# Patient Record
Sex: FEMALE | Race: WHITE | NOT HISPANIC OR LATINO | URBAN - METROPOLITAN AREA
[De-identification: names, ages, dates, MRNs, and addresses within clinical notes are randomized per-mention and may not be internally consistent; named-entity substitution may affect disease eponyms.]

---

## 2017-01-04 ENCOUNTER — EMERGENCY (EMERGENCY)
Facility: HOSPITAL | Age: 82
LOS: 0 days | Discharge: HOME | End: 2017-01-05
Admitting: INTERNAL MEDICINE

## 2017-06-27 DIAGNOSIS — Z90.710 ACQUIRED ABSENCE OF BOTH CERVIX AND UTERUS: ICD-10-CM

## 2017-06-27 DIAGNOSIS — E78.00 PURE HYPERCHOLESTEROLEMIA, UNSPECIFIED: ICD-10-CM

## 2017-06-27 DIAGNOSIS — Z90.89 ACQUIRED ABSENCE OF OTHER ORGANS: ICD-10-CM

## 2017-06-27 DIAGNOSIS — R05 COUGH: ICD-10-CM

## 2017-06-27 DIAGNOSIS — E03.9 HYPOTHYROIDISM, UNSPECIFIED: ICD-10-CM

## 2017-06-27 DIAGNOSIS — I50.9 HEART FAILURE, UNSPECIFIED: ICD-10-CM

## 2017-06-27 DIAGNOSIS — J40 BRONCHITIS, NOT SPECIFIED AS ACUTE OR CHRONIC: ICD-10-CM

## 2017-06-27 DIAGNOSIS — I48.91 UNSPECIFIED ATRIAL FIBRILLATION: ICD-10-CM

## 2017-06-27 DIAGNOSIS — I11.0 HYPERTENSIVE HEART DISEASE WITH HEART FAILURE: ICD-10-CM

## 2017-07-30 ENCOUNTER — INPATIENT (INPATIENT)
Facility: HOSPITAL | Age: 82
LOS: 1 days | Discharge: HOME HEALTH | End: 2017-08-01
Attending: INTERNAL MEDICINE

## 2017-07-30 DIAGNOSIS — I51.9 HEART DISEASE, UNSPECIFIED: ICD-10-CM

## 2017-07-30 DIAGNOSIS — R06.02 SHORTNESS OF BREATH: ICD-10-CM

## 2017-07-30 DIAGNOSIS — I27.2 OTHER SECONDARY PULMONARY HYPERTENSION: ICD-10-CM

## 2017-07-30 DIAGNOSIS — M94.0 CHONDROCOSTAL JUNCTION SYNDROME [TIETZE]: ICD-10-CM

## 2017-07-30 DIAGNOSIS — B02.9 ZOSTER WITHOUT COMPLICATIONS: ICD-10-CM

## 2017-07-30 DIAGNOSIS — R50.9 FEVER, UNSPECIFIED: ICD-10-CM

## 2017-07-30 DIAGNOSIS — G20 PARKINSON'S DISEASE: ICD-10-CM

## 2017-07-30 DIAGNOSIS — R05 COUGH: ICD-10-CM

## 2017-07-30 DIAGNOSIS — E03.9 HYPOTHYROIDISM, UNSPECIFIED: ICD-10-CM

## 2017-08-03 DIAGNOSIS — F41.9 ANXIETY DISORDER, UNSPECIFIED: ICD-10-CM

## 2017-08-03 DIAGNOSIS — I50.9 HEART FAILURE, UNSPECIFIED: ICD-10-CM

## 2017-08-03 DIAGNOSIS — Z90.711 ACQUIRED ABSENCE OF UTERUS WITH REMAINING CERVICAL STUMP: ICD-10-CM

## 2017-08-03 DIAGNOSIS — I11.0 HYPERTENSIVE HEART DISEASE WITH HEART FAILURE: ICD-10-CM

## 2017-08-03 DIAGNOSIS — E78.5 HYPERLIPIDEMIA, UNSPECIFIED: ICD-10-CM

## 2017-08-03 DIAGNOSIS — I48.91 UNSPECIFIED ATRIAL FIBRILLATION: ICD-10-CM

## 2017-08-03 DIAGNOSIS — I07.1 RHEUMATIC TRICUSPID INSUFFICIENCY: ICD-10-CM

## 2017-08-03 DIAGNOSIS — R07.9 CHEST PAIN, UNSPECIFIED: ICD-10-CM

## 2017-08-03 DIAGNOSIS — E03.9 HYPOTHYROIDISM, UNSPECIFIED: ICD-10-CM

## 2017-08-03 DIAGNOSIS — G20 PARKINSON'S DISEASE: ICD-10-CM

## 2017-10-12 ENCOUNTER — INPATIENT (INPATIENT)
Facility: HOSPITAL | Age: 82
LOS: 6 days | Discharge: NURSING, REHAB OR LONG-TERM CARE | End: 2017-10-19
Attending: INTERNAL MEDICINE

## 2017-10-12 DIAGNOSIS — R05 COUGH: ICD-10-CM

## 2017-10-12 DIAGNOSIS — B02.9 ZOSTER WITHOUT COMPLICATIONS: ICD-10-CM

## 2017-10-12 DIAGNOSIS — R06.02 SHORTNESS OF BREATH: ICD-10-CM

## 2017-10-12 DIAGNOSIS — R50.9 FEVER, UNSPECIFIED: ICD-10-CM

## 2017-10-12 DIAGNOSIS — G20 PARKINSON'S DISEASE: ICD-10-CM

## 2017-10-12 DIAGNOSIS — I51.9 HEART DISEASE, UNSPECIFIED: ICD-10-CM

## 2017-10-12 DIAGNOSIS — E03.9 HYPOTHYROIDISM, UNSPECIFIED: ICD-10-CM

## 2017-10-26 DIAGNOSIS — I25.10 ATHEROSCLEROTIC HEART DISEASE OF NATIVE CORONARY ARTERY WITHOUT ANGINA PECTORIS: ICD-10-CM

## 2017-10-26 DIAGNOSIS — J12.9 VIRAL PNEUMONIA, UNSPECIFIED: ICD-10-CM

## 2017-10-26 DIAGNOSIS — I50.9 HEART FAILURE, UNSPECIFIED: ICD-10-CM

## 2017-10-26 DIAGNOSIS — I48.91 UNSPECIFIED ATRIAL FIBRILLATION: ICD-10-CM

## 2017-10-26 DIAGNOSIS — Z86.19 PERSONAL HISTORY OF OTHER INFECTIOUS AND PARASITIC DISEASES: ICD-10-CM

## 2017-10-26 DIAGNOSIS — I11.0 HYPERTENSIVE HEART DISEASE WITH HEART FAILURE: ICD-10-CM

## 2017-10-26 DIAGNOSIS — F41.9 ANXIETY DISORDER, UNSPECIFIED: ICD-10-CM

## 2017-10-26 DIAGNOSIS — G93.41 METABOLIC ENCEPHALOPATHY: ICD-10-CM

## 2017-10-26 DIAGNOSIS — J12.1 RESPIRATORY SYNCYTIAL VIRUS PNEUMONIA: ICD-10-CM

## 2017-10-26 DIAGNOSIS — G20 PARKINSON'S DISEASE: ICD-10-CM

## 2017-10-26 DIAGNOSIS — F02.80 DEMENTIA IN OTHER DISEASES CLASSIFIED ELSEWHERE, UNSPECIFIED SEVERITY, WITHOUT BEHAVIORAL DISTURBANCE, PSYCHOTIC DISTURBANCE, MOOD DISTURBANCE, AND ANXIETY: ICD-10-CM

## 2017-10-26 DIAGNOSIS — E03.9 HYPOTHYROIDISM, UNSPECIFIED: ICD-10-CM

## 2018-02-15 ENCOUNTER — EMERGENCY (EMERGENCY)
Facility: HOSPITAL | Age: 83
LOS: 0 days | Discharge: HOME | End: 2018-02-16
Attending: EMERGENCY MEDICINE

## 2018-02-15 VITALS
SYSTOLIC BLOOD PRESSURE: 142 MMHG | TEMPERATURE: 98 F | HEART RATE: 67 BPM | DIASTOLIC BLOOD PRESSURE: 72 MMHG | OXYGEN SATURATION: 98 % | RESPIRATION RATE: 16 BRPM

## 2018-02-15 VITALS
WEIGHT: 139.99 LBS | SYSTOLIC BLOOD PRESSURE: 166 MMHG | OXYGEN SATURATION: 96 % | TEMPERATURE: 100 F | RESPIRATION RATE: 17 BRPM | HEART RATE: 58 BPM | HEIGHT: 60 IN | DIASTOLIC BLOOD PRESSURE: 76 MMHG

## 2018-02-15 DIAGNOSIS — M25.551 PAIN IN RIGHT HIP: ICD-10-CM

## 2018-02-15 DIAGNOSIS — I11.0 HYPERTENSIVE HEART DISEASE WITH HEART FAILURE: ICD-10-CM

## 2018-02-15 DIAGNOSIS — S22.41XA MULTIPLE FRACTURES OF RIBS, RIGHT SIDE, INITIAL ENCOUNTER FOR CLOSED FRACTURE: ICD-10-CM
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
## 2018-02-15 DIAGNOSIS — I50.9 HEART FAILURE, UNSPECIFIED: ICD-10-CM

## 2018-02-15 RX ORDER — OXYCODONE AND ACETAMINOPHEN 5; 325 MG/1; MG/1
1 TABLET ORAL ONCE
Qty: 0 | Refills: 0 | Status: COMPLETED | OUTPATIENT
Start: 2018-02-15 | End: 2018-02-15

## 2018-02-15 NOTE — ED PROVIDER NOTE - PHYSICAL EXAMINATION
CONSTITUTIONAL: Awake, alert. Well-developed; well-nourished; in no distress. Non-toxic appearing.   SKIN: No rash, vesicles/lesion, abrasions or lacerations. No ecchymosis or signs of trauma.   HEAD: Normocephalic; atraumatic. No step offs or tenderness.   EYES: Symmetrical, no discharge or signs of trauma. Conjunctiva and sclera clear.   ENT: Airway patent.   NECK: Supple; non-tender.   CARD: No chest wall deformity or tenderness. S1, S2 normal; no murmurs, gallops, or rubs. Regular rate and rhythm.  RESP: Good air movement. Lungs CTAB. No crackles, wheezes, rales or rhonchi.  ABD: Soft; non-distended; non-tender. No rebound/guarding/rigidity.   EXT: No bony deformity. + diffuse ttp along right rib. + ttp along anterior superior aspect of right hip. No shortening or external rotation.   NEURO: Strength 5/5 UE/LE b/l. No sensory deficits. n/v intact UE/LE b/l, pulses symmetrical.  PSYCH: Cooperative, appropriate.

## 2018-02-15 NOTE — ED PROVIDER NOTE - ATTENDING CONTRIBUTION TO CARE
s/p mech fall c/o right hip and rib pain. able to ambulate. no loc. on asa. no anticoags. in nad, ncat, perrl, eomi, cta, s1s2, ab soft, nt. tender right chest wall. no crep. tender right hip. FROM right hip with pain. nvi. non focal. will get imaging.

## 2018-02-15 NOTE — ED PROVIDER NOTE - PROGRESS NOTE DETAILS
Pt ambulating around ED. CT results discussed with pt. Instructed pt to f/u with PMD. Will give incentive spirometer & Percoets. Return precautions discussed and length and verbalized agreement. Pt ok with plan and comfortable with discharge.

## 2018-02-15 NOTE — ED PROVIDER NOTE - NS ED ROS FT
GENERAL: Denies fever/chills, loss of appetite/weight or fatigue.  SKIN: Denies rashes, abrasions, lacerations, ecchymosis, erythema, or edema.  HEAD: Denies headache, dizziness or trauma.  EYES: Denies blurry vision, diplopia, or photophobia.  CARDIAC: Denies chest pain, palpitations, or SOB.   RESPIRATORY: Denies SOB, cough, hemoptysis or wheezing.   GI: Denies abdominal pain, n/v/d.  MSK: + right hip pain, + right rib pain.   NEURO: Denies paresthesias, tingling or weakness.

## 2018-02-15 NOTE — ED PROVIDER NOTE - OBJECTIVE STATEMENT
Pt is an 84 y/o Female, PMHX of an irregular heartbeat, on 81mg of ASA, htn, presents to ED s/p fall complaining of right hip & right rib pain. Pt reports she was in the bathroom applying cream, when she lost her balance and fell onto her right side. Pt denies hitting her head or LOC. Has been ambulatory since injury. Denies bruising to area. Denies chest pain, SOB, abdominal pain, n/v, numbness, tingling, weakness.

## 2018-02-15 NOTE — ED PROVIDER NOTE - CARE PLAN
Principal Discharge DX:	Closed fracture of multiple ribs of right side, initial encounter  Secondary Diagnosis:	Fall, initial encounter  Secondary Diagnosis:	Pain of right hip joint

## 2018-02-16 RX ORDER — ACETAMINOPHEN WITH CODEINE 300MG-30MG
1 TABLET ORAL
Qty: 21 | Refills: 0 | OUTPATIENT
Start: 2018-02-16 | End: 2018-02-22

## 2018-04-19 ENCOUNTER — INPATIENT (INPATIENT)
Facility: HOSPITAL | Age: 83
LOS: 3 days | Discharge: HOME HEALTH | End: 2018-04-23
Attending: INTERNAL MEDICINE | Admitting: INTERNAL MEDICINE

## 2018-04-19 VITALS
WEIGHT: 132.94 LBS | DIASTOLIC BLOOD PRESSURE: 101 MMHG | SYSTOLIC BLOOD PRESSURE: 184 MMHG | RESPIRATION RATE: 20 BRPM | OXYGEN SATURATION: 95 % | TEMPERATURE: 99 F | HEIGHT: 62 IN | HEART RATE: 68 BPM

## 2018-04-19 DIAGNOSIS — R07.9 CHEST PAIN, UNSPECIFIED: ICD-10-CM

## 2018-04-19 DIAGNOSIS — I10 ESSENTIAL (PRIMARY) HYPERTENSION: ICD-10-CM

## 2018-04-19 DIAGNOSIS — I50.9 HEART FAILURE, UNSPECIFIED: ICD-10-CM

## 2018-04-19 LAB
ALBUMIN SERPL ELPH-MCNC: 4.2 G/DL — SIGNIFICANT CHANGE UP (ref 3.5–5.2)
ALP SERPL-CCNC: 108 U/L — SIGNIFICANT CHANGE UP (ref 30–115)
ALT FLD-CCNC: 8 U/L — SIGNIFICANT CHANGE UP (ref 0–41)
ANION GAP SERPL CALC-SCNC: 13 MMOL/L — SIGNIFICANT CHANGE UP (ref 7–14)
APTT BLD: 34.4 SEC — SIGNIFICANT CHANGE UP (ref 27–39.2)
AST SERPL-CCNC: 22 U/L — SIGNIFICANT CHANGE UP (ref 0–41)
BASOPHILS # BLD AUTO: 0.03 K/UL — SIGNIFICANT CHANGE UP (ref 0–0.2)
BASOPHILS NFR BLD AUTO: 0.6 % — SIGNIFICANT CHANGE UP (ref 0–1)
BILIRUB SERPL-MCNC: 0.8 MG/DL — SIGNIFICANT CHANGE UP (ref 0.2–1.2)
BUN SERPL-MCNC: 22 MG/DL — HIGH (ref 10–20)
CALCIUM SERPL-MCNC: 9.7 MG/DL — SIGNIFICANT CHANGE UP (ref 8.5–10.1)
CHLORIDE SERPL-SCNC: 103 MMOL/L — SIGNIFICANT CHANGE UP (ref 98–110)
CK SERPL-CCNC: 106 U/L — SIGNIFICANT CHANGE UP (ref 0–225)
CO2 SERPL-SCNC: 28 MMOL/L — SIGNIFICANT CHANGE UP (ref 17–32)
CREAT SERPL-MCNC: 1 MG/DL — SIGNIFICANT CHANGE UP (ref 0.7–1.5)
EOSINOPHIL # BLD AUTO: 0.18 K/UL — SIGNIFICANT CHANGE UP (ref 0–0.7)
EOSINOPHIL NFR BLD AUTO: 3.8 % — SIGNIFICANT CHANGE UP (ref 0–8)
GLUCOSE SERPL-MCNC: 106 MG/DL — HIGH (ref 70–99)
HCT VFR BLD CALC: 34.2 % — LOW (ref 37–47)
HGB BLD-MCNC: 11.2 G/DL — LOW (ref 12–16)
IMM GRANULOCYTES NFR BLD AUTO: 0.2 % — SIGNIFICANT CHANGE UP (ref 0.1–0.3)
INR BLD: 1.27 RATIO — SIGNIFICANT CHANGE UP (ref 0.65–1.3)
LIDOCAIN IGE QN: 30 U/L — SIGNIFICANT CHANGE UP (ref 7–60)
LYMPHOCYTES # BLD AUTO: 1.09 K/UL — LOW (ref 1.2–3.4)
LYMPHOCYTES # BLD AUTO: 22.8 % — SIGNIFICANT CHANGE UP (ref 20.5–51.1)
MCHC RBC-ENTMCNC: 27 PG — SIGNIFICANT CHANGE UP (ref 27–31)
MCHC RBC-ENTMCNC: 32.7 G/DL — SIGNIFICANT CHANGE UP (ref 32–37)
MCV RBC AUTO: 82.4 FL — SIGNIFICANT CHANGE UP (ref 81–99)
MONOCYTES # BLD AUTO: 0.5 K/UL — SIGNIFICANT CHANGE UP (ref 0.1–0.6)
MONOCYTES NFR BLD AUTO: 10.5 % — HIGH (ref 1.7–9.3)
NEUTROPHILS # BLD AUTO: 2.97 K/UL — SIGNIFICANT CHANGE UP (ref 1.4–6.5)
NEUTROPHILS NFR BLD AUTO: 62.1 % — SIGNIFICANT CHANGE UP (ref 42.2–75.2)
NRBC # BLD: 0 /100 WBCS — SIGNIFICANT CHANGE UP (ref 0–0)
NT-PROBNP SERPL-SCNC: 5271 PG/ML — HIGH (ref 0–300)
PLATELET # BLD AUTO: 174 K/UL — SIGNIFICANT CHANGE UP (ref 130–400)
POTASSIUM SERPL-MCNC: 3.9 MMOL/L — SIGNIFICANT CHANGE UP (ref 3.5–5)
POTASSIUM SERPL-SCNC: 3.9 MMOL/L — SIGNIFICANT CHANGE UP (ref 3.5–5)
PROT SERPL-MCNC: 6.6 G/DL — SIGNIFICANT CHANGE UP (ref 6–8)
PROTHROM AB SERPL-ACNC: 13.9 SEC — HIGH (ref 9.95–12.87)
RBC # BLD: 4.15 M/UL — LOW (ref 4.2–5.4)
RBC # FLD: 17.5 % — HIGH (ref 11.5–14.5)
SODIUM SERPL-SCNC: 144 MMOL/L — SIGNIFICANT CHANGE UP (ref 135–146)
TROPONIN T SERPL-MCNC: 0.02 NG/ML — HIGH
WBC # BLD: 4.78 K/UL — LOW (ref 4.8–10.8)
WBC # FLD AUTO: 4.78 K/UL — LOW (ref 4.8–10.8)

## 2018-04-19 RX ORDER — POTASSIUM CHLORIDE 20 MEQ
20 PACKET (EA) ORAL DAILY
Qty: 0 | Refills: 0 | Status: DISCONTINUED | OUTPATIENT
Start: 2018-04-19 | End: 2018-04-21

## 2018-04-19 RX ORDER — CARBIDOPA AND LEVODOPA 25; 100 MG/1; MG/1
1 TABLET ORAL THREE TIMES A DAY
Qty: 0 | Refills: 0 | Status: DISCONTINUED | OUTPATIENT
Start: 2018-04-19 | End: 2018-04-23

## 2018-04-19 RX ORDER — LEVOTHYROXINE SODIUM 125 MCG
75 TABLET ORAL DAILY
Qty: 0 | Refills: 0 | Status: DISCONTINUED | OUTPATIENT
Start: 2018-04-19 | End: 2018-04-23

## 2018-04-19 RX ORDER — ATORVASTATIN CALCIUM 80 MG/1
20 TABLET, FILM COATED ORAL AT BEDTIME
Qty: 0 | Refills: 0 | Status: DISCONTINUED | OUTPATIENT
Start: 2018-04-19 | End: 2018-04-23

## 2018-04-19 RX ORDER — CARVEDILOL PHOSPHATE 80 MG/1
25 CAPSULE, EXTENDED RELEASE ORAL EVERY 12 HOURS
Qty: 0 | Refills: 0 | Status: DISCONTINUED | OUTPATIENT
Start: 2018-04-19 | End: 2018-04-23

## 2018-04-19 RX ORDER — CARBIDOPA AND LEVODOPA 25; 100 MG/1; MG/1
1 TABLET ORAL DAILY
Qty: 0 | Refills: 0 | Status: DISCONTINUED | OUTPATIENT
Start: 2018-04-19 | End: 2018-04-23

## 2018-04-19 RX ORDER — HEPARIN SODIUM 5000 [USP'U]/ML
5000 INJECTION INTRAVENOUS; SUBCUTANEOUS EVERY 12 HOURS
Qty: 0 | Refills: 0 | Status: DISCONTINUED | OUTPATIENT
Start: 2018-04-19 | End: 2018-04-23

## 2018-04-19 RX ORDER — TEMAZEPAM 15 MG/1
15 CAPSULE ORAL AT BEDTIME
Qty: 0 | Refills: 0 | Status: DISCONTINUED | OUTPATIENT
Start: 2018-04-19 | End: 2018-04-23

## 2018-04-19 RX ORDER — DULOXETINE HYDROCHLORIDE 30 MG/1
60 CAPSULE, DELAYED RELEASE ORAL DAILY
Qty: 0 | Refills: 0 | Status: DISCONTINUED | OUTPATIENT
Start: 2018-04-19 | End: 2018-04-23

## 2018-04-19 RX ORDER — FOLIC ACID 0.8 MG
1 TABLET ORAL DAILY
Qty: 0 | Refills: 0 | Status: DISCONTINUED | OUTPATIENT
Start: 2018-04-19 | End: 2018-04-23

## 2018-04-19 RX ORDER — LOSARTAN POTASSIUM 100 MG/1
100 TABLET, FILM COATED ORAL DAILY
Qty: 0 | Refills: 0 | Status: DISCONTINUED | OUTPATIENT
Start: 2018-04-19 | End: 2018-04-23

## 2018-04-19 RX ORDER — ISOSORBIDE MONONITRATE 60 MG/1
30 TABLET, EXTENDED RELEASE ORAL DAILY
Qty: 0 | Refills: 0 | Status: DISCONTINUED | OUTPATIENT
Start: 2018-04-19 | End: 2018-04-23

## 2018-04-19 RX ORDER — FUROSEMIDE 40 MG
40 TABLET ORAL DAILY
Qty: 0 | Refills: 0 | Status: DISCONTINUED | OUTPATIENT
Start: 2018-04-19 | End: 2018-04-20

## 2018-04-19 RX ORDER — FAMOTIDINE 10 MG/ML
20 INJECTION INTRAVENOUS DAILY
Qty: 0 | Refills: 0 | Status: DISCONTINUED | OUTPATIENT
Start: 2018-04-19 | End: 2018-04-23

## 2018-04-19 RX ORDER — ASPIRIN/CALCIUM CARB/MAGNESIUM 324 MG
81 TABLET ORAL DAILY
Qty: 0 | Refills: 0 | Status: DISCONTINUED | OUTPATIENT
Start: 2018-04-19 | End: 2018-04-23

## 2018-04-19 RX ORDER — ASPIRIN/CALCIUM CARB/MAGNESIUM 324 MG
325 TABLET ORAL ONCE
Qty: 0 | Refills: 0 | Status: COMPLETED | OUTPATIENT
Start: 2018-04-19 | End: 2018-04-19

## 2018-04-19 RX ADMIN — Medication 325 MILLIGRAM(S): at 22:06

## 2018-04-19 NOTE — H&P ADULT - NSHPLABSRESULTS_GEN_ALL_CORE
11.2   4.78  )-----------( 174      ( 19 Apr 2018 18:52 )             34.2       04-19    144  |  103  |  22<H>  ----------------------------<  106<H>  3.9   |  28  |  1.0    Ca    9.7      19 Apr 2018 18:52    TPro  6.6  /  Alb  4.2  /  TBili  0.8  /  DBili  x   /  AST  22  /  ALT  8   /  AlkPhos  108  04-19                  PT/INR - ( 19 Apr 2018 18:52 )   PT: 13.90 sec;   INR: 1.27 ratio         PTT - ( 19 Apr 2018 18:52 )  PTT:34.4 sec    Lactate Trend      CARDIAC MARKERS ( 19 Apr 2018 18:52 )  x     / 0.02 ng/mL / 106 U/L / x     / x            CAPILLARY BLOOD GLUCOSE

## 2018-04-19 NOTE — ED PROVIDER NOTE - NS ED ROS FT
Constitutional:  No fever, chills, lethargy, or abnormal weight loss  Eyes:  No eye pain or visual changes  ENMT: No nasal discharge, no toothache, no sore throat. No neck pain or stiffness  Cardiac:  + chest pain  Respiratory:  No cough or respiratory distress.   GI:  No nausea, vomiting, diarrhea or abdominal pain.  :  No dysuria, frequency or burning.  MS:  No back or joint pain.  Neuro:  No headache. No numbness, weakness, or tingling.   Skin:  No skin rash  Except as documented in the HPI,  all other systems are negative

## 2018-04-19 NOTE — ED ADULT NURSE NOTE - CHIEF COMPLAINT QUOTE
Daughter states "she feels rapid heart rate chest pain on and off and right leg pain dr ktae office said to come I have crackles".

## 2018-04-19 NOTE — ED PROVIDER NOTE - PHYSICAL EXAMINATION
VITAL SIGNS: I have reviewed nursing notes and confirm.  CONSTITUTIONAL: elderly female, non-toxic, NAD  SKIN: Warm dry, normal skin turgor  HEAD: NCAT  EYES: EOMI, PERRLA, no scleral icterus  ENT: normal pharynx with no erythema or exudates  NECK: Supple; non tender. Full ROM. No cervical LAD  CARD: RRR, + systolic murmur  RESP: clear to ausculation b/l.  No rales, rhonchi, or wheezing.  ABD: soft, + BS, non-tender, non-distended, no rebound or guarding. No CVA tenderness  EXT: Full ROM, no bony tenderness, no pedal edema, no calf tenderness  NEURO: normal motor. normal sensory.   PSYCH: Cooperative, appropriate.

## 2018-04-19 NOTE — ED PROVIDER NOTE - OBJECTIVE STATEMENT
84yo F PMH HTN DL Hypothyroidism, CHF AFib no anticoagulation, Parkinson's who presents with chest pain.  No fever, chills, URI symptoms.  Patient has no leg swelling or calf pain. Patient reports pain as sharp, located to b/l chest, 6/10, improved with time, and non-radiating.

## 2018-04-19 NOTE — H&P ADULT - NSHPPHYSICALEXAM_GEN_ALL_CORE
Vital Signs Last 24 Hrs  T(C): 37.2 (04-19-18 @ 22:21)  T(F): 99 (04-19-18 @ 22:21), Max: 99 (04-19-18 @ 22:21)  HR: 88 (04-19-18 @ 22:21) (68 - 88)  BP: 131/79 (04-19-18 @ 22:21)  BP(mean): --  RR: 20 (04-19-18 @ 18:21) (20 - 20)  SpO2: 95% (04-19-18 @ 18:21) (95% - 95%)  Wt(kg): --

## 2018-04-19 NOTE — ED ADULT TRIAGE NOTE - CHIEF COMPLAINT QUOTE
Daughter states "she feels rapid heart rate chest pain on and off and right leg pain dr kate office said to come I have crackles".

## 2018-04-19 NOTE — ED PROVIDER NOTE - ATTENDING CONTRIBUTION TO CARE
84yo F PMH HTN CAD Dyslipidemia Hypothyroidism, CHF AFib no anticoagulation, Parkinson's who presents with chest pain since this AM, +nausea. Pain non-radiating, no SOB, f/c/v/d/, abd pain, numbness, weakness, no hx CAD. Cards Lefkovic. Denies hx DVT/PE, recent trauma/travel.    EKG reviewed and abnormal, but unchanged from prior (10/2017).  Appropiate labs sent. Patient is a IMTIAZ 3.  Dr. Curry spoken to and will admit to low risk tele for chest pain.

## 2018-04-20 LAB
ANION GAP SERPL CALC-SCNC: 13 MMOL/L — SIGNIFICANT CHANGE UP (ref 7–14)
ANION GAP SERPL CALC-SCNC: 16 MMOL/L — HIGH (ref 7–14)
BUN SERPL-MCNC: 20 MG/DL — SIGNIFICANT CHANGE UP (ref 10–20)
BUN SERPL-MCNC: 22 MG/DL — HIGH (ref 10–20)
CALCIUM SERPL-MCNC: 9.1 MG/DL — SIGNIFICANT CHANGE UP (ref 8.5–10.1)
CALCIUM SERPL-MCNC: 9.1 MG/DL — SIGNIFICANT CHANGE UP (ref 8.5–10.1)
CHLORIDE SERPL-SCNC: 103 MMOL/L — SIGNIFICANT CHANGE UP (ref 98–110)
CHLORIDE SERPL-SCNC: 106 MMOL/L — SIGNIFICANT CHANGE UP (ref 98–110)
CK MB CFR SERPL CALC: 2.3 NG/ML — SIGNIFICANT CHANGE UP (ref 0.6–6.3)
CK MB CFR SERPL CALC: 3.2 NG/ML — SIGNIFICANT CHANGE UP (ref 0.6–6.3)
CK SERPL-CCNC: 59 U/L — SIGNIFICANT CHANGE UP (ref 0–225)
CK SERPL-CCNC: 80 U/L — SIGNIFICANT CHANGE UP (ref 0–225)
CO2 SERPL-SCNC: 26 MMOL/L — SIGNIFICANT CHANGE UP (ref 17–32)
CO2 SERPL-SCNC: 30 MMOL/L — SIGNIFICANT CHANGE UP (ref 17–32)
CREAT SERPL-MCNC: 0.9 MG/DL — SIGNIFICANT CHANGE UP (ref 0.7–1.5)
CREAT SERPL-MCNC: 1.1 MG/DL — SIGNIFICANT CHANGE UP (ref 0.7–1.5)
GLUCOSE SERPL-MCNC: 172 MG/DL — HIGH (ref 70–99)
GLUCOSE SERPL-MCNC: 92 MG/DL — SIGNIFICANT CHANGE UP (ref 70–99)
HCT VFR BLD CALC: 34.9 % — LOW (ref 37–47)
HGB BLD-MCNC: 11.2 G/DL — LOW (ref 12–16)
MCHC RBC-ENTMCNC: 26.4 PG — LOW (ref 27–31)
MCHC RBC-ENTMCNC: 32.1 G/DL — SIGNIFICANT CHANGE UP (ref 32–37)
MCV RBC AUTO: 82.1 FL — SIGNIFICANT CHANGE UP (ref 81–99)
NRBC # BLD: 0 /100 WBCS — SIGNIFICANT CHANGE UP (ref 0–0)
PLATELET # BLD AUTO: 155 K/UL — SIGNIFICANT CHANGE UP (ref 130–400)
POTASSIUM SERPL-MCNC: 3.4 MMOL/L — LOW (ref 3.5–5)
POTASSIUM SERPL-MCNC: 4 MMOL/L — SIGNIFICANT CHANGE UP (ref 3.5–5)
POTASSIUM SERPL-SCNC: 3.4 MMOL/L — LOW (ref 3.5–5)
POTASSIUM SERPL-SCNC: 4 MMOL/L — SIGNIFICANT CHANGE UP (ref 3.5–5)
RBC # BLD: 4.25 M/UL — SIGNIFICANT CHANGE UP (ref 4.2–5.4)
RBC # FLD: 17.6 % — HIGH (ref 11.5–14.5)
SODIUM SERPL-SCNC: 146 MMOL/L — SIGNIFICANT CHANGE UP (ref 135–146)
SODIUM SERPL-SCNC: 148 MMOL/L — HIGH (ref 135–146)
TROPONIN T SERPL-MCNC: 0.01 NG/ML — SIGNIFICANT CHANGE UP
TROPONIN T SERPL-MCNC: <0.01 NG/ML — SIGNIFICANT CHANGE UP
WBC # BLD: 4.14 K/UL — LOW (ref 4.8–10.8)
WBC # FLD AUTO: 4.14 K/UL — LOW (ref 4.8–10.8)

## 2018-04-20 RX ORDER — FUROSEMIDE 40 MG
40 TABLET ORAL
Qty: 0 | Refills: 0 | Status: DISCONTINUED | OUTPATIENT
Start: 2018-04-20 | End: 2018-04-23

## 2018-04-20 RX ORDER — POTASSIUM CHLORIDE 20 MEQ
20 PACKET (EA) ORAL
Qty: 0 | Refills: 0 | Status: DISCONTINUED | OUTPATIENT
Start: 2018-04-20 | End: 2018-04-23

## 2018-04-20 RX ORDER — LABETALOL HCL 100 MG
100 TABLET ORAL ONCE
Qty: 0 | Refills: 0 | Status: COMPLETED | OUTPATIENT
Start: 2018-04-20 | End: 2018-04-20

## 2018-04-20 RX ADMIN — CARVEDILOL PHOSPHATE 25 MILLIGRAM(S): 80 CAPSULE, EXTENDED RELEASE ORAL at 06:50

## 2018-04-20 RX ADMIN — CARBIDOPA AND LEVODOPA 1 TABLET(S): 25; 100 TABLET ORAL at 17:22

## 2018-04-20 RX ADMIN — Medication 40 MILLIGRAM(S): at 06:50

## 2018-04-20 RX ADMIN — Medication 100 MILLIGRAM(S): at 06:50

## 2018-04-20 RX ADMIN — TEMAZEPAM 15 MILLIGRAM(S): 15 CAPSULE ORAL at 22:27

## 2018-04-20 RX ADMIN — CARBIDOPA AND LEVODOPA 1 TABLET(S): 25; 100 TABLET ORAL at 06:50

## 2018-04-20 RX ADMIN — DULOXETINE HYDROCHLORIDE 60 MILLIGRAM(S): 30 CAPSULE, DELAYED RELEASE ORAL at 11:28

## 2018-04-20 RX ADMIN — FAMOTIDINE 20 MILLIGRAM(S): 10 INJECTION INTRAVENOUS at 11:28

## 2018-04-20 RX ADMIN — Medication 40 MILLIGRAM(S): at 17:22

## 2018-04-20 RX ADMIN — CARBIDOPA AND LEVODOPA 1 TABLET(S): 25; 100 TABLET ORAL at 22:27

## 2018-04-20 RX ADMIN — Medication 20 MILLIEQUIVALENT(S): at 11:28

## 2018-04-20 RX ADMIN — Medication 81 MILLIGRAM(S): at 11:28

## 2018-04-20 RX ADMIN — Medication 1 MILLIGRAM(S): at 11:28

## 2018-04-20 RX ADMIN — Medication 75 MICROGRAM(S): at 06:50

## 2018-04-20 RX ADMIN — Medication 100 MILLIGRAM(S): at 17:27

## 2018-04-20 RX ADMIN — HEPARIN SODIUM 5000 UNIT(S): 5000 INJECTION INTRAVENOUS; SUBCUTANEOUS at 06:50

## 2018-04-20 RX ADMIN — HEPARIN SODIUM 5000 UNIT(S): 5000 INJECTION INTRAVENOUS; SUBCUTANEOUS at 17:23

## 2018-04-20 RX ADMIN — Medication 100 MILLIGRAM(S): at 01:55

## 2018-04-20 RX ADMIN — ISOSORBIDE MONONITRATE 30 MILLIGRAM(S): 60 TABLET, EXTENDED RELEASE ORAL at 11:28

## 2018-04-20 RX ADMIN — LOSARTAN POTASSIUM 100 MILLIGRAM(S): 100 TABLET, FILM COATED ORAL at 06:50

## 2018-04-20 RX ADMIN — CARVEDILOL PHOSPHATE 25 MILLIGRAM(S): 80 CAPSULE, EXTENDED RELEASE ORAL at 17:22

## 2018-04-20 RX ADMIN — ATORVASTATIN CALCIUM 20 MILLIGRAM(S): 80 TABLET, FILM COATED ORAL at 22:27

## 2018-04-20 RX ADMIN — Medication 20 MILLIEQUIVALENT(S): at 17:23

## 2018-04-20 NOTE — PROGRESS NOTE ADULT - ASSESSMENT
a/p -     sob / cp / - r/o acute mi ; continue meds lasix IV replace K +     hypothyroidism - continue meds    parkinsons - stable

## 2018-04-20 NOTE — PROGRESS NOTE ADULT - SUBJECTIVE AND OBJECTIVE BOX
85y female who presents with sob / chest pain ; pmhx - cad / chf/ parkinson's / hypothyroidism - feeling somewhat better     MEDICATIONS  (STANDING):  aspirin  chewable 81 milliGRAM(s) Oral daily  atorvastatin 20 milliGRAM(s) Oral at bedtime  carbidopa/levodopa  10/100 1 Tablet(s) Oral three times a day  carbidopa/levodopa  25/100 1 Tablet(s) Oral daily  carvedilol 25 milliGRAM(s) Oral every 12 hours  DULoxetine 60 milliGRAM(s) Oral daily  famotidine    Tablet 20 milliGRAM(s) Oral daily  folic acid 1 milliGRAM(s) Oral daily  furosemide    Tablet 40 milliGRAM(s) Oral daily  heparin  Injectable 5000 Unit(s) SubCutaneous every 12 hours  isosorbide   mononitrate ER Tablet (IMDUR) 30 milliGRAM(s) Oral daily  levothyroxine 75 MICROGram(s) Oral daily  losartan 100 milliGRAM(s) Oral daily  potassium chloride   Powder 20 milliEquivalent(s) Oral daily  pregabalin 100 milliGRAM(s) Oral two times a day    MEDICATIONS  (PRN):  temazepam 15 milliGRAM(s) Oral at bedtime PRN Insomnia    Vital Signs Last 24 Hrs  T(C): 36 (20 Apr 2018 14:00), Max: 37.2 (19 Apr 2018 22:21)  T(F): 96.8 (20 Apr 2018 14:00), Max: 99 (19 Apr 2018 22:21)  HR: 65 (20 Apr 2018 14:00) (55 - 88)  BP: 142/65 (20 Apr 2018 14:00) (131/79 - 196/80)  BP(mean): --  RR: 16 (20 Apr 2018 14:00) (16 - 20)  SpO2: 95% (19 Apr 2018 18:21) (95% - 95%)    Sitting up in BED - edentulous wearing glasses   lung - bilateral air entry  card - s1s2   abd - soft + bs  ext - no cyanosis   neuro alert awake no focal deficits     xray and ct reviewed     04-20    148<H>  |  106  |  20  ----------------------------<  92  3.4<L>   |  26  |  0.9    Ca    9.1      20 Apr 2018 07:01    TPro  6.6  /  Alb  4.2  /  TBili  0.8  /  DBili  x   /  AST  22  /  ALT  8   /  AlkPhos  108  04-19    CARDIAC MARKERS ( 20 Apr 2018 11:19 )  x     / <0.01 ng/mL / 59 U/L / x     / 2.3 ng/mL  CARDIAC MARKERS ( 20 Apr 2018 03:30 )  x     / 0.01 ng/mL / 80 U/L / x     / 3.2 ng/mL  CARDIAC MARKERS ( 19 Apr 2018 18:52 )  x     / 0.02 ng/mL / 106 U/L / x     / x

## 2018-04-21 LAB
HCT VFR BLD CALC: 35.6 % — LOW (ref 37–47)
HGB BLD-MCNC: 11.5 G/DL — LOW (ref 12–16)
MCHC RBC-ENTMCNC: 26.3 PG — LOW (ref 27–31)
MCHC RBC-ENTMCNC: 32.3 G/DL — SIGNIFICANT CHANGE UP (ref 32–37)
MCV RBC AUTO: 81.3 FL — SIGNIFICANT CHANGE UP (ref 81–99)
NRBC # BLD: 0 /100 WBCS — SIGNIFICANT CHANGE UP (ref 0–0)
NT-PROBNP SERPL-SCNC: 3011 PG/ML — HIGH (ref 0–300)
PLATELET # BLD AUTO: 178 K/UL — SIGNIFICANT CHANGE UP (ref 130–400)
RBC # BLD: 4.38 M/UL — SIGNIFICANT CHANGE UP (ref 4.2–5.4)
RBC # FLD: 17.1 % — HIGH (ref 11.5–14.5)
WBC # BLD: 5.09 K/UL — SIGNIFICANT CHANGE UP (ref 4.8–10.8)
WBC # FLD AUTO: 5.09 K/UL — SIGNIFICANT CHANGE UP (ref 4.8–10.8)

## 2018-04-21 RX ADMIN — CARBIDOPA AND LEVODOPA 1 TABLET(S): 25; 100 TABLET ORAL at 05:40

## 2018-04-21 RX ADMIN — CARBIDOPA AND LEVODOPA 1 TABLET(S): 25; 100 TABLET ORAL at 22:08

## 2018-04-21 RX ADMIN — CARBIDOPA AND LEVODOPA 1 TABLET(S): 25; 100 TABLET ORAL at 15:37

## 2018-04-21 RX ADMIN — CARBIDOPA AND LEVODOPA 1 TABLET(S): 25; 100 TABLET ORAL at 12:07

## 2018-04-21 RX ADMIN — CARVEDILOL PHOSPHATE 25 MILLIGRAM(S): 80 CAPSULE, EXTENDED RELEASE ORAL at 18:14

## 2018-04-21 RX ADMIN — ATORVASTATIN CALCIUM 20 MILLIGRAM(S): 80 TABLET, FILM COATED ORAL at 22:07

## 2018-04-21 RX ADMIN — TEMAZEPAM 15 MILLIGRAM(S): 15 CAPSULE ORAL at 22:12

## 2018-04-21 RX ADMIN — DULOXETINE HYDROCHLORIDE 60 MILLIGRAM(S): 30 CAPSULE, DELAYED RELEASE ORAL at 12:07

## 2018-04-21 RX ADMIN — LOSARTAN POTASSIUM 100 MILLIGRAM(S): 100 TABLET, FILM COATED ORAL at 05:40

## 2018-04-21 RX ADMIN — Medication 40 MILLIGRAM(S): at 18:15

## 2018-04-21 RX ADMIN — Medication 100 MILLIGRAM(S): at 18:14

## 2018-04-21 RX ADMIN — Medication 40 MILLIGRAM(S): at 05:40

## 2018-04-21 RX ADMIN — HEPARIN SODIUM 5000 UNIT(S): 5000 INJECTION INTRAVENOUS; SUBCUTANEOUS at 18:15

## 2018-04-21 RX ADMIN — Medication 1 MILLIGRAM(S): at 12:07

## 2018-04-21 RX ADMIN — Medication 81 MILLIGRAM(S): at 12:07

## 2018-04-21 RX ADMIN — Medication 75 MICROGRAM(S): at 05:39

## 2018-04-21 RX ADMIN — Medication 20 MILLIEQUIVALENT(S): at 18:15

## 2018-04-21 RX ADMIN — CARVEDILOL PHOSPHATE 25 MILLIGRAM(S): 80 CAPSULE, EXTENDED RELEASE ORAL at 05:40

## 2018-04-21 RX ADMIN — Medication 20 MILLIEQUIVALENT(S): at 05:40

## 2018-04-21 RX ADMIN — Medication 100 MILLIGRAM(S): at 05:40

## 2018-04-21 RX ADMIN — HEPARIN SODIUM 5000 UNIT(S): 5000 INJECTION INTRAVENOUS; SUBCUTANEOUS at 05:40

## 2018-04-21 RX ADMIN — ISOSORBIDE MONONITRATE 30 MILLIGRAM(S): 60 TABLET, EXTENDED RELEASE ORAL at 12:08

## 2018-04-21 RX ADMIN — FAMOTIDINE 20 MILLIGRAM(S): 10 INJECTION INTRAVENOUS at 12:07

## 2018-04-21 NOTE — PROGRESS NOTE ADULT - ASSESSMENT
chest pain, SOB due to acute on chronic CHF, CE negative  chest pain due to thoracic rib cage pain and rib fx  Hx of HTN, ASHD, CAD, afiv, CHF, cardiomegaly  Hx of CKD  Hx of Hyperlipidemia  Hx of Hypothyroidism  Hx of Parkinsons  Hx of oa, DDD, DJD, osteoporosis, ribx, Th and lumbar vertebral compression fx  hx of dec hearing and visual acuity  Hx of anxiety, depression, insomnia  Hx of GERD, diverticulosis  Advanced age  Edentulous state      gentle IV diuresis, O2 if needed  cont all meds  change diet to 2gm, heart healthy soft diet with chopped meat, add ensure pudding, yogurt for PM snack  monitor renal parameters and electrolytes and replete as needed  GI and DVT prophylaxis  oral and skin care  rehab for PT

## 2018-04-21 NOTE — PROGRESS NOTE ADULT - SUBJECTIVE AND OBJECTIVE BOX
BOB ENGLE 84yo W Female ad via the ER from home for c/o CP and SOB increasing x 2 days due to exacerbation of acute on chronic CHF.  The pt placed on IV diuretics with some improvement of sx.  Pt on tele with elevated BNP.  The CE are negative. EKG showed rate controlled  afib. The pt has the ff PMHx:  HTN, ASHD, CAD, CHF afib, CKD, Hyperlipidemia, Hypothyroidism, Parkinsons, OA, DDD, DJD, nondisplaced rib fx, comp fx of lower thoracic and lumbar vertebrae, osteoporosis, GERD, diverticulosis, dec visual acuity, dec hearing.    INTERVAL HPI/OVERNIGHT EVENTS:  pt feel better, less SOB    MEDICATIONS  (STANDING):  aspirin  chewable 81 milliGRAM(s) Oral daily  atorvastatin 20 milliGRAM(s) Oral at bedtime  carbidopa/levodopa  10/100 1 Tablet(s) Oral three times a day  carbidopa/levodopa  25/100 1 Tablet(s) Oral daily  carvedilol 25 milliGRAM(s) Oral every 12 hours  DULoxetine 60 milliGRAM(s) Oral daily  famotidine    Tablet 20 milliGRAM(s) Oral daily  folic acid 1 milliGRAM(s) Oral daily  furosemide    Tablet 40 milliGRAM(s) Oral two times a day  heparin  Injectable 5000 Unit(s) SubCutaneous every 12 hours  isosorbide   mononitrate ER Tablet (IMDUR) 30 milliGRAM(s) Oral daily  levothyroxine 75 MICROGram(s) Oral daily  losartan 100 milliGRAM(s) Oral daily  potassium chloride    Tablet ER 20 milliEquivalent(s) Oral two times a day  pregabalin 100 milliGRAM(s) Oral two times a day    MEDICATIONS  (PRN):  temazepam 15 milliGRAM(s) Oral at bedtime PRN Insomnia      Allergies    No Known Allergies  	    Vital Signs Last 24 Hrs  T(C): 36.3 (21 Apr 2018 14:15), Max: 36.3 (21 Apr 2018 14:15)  T(F): 97.4 (21 Apr 2018 14:15), Max: 97.4 (21 Apr 2018 14:15)  HR: 55 (21 Apr 2018 14:15) (50 - 62)  BP: 148/63 (21 Apr 2018 14:15) (148/63 - 164/72)    RR: 16 (21 Apr 2018 14:15) (16 - 16)      PHYSICAL EXAM:      Constitutional:  pt alert, oriented, thin and frail elderly WF, NAD    Eyes: normal    ENMT: edentulous state    Neck:  supple, + JVD    Back:  kelly rib cage, + TH kyphosis    Respiratory:  shallow respirations, scattered rhonchi    Cardiovascular:  S1S2 irreg, gr II/VI OZ    Gastrointestinal:  globose, soft and benign    Extremities:  moves all ext, dec motor strength of lower ext, mild pedal edema    Skin:  no rask, ecchymosis or laceration    Lymph Nodes:  not enlarged    Musculoskeletal:  poor mm mass and tone      LABS:                        11.5   5.09  )-----------( 178      ( 21 Apr 2018 06:57 )             35.6     04-20    146  |  103  |  22<H>  ----------------------------<  172<H>  4.0   |  30  |  1.1    GFR 46  Ca    9.1      20 Apr 2018 19:33  CE negative x 3  pro BNP 3011    TPro  6.6  /  Alb  4.2  /  TBili  0.8  /  DBili  x   /  AST  22  /  ALT  8   /  AlkPhos  108  04-19    PT/INR - ( 19 Apr 2018 18:52 )   PT: 13.90 sec;   INR: 1.27 ratio         PTT - ( 19 Apr 2018 18:52 )  PTT:34.4 sec      RADIOLOGY & ADDITIONAL TESTS:    CXR:  inc vasc markings, cardiomegaly, bibasilar atelectasis  Venous duplex of lower ext:  neg for DVT  EKG:  afib 80/min no acute changes  CT of R hip:  no fx, deg and arthritic cjanges  CT of chest:  cardiomegaly, mediastinal LN up to 1.4cm, enlarge pul a, min displaced 9th and 10th rib fx, no pneumo, lower thoracic and lumbar comp vertebral fx

## 2018-04-22 RX ADMIN — CARBIDOPA AND LEVODOPA 1 TABLET(S): 25; 100 TABLET ORAL at 21:34

## 2018-04-22 RX ADMIN — ATORVASTATIN CALCIUM 20 MILLIGRAM(S): 80 TABLET, FILM COATED ORAL at 21:34

## 2018-04-22 RX ADMIN — Medication 81 MILLIGRAM(S): at 11:47

## 2018-04-22 RX ADMIN — Medication 75 MICROGRAM(S): at 06:22

## 2018-04-22 RX ADMIN — Medication 20 MILLIEQUIVALENT(S): at 06:22

## 2018-04-22 RX ADMIN — TEMAZEPAM 15 MILLIGRAM(S): 15 CAPSULE ORAL at 21:34

## 2018-04-22 RX ADMIN — Medication 40 MILLIGRAM(S): at 17:56

## 2018-04-22 RX ADMIN — DULOXETINE HYDROCHLORIDE 60 MILLIGRAM(S): 30 CAPSULE, DELAYED RELEASE ORAL at 11:47

## 2018-04-22 RX ADMIN — CARVEDILOL PHOSPHATE 25 MILLIGRAM(S): 80 CAPSULE, EXTENDED RELEASE ORAL at 17:56

## 2018-04-22 RX ADMIN — Medication 20 MILLIEQUIVALENT(S): at 17:56

## 2018-04-22 RX ADMIN — HEPARIN SODIUM 5000 UNIT(S): 5000 INJECTION INTRAVENOUS; SUBCUTANEOUS at 17:56

## 2018-04-22 RX ADMIN — HEPARIN SODIUM 5000 UNIT(S): 5000 INJECTION INTRAVENOUS; SUBCUTANEOUS at 06:25

## 2018-04-22 RX ADMIN — CARVEDILOL PHOSPHATE 25 MILLIGRAM(S): 80 CAPSULE, EXTENDED RELEASE ORAL at 06:22

## 2018-04-22 RX ADMIN — Medication 1 MILLIGRAM(S): at 11:47

## 2018-04-22 RX ADMIN — CARBIDOPA AND LEVODOPA 1 TABLET(S): 25; 100 TABLET ORAL at 11:47

## 2018-04-22 RX ADMIN — Medication 100 MILLIGRAM(S): at 06:25

## 2018-04-22 RX ADMIN — FAMOTIDINE 20 MILLIGRAM(S): 10 INJECTION INTRAVENOUS at 11:46

## 2018-04-22 RX ADMIN — ISOSORBIDE MONONITRATE 30 MILLIGRAM(S): 60 TABLET, EXTENDED RELEASE ORAL at 11:47

## 2018-04-22 RX ADMIN — Medication 100 MILLIGRAM(S): at 18:03

## 2018-04-22 RX ADMIN — Medication 40 MILLIGRAM(S): at 06:22

## 2018-04-22 RX ADMIN — CARBIDOPA AND LEVODOPA 1 TABLET(S): 25; 100 TABLET ORAL at 06:22

## 2018-04-22 RX ADMIN — LOSARTAN POTASSIUM 100 MILLIGRAM(S): 100 TABLET, FILM COATED ORAL at 06:22

## 2018-04-22 NOTE — PROGRESS NOTE ADULT - SUBJECTIVE AND OBJECTIVE BOX
BOB ENGLE 86yo W Female ad via the ER from home for c/o CP and SOB increasing x 2 days due to exacerbation of acute on chronic CHF.  The pt placed on IV diuretics with some improvement of sx.  Pt on tele with elevated BNP.  The CE are negative. EKG showed rate controlled  afib. The pt has the ff PMHx:  HTN, ASHD, CAD, CHF afib, CKD, Hyperlipidemia, Hypothyroidism, Parkinsons, OA, DDD, DJD, nondisplaced rib fx, comp fx of lower thoracic and lumbar vertebrae, osteoporosis, GERD, diverticulosis, dec visual acuity, dec hearing.    INTERVAL HPI/OVERNIGHT EVENTS: improving respiratory status, no CP    MEDICATIONS  (STANDING):  aspirin  chewable 81 milliGRAM(s) Oral daily  atorvastatin 20 milliGRAM(s) Oral at bedtime  carbidopa/levodopa  10/100 1 Tablet(s) Oral three times a day  carbidopa/levodopa  25/100 1 Tablet(s) Oral daily  carvedilol 25 milliGRAM(s) Oral every 12 hours  DULoxetine 60 milliGRAM(s) Oral daily  famotidine    Tablet 20 milliGRAM(s) Oral daily  folic acid 1 milliGRAM(s) Oral daily  furosemide    Tablet 40 milliGRAM(s) Oral two times a day  heparin  Injectable 5000 Unit(s) SubCutaneous every 12 hours  isosorbide   mononitrate ER Tablet (IMDUR) 30 milliGRAM(s) Oral daily  levothyroxine 75 MICROGram(s) Oral daily  losartan 100 milliGRAM(s) Oral daily  potassium chloride    Tablet ER 20 milliEquivalent(s) Oral two times a day  pregabalin 100 milliGRAM(s) Oral two times a day    MEDICATIONS  (PRN):  temazepam 15 milliGRAM(s) Oral at bedtime PRN Insomnia      Allergies    No Known Allergies  	    Vital Signs Last 24 Hrs    T(F): 96.2  HR: 63  BP: 164/71    RR: 18      PHYSICAL EXAM:      Constitutional:  pt alert, oriented, thin and frail elderly WF, NAD    Eyes: normal    ENMT: edentulous state, dry oral mucosa    Neck:  supple, + JVD    Back:  kelly rib cage, + TH kyphosis    Respiratory:  shallow respirations, scattered rhonchi    Cardiovascular:  S1S2 irreg, gr II/VI OZ    Gastrointestinal:  globose, soft and benign    Extremities:  moves all ext, dec motor strength of lower ext, mild pedal edema    Skin:  no rask, ecchymosis or laceration    Lymph Nodes:  not enlarged    Musculoskeletal:  poor mm mass and tone      LABS:                        11.5   5.09  )-----------( 178                   35.6         146  |  103  |  22<H>  ----------------------------<  172<H>  4.0   |  30  |  1.1    GFR 46  Ca    9.1      20 Apr 2018 19:33  CE negative x 3  pro BNP 3011    TPro  6.6  /  Alb  4.2  /  TBili  0.8  /  DBili  x   /  AST  22  /  ALT  8   /  AlkPhos  108  04-19    PT/INR - ( 19 Apr 2018 18:52 )   PT: 13.90 sec;   INR: 1.27 ratio         PTT - ( 19 Apr 2018 18:52 )  PTT:34.4 sec      RADIOLOGY & ADDITIONAL TESTS:    CXR:  inc vasc markings, cardiomegaly, bibasilar atelectasis  Venous duplex of lower ext:  neg for DVT  EKG:  afib 80/min no acute changes  CT of R hip:  no fx, deg and arthritic cjanges  CT of chest:  cardiomegaly, mediastinal LN up to 1.4cm, enlarge pul a, min displaced 9th and 10th rib fx, no pneumo, lower thoracic and lumbar comp vertebral fx

## 2018-04-22 NOTE — PROGRESS NOTE ADULT - ASSESSMENT
chest pain, SOB due to acute on chronic CHF, CE negative  chest pain due to thoracic rib cage pain and rib fx  Hx of HTN, ASHD, CAD, afiv, CHF, cardiomegaly  Hx of CKD  Hx of Hyperlipidemia  Hx of Hypothyroidism  Hx of Parkinsons  Hx of oa, DDD, DJD, osteoporosis, ribx, Th and lumbar vertebral compression fx  hx of dec hearing and visual acuity  Hx of anxiety, depression, insomnia  Hx of GERD, diverticulosis  Advanced age  Edentulous state      gentle IV diuresis, O2 if needed  cont all meds  change diet to 2gm, heart healthy soft diet with chopped meat, add ensure pudding, yogurt for PM snack  monitor renal parameters and electrolytes and replete as needed  GI and DVT prophylaxis  oral and skin care  rehab for PT  social service consult for safe disposition

## 2018-04-23 VITALS
RESPIRATION RATE: 16 BRPM | DIASTOLIC BLOOD PRESSURE: 63 MMHG | SYSTOLIC BLOOD PRESSURE: 126 MMHG | TEMPERATURE: 99 F | HEART RATE: 54 BPM

## 2018-04-23 RX ADMIN — ISOSORBIDE MONONITRATE 30 MILLIGRAM(S): 60 TABLET, EXTENDED RELEASE ORAL at 11:46

## 2018-04-23 RX ADMIN — CARBIDOPA AND LEVODOPA 1 TABLET(S): 25; 100 TABLET ORAL at 11:47

## 2018-04-23 RX ADMIN — CARVEDILOL PHOSPHATE 25 MILLIGRAM(S): 80 CAPSULE, EXTENDED RELEASE ORAL at 05:33

## 2018-04-23 RX ADMIN — Medication 20 MILLIEQUIVALENT(S): at 17:18

## 2018-04-23 RX ADMIN — Medication 75 MICROGRAM(S): at 05:33

## 2018-04-23 RX ADMIN — DULOXETINE HYDROCHLORIDE 60 MILLIGRAM(S): 30 CAPSULE, DELAYED RELEASE ORAL at 11:46

## 2018-04-23 RX ADMIN — FAMOTIDINE 20 MILLIGRAM(S): 10 INJECTION INTRAVENOUS at 11:46

## 2018-04-23 RX ADMIN — HEPARIN SODIUM 5000 UNIT(S): 5000 INJECTION INTRAVENOUS; SUBCUTANEOUS at 05:32

## 2018-04-23 RX ADMIN — Medication 81 MILLIGRAM(S): at 11:47

## 2018-04-23 RX ADMIN — Medication 1 MILLIGRAM(S): at 11:46

## 2018-04-23 RX ADMIN — CARBIDOPA AND LEVODOPA 1 TABLET(S): 25; 100 TABLET ORAL at 17:19

## 2018-04-23 RX ADMIN — LOSARTAN POTASSIUM 100 MILLIGRAM(S): 100 TABLET, FILM COATED ORAL at 05:33

## 2018-04-23 RX ADMIN — Medication 100 MILLIGRAM(S): at 05:33

## 2018-04-23 RX ADMIN — Medication 40 MILLIGRAM(S): at 17:18

## 2018-04-23 RX ADMIN — CARBIDOPA AND LEVODOPA 1 TABLET(S): 25; 100 TABLET ORAL at 05:33

## 2018-04-23 RX ADMIN — Medication 40 MILLIGRAM(S): at 05:33

## 2018-04-23 RX ADMIN — Medication 20 MILLIEQUIVALENT(S): at 05:32

## 2018-04-23 RX ADMIN — Medication 100 MILLIGRAM(S): at 17:21

## 2018-04-23 RX ADMIN — HEPARIN SODIUM 5000 UNIT(S): 5000 INJECTION INTRAVENOUS; SUBCUTANEOUS at 17:18

## 2018-04-23 RX ADMIN — CARVEDILOL PHOSPHATE 25 MILLIGRAM(S): 80 CAPSULE, EXTENDED RELEASE ORAL at 17:18

## 2018-04-23 NOTE — DISCHARGE NOTE ADULT - HOSPITAL COURSE
85y female admitted with chest pain and sob ; r/o m/i - placed on telemetry diuresis   Vital Signs Last 24 Hrs  T(C): 36.7 (23 Apr 2018 06:18), Max: 36.7 (23 Apr 2018 06:18)  T(F): 98 (23 Apr 2018 06:18), Max: 98 (23 Apr 2018 06:18)  HR: 58 (23 Apr 2018 06:18) (58 - 72)  BP: 155/67 (23 Apr 2018 06:18) (131/61 - 155/67)  BP(mean): --  RR: 16 (23 Apr 2018 06:18) (16 - 16)  SpO2: --    alert awake oriented   lung bilateral air entry   card s1 s2     improved for d/c

## 2018-04-23 NOTE — DISCHARGE NOTE ADULT - PATIENT PORTAL LINK FT
You can access the [facility] Portal, offered by [facility], by registering with the following website: [email or web address]

## 2018-04-23 NOTE — DISCHARGE NOTE ADULT - MEDICATION SUMMARY - MEDICATIONS TO TAKE
I will START or STAY ON the medications listed below when I get home from the hospital:    aspirin 81 mg oral tablet  -- 1 tab(s) by mouth once a day  -- Indication: For Congestive heart failure    losartan 100 mg oral tablet  -- 1 tab(s) by mouth once a day  -- Indication: For Congestive heart failure    isosorbide mononitrate 30 mg oral tablet, extended release  -- 1 tab(s) by mouth once a day (in the morning)  -- Indication: For Congestive heart failure    Lyrica 100 mg oral capsule  -- 1 cap(s) by mouth 2 times a day  -- Indication: For neuropathy    Cymbalta 60 mg oral delayed release capsule  -- 1 cap(s) by mouth 2 times a day  -- Indication: For mood     pravastatin 10 mg oral tablet  -- 1 tab(s) by mouth once a day  -- Indication: For Congestive heart failure    carbidopa-levodopa 25 mg-100 mg oral tablet  -- 1 tab(s) by mouth once a day  -- Indication: For Congestive heart failure    carbidopa-levodopa 10 mg-100 mg oral tablet  -- 1 tab(s) by mouth 2 times a day  -- Indication: For parkinsons    temazepam 30 mg oral capsule  -- 1 cap(s) by mouth once a day (at bedtime)  -- Indication: For sleep    carvedilol 25 mg oral tablet  -- 1 tab(s) by mouth 2 times a day  -- Indication: For parkinson    Lasix 40 mg oral tablet  -- 1 tab(s) by mouth once a day  -- Indication: For Congestive heart failure    Zantac 300 oral tablet  -- 1 tab(s) by mouth once a day (at bedtime)  -- Indication: For gerd    Klor-Con 20 mEq oral powder for reconstitution  -- 1 each by mouth once a day (at bedtime)  -- Indication: For electrolyte supplement    levothyroxine 75 mcg (0.075 mg) oral tablet  -- 1 tab(s) by mouth once a day  -- Indication: For Hypothyroidism    folic acid 1 mg oral tablet  -- 1 tab(s) by mouth once a day  -- Indication: For supplement

## 2018-04-23 NOTE — DISCHARGE NOTE ADULT - CARE PLAN
Principal Discharge DX:	Congestive heart failure  Goal:	improvement  Assessment and plan of treatment:	monitor weights and fluid continue medications

## 2018-04-23 NOTE — DISCHARGE NOTE ADULT - CARE PROVIDER_API CALL
Unruly Curry), Internal Medicine  [address]  [address]  [place] [address]  Phone: [phone]  Fax: [phone]    Yonas Scott), Cardiovascular Disease; Internal Medicine  [address] [place] [address]  Phone: [phone]  Fax: [phone]

## 2018-05-08 DIAGNOSIS — F41.9 ANXIETY DISORDER, UNSPECIFIED: ICD-10-CM

## 2018-05-08 DIAGNOSIS — I13.0 HYPERTENSIVE HEART AND CHRONIC KIDNEY DISEASE WITH HEART FAILURE AND STAGE 1 THROUGH STAGE 4 CHRONIC KIDNEY DISEASE, OR UNSPECIFIED CHRONIC KIDNEY DISEASE: ICD-10-CM

## 2018-05-08 DIAGNOSIS — N18.9 CHRONIC KIDNEY DISEASE, UNSPECIFIED: ICD-10-CM

## 2018-05-08 DIAGNOSIS — E78.5 HYPERLIPIDEMIA, UNSPECIFIED: ICD-10-CM

## 2018-05-08 DIAGNOSIS — I48.91 UNSPECIFIED ATRIAL FIBRILLATION: ICD-10-CM

## 2018-05-08 DIAGNOSIS — E03.9 HYPOTHYROIDISM, UNSPECIFIED: ICD-10-CM

## 2018-05-08 DIAGNOSIS — I50.33 ACUTE ON CHRONIC DIASTOLIC (CONGESTIVE) HEART FAILURE: ICD-10-CM

## 2018-05-08 DIAGNOSIS — M19.90 UNSPECIFIED OSTEOARTHRITIS, UNSPECIFIED SITE: ICD-10-CM

## 2018-05-08 DIAGNOSIS — K21.9 GASTRO-ESOPHAGEAL REFLUX DISEASE WITHOUT ESOPHAGITIS: ICD-10-CM

## 2018-05-08 DIAGNOSIS — G47.00 INSOMNIA, UNSPECIFIED: ICD-10-CM

## 2018-05-08 DIAGNOSIS — I51.7 CARDIOMEGALY: ICD-10-CM

## 2018-05-08 DIAGNOSIS — R07.9 CHEST PAIN, UNSPECIFIED: ICD-10-CM

## 2018-05-08 DIAGNOSIS — F32.9 MAJOR DEPRESSIVE DISORDER, SINGLE EPISODE, UNSPECIFIED: ICD-10-CM

## 2018-05-08 DIAGNOSIS — G20 PARKINSON'S DISEASE: ICD-10-CM

## 2018-05-08 DIAGNOSIS — M81.0 AGE-RELATED OSTEOPOROSIS WITHOUT CURRENT PATHOLOGICAL FRACTURE: ICD-10-CM

## 2018-05-08 PROBLEM — Z00.00 ENCOUNTER FOR PREVENTIVE HEALTH EXAMINATION: Status: ACTIVE | Noted: 2018-05-08

## 2018-06-02 ENCOUNTER — EMERGENCY (EMERGENCY)
Facility: HOSPITAL | Age: 83
LOS: 0 days | Discharge: HOME | End: 2018-06-02
Attending: EMERGENCY MEDICINE | Admitting: EMERGENCY MEDICINE

## 2018-06-02 VITALS
TEMPERATURE: 100 F | DIASTOLIC BLOOD PRESSURE: 94 MMHG | HEART RATE: 62 BPM | RESPIRATION RATE: 18 BRPM | SYSTOLIC BLOOD PRESSURE: 169 MMHG | HEIGHT: 61 IN | WEIGHT: 139.99 LBS | OXYGEN SATURATION: 96 %

## 2018-06-02 VITALS
SYSTOLIC BLOOD PRESSURE: 180 MMHG | HEART RATE: 60 BPM | TEMPERATURE: 100 F | OXYGEN SATURATION: 98 % | RESPIRATION RATE: 17 BRPM | DIASTOLIC BLOOD PRESSURE: 99 MMHG

## 2018-06-02 DIAGNOSIS — M25.552 PAIN IN LEFT HIP: ICD-10-CM
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
## 2018-06-02 DIAGNOSIS — Z79.899 OTHER LONG TERM (CURRENT) DRUG THERAPY: ICD-10-CM

## 2018-06-02 DIAGNOSIS — I11.0 HYPERTENSIVE HEART DISEASE WITH HEART FAILURE: ICD-10-CM
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
## 2018-06-02 DIAGNOSIS — E03.9 HYPOTHYROIDISM, UNSPECIFIED: ICD-10-CM
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
## 2018-06-02 DIAGNOSIS — S22.32XA FRACTURE OF ONE RIB, LEFT SIDE, INITIAL ENCOUNTER FOR CLOSED FRACTURE: ICD-10-CM

## 2018-06-02 DIAGNOSIS — M54.2 CERVICALGIA: ICD-10-CM

## 2018-06-02 DIAGNOSIS — Z79.82 LONG TERM (CURRENT) USE OF ASPIRIN: ICD-10-CM

## 2018-06-02 DIAGNOSIS — R07.81 PLEURODYNIA: ICD-10-CM

## 2018-06-02 DIAGNOSIS — I50.9 HEART FAILURE, UNSPECIFIED: ICD-10-CM

## 2018-06-02 LAB
ANION GAP SERPL CALC-SCNC: 11 MMOL/L — SIGNIFICANT CHANGE UP (ref 7–14)
APTT BLD: 29.4 SEC — SIGNIFICANT CHANGE UP (ref 27–39.2)
BASOPHILS # BLD AUTO: 0.03 K/UL — SIGNIFICANT CHANGE UP (ref 0–0.2)
BASOPHILS NFR BLD AUTO: 0.6 % — SIGNIFICANT CHANGE UP (ref 0–1)
BUN SERPL-MCNC: 36 MG/DL — HIGH (ref 10–20)
CALCIUM SERPL-MCNC: 8.9 MG/DL — SIGNIFICANT CHANGE UP (ref 8.5–10.1)
CHLORIDE SERPL-SCNC: 101 MMOL/L — SIGNIFICANT CHANGE UP (ref 98–110)
CO2 SERPL-SCNC: 29 MMOL/L — SIGNIFICANT CHANGE UP (ref 17–32)
CREAT SERPL-MCNC: 1.2 MG/DL — SIGNIFICANT CHANGE UP (ref 0.7–1.5)
EOSINOPHIL # BLD AUTO: 0.2 K/UL — SIGNIFICANT CHANGE UP (ref 0–0.7)
EOSINOPHIL NFR BLD AUTO: 4 % — SIGNIFICANT CHANGE UP (ref 0–8)
GLUCOSE SERPL-MCNC: 86 MG/DL — SIGNIFICANT CHANGE UP (ref 70–99)
HCT VFR BLD CALC: 35.2 % — LOW (ref 37–47)
HGB BLD-MCNC: 11.7 G/DL — LOW (ref 12–16)
IMM GRANULOCYTES NFR BLD AUTO: 0.4 % — HIGH (ref 0.1–0.3)
INR BLD: 1.11 RATIO — SIGNIFICANT CHANGE UP (ref 0.65–1.3)
LYMPHOCYTES # BLD AUTO: 1.28 K/UL — SIGNIFICANT CHANGE UP (ref 1.2–3.4)
LYMPHOCYTES # BLD AUTO: 25.7 % — SIGNIFICANT CHANGE UP (ref 20.5–51.1)
MCHC RBC-ENTMCNC: 27.7 PG — SIGNIFICANT CHANGE UP (ref 27–31)
MCHC RBC-ENTMCNC: 33.2 G/DL — SIGNIFICANT CHANGE UP (ref 32–37)
MCV RBC AUTO: 83.4 FL — SIGNIFICANT CHANGE UP (ref 81–99)
MONOCYTES # BLD AUTO: 0.53 K/UL — SIGNIFICANT CHANGE UP (ref 0.1–0.6)
MONOCYTES NFR BLD AUTO: 10.6 % — HIGH (ref 1.7–9.3)
NEUTROPHILS # BLD AUTO: 2.93 K/UL — SIGNIFICANT CHANGE UP (ref 1.4–6.5)
NEUTROPHILS NFR BLD AUTO: 58.7 % — SIGNIFICANT CHANGE UP (ref 42.2–75.2)
NRBC # BLD: 0 /100 WBCS — SIGNIFICANT CHANGE UP (ref 0–0)
PLATELET # BLD AUTO: 169 K/UL — SIGNIFICANT CHANGE UP (ref 130–400)
POTASSIUM SERPL-MCNC: 4.3 MMOL/L — SIGNIFICANT CHANGE UP (ref 3.5–5)
POTASSIUM SERPL-SCNC: 4.3 MMOL/L — SIGNIFICANT CHANGE UP (ref 3.5–5)
PROTHROM AB SERPL-ACNC: 12 SEC — SIGNIFICANT CHANGE UP (ref 9.95–12.87)
RBC # BLD: 4.22 M/UL — SIGNIFICANT CHANGE UP (ref 4.2–5.4)
RBC # FLD: 18.5 % — HIGH (ref 11.5–14.5)
SODIUM SERPL-SCNC: 141 MMOL/L — SIGNIFICANT CHANGE UP (ref 135–146)
WBC # BLD: 4.99 K/UL — SIGNIFICANT CHANGE UP (ref 4.8–10.8)
WBC # FLD AUTO: 4.99 K/UL — SIGNIFICANT CHANGE UP (ref 4.8–10.8)

## 2018-06-02 RX ORDER — ACETAMINOPHEN WITH CODEINE 300MG-30MG
1 TABLET ORAL
Qty: 8 | Refills: 0
Start: 2018-06-02

## 2018-06-02 RX ORDER — IBUPROFEN 200 MG
1 TABLET ORAL
Qty: 15 | Refills: 0
Start: 2018-06-02 | End: 2018-06-06

## 2018-06-02 NOTE — ED ADULT NURSE REASSESSMENT NOTE - NS ED NURSE REASSESS COMMENT FT1
Pt received awake in bed A&Ox3 w/ family at bedside. Pt breathing RA w/ normal WOB. Pt c/o slight pain to left rib area. Will continue to monitor.

## 2018-06-02 NOTE — ED PROVIDER NOTE - CARDIAC, MLM
Normal rate, regular rhythm.  Heart sounds S1, S2.  No murmurs, rubs or gallops. chest wall has brusing noted to the left lateral chest wall

## 2018-06-02 NOTE — ED PROVIDER NOTE - CARE PLAN
Principal Discharge DX:	Closed fracture of one rib of left side, initial encounter  Secondary Diagnosis:	Fall, initial encounter

## 2018-06-02 NOTE — ED PROVIDER NOTE - OBJECTIVE STATEMENT
Patient is a 85 years old F pmhx CHF, HTN presents to the ED for evaluation of left sided rib pain post fall on . As per patient she was walking in the house getting ready for  of her cousin when she turned lost balance and fell onto left side. No head injury no loc, no neck pain, +chest wall pain and left hip pain.

## 2018-06-02 NOTE — ED PROVIDER NOTE - NS ED ROS FT
Review of Systems  Constitutional: (-) fever  Cardiovascular: (-) chest pain, (-) syncope  Respiratory: (-) cough, (-) shortness of breath  Musculoskeletal: (-) neck pain, (-) back pain, (-) joint pain  Integumentary: (-) rash, (-) edema  Neurological: (-) headache, (-) altered mental status

## 2018-06-02 NOTE — ED PROVIDER NOTE - ATTENDING CONTRIBUTION TO CARE
I have personally performed a history and physical exam on this patient and personally directed the management of the patient. Patient presnts for evaluation of left lateral chest wall pain and brusing after a slip and fall 2 days prior she denies any loc and denies any vomiting, s he denies any headache, visual changes, shortness of breath, abdominal pain or back pain she denies any other joint pain. On physical exam she is nc/at perrla eomi oropharynx clear she has no pain to palpation of the posterior c-spine, she has bruising and ttp of the left lateral chest wall at the level of approx t4-t6.  radail pulses 2 +=, rrr s1s2 noted abd-soft nt nd bs +ext from she has no focal deficits. baed on her age and condition we obtained ct of the head, neck, chest abd/pelvis she has no other ttp of her joints I will continue to monitor at this time family updated and agree with the poc.

## 2018-06-03 PROBLEM — S22.39XA FRACTURE OF ONE RIB, UNSPECIFIED SIDE, INITIAL ENCOUNTER FOR CLOSED FRACTURE: Chronic | Status: ACTIVE | Noted: 2018-04-19

## 2019-07-09 ENCOUNTER — INPATIENT (INPATIENT)
Facility: HOSPITAL | Age: 84
LOS: 7 days | Discharge: HOME HEALTH | End: 2019-07-17
Attending: INTERNAL MEDICINE | Admitting: INTERNAL MEDICINE
Payer: MEDICARE

## 2019-07-09 VITALS
OXYGEN SATURATION: 96 % | TEMPERATURE: 98 F | RESPIRATION RATE: 20 BRPM | WEIGHT: 134.92 LBS | HEART RATE: 68 BPM | DIASTOLIC BLOOD PRESSURE: 89 MMHG | HEIGHT: 60 IN | SYSTOLIC BLOOD PRESSURE: 193 MMHG

## 2019-07-09 DIAGNOSIS — N18.3 CHRONIC KIDNEY DISEASE, STAGE 3 (MODERATE): ICD-10-CM

## 2019-07-09 DIAGNOSIS — E78.5 HYPERLIPIDEMIA, UNSPECIFIED: ICD-10-CM

## 2019-07-09 DIAGNOSIS — G20 PARKINSON'S DISEASE: ICD-10-CM

## 2019-07-09 DIAGNOSIS — J40 BRONCHITIS, NOT SPECIFIED AS ACUTE OR CHRONIC: ICD-10-CM

## 2019-07-09 DIAGNOSIS — E03.9 HYPOTHYROIDISM, UNSPECIFIED: ICD-10-CM

## 2019-07-09 DIAGNOSIS — I48.91 UNSPECIFIED ATRIAL FIBRILLATION: ICD-10-CM

## 2019-07-09 DIAGNOSIS — I13.0 HYPERTENSIVE HEART AND CHRONIC KIDNEY DISEASE WITH HEART FAILURE AND STAGE 1 THROUGH STAGE 4 CHRONIC KIDNEY DISEASE, OR UNSPECIFIED CHRONIC KIDNEY DISEASE: ICD-10-CM

## 2019-07-09 DIAGNOSIS — I50.33 ACUTE ON CHRONIC DIASTOLIC (CONGESTIVE) HEART FAILURE: ICD-10-CM

## 2019-07-09 DIAGNOSIS — I50.9 HEART FAILURE, UNSPECIFIED: ICD-10-CM

## 2019-07-09 DIAGNOSIS — Z79.82 LONG TERM (CURRENT) USE OF ASPIRIN: ICD-10-CM

## 2019-07-09 LAB
ALBUMIN SERPL ELPH-MCNC: 4.3 G/DL — SIGNIFICANT CHANGE UP (ref 3.5–5.2)
ALP SERPL-CCNC: 133 U/L — HIGH (ref 30–115)
ALT FLD-CCNC: 10 U/L — SIGNIFICANT CHANGE UP (ref 0–41)
ANION GAP SERPL CALC-SCNC: 11 MMOL/L — SIGNIFICANT CHANGE UP (ref 7–14)
APTT BLD: 35.8 SEC — SIGNIFICANT CHANGE UP (ref 27–39.2)
AST SERPL-CCNC: 29 U/L — SIGNIFICANT CHANGE UP (ref 0–41)
BASE EXCESS BLDV CALC-SCNC: 4.8 MMOL/L — HIGH (ref -2–2)
BILIRUB SERPL-MCNC: 0.8 MG/DL — SIGNIFICANT CHANGE UP (ref 0.2–1.2)
BUN SERPL-MCNC: 33 MG/DL — HIGH (ref 10–20)
CA-I SERPL-SCNC: 1.19 MMOL/L — SIGNIFICANT CHANGE UP (ref 1.12–1.3)
CALCIUM SERPL-MCNC: 9.1 MG/DL — SIGNIFICANT CHANGE UP (ref 8.5–10.1)
CHLORIDE SERPL-SCNC: 99 MMOL/L — SIGNIFICANT CHANGE UP (ref 98–110)
CO2 SERPL-SCNC: 29 MMOL/L — SIGNIFICANT CHANGE UP (ref 17–32)
CREAT SERPL-MCNC: 1.1 MG/DL — SIGNIFICANT CHANGE UP (ref 0.7–1.5)
GAS PNL BLDV: 140 MMOL/L — SIGNIFICANT CHANGE UP (ref 136–145)
GAS PNL BLDV: SIGNIFICANT CHANGE UP
GLUCOSE SERPL-MCNC: 128 MG/DL — HIGH (ref 70–99)
HCO3 BLDV-SCNC: 31 MMOL/L — HIGH (ref 22–29)
HCT VFR BLD CALC: 40.4 % — SIGNIFICANT CHANGE UP (ref 37–47)
HCT VFR BLDA CALC: 42 % — SIGNIFICANT CHANGE UP (ref 34–44)
HGB BLD CALC-MCNC: 13.7 G/DL — LOW (ref 14–18)
HGB BLD-MCNC: 12.9 G/DL — SIGNIFICANT CHANGE UP (ref 12–16)
HOROWITZ INDEX BLDV+IHG-RTO: 21 — SIGNIFICANT CHANGE UP
INR BLD: 1.18 RATIO — SIGNIFICANT CHANGE UP (ref 0.65–1.3)
LACTATE BLDV-MCNC: 1.6 MMOL/L — SIGNIFICANT CHANGE UP (ref 0.5–1.6)
MAGNESIUM SERPL-MCNC: 2.2 MG/DL — SIGNIFICANT CHANGE UP (ref 1.8–2.4)
MCHC RBC-ENTMCNC: 28.2 PG — SIGNIFICANT CHANGE UP (ref 27–31)
MCHC RBC-ENTMCNC: 31.9 G/DL — LOW (ref 32–37)
MCV RBC AUTO: 88.4 FL — SIGNIFICANT CHANGE UP (ref 81–99)
NRBC # BLD: 0 /100 WBCS — SIGNIFICANT CHANGE UP (ref 0–0)
NT-PROBNP SERPL-SCNC: 4397 PG/ML — HIGH (ref 0–300)
PCO2 BLDV: 49 MMHG — SIGNIFICANT CHANGE UP (ref 41–51)
PH BLDV: 7.4 — SIGNIFICANT CHANGE UP (ref 7.26–7.43)
PLATELET # BLD AUTO: 175 K/UL — SIGNIFICANT CHANGE UP (ref 130–400)
PO2 BLDV: 35 MMHG — SIGNIFICANT CHANGE UP (ref 20–40)
POTASSIUM BLDV-SCNC: 4.4 MMOL/L — SIGNIFICANT CHANGE UP (ref 3.3–5.6)
POTASSIUM SERPL-MCNC: 5.1 MMOL/L — HIGH (ref 3.5–5)
POTASSIUM SERPL-SCNC: 5.1 MMOL/L — HIGH (ref 3.5–5)
PROT SERPL-MCNC: 7.2 G/DL — SIGNIFICANT CHANGE UP (ref 6–8)
PROTHROM AB SERPL-ACNC: 13.5 SEC — HIGH (ref 9.95–12.87)
RBC # BLD: 4.57 M/UL — SIGNIFICANT CHANGE UP (ref 4.2–5.4)
RBC # FLD: 15.6 % — HIGH (ref 11.5–14.5)
SAO2 % BLDV: 66 % — SIGNIFICANT CHANGE UP
SODIUM SERPL-SCNC: 139 MMOL/L — SIGNIFICANT CHANGE UP (ref 135–146)
TROPONIN T SERPL-MCNC: <0.01 NG/ML — SIGNIFICANT CHANGE UP
WBC # BLD: 6.88 K/UL — SIGNIFICANT CHANGE UP (ref 4.8–10.8)
WBC # FLD AUTO: 6.88 K/UL — SIGNIFICANT CHANGE UP (ref 4.8–10.8)

## 2019-07-09 PROCEDURE — 71045 X-RAY EXAM CHEST 1 VIEW: CPT | Mod: 26

## 2019-07-09 PROCEDURE — 99285 EMERGENCY DEPT VISIT HI MDM: CPT

## 2019-07-09 RX ORDER — IPRATROPIUM/ALBUTEROL SULFATE 18-103MCG
3 AEROSOL WITH ADAPTER (GRAM) INHALATION ONCE
Refills: 0 | Status: COMPLETED | OUTPATIENT
Start: 2019-07-09 | End: 2019-07-09

## 2019-07-09 RX ORDER — IPRATROPIUM/ALBUTEROL SULFATE 18-103MCG
3 AEROSOL WITH ADAPTER (GRAM) INHALATION EVERY 6 HOURS
Refills: 0 | Status: DISCONTINUED | OUTPATIENT
Start: 2019-07-09 | End: 2019-07-17

## 2019-07-09 RX ORDER — FUROSEMIDE 40 MG
40 TABLET ORAL DAILY
Refills: 0 | Status: DISCONTINUED | OUTPATIENT
Start: 2019-07-09 | End: 2019-07-13

## 2019-07-09 RX ORDER — AMIODARONE HYDROCHLORIDE 400 MG/1
200 TABLET ORAL DAILY
Refills: 0 | Status: DISCONTINUED | OUTPATIENT
Start: 2019-07-09 | End: 2019-07-17

## 2019-07-09 RX ORDER — LEVOTHYROXINE SODIUM 125 MCG
75 TABLET ORAL DAILY
Refills: 0 | Status: DISCONTINUED | OUTPATIENT
Start: 2019-07-09 | End: 2019-07-17

## 2019-07-09 RX ORDER — FUROSEMIDE 40 MG
40 TABLET ORAL ONCE
Refills: 0 | Status: COMPLETED | OUTPATIENT
Start: 2019-07-09 | End: 2019-07-09

## 2019-07-09 RX ORDER — DULOXETINE HYDROCHLORIDE 30 MG/1
60 CAPSULE, DELAYED RELEASE ORAL DAILY
Refills: 0 | Status: DISCONTINUED | OUTPATIENT
Start: 2019-07-09 | End: 2019-07-17

## 2019-07-09 RX ORDER — CHLORHEXIDINE GLUCONATE 213 G/1000ML
1 SOLUTION TOPICAL
Refills: 0 | Status: DISCONTINUED | OUTPATIENT
Start: 2019-07-09 | End: 2019-07-17

## 2019-07-09 RX ORDER — ISOSORBIDE MONONITRATE 60 MG/1
30 TABLET, EXTENDED RELEASE ORAL DAILY
Refills: 0 | Status: DISCONTINUED | OUTPATIENT
Start: 2019-07-09 | End: 2019-07-17

## 2019-07-09 RX ORDER — CARVEDILOL PHOSPHATE 80 MG/1
25 CAPSULE, EXTENDED RELEASE ORAL
Refills: 0 | Status: DISCONTINUED | OUTPATIENT
Start: 2019-07-09 | End: 2019-07-17

## 2019-07-09 RX ORDER — RANITIDINE HYDROCHLORIDE 150 MG/1
1 TABLET, FILM COATED ORAL
Qty: 0 | Refills: 0 | DISCHARGE

## 2019-07-09 RX ORDER — HEPARIN SODIUM 5000 [USP'U]/ML
5000 INJECTION INTRAVENOUS; SUBCUTANEOUS EVERY 12 HOURS
Refills: 0 | Status: DISCONTINUED | OUTPATIENT
Start: 2019-07-09 | End: 2019-07-17

## 2019-07-09 RX ORDER — ASPIRIN/CALCIUM CARB/MAGNESIUM 324 MG
81 TABLET ORAL DAILY
Refills: 0 | Status: DISCONTINUED | OUTPATIENT
Start: 2019-07-09 | End: 2019-07-17

## 2019-07-09 RX ADMIN — Medication 40 MILLIGRAM(S): at 22:36

## 2019-07-09 RX ADMIN — Medication 3 MILLILITER(S): at 21:49

## 2019-07-09 NOTE — H&P ADULT - HISTORY OF PRESENT ILLNESS
Pt is an 84 yo f h/o Afib CHF HTN CKD Parkinson’s hypothyroid hyperlipidemia presents with cough and SOB x 3 days.    pt c/o worsening SOB, even at rest. also had intermitted chest pain/dyspnea with breathing, pain 5/10, now resolved   Denies fever, chills, CP N/V/D numbness/tingling

## 2019-07-09 NOTE — H&P ADULT - NSICDXPASTMEDICALHX_GEN_ALL_CORE_FT
PAST MEDICAL HISTORY:  Congestive heart failure     Hypertension     Hypothyroid     Parkinson disease     Rib fractures

## 2019-07-09 NOTE — H&P ADULT - ASSESSMENT
A/p: 86 yo f h/o Afib CHF HTN CKD Parkinson’s hypothyroid hyperlipidemia p/w CHF exacerbation   # CHF exacerbation   Admit to medicine   IV Lasix   O2 prn   Daily weights   Nebs prn     # pmhx HTN Afib Parkinson’s hypothyroid hyperlipidemia  Continue home meds    # DVT ppx   # GI ppx

## 2019-07-09 NOTE — H&P ADULT - NSHPLABSRESULTS_GEN_ALL_CORE
12.9   6.88  )-----------( 175      ( 09 Jul 2019 21:18 )             40.4     07-09    139  |  99  |  33<H>  ----------------------------<  128<H>  5.1<H>   |  29  |  1.1    Ca    9.1      09 Jul 2019 21:18  Mg     2.2     07-09    TPro  7.2  /  Alb  4.3  /  TBili  0.8  /  DBili  x   /  AST  29  /  ALT  10  /  AlkPhos  133<H>  07-09      BMP 4300

## 2019-07-09 NOTE — ED PROVIDER NOTE - PHYSICAL EXAMINATION
Physical Exam    Vital Signs: I have reviewed the initial vital signs.  Constitutional: well-nourished, appears stated age, no acute distress  Eyes: Conjunctiva pink, Sclera clear, PERRLA, EOMI.  Cardiovascular: S1 and S2, regular rate, regular rhythm, well-perfused extremities, radial pulses equal and 2+  Respiratory: unlabored respiratory effort, + b/l wheezing and rales with decreased breath sounds at bases  Gastrointestinal: soft, non-tender abdomen, no pulsatile mass, normal bowl sounds  Musculoskeletal: supple neck, no lower extremity edema, no midline tenderness  Integumentary: warm, dry, no rash  Neurologic: awake, alert, cranial nerves II-XII grossly intact, extremities’ motor and sensory functions grossly intact  Psychiatric: appropriate mood, appropriate affect

## 2019-07-09 NOTE — ED PROVIDER NOTE - CLINICAL SUMMARY MEDICAL DECISION MAKING FREE TEXT BOX
Pt presenting with shortness of breath, cough- found to be fluid overloaded, lasix given, admitted for CHF exacerbation

## 2019-07-09 NOTE — H&P ADULT - NSHPPHYSICALEXAM_GEN_ALL_CORE
Vitals: ICU Vital Signs Last 24 Hrs  T(C): 37.6 (09 Jul 2019 20:29), Max: 37.6 (09 Jul 2019 20:29)  T(F): 99.7 (09 Jul 2019 20:29), Max: 99.7 (09 Jul 2019 20:29)  HR: 64 (09 Jul 2019 22:15) (64 - 68)  BP: 184/91 (09 Jul 2019 22:15) (184/91 - 193/89)  RR: 20 (09 Jul 2019 22:15) (20 - 20)  SpO2: 96% (09 Jul 2019 22:15) (96% - 96%)    Gen: lying in bed, NAD   HEENT: PERRL No nasal discharge. Moist mucus membranes.  Neck: Supple, non tender, full range of motion.  CV: RRR no murmurs, rubs, or gallops. +S1S2.   Pulm: speaking in full sentences, b/l rhochi and crackles   Abd: soft NT ND +BS.   Ext: Warm and well perfused x4, moving all extremities, b/l pedal edema   Psy: Cooperative, appropriate.   Skin: Warm, dry, no rash

## 2019-07-09 NOTE — ED PROVIDER NOTE - ATTENDING CONTRIBUTION TO CARE
87yo F history of HTN Hypothyroidism CHF presenting with shortness of breath, cough- no fevers/chills, chest pain, abdominal pain, nausea/vomiting/diarrhea. Constitutional: Well appearing. No acute distress. Non toxic.   Eyes: PERRLA. Extraocular movements intact, no entrapment. Conjunctiva normal.   ENT: No nasal discharge. Moist mucus membranes.  Neck: Supple, non tender, full range of motion.  CV: RRR no murmurs, rubs, or gallops. +S1S2.   Pulm: +b/l rales Normal work of breathing.  Abd: soft NT ND +BS.   Ext: Warm and well perfused x4, moving all extremities, no edema.   Psy: Cooperative, appropriate.   Skin: Warm, dry, no rash  Neuro: CN2-12 grossly intact no sensory or motor deficits throughout, no drift \    a/p- sob- labs, ekg, cxr, reassess

## 2019-07-09 NOTE — ED PROVIDER NOTE - OBJECTIVE STATEMENT
86 year old female past medical history of CHF and parkinson's. comes to emergency room for coughing for the last 4 days with increasing shortness of breath. Pt denies chest pain, no fever no chills.

## 2019-07-09 NOTE — ED PROVIDER NOTE - NS ED ROS FT
Constitutional: (-) fever  Eyes/ENT: (-) blurry vision, (-) epistaxis  Cardiovascular: (-) chest pain, (-) syncope  Respiratory: +) cough, (+) shortness of breath  Gastrointestinal: (-) vomiting, (-) diarrhea  Musculoskeletal: (-) neck pain, (-) back pain, (-) joint pain  Integumentary: (-) rash, (-) edema  Neurological: (-) headache, (-) altered mental status  Psychiatric: (-) hallucinations  Allergic/Immunologic: (-) pruritus

## 2019-07-10 PROBLEM — I10 ESSENTIAL (PRIMARY) HYPERTENSION: Chronic | Status: ACTIVE | Noted: 2018-02-16

## 2019-07-10 PROBLEM — E03.9 HYPOTHYROIDISM, UNSPECIFIED: Chronic | Status: ACTIVE | Noted: 2018-06-02

## 2019-07-10 PROBLEM — I50.9 HEART FAILURE, UNSPECIFIED: Chronic | Status: ACTIVE | Noted: 2018-02-16

## 2019-07-10 RX ORDER — CARBIDOPA AND LEVODOPA 25; 100 MG/1; MG/1
1 TABLET ORAL AT BEDTIME
Refills: 0 | Status: DISCONTINUED | OUTPATIENT
Start: 2019-07-10 | End: 2019-07-17

## 2019-07-10 RX ORDER — TEMAZEPAM 15 MG/1
30 CAPSULE ORAL AT BEDTIME
Refills: 0 | Status: DISCONTINUED | OUTPATIENT
Start: 2019-07-10 | End: 2019-07-16

## 2019-07-10 RX ORDER — ATORVASTATIN CALCIUM 80 MG/1
10 TABLET, FILM COATED ORAL AT BEDTIME
Refills: 0 | Status: DISCONTINUED | OUTPATIENT
Start: 2019-07-10 | End: 2019-07-17

## 2019-07-10 RX ORDER — CARBIDOPA AND LEVODOPA 25; 100 MG/1; MG/1
1 TABLET ORAL DAILY
Refills: 0 | Status: DISCONTINUED | OUTPATIENT
Start: 2019-07-10 | End: 2019-07-17

## 2019-07-10 RX ORDER — FAMOTIDINE 10 MG/ML
20 INJECTION INTRAVENOUS DAILY
Refills: 0 | Status: DISCONTINUED | OUTPATIENT
Start: 2019-07-10 | End: 2019-07-17

## 2019-07-10 RX ADMIN — HEPARIN SODIUM 5000 UNIT(S): 5000 INJECTION INTRAVENOUS; SUBCUTANEOUS at 17:39

## 2019-07-10 RX ADMIN — DULOXETINE HYDROCHLORIDE 60 MILLIGRAM(S): 30 CAPSULE, DELAYED RELEASE ORAL at 11:16

## 2019-07-10 RX ADMIN — ISOSORBIDE MONONITRATE 30 MILLIGRAM(S): 60 TABLET, EXTENDED RELEASE ORAL at 11:16

## 2019-07-10 RX ADMIN — CARBIDOPA AND LEVODOPA 1 TABLET(S): 25; 100 TABLET ORAL at 11:16

## 2019-07-10 RX ADMIN — Medication 81 MILLIGRAM(S): at 11:16

## 2019-07-10 RX ADMIN — Medication 40 MILLIGRAM(S): at 05:04

## 2019-07-10 RX ADMIN — FAMOTIDINE 20 MILLIGRAM(S): 10 INJECTION INTRAVENOUS at 11:16

## 2019-07-10 RX ADMIN — CARVEDILOL PHOSPHATE 25 MILLIGRAM(S): 80 CAPSULE, EXTENDED RELEASE ORAL at 01:22

## 2019-07-10 RX ADMIN — Medication 3 MILLILITER(S): at 13:15

## 2019-07-10 RX ADMIN — ATORVASTATIN CALCIUM 10 MILLIGRAM(S): 80 TABLET, FILM COATED ORAL at 21:27

## 2019-07-10 RX ADMIN — TEMAZEPAM 30 MILLIGRAM(S): 15 CAPSULE ORAL at 21:30

## 2019-07-10 RX ADMIN — Medication 3 MILLILITER(S): at 07:56

## 2019-07-10 RX ADMIN — CARVEDILOL PHOSPHATE 25 MILLIGRAM(S): 80 CAPSULE, EXTENDED RELEASE ORAL at 17:39

## 2019-07-10 RX ADMIN — CARBIDOPA AND LEVODOPA 1 TABLET(S): 25; 100 TABLET ORAL at 21:28

## 2019-07-10 RX ADMIN — Medication 100 MILLIGRAM(S): at 05:04

## 2019-07-10 RX ADMIN — Medication 3 MILLILITER(S): at 19:56

## 2019-07-10 RX ADMIN — HEPARIN SODIUM 5000 UNIT(S): 5000 INJECTION INTRAVENOUS; SUBCUTANEOUS at 05:04

## 2019-07-10 RX ADMIN — Medication 75 MICROGRAM(S): at 05:04

## 2019-07-10 RX ADMIN — Medication 100 MILLIGRAM(S): at 17:38

## 2019-07-10 NOTE — PROGRESS NOTE ADULT - ASSESSMENT
84 yo f h/o Afib CHF HTN CKD Parkinson’s hypothyroid hyperlipidemia p/w CHF exacerbation   # CHF exacerbation   Admit to medicine   IV Lasix   O2 prn   Daily weights   Nebs prn     # pmhx HTN Afib Parkinson’s hypothyroid hyperlipidemia  Continue home meds    # DVT ppx   # GI ppx       Problem/Plan - 1:  ·  Problem: CHF exacerbation.  Plan: # CHF exacerbation   Admit to medicine   IV Lasix   O2 prn   Daily weights   Nebs prn.       advance care cpr  dispo home

## 2019-07-10 NOTE — PATIENT PROFILE ADULT - VISION (WITH CORRECTIVE LENSES IF THE PATIENT USUALLY WEARS THEM):
Wears corrective glasses/Normal vision: sees adequately in most situations; can see medication labels, newsprint

## 2019-07-11 LAB
ALBUMIN SERPL ELPH-MCNC: 3.8 G/DL — SIGNIFICANT CHANGE UP (ref 3.5–5.2)
ALP SERPL-CCNC: 125 U/L — HIGH (ref 30–115)
ALT FLD-CCNC: 6 U/L — SIGNIFICANT CHANGE UP (ref 0–41)
ANION GAP SERPL CALC-SCNC: 12 MMOL/L — SIGNIFICANT CHANGE UP (ref 7–14)
AST SERPL-CCNC: 19 U/L — SIGNIFICANT CHANGE UP (ref 0–41)
BILIRUB SERPL-MCNC: 0.8 MG/DL — SIGNIFICANT CHANGE UP (ref 0.2–1.2)
BUN SERPL-MCNC: 28 MG/DL — HIGH (ref 10–20)
CALCIUM SERPL-MCNC: 8.6 MG/DL — SIGNIFICANT CHANGE UP (ref 8.5–10.1)
CHLORIDE SERPL-SCNC: 101 MMOL/L — SIGNIFICANT CHANGE UP (ref 98–110)
CO2 SERPL-SCNC: 30 MMOL/L — SIGNIFICANT CHANGE UP (ref 17–32)
CREAT SERPL-MCNC: 1 MG/DL — SIGNIFICANT CHANGE UP (ref 0.7–1.5)
GLUCOSE SERPL-MCNC: 117 MG/DL — HIGH (ref 70–99)
HCT VFR BLD CALC: 39.1 % — SIGNIFICANT CHANGE UP (ref 37–47)
HGB BLD-MCNC: 13 G/DL — SIGNIFICANT CHANGE UP (ref 12–16)
MCHC RBC-ENTMCNC: 28.8 PG — SIGNIFICANT CHANGE UP (ref 27–31)
MCHC RBC-ENTMCNC: 33.2 G/DL — SIGNIFICANT CHANGE UP (ref 32–37)
MCV RBC AUTO: 86.5 FL — SIGNIFICANT CHANGE UP (ref 81–99)
NRBC # BLD: 0 /100 WBCS — SIGNIFICANT CHANGE UP (ref 0–0)
NT-PROBNP SERPL-SCNC: 3700 PG/ML — HIGH (ref 0–300)
PLATELET # BLD AUTO: 171 K/UL — SIGNIFICANT CHANGE UP (ref 130–400)
POTASSIUM SERPL-MCNC: 3.8 MMOL/L — SIGNIFICANT CHANGE UP (ref 3.5–5)
POTASSIUM SERPL-SCNC: 3.8 MMOL/L — SIGNIFICANT CHANGE UP (ref 3.5–5)
PROT SERPL-MCNC: 6.5 G/DL — SIGNIFICANT CHANGE UP (ref 6–8)
RBC # BLD: 4.52 M/UL — SIGNIFICANT CHANGE UP (ref 4.2–5.4)
RBC # FLD: 15.3 % — HIGH (ref 11.5–14.5)
SODIUM SERPL-SCNC: 143 MMOL/L — SIGNIFICANT CHANGE UP (ref 135–146)
WBC # BLD: 6.22 K/UL — SIGNIFICANT CHANGE UP (ref 4.8–10.8)
WBC # FLD AUTO: 6.22 K/UL — SIGNIFICANT CHANGE UP (ref 4.8–10.8)

## 2019-07-11 RX ADMIN — CARBIDOPA AND LEVODOPA 1 TABLET(S): 25; 100 TABLET ORAL at 21:59

## 2019-07-11 RX ADMIN — ATORVASTATIN CALCIUM 10 MILLIGRAM(S): 80 TABLET, FILM COATED ORAL at 21:59

## 2019-07-11 RX ADMIN — CARVEDILOL PHOSPHATE 25 MILLIGRAM(S): 80 CAPSULE, EXTENDED RELEASE ORAL at 04:58

## 2019-07-11 RX ADMIN — Medication 3 MILLILITER(S): at 20:03

## 2019-07-11 RX ADMIN — Medication 75 MICROGRAM(S): at 04:58

## 2019-07-11 RX ADMIN — CARBIDOPA AND LEVODOPA 1 TABLET(S): 25; 100 TABLET ORAL at 11:38

## 2019-07-11 RX ADMIN — Medication 3 MILLILITER(S): at 07:41

## 2019-07-11 RX ADMIN — DULOXETINE HYDROCHLORIDE 60 MILLIGRAM(S): 30 CAPSULE, DELAYED RELEASE ORAL at 11:38

## 2019-07-11 RX ADMIN — HEPARIN SODIUM 5000 UNIT(S): 5000 INJECTION INTRAVENOUS; SUBCUTANEOUS at 04:59

## 2019-07-11 RX ADMIN — ISOSORBIDE MONONITRATE 30 MILLIGRAM(S): 60 TABLET, EXTENDED RELEASE ORAL at 11:38

## 2019-07-11 RX ADMIN — Medication 81 MILLIGRAM(S): at 11:38

## 2019-07-11 RX ADMIN — Medication 3 MILLILITER(S): at 13:10

## 2019-07-11 RX ADMIN — CHLORHEXIDINE GLUCONATE 1 APPLICATION(S): 213 SOLUTION TOPICAL at 05:00

## 2019-07-11 RX ADMIN — TEMAZEPAM 30 MILLIGRAM(S): 15 CAPSULE ORAL at 21:59

## 2019-07-11 RX ADMIN — FAMOTIDINE 20 MILLIGRAM(S): 10 INJECTION INTRAVENOUS at 11:38

## 2019-07-11 RX ADMIN — CARVEDILOL PHOSPHATE 25 MILLIGRAM(S): 80 CAPSULE, EXTENDED RELEASE ORAL at 17:13

## 2019-07-11 RX ADMIN — Medication 100 MILLIGRAM(S): at 05:00

## 2019-07-11 RX ADMIN — HEPARIN SODIUM 5000 UNIT(S): 5000 INJECTION INTRAVENOUS; SUBCUTANEOUS at 17:13

## 2019-07-11 RX ADMIN — Medication 100 MILLIGRAM(S): at 17:13

## 2019-07-11 RX ADMIN — Medication 40 MILLIGRAM(S): at 04:59

## 2019-07-11 RX ADMIN — AMIODARONE HYDROCHLORIDE 200 MILLIGRAM(S): 400 TABLET ORAL at 04:58

## 2019-07-11 NOTE — PROGRESS NOTE ADULT - SUBJECTIVE AND OBJECTIVE BOX
86 yo f h/o Afib CHF HTN CKD Parkinson’s hypothyroid hyperlipidemia presents with cough and SOB x 3 days.   pt c/o worsening SOB, even at rest. also had intermitted chest pain/dyspnea with breathing, pain 5/10, now resolved   Denies fever, chills, CP N/V/D numbness/tingling   PAST MEDICAL & SURGICAL HISTORY:  Parkinson disease  Hypothyroid  Rib fractures  Congestive heart failure  Hypertension  No significant past surgical history    MEDICATIONS  (STANDING):  ALBUTerol/ipratropium for Nebulization 3 milliLiter(s) Nebulizer every 6 hours  amiodarone    Tablet 200 milliGRAM(s) Oral daily  aspirin  chewable 81 milliGRAM(s) Oral daily  atorvastatin 10 milliGRAM(s) Oral at bedtime  carbidopa/levodopa  10/100 1 Tablet(s) Oral at bedtime  carbidopa/levodopa  25/100 1 Tablet(s) Oral daily  carvedilol Oral Tab/Cap - Peds 25 milliGRAM(s) Oral two times a day  chlorhexidine 4% Liquid 1 Application(s) Topical <User Schedule>  DULoxetine 60 milliGRAM(s) Oral daily  famotidine    Tablet 20 milliGRAM(s) Oral daily  furosemide   Injectable 40 milliGRAM(s) IV Push daily  heparin  Injectable 5000 Unit(s) SubCutaneous every 12 hours  isosorbide   mononitrate ER Tablet (IMDUR) 30 milliGRAM(s) Oral daily  levothyroxine 75 MICROGram(s) Oral daily  pregabalin 100 milliGRAM(s) Oral two times a day    MEDICATIONS  (PRN):  temazepam 30 milliGRAM(s) Oral at bedtime PRN Insomnia  Vital Signs Last 24 Hrs  T(C): 36.8 (10 Jul 2019 21:10), Max: 36.9 (10 Jul 2019 14:20)  T(F): 98.2 (10 Jul 2019 21:10), Max: 98.4 (10 Jul 2019 14:20)  HR: 58 (10 Jul 2019 21:10) (52 - 68)  BP: 152/67 (10 Jul 2019 21:10) (149/63 - 172/74)  BP(mean): --  RR: 16 (10 Jul 2019 21:10) (16 - 16)  SpO2: 96% (10 Jul 2019 21:34) (96% - 96%)    	Gen: lying in bed, NAD   	HEENT: PERRL No nasal discharge. Moist mucus membranes.  	Neck: Supple, non tender, full range of motion.  	CV: RRR no murmurs, rubs, or gallops. +S1S2.   	Pulm: speaking in full sentences, b/l rhochi and crackles   	Abd: soft NT ND +BS.   	Ext: Warm and well perfused x4, moving all extremities, b/l pedal edema   	Psy: Cooperative, appropriate.   Skin: Warm, dry, no rash    CARDIAC MARKERS ( 09 Jul 2019 21:18 )  x     / <0.01 ng/mL / x     / x     / x                              12.9   6.88  )-----------( 175      ( 09 Jul 2019 21:18 )             40.4   07-09    139  |  99  |  33<H>  ----------------------------<  128<H>  5.1<H>   |  29  |  1.1    Ca    9.1      09 Jul 2019 21:18  Mg     2.2     07-09    TPro  7.2  /  Alb  4.3  /  TBili  0.8  /  DBili  x   /  AST  29  /  ALT  10  /  AlkPhos  133<H>  07-09

## 2019-07-11 NOTE — PHYSICAL THERAPY INITIAL EVALUATION ADULT - GAIT DEVIATIONS NOTED, PT EVAL
decreased fabricio/increased time in double stance/decreased heel strike / push off, forward flexed posture/decreased step length/decreased weight-shifting ability

## 2019-07-11 NOTE — PHYSICAL THERAPY INITIAL EVALUATION ADULT - GENERAL OBSERVATIONS, REHAB EVAL
9:45 - 10:05. Chart reviewed. Patient available to be seen for physical therapy. Patient encountered semi-reclined in bed. Denies pain at rest, agreeable for PT evaluation.

## 2019-07-11 NOTE — PHYSICAL THERAPY INITIAL EVALUATION ADULT - ADDITIONAL COMMENTS
Patient has rolling walker, but does not use it.  + flight of stairs in home, patient says daughter and son-in-law assist her with stairs at baseline

## 2019-07-12 RX ORDER — IPRATROPIUM/ALBUTEROL SULFATE 18-103MCG
3 AEROSOL WITH ADAPTER (GRAM) INHALATION THREE TIMES A DAY
Refills: 0 | Status: DISCONTINUED | OUTPATIENT
Start: 2019-07-12 | End: 2019-07-12

## 2019-07-12 RX ORDER — BUDESONIDE AND FORMOTEROL FUMARATE DIHYDRATE 160; 4.5 UG/1; UG/1
2 AEROSOL RESPIRATORY (INHALATION)
Refills: 0 | Status: DISCONTINUED | OUTPATIENT
Start: 2019-07-12 | End: 2019-07-17

## 2019-07-12 RX ORDER — AZITHROMYCIN 500 MG/1
500 TABLET, FILM COATED ORAL DAILY
Refills: 0 | Status: COMPLETED | OUTPATIENT
Start: 2019-07-12 | End: 2019-07-14

## 2019-07-12 RX ADMIN — Medication 3 MILLILITER(S): at 13:16

## 2019-07-12 RX ADMIN — Medication 60 MILLIGRAM(S): at 21:57

## 2019-07-12 RX ADMIN — BUDESONIDE AND FORMOTEROL FUMARATE DIHYDRATE 2 PUFF(S): 160; 4.5 AEROSOL RESPIRATORY (INHALATION) at 19:35

## 2019-07-12 RX ADMIN — ATORVASTATIN CALCIUM 10 MILLIGRAM(S): 80 TABLET, FILM COATED ORAL at 21:57

## 2019-07-12 RX ADMIN — TEMAZEPAM 30 MILLIGRAM(S): 15 CAPSULE ORAL at 22:00

## 2019-07-12 RX ADMIN — Medication 40 MILLIGRAM(S): at 05:36

## 2019-07-12 RX ADMIN — Medication 60 MILLIGRAM(S): at 14:37

## 2019-07-12 RX ADMIN — CARVEDILOL PHOSPHATE 25 MILLIGRAM(S): 80 CAPSULE, EXTENDED RELEASE ORAL at 05:34

## 2019-07-12 RX ADMIN — Medication 100 MILLIGRAM(S): at 18:04

## 2019-07-12 RX ADMIN — CARVEDILOL PHOSPHATE 25 MILLIGRAM(S): 80 CAPSULE, EXTENDED RELEASE ORAL at 18:04

## 2019-07-12 RX ADMIN — Medication 3 MILLILITER(S): at 19:35

## 2019-07-12 RX ADMIN — AZITHROMYCIN 500 MILLIGRAM(S): 500 TABLET, FILM COATED ORAL at 12:03

## 2019-07-12 RX ADMIN — Medication 3 MILLILITER(S): at 08:06

## 2019-07-12 RX ADMIN — BUDESONIDE AND FORMOTEROL FUMARATE DIHYDRATE 2 PUFF(S): 160; 4.5 AEROSOL RESPIRATORY (INHALATION) at 09:40

## 2019-07-12 RX ADMIN — CARBIDOPA AND LEVODOPA 1 TABLET(S): 25; 100 TABLET ORAL at 21:57

## 2019-07-12 RX ADMIN — AMIODARONE HYDROCHLORIDE 200 MILLIGRAM(S): 400 TABLET ORAL at 05:36

## 2019-07-12 RX ADMIN — Medication 100 MILLIGRAM(S): at 05:36

## 2019-07-12 RX ADMIN — HEPARIN SODIUM 5000 UNIT(S): 5000 INJECTION INTRAVENOUS; SUBCUTANEOUS at 05:36

## 2019-07-12 RX ADMIN — CHLORHEXIDINE GLUCONATE 1 APPLICATION(S): 213 SOLUTION TOPICAL at 05:35

## 2019-07-12 RX ADMIN — Medication 81 MILLIGRAM(S): at 12:03

## 2019-07-12 RX ADMIN — Medication 75 MICROGRAM(S): at 05:34

## 2019-07-12 RX ADMIN — HEPARIN SODIUM 5000 UNIT(S): 5000 INJECTION INTRAVENOUS; SUBCUTANEOUS at 18:04

## 2019-07-12 RX ADMIN — CARBIDOPA AND LEVODOPA 1 TABLET(S): 25; 100 TABLET ORAL at 12:03

## 2019-07-12 RX ADMIN — Medication 1200 MILLIGRAM(S): at 18:04

## 2019-07-12 RX ADMIN — DULOXETINE HYDROCHLORIDE 60 MILLIGRAM(S): 30 CAPSULE, DELAYED RELEASE ORAL at 12:03

## 2019-07-12 RX ADMIN — FAMOTIDINE 20 MILLIGRAM(S): 10 INJECTION INTRAVENOUS at 12:03

## 2019-07-12 RX ADMIN — ISOSORBIDE MONONITRATE 30 MILLIGRAM(S): 60 TABLET, EXTENDED RELEASE ORAL at 12:02

## 2019-07-12 NOTE — PROGRESS NOTE ADULT - SUBJECTIVE AND OBJECTIVE BOX
84 yo f h/o Afib CHF HTN CKD Parkinson’s hypothyroid hyperlipidemia presents with cough and SOB x 3 days.   pt c/o worsening SOB, even at rest. also had intermitted chest pain/dyspnea with breathing, pain 5/10, now resolved   Denies fever, chills, CP N/V/D numbness/tingling   now with cough and congestion + yellow / green sputum     PAST MEDICAL & SURGICAL HISTORY:  Parkinson disease  Hypothyroid  Rib fractures  Congestive heart failure  Hypertension  No significant past surgical history    MEDICATIONS  (STANDING):  ALBUTerol/ipratropium for Nebulization 3 milliLiter(s) Nebulizer every 6 hours  ALBUTerol/ipratropium for Nebulization. 3 milliLiter(s) Nebulizer three times a day  amiodarone    Tablet 200 milliGRAM(s) Oral daily  aspirin  chewable 81 milliGRAM(s) Oral daily  atorvastatin 10 milliGRAM(s) Oral at bedtime  azithromycin   Tablet 500 milliGRAM(s) Oral daily  buDESOnide 160 MICROgram(s)/formoterol 4.5 MICROgram(s) Inhaler 2 Puff(s) Inhalation two times a day  carbidopa/levodopa  10/100 1 Tablet(s) Oral at bedtime  carbidopa/levodopa  25/100 1 Tablet(s) Oral daily  carvedilol Oral Tab/Cap - Peds 25 milliGRAM(s) Oral two times a day  chlorhexidine 4% Liquid 1 Application(s) Topical <User Schedule>  DULoxetine 60 milliGRAM(s) Oral daily  famotidine    Tablet 20 milliGRAM(s) Oral daily  furosemide   Injectable 40 milliGRAM(s) IV Push daily  guaiFENesin ER 1200 milliGRAM(s) Oral every 12 hours  heparin  Injectable 5000 Unit(s) SubCutaneous every 12 hours  isosorbide   mononitrate ER Tablet (IMDUR) 30 milliGRAM(s) Oral daily  levothyroxine 75 MICROGram(s) Oral daily  methylPREDNISolone sodium succinate Injectable 60 milliGRAM(s) IV Push every 8 hours  pregabalin 100 milliGRAM(s) Oral two times a day    MEDICATIONS  (PRN):  temazepam 30 milliGRAM(s) Oral at bedtime PRN Insomnia      Vital Signs Last 24 Hrs  T(C): 36.6 (12 Jul 2019 05:37), Max: 37.6 (11 Jul 2019 14:57)  T(F): 97.9 (12 Jul 2019 05:37), Max: 99.7 (11 Jul 2019 14:57)  HR: 61 (12 Jul 2019 05:37) (53 - 68)  BP: 181/74 (12 Jul 2019 05:37) (123/69 - 181/74)  BP(mean): --  RR: 16 (12 Jul 2019 05:37) (16 - 16)  SpO2: 92% (11 Jul 2019 07:42) (92% - 92%)  	Gen: lying in bed, NAD   	HEENT: PERRL No nasal discharge. Moist mucus membranes.  	Neck: Supple, non tender, full range of motion.  	CV: RRR no murmurs, rubs, or gallops. +S1S2.   	Pulm: speaking in full sentences, b/l rhochi and crackles   	Abd: soft NT ND +BS.   	Ext: Warm and well perfused x4, moving all extremities, b/l pedal edema   	Psy: Cooperative, appropriate.   Skin: Warm, dry, no rash    CARDIAC MARKERS ( 09 Jul 2019 21:18 )  x     / <0.01 ng/mL / x     / x     / x                                   13.0   6.22  )-----------( 171      ( 11 Jul 2019 07:11 )             39.1   07-11    143  |  101  |  28<H>  ----------------------------<  117<H>  3.8   |  30  |  1.0    Ca    8.6      11 Jul 2019 07:11    TPro  6.5  /  Alb  3.8  /  TBili  0.8  /  DBili  x   /  AST  19  /  ALT  6   /  AlkPhos  125<H>  07-11

## 2019-07-12 NOTE — PROGRESS NOTE ADULT - ASSESSMENT
86 yo f h/o Afib CHF HTN CKD Parkinson’s hypothyroid hyperlipidemia p/w CHF exacerbation   # CHF exacerbation   Admit to medicine   IV Lasix   O2 prn   Daily weights   Nebs prn     # pmhx HTN Afib Parkinson’s hypothyroid hyperlipidemia  Continue home meds    # DVT ppx   # GI ppx     congestion bronchitits     - add zithromax  - add steroids   - add symbicort     Problem/Plan - 1:  ·  Problem: CHF exacerbation.  Plan: # CHF exacerbation   Admit to medicine   IV Lasix   O2 prn   Daily weights   Nebs prn.

## 2019-07-13 LAB
ALBUMIN SERPL ELPH-MCNC: 4 G/DL — SIGNIFICANT CHANGE UP (ref 3.5–5.2)
ALP SERPL-CCNC: 141 U/L — HIGH (ref 30–115)
ALT FLD-CCNC: 13 U/L — SIGNIFICANT CHANGE UP (ref 0–41)
ANION GAP SERPL CALC-SCNC: 13 MMOL/L — SIGNIFICANT CHANGE UP (ref 7–14)
AST SERPL-CCNC: 19 U/L — SIGNIFICANT CHANGE UP (ref 0–41)
BILIRUB SERPL-MCNC: 0.5 MG/DL — SIGNIFICANT CHANGE UP (ref 0.2–1.2)
BUN SERPL-MCNC: 31 MG/DL — HIGH (ref 10–20)
CALCIUM SERPL-MCNC: 9.7 MG/DL — SIGNIFICANT CHANGE UP (ref 8.5–10.1)
CHLORIDE SERPL-SCNC: 98 MMOL/L — SIGNIFICANT CHANGE UP (ref 98–110)
CO2 SERPL-SCNC: 29 MMOL/L — SIGNIFICANT CHANGE UP (ref 17–32)
CREAT SERPL-MCNC: 0.9 MG/DL — SIGNIFICANT CHANGE UP (ref 0.7–1.5)
GLUCOSE SERPL-MCNC: 284 MG/DL — HIGH (ref 70–99)
HCT VFR BLD CALC: 41.7 % — SIGNIFICANT CHANGE UP (ref 37–47)
HGB BLD-MCNC: 13.7 G/DL — SIGNIFICANT CHANGE UP (ref 12–16)
MCHC RBC-ENTMCNC: 28.5 PG — SIGNIFICANT CHANGE UP (ref 27–31)
MCHC RBC-ENTMCNC: 32.9 G/DL — SIGNIFICANT CHANGE UP (ref 32–37)
MCV RBC AUTO: 86.9 FL — SIGNIFICANT CHANGE UP (ref 81–99)
NRBC # BLD: 0 /100 WBCS — SIGNIFICANT CHANGE UP (ref 0–0)
NT-PROBNP SERPL-SCNC: 4909 PG/ML — HIGH (ref 0–300)
PLATELET # BLD AUTO: 219 K/UL — SIGNIFICANT CHANGE UP (ref 130–400)
POTASSIUM SERPL-MCNC: 4.1 MMOL/L — SIGNIFICANT CHANGE UP (ref 3.5–5)
POTASSIUM SERPL-SCNC: 4.1 MMOL/L — SIGNIFICANT CHANGE UP (ref 3.5–5)
PROT SERPL-MCNC: 7.1 G/DL — SIGNIFICANT CHANGE UP (ref 6–8)
RBC # BLD: 4.8 M/UL — SIGNIFICANT CHANGE UP (ref 4.2–5.4)
RBC # FLD: 15 % — HIGH (ref 11.5–14.5)
SODIUM SERPL-SCNC: 140 MMOL/L — SIGNIFICANT CHANGE UP (ref 135–146)
WBC # BLD: 9.56 K/UL — SIGNIFICANT CHANGE UP (ref 4.8–10.8)
WBC # FLD AUTO: 9.56 K/UL — SIGNIFICANT CHANGE UP (ref 4.8–10.8)

## 2019-07-13 RX ORDER — ACETAMINOPHEN 500 MG
650 TABLET ORAL EVERY 6 HOURS
Refills: 0 | Status: DISCONTINUED | OUTPATIENT
Start: 2019-07-13 | End: 2019-07-17

## 2019-07-13 RX ORDER — FUROSEMIDE 40 MG
20 TABLET ORAL EVERY 12 HOURS
Refills: 0 | Status: DISCONTINUED | OUTPATIENT
Start: 2019-07-13 | End: 2019-07-15

## 2019-07-13 RX ADMIN — Medication 1200 MILLIGRAM(S): at 17:17

## 2019-07-13 RX ADMIN — Medication 81 MILLIGRAM(S): at 12:06

## 2019-07-13 RX ADMIN — TEMAZEPAM 30 MILLIGRAM(S): 15 CAPSULE ORAL at 21:46

## 2019-07-13 RX ADMIN — Medication 60 MILLIGRAM(S): at 13:13

## 2019-07-13 RX ADMIN — Medication 40 MILLIGRAM(S): at 05:42

## 2019-07-13 RX ADMIN — Medication 3 MILLILITER(S): at 21:00

## 2019-07-13 RX ADMIN — CARVEDILOL PHOSPHATE 25 MILLIGRAM(S): 80 CAPSULE, EXTENDED RELEASE ORAL at 17:16

## 2019-07-13 RX ADMIN — CARVEDILOL PHOSPHATE 25 MILLIGRAM(S): 80 CAPSULE, EXTENDED RELEASE ORAL at 05:42

## 2019-07-13 RX ADMIN — AMIODARONE HYDROCHLORIDE 200 MILLIGRAM(S): 400 TABLET ORAL at 05:42

## 2019-07-13 RX ADMIN — DULOXETINE HYDROCHLORIDE 60 MILLIGRAM(S): 30 CAPSULE, DELAYED RELEASE ORAL at 12:06

## 2019-07-13 RX ADMIN — CARBIDOPA AND LEVODOPA 1 TABLET(S): 25; 100 TABLET ORAL at 12:06

## 2019-07-13 RX ADMIN — Medication 60 MILLIGRAM(S): at 21:38

## 2019-07-13 RX ADMIN — Medication 75 MICROGRAM(S): at 05:41

## 2019-07-13 RX ADMIN — ISOSORBIDE MONONITRATE 30 MILLIGRAM(S): 60 TABLET, EXTENDED RELEASE ORAL at 12:06

## 2019-07-13 RX ADMIN — Medication 20 MILLIGRAM(S): at 17:15

## 2019-07-13 RX ADMIN — BUDESONIDE AND FORMOTEROL FUMARATE DIHYDRATE 2 PUFF(S): 160; 4.5 AEROSOL RESPIRATORY (INHALATION) at 07:54

## 2019-07-13 RX ADMIN — Medication 3 MILLILITER(S): at 07:52

## 2019-07-13 RX ADMIN — Medication 1200 MILLIGRAM(S): at 05:42

## 2019-07-13 RX ADMIN — Medication 650 MILLIGRAM(S): at 21:46

## 2019-07-13 RX ADMIN — Medication 60 MILLIGRAM(S): at 05:43

## 2019-07-13 RX ADMIN — ATORVASTATIN CALCIUM 10 MILLIGRAM(S): 80 TABLET, FILM COATED ORAL at 21:39

## 2019-07-13 RX ADMIN — HEPARIN SODIUM 5000 UNIT(S): 5000 INJECTION INTRAVENOUS; SUBCUTANEOUS at 17:15

## 2019-07-13 RX ADMIN — Medication 100 MILLIGRAM(S): at 17:16

## 2019-07-13 RX ADMIN — HEPARIN SODIUM 5000 UNIT(S): 5000 INJECTION INTRAVENOUS; SUBCUTANEOUS at 05:42

## 2019-07-13 RX ADMIN — FAMOTIDINE 20 MILLIGRAM(S): 10 INJECTION INTRAVENOUS at 12:06

## 2019-07-13 RX ADMIN — CARBIDOPA AND LEVODOPA 1 TABLET(S): 25; 100 TABLET ORAL at 21:39

## 2019-07-13 RX ADMIN — BUDESONIDE AND FORMOTEROL FUMARATE DIHYDRATE 2 PUFF(S): 160; 4.5 AEROSOL RESPIRATORY (INHALATION) at 21:01

## 2019-07-13 RX ADMIN — AZITHROMYCIN 500 MILLIGRAM(S): 500 TABLET, FILM COATED ORAL at 12:06

## 2019-07-13 RX ADMIN — Medication 100 MILLIGRAM(S): at 05:42

## 2019-07-13 RX ADMIN — Medication 3 MILLILITER(S): at 13:04

## 2019-07-13 NOTE — PROGRESS NOTE ADULT - SUBJECTIVE AND OBJECTIVE BOX
86 yo f h/o Afib CHF HTN CKD Parkinson’s hypothyroid hyperlipidemia presents with cough and SOB x 3 days.   pt c/o worsening SOB, even at rest. also had intermitted chest pain/dyspnea with breathing, pain 5/10, now resolved   Denies fever, chills, CP N/V/D numbness/tingling   now with cough and congestion + yellow / green sputum     PAST MEDICAL & SURGICAL HISTORY:  Parkinson disease  Hypothyroid  Rib fractures  Congestive heart failure  Hypertension  No significant past surgical history    MEDICATIONS  (STANDING):  ALBUTerol/ipratropium for Nebulization 3 milliLiter(s) Nebulizer every 6 hours  amiodarone    Tablet 200 milliGRAM(s) Oral daily  aspirin  chewable 81 milliGRAM(s) Oral daily  atorvastatin 10 milliGRAM(s) Oral at bedtime  azithromycin   Tablet 500 milliGRAM(s) Oral daily  buDESOnide 160 MICROgram(s)/formoterol 4.5 MICROgram(s) Inhaler 2 Puff(s) Inhalation two times a day  carbidopa/levodopa  10/100 1 Tablet(s) Oral at bedtime  carbidopa/levodopa  25/100 1 Tablet(s) Oral daily  carvedilol Oral Tab/Cap - Peds 25 milliGRAM(s) Oral two times a day  chlorhexidine 4% Liquid 1 Application(s) Topical <User Schedule>  DULoxetine 60 milliGRAM(s) Oral daily  famotidine    Tablet 20 milliGRAM(s) Oral daily  furosemide   Injectable 20 milliGRAM(s) IV Push every 12 hours  furosemide   Injectable 40 milliGRAM(s) IV Push daily  guaiFENesin ER 1200 milliGRAM(s) Oral every 12 hours  heparin  Injectable 5000 Unit(s) SubCutaneous every 12 hours  isosorbide   mononitrate ER Tablet (IMDUR) 30 milliGRAM(s) Oral daily  levothyroxine 75 MICROGram(s) Oral daily  methylPREDNISolone sodium succinate Injectable 60 milliGRAM(s) IV Push every 8 hours  pregabalin 100 milliGRAM(s) Oral two times a day    MEDICATIONS  (PRN):  temazepam 30 milliGRAM(s) Oral at bedtime PRN Insomnia        Vital Signs Last 24 Hrs  T(C): 36.1 (13 Jul 2019 06:05), Max: 36.1 (13 Jul 2019 06:05)  T(F): 96.9 (13 Jul 2019 06:05), Max: 96.9 (13 Jul 2019 06:05)  HR: 63 (13 Jul 2019 06:05) (61 - 63)  BP: 185/83 (13 Jul 2019 06:05) (138/63 - 185/83)  BP(mean): --  RR: 16 (13 Jul 2019 06:05) (16 - 16)  SpO2: 95% (13 Jul 2019 05:40) (95% - 95%)    	Gen: lying in bed, NAD   	HEENT: PERRL No nasal discharge. Moist mucus membranes.  	Neck: Supple, non tender, full range of motion.  	CV: RRR no murmurs, rubs, or gallops. +S1S2.   	Pulm: speaking in full sentences, b/l rhochi and crackles   	Abd: soft NT ND +BS.   	Ext: Warm and well perfused x4, moving all extremities, b/l pedal edema   	Psy: Cooperative, appropriate.   Skin: Warm, dry, no rash    CARDIAC MARKERS ( 09 Jul 2019 21:18 )  x     / <0.01 ng/mL / x     / x     / x                                   13.0   6.22  )-----------( 171      ( 11 Jul 2019 07:11 )             39.1   07-11    143  |  101  |  28<H>  ----------------------------<  117<H>  3.8   |  30  |  1.0    Ca    8.6      11 Jul 2019 07:11    TPro  6.5  /  Alb  3.8  /  TBili  0.8  /  DBili  x   /  AST  19  /  ALT  6   /  AlkPhos  125<H>  07-11

## 2019-07-14 LAB
ALBUMIN SERPL ELPH-MCNC: 3.9 G/DL — SIGNIFICANT CHANGE UP (ref 3.5–5.2)
ALP SERPL-CCNC: 125 U/L — HIGH (ref 30–115)
ALT FLD-CCNC: 18 U/L — SIGNIFICANT CHANGE UP (ref 0–41)
ANION GAP SERPL CALC-SCNC: 13 MMOL/L — SIGNIFICANT CHANGE UP (ref 7–14)
AST SERPL-CCNC: 24 U/L — SIGNIFICANT CHANGE UP (ref 0–41)
BILIRUB SERPL-MCNC: 0.4 MG/DL — SIGNIFICANT CHANGE UP (ref 0.2–1.2)
BUN SERPL-MCNC: 40 MG/DL — HIGH (ref 10–20)
CALCIUM SERPL-MCNC: 9.6 MG/DL — SIGNIFICANT CHANGE UP (ref 8.5–10.1)
CHLORIDE SERPL-SCNC: 101 MMOL/L — SIGNIFICANT CHANGE UP (ref 98–110)
CO2 SERPL-SCNC: 28 MMOL/L — SIGNIFICANT CHANGE UP (ref 17–32)
CREAT SERPL-MCNC: 1.1 MG/DL — SIGNIFICANT CHANGE UP (ref 0.7–1.5)
GLUCOSE SERPL-MCNC: 305 MG/DL — HIGH (ref 70–99)
HCT VFR BLD CALC: 41 % — SIGNIFICANT CHANGE UP (ref 37–47)
HGB BLD-MCNC: 13.5 G/DL — SIGNIFICANT CHANGE UP (ref 12–16)
MCHC RBC-ENTMCNC: 28.7 PG — SIGNIFICANT CHANGE UP (ref 27–31)
MCHC RBC-ENTMCNC: 32.9 G/DL — SIGNIFICANT CHANGE UP (ref 32–37)
MCV RBC AUTO: 87.2 FL — SIGNIFICANT CHANGE UP (ref 81–99)
NRBC # BLD: 0 /100 WBCS — SIGNIFICANT CHANGE UP (ref 0–0)
PLATELET # BLD AUTO: 211 K/UL — SIGNIFICANT CHANGE UP (ref 130–400)
POTASSIUM SERPL-MCNC: 4.2 MMOL/L — SIGNIFICANT CHANGE UP (ref 3.5–5)
POTASSIUM SERPL-SCNC: 4.2 MMOL/L — SIGNIFICANT CHANGE UP (ref 3.5–5)
PROT SERPL-MCNC: 6.6 G/DL — SIGNIFICANT CHANGE UP (ref 6–8)
RBC # BLD: 4.7 M/UL — SIGNIFICANT CHANGE UP (ref 4.2–5.4)
RBC # FLD: 15.3 % — HIGH (ref 11.5–14.5)
SODIUM SERPL-SCNC: 142 MMOL/L — SIGNIFICANT CHANGE UP (ref 135–146)
WBC # BLD: 10.11 K/UL — SIGNIFICANT CHANGE UP (ref 4.8–10.8)
WBC # FLD AUTO: 10.11 K/UL — SIGNIFICANT CHANGE UP (ref 4.8–10.8)

## 2019-07-14 RX ORDER — IBUPROFEN 200 MG
400 TABLET ORAL EVERY 8 HOURS
Refills: 0 | Status: DISCONTINUED | OUTPATIENT
Start: 2019-07-14 | End: 2019-07-17

## 2019-07-14 RX ADMIN — Medication 3 MILLILITER(S): at 20:16

## 2019-07-14 RX ADMIN — Medication 650 MILLIGRAM(S): at 16:26

## 2019-07-14 RX ADMIN — Medication 400 MILLIGRAM(S): at 11:03

## 2019-07-14 RX ADMIN — AMIODARONE HYDROCHLORIDE 200 MILLIGRAM(S): 400 TABLET ORAL at 05:11

## 2019-07-14 RX ADMIN — Medication 20 MILLIGRAM(S): at 17:35

## 2019-07-14 RX ADMIN — Medication 650 MILLIGRAM(S): at 16:00

## 2019-07-14 RX ADMIN — ATORVASTATIN CALCIUM 10 MILLIGRAM(S): 80 TABLET, FILM COATED ORAL at 21:22

## 2019-07-14 RX ADMIN — DULOXETINE HYDROCHLORIDE 60 MILLIGRAM(S): 30 CAPSULE, DELAYED RELEASE ORAL at 11:01

## 2019-07-14 RX ADMIN — TEMAZEPAM 30 MILLIGRAM(S): 15 CAPSULE ORAL at 21:26

## 2019-07-14 RX ADMIN — Medication 1200 MILLIGRAM(S): at 05:11

## 2019-07-14 RX ADMIN — AZITHROMYCIN 500 MILLIGRAM(S): 500 TABLET, FILM COATED ORAL at 11:00

## 2019-07-14 RX ADMIN — CARBIDOPA AND LEVODOPA 1 TABLET(S): 25; 100 TABLET ORAL at 11:01

## 2019-07-14 RX ADMIN — CARVEDILOL PHOSPHATE 25 MILLIGRAM(S): 80 CAPSULE, EXTENDED RELEASE ORAL at 17:35

## 2019-07-14 RX ADMIN — Medication 3 MILLILITER(S): at 13:21

## 2019-07-14 RX ADMIN — Medication 3 MILLILITER(S): at 07:56

## 2019-07-14 RX ADMIN — Medication 100 MILLIGRAM(S): at 05:12

## 2019-07-14 RX ADMIN — BUDESONIDE AND FORMOTEROL FUMARATE DIHYDRATE 2 PUFF(S): 160; 4.5 AEROSOL RESPIRATORY (INHALATION) at 07:54

## 2019-07-14 RX ADMIN — HEPARIN SODIUM 5000 UNIT(S): 5000 INJECTION INTRAVENOUS; SUBCUTANEOUS at 17:35

## 2019-07-14 RX ADMIN — BUDESONIDE AND FORMOTEROL FUMARATE DIHYDRATE 2 PUFF(S): 160; 4.5 AEROSOL RESPIRATORY (INHALATION) at 21:22

## 2019-07-14 RX ADMIN — CARVEDILOL PHOSPHATE 25 MILLIGRAM(S): 80 CAPSULE, EXTENDED RELEASE ORAL at 05:11

## 2019-07-14 RX ADMIN — Medication 400 MILLIGRAM(S): at 10:56

## 2019-07-14 RX ADMIN — Medication 650 MILLIGRAM(S): at 08:24

## 2019-07-14 RX ADMIN — Medication 650 MILLIGRAM(S): at 09:28

## 2019-07-14 RX ADMIN — Medication 75 MICROGRAM(S): at 05:11

## 2019-07-14 RX ADMIN — Medication 1200 MILLIGRAM(S): at 17:35

## 2019-07-14 RX ADMIN — ISOSORBIDE MONONITRATE 30 MILLIGRAM(S): 60 TABLET, EXTENDED RELEASE ORAL at 11:01

## 2019-07-14 RX ADMIN — Medication 60 MILLIGRAM(S): at 05:11

## 2019-07-14 RX ADMIN — Medication 81 MILLIGRAM(S): at 11:00

## 2019-07-14 RX ADMIN — FAMOTIDINE 20 MILLIGRAM(S): 10 INJECTION INTRAVENOUS at 11:01

## 2019-07-14 RX ADMIN — HEPARIN SODIUM 5000 UNIT(S): 5000 INJECTION INTRAVENOUS; SUBCUTANEOUS at 05:11

## 2019-07-14 RX ADMIN — Medication 20 MILLIGRAM(S): at 05:11

## 2019-07-14 RX ADMIN — CARBIDOPA AND LEVODOPA 1 TABLET(S): 25; 100 TABLET ORAL at 21:22

## 2019-07-14 RX ADMIN — Medication 100 MILLIGRAM(S): at 17:35

## 2019-07-14 NOTE — PROGRESS NOTE ADULT - SUBJECTIVE AND OBJECTIVE BOX
84 yo f h/o Afib CHF HTN CKD Parkinson’s hypothyroid hyperlipidemia presents with cough and SOB x 3 days.   pt c/o worsening SOB, even at rest. also had intermitted chest pain/dyspnea with breathing, pain 5/10, now resolved   Denies fever, chills, CP N/V/D numbness/tingling   now with cough and congestion + yellow / green sputum     PAST MEDICAL & SURGICAL HISTORY:  Parkinson disease  Hypothyroid  Rib fractures  Congestive heart failure  Hypertension  No significant past surgical history    MEDICATIONS  (STANDING):  ALBUTerol/ipratropium for Nebulization 3 milliLiter(s) Nebulizer every 6 hours  amiodarone    Tablet 200 milliGRAM(s) Oral daily  aspirin  chewable 81 milliGRAM(s) Oral daily  atorvastatin 10 milliGRAM(s) Oral at bedtime  buDESOnide 160 MICROgram(s)/formoterol 4.5 MICROgram(s) Inhaler 2 Puff(s) Inhalation two times a day  carbidopa/levodopa  10/100 1 Tablet(s) Oral at bedtime  carbidopa/levodopa  25/100 1 Tablet(s) Oral daily  carvedilol Oral Tab/Cap - Peds 25 milliGRAM(s) Oral two times a day  chlorhexidine 4% Liquid 1 Application(s) Topical <User Schedule>  DULoxetine 60 milliGRAM(s) Oral daily  famotidine    Tablet 20 milliGRAM(s) Oral daily  furosemide   Injectable 20 milliGRAM(s) IV Push every 12 hours  guaiFENesin ER 1200 milliGRAM(s) Oral every 12 hours  heparin  Injectable 5000 Unit(s) SubCutaneous every 12 hours  isosorbide   mononitrate ER Tablet (IMDUR) 30 milliGRAM(s) Oral daily  levothyroxine 75 MICROGram(s) Oral daily  pregabalin 100 milliGRAM(s) Oral two times a day    MEDICATIONS  (PRN):  acetaminophen   Tablet .. 650 milliGRAM(s) Oral every 6 hours PRN Temp greater or equal to 38C (100.4F), Mild Pain (1 - 3)  ibuprofen  Tablet. 400 milliGRAM(s) Oral every 8 hours PRN Moderate Pain (4 - 6)  temazepam 30 milliGRAM(s) Oral at bedtime PRN Insomnia    Vital Signs Last 24 Hrs  T(C): 36.8 (14 Jul 2019 22:42), Max: 36.8 (14 Jul 2019 22:42)  T(F): 98.2 (14 Jul 2019 22:42), Max: 98.2 (14 Jul 2019 22:42)  HR: 68 (14 Jul 2019 22:42) (61 - 68)  BP: 139/62 (14 Jul 2019 22:42) (138/60 - 177/80)  BP(mean): --  RR: 16 (14 Jul 2019 22:42) (16 - 16)  SpO2: 94% (14 Jul 2019 08:12) (94% - 94%)    Gen: lying in bed, NAD   	HEENT: PERRL No nasal discharge. Moist mucus membranes.  	Neck: Supple, non tender, full range of motion.  	CV: RRR no murmurs, rubs, or gallops. +S1S2.   	Pulm: speaking in full sentences, b/l rhochi and crackles   	Abd: soft NT ND +BS.   	Ext: Warm and well perfused x4, moving all extremities, b/l pedal edema   	Psy: Cooperative, appropriate.   Skin: Warm, dry, no rash                          13.5   10.11 )-----------( 211      ( 14 Jul 2019 11:02 )             41.0   07-14    142  |  101  |  40<H>  ----------------------------<  305<H>  4.2   |  28  |  1.1    Ca    9.6      14 Jul 2019 11:02    TPro  6.6  /  Alb  3.9  /  TBili  0.4  /  DBili  x   /  AST  24  /  ALT  18  /  AlkPhos  125<H>  07-14

## 2019-07-14 NOTE — CHART NOTE - NSCHARTNOTEFT_GEN_A_CORE
Called by RN regarding pt's c/o anterior chest pain. Pt seen and evaluated at bedside. She reports transient episodes of chest pain. Current started overnight and has decreased in severity. Pt receiving neb tx. VS/Pox stable.  On PE the are b/l end expiratory wheezes, no rales auscultated. +reproducible left chest wall tenderness (same location as stated pain).  Pt to continue on current meds. Motrin PRN  musculoskeletal pain.

## 2019-07-15 LAB
CULTURE RESULTS: SIGNIFICANT CHANGE UP
CULTURE RESULTS: SIGNIFICANT CHANGE UP
SPECIMEN SOURCE: SIGNIFICANT CHANGE UP
SPECIMEN SOURCE: SIGNIFICANT CHANGE UP

## 2019-07-15 RX ORDER — FUROSEMIDE 40 MG
40 TABLET ORAL
Refills: 0 | Status: DISCONTINUED | OUTPATIENT
Start: 2019-07-15 | End: 2019-07-17

## 2019-07-15 RX ADMIN — Medication 40 MILLIGRAM(S): at 17:22

## 2019-07-15 RX ADMIN — AMIODARONE HYDROCHLORIDE 200 MILLIGRAM(S): 400 TABLET ORAL at 05:52

## 2019-07-15 RX ADMIN — Medication 1200 MILLIGRAM(S): at 17:24

## 2019-07-15 RX ADMIN — HEPARIN SODIUM 5000 UNIT(S): 5000 INJECTION INTRAVENOUS; SUBCUTANEOUS at 05:56

## 2019-07-15 RX ADMIN — CARVEDILOL PHOSPHATE 25 MILLIGRAM(S): 80 CAPSULE, EXTENDED RELEASE ORAL at 17:23

## 2019-07-15 RX ADMIN — BUDESONIDE AND FORMOTEROL FUMARATE DIHYDRATE 2 PUFF(S): 160; 4.5 AEROSOL RESPIRATORY (INHALATION) at 12:19

## 2019-07-15 RX ADMIN — ISOSORBIDE MONONITRATE 30 MILLIGRAM(S): 60 TABLET, EXTENDED RELEASE ORAL at 12:19

## 2019-07-15 RX ADMIN — CHLORHEXIDINE GLUCONATE 1 APPLICATION(S): 213 SOLUTION TOPICAL at 08:38

## 2019-07-15 RX ADMIN — TEMAZEPAM 30 MILLIGRAM(S): 15 CAPSULE ORAL at 21:40

## 2019-07-15 RX ADMIN — Medication 100 MILLIGRAM(S): at 05:53

## 2019-07-15 RX ADMIN — Medication 1200 MILLIGRAM(S): at 05:52

## 2019-07-15 RX ADMIN — CARBIDOPA AND LEVODOPA 1 TABLET(S): 25; 100 TABLET ORAL at 21:40

## 2019-07-15 RX ADMIN — BUDESONIDE AND FORMOTEROL FUMARATE DIHYDRATE 2 PUFF(S): 160; 4.5 AEROSOL RESPIRATORY (INHALATION) at 19:56

## 2019-07-15 RX ADMIN — Medication 100 MILLIGRAM(S): at 17:27

## 2019-07-15 RX ADMIN — HEPARIN SODIUM 5000 UNIT(S): 5000 INJECTION INTRAVENOUS; SUBCUTANEOUS at 17:22

## 2019-07-15 RX ADMIN — FAMOTIDINE 20 MILLIGRAM(S): 10 INJECTION INTRAVENOUS at 12:18

## 2019-07-15 RX ADMIN — Medication 3 MILLILITER(S): at 14:28

## 2019-07-15 RX ADMIN — Medication 75 MICROGRAM(S): at 05:52

## 2019-07-15 RX ADMIN — Medication 20 MILLIGRAM(S): at 05:52

## 2019-07-15 RX ADMIN — DULOXETINE HYDROCHLORIDE 60 MILLIGRAM(S): 30 CAPSULE, DELAYED RELEASE ORAL at 12:19

## 2019-07-15 RX ADMIN — Medication 40 MILLIGRAM(S): at 05:52

## 2019-07-15 RX ADMIN — CARBIDOPA AND LEVODOPA 1 TABLET(S): 25; 100 TABLET ORAL at 12:18

## 2019-07-15 RX ADMIN — Medication 3 MILLILITER(S): at 19:56

## 2019-07-15 RX ADMIN — Medication 3 MILLILITER(S): at 10:03

## 2019-07-15 RX ADMIN — Medication 81 MILLIGRAM(S): at 12:19

## 2019-07-15 RX ADMIN — ATORVASTATIN CALCIUM 10 MILLIGRAM(S): 80 TABLET, FILM COATED ORAL at 21:40

## 2019-07-15 NOTE — PROGRESS NOTE ADULT - SUBJECTIVE AND OBJECTIVE BOX
86 yo f h/o Afib CHF HTN CKD Parkinson’s hypothyroid hyperlipidemia presents with cough and SOB x 3 days.   pt c/o worsening SOB, even at rest. also had intermitted chest pain/dyspnea with breathing, pain 5/10, now resolved   Denies fever, chills, CP N/V/D numbness/tingling   now with cough and congestion + yellow / green sputum     PAST MEDICAL & SURGICAL HISTORY:  Parkinson disease  Hypothyroid  Rib fractures  Congestive heart failure  Hypertension  No significant past surgical history    MEDICATIONS  (STANDING):  ALBUTerol/ipratropium for Nebulization 3 milliLiter(s) Nebulizer every 6 hours  amiodarone    Tablet 200 milliGRAM(s) Oral daily  aspirin  chewable 81 milliGRAM(s) Oral daily  atorvastatin 10 milliGRAM(s) Oral at bedtime  buDESOnide 160 MICROgram(s)/formoterol 4.5 MICROgram(s) Inhaler 2 Puff(s) Inhalation two times a day  carbidopa/levodopa  10/100 1 Tablet(s) Oral at bedtime  carbidopa/levodopa  25/100 1 Tablet(s) Oral daily  carvedilol Oral Tab/Cap - Peds 25 milliGRAM(s) Oral two times a day  chlorhexidine 4% Liquid 1 Application(s) Topical <User Schedule>  DULoxetine 60 milliGRAM(s) Oral daily  famotidine    Tablet 20 milliGRAM(s) Oral daily  furosemide    Tablet 40 milliGRAM(s) Oral two times a day  furosemide   Injectable 20 milliGRAM(s) IV Push every 12 hours  guaiFENesin ER 1200 milliGRAM(s) Oral every 12 hours  heparin  Injectable 5000 Unit(s) SubCutaneous every 12 hours  isosorbide   mononitrate ER Tablet (IMDUR) 30 milliGRAM(s) Oral daily  levothyroxine 75 MICROGram(s) Oral daily  methylPREDNISolone sodium succinate Injectable 40 milliGRAM(s) IV Push daily  pregabalin 100 milliGRAM(s) Oral two times a day    MEDICATIONS  (PRN):  acetaminophen   Tablet .. 650 milliGRAM(s) Oral every 6 hours PRN Temp greater or equal to 38C (100.4F), Mild Pain (1 - 3)  ibuprofen  Tablet. 400 milliGRAM(s) Oral every 8 hours PRN Moderate Pain (4 - 6)  temazepam 30 milliGRAM(s) Oral at bedtime PRN Insomnia      Vital Signs Last 24 Hrs  T(C): 36.8 (14 Jul 2019 22:42), Max: 36.8 (14 Jul 2019 22:42)  T(F): 98.2 (14 Jul 2019 22:42), Max: 98.2 (14 Jul 2019 22:42)  HR: 68 (14 Jul 2019 22:42) (61 - 68)  BP: 139/62 (14 Jul 2019 22:42) (138/60 - 165/71)  BP(mean): --  RR: 16 (14 Jul 2019 22:42) (16 - 16)  SpO2: 94% (14 Jul 2019 08:12) (94% - 94%)      Gen: lying in bed, NAD   	HEENT: PERRL No nasal discharge. Moist mucus membranes.  	Neck: Supple, non tender, full range of motion.  	CV: RRR no murmurs, rubs, or gallops. +S1S2.   	Pulm: speaking in full sentences, b/l rhochi and crackles   	Abd: soft NT ND +BS.   	Ext: Warm and well perfused x4, moving all extremities, b/l pedal edema   	Psy: Cooperative, appropriate.   Skin: Warm, dry, no rash                          13.5   10.11 )-----------( 211      ( 14 Jul 2019 11:02 )             41.0   07-14    142  |  101  |  40<H>  ----------------------------<  305<H>  4.2   |  28  |  1.1    Ca    9.6      14 Jul 2019 11:02    TPro  6.6  /  Alb  3.9  /  TBili  0.4  /  DBili  x   /  AST  24  /  ALT  18  /  AlkPhos  125<H>  07-14

## 2019-07-15 NOTE — PROGRESS NOTE ADULT - ASSESSMENT
84 yo f h/o Afib CHF HTN CKD Parkinson’s hypothyroid hyperlipidemia p/w CHF exacerbation   # CHF exacerbation   Admit to medicine   Lasix   O2 prn   Daily weights   Nebs prn   # pmhx HTN Afib Parkinson’s hypothyroid hyperlipidemia  Continue home meds    # DVT ppx   # GI ppx     congestion bronchitits     - add zithromax  - add steroids   - add symbicort     Problem/Plan - 1:  ·  Problem: CHF exacerbation.  Plan: # CHF exacerbation   Admit to medicine   IV Lasix   O2 prn   Daily weights   Nebs prn.

## 2019-07-16 LAB
ALBUMIN SERPL ELPH-MCNC: 3.8 G/DL — SIGNIFICANT CHANGE UP (ref 3.5–5.2)
ALP SERPL-CCNC: 109 U/L — SIGNIFICANT CHANGE UP (ref 30–115)
ALT FLD-CCNC: 20 U/L — SIGNIFICANT CHANGE UP (ref 0–41)
ANION GAP SERPL CALC-SCNC: 13 MMOL/L — SIGNIFICANT CHANGE UP (ref 7–14)
AST SERPL-CCNC: 24 U/L — SIGNIFICANT CHANGE UP (ref 0–41)
BILIRUB SERPL-MCNC: 0.4 MG/DL — SIGNIFICANT CHANGE UP (ref 0.2–1.2)
BUN SERPL-MCNC: 62 MG/DL — CRITICAL HIGH (ref 10–20)
CALCIUM SERPL-MCNC: 9.1 MG/DL — SIGNIFICANT CHANGE UP (ref 8.5–10.1)
CHLORIDE SERPL-SCNC: 97 MMOL/L — LOW (ref 98–110)
CO2 SERPL-SCNC: 31 MMOL/L — SIGNIFICANT CHANGE UP (ref 17–32)
CREAT SERPL-MCNC: 1 MG/DL — SIGNIFICANT CHANGE UP (ref 0.7–1.5)
GLUCOSE SERPL-MCNC: 332 MG/DL — HIGH (ref 70–99)
HCT VFR BLD CALC: 41.8 % — SIGNIFICANT CHANGE UP (ref 37–47)
HGB BLD-MCNC: 13.7 G/DL — SIGNIFICANT CHANGE UP (ref 12–16)
MCHC RBC-ENTMCNC: 28.2 PG — SIGNIFICANT CHANGE UP (ref 27–31)
MCHC RBC-ENTMCNC: 32.8 G/DL — SIGNIFICANT CHANGE UP (ref 32–37)
MCV RBC AUTO: 86 FL — SIGNIFICANT CHANGE UP (ref 81–99)
NRBC # BLD: 0 /100 WBCS — SIGNIFICANT CHANGE UP (ref 0–0)
PLATELET # BLD AUTO: 199 K/UL — SIGNIFICANT CHANGE UP (ref 130–400)
POTASSIUM SERPL-MCNC: 4.1 MMOL/L — SIGNIFICANT CHANGE UP (ref 3.5–5)
POTASSIUM SERPL-SCNC: 4.1 MMOL/L — SIGNIFICANT CHANGE UP (ref 3.5–5)
PROT SERPL-MCNC: 6.6 G/DL — SIGNIFICANT CHANGE UP (ref 6–8)
RBC # BLD: 4.86 M/UL — SIGNIFICANT CHANGE UP (ref 4.2–5.4)
RBC # FLD: 15.4 % — HIGH (ref 11.5–14.5)
SODIUM SERPL-SCNC: 141 MMOL/L — SIGNIFICANT CHANGE UP (ref 135–146)
WBC # BLD: 9.94 K/UL — SIGNIFICANT CHANGE UP (ref 4.8–10.8)
WBC # FLD AUTO: 9.94 K/UL — SIGNIFICANT CHANGE UP (ref 4.8–10.8)

## 2019-07-16 PROCEDURE — 71046 X-RAY EXAM CHEST 2 VIEWS: CPT | Mod: 26

## 2019-07-16 RX ORDER — NYSTATIN CREAM 100000 [USP'U]/G
1 CREAM TOPICAL
Refills: 0 | Status: DISCONTINUED | OUTPATIENT
Start: 2019-07-16 | End: 2019-07-17

## 2019-07-16 RX ADMIN — CARBIDOPA AND LEVODOPA 1 TABLET(S): 25; 100 TABLET ORAL at 21:19

## 2019-07-16 RX ADMIN — Medication 40 MILLIGRAM(S): at 05:58

## 2019-07-16 RX ADMIN — BUDESONIDE AND FORMOTEROL FUMARATE DIHYDRATE 2 PUFF(S): 160; 4.5 AEROSOL RESPIRATORY (INHALATION) at 21:19

## 2019-07-16 RX ADMIN — FAMOTIDINE 20 MILLIGRAM(S): 10 INJECTION INTRAVENOUS at 12:44

## 2019-07-16 RX ADMIN — CARVEDILOL PHOSPHATE 25 MILLIGRAM(S): 80 CAPSULE, EXTENDED RELEASE ORAL at 05:58

## 2019-07-16 RX ADMIN — CHLORHEXIDINE GLUCONATE 1 APPLICATION(S): 213 SOLUTION TOPICAL at 05:58

## 2019-07-16 RX ADMIN — AMIODARONE HYDROCHLORIDE 200 MILLIGRAM(S): 400 TABLET ORAL at 05:58

## 2019-07-16 RX ADMIN — BUDESONIDE AND FORMOTEROL FUMARATE DIHYDRATE 2 PUFF(S): 160; 4.5 AEROSOL RESPIRATORY (INHALATION) at 20:04

## 2019-07-16 RX ADMIN — Medication 3 MILLILITER(S): at 14:28

## 2019-07-16 RX ADMIN — TEMAZEPAM 30 MILLIGRAM(S): 15 CAPSULE ORAL at 21:18

## 2019-07-16 RX ADMIN — Medication 3 MILLILITER(S): at 07:32

## 2019-07-16 RX ADMIN — Medication 40 MILLIGRAM(S): at 05:56

## 2019-07-16 RX ADMIN — Medication 100 MILLIGRAM(S): at 05:57

## 2019-07-16 RX ADMIN — Medication 3 MILLILITER(S): at 20:04

## 2019-07-16 RX ADMIN — Medication 100 MILLIGRAM(S): at 17:42

## 2019-07-16 RX ADMIN — Medication 1200 MILLIGRAM(S): at 17:42

## 2019-07-16 RX ADMIN — DULOXETINE HYDROCHLORIDE 60 MILLIGRAM(S): 30 CAPSULE, DELAYED RELEASE ORAL at 12:44

## 2019-07-16 RX ADMIN — Medication 75 MICROGRAM(S): at 05:57

## 2019-07-16 RX ADMIN — ATORVASTATIN CALCIUM 10 MILLIGRAM(S): 80 TABLET, FILM COATED ORAL at 21:19

## 2019-07-16 RX ADMIN — ISOSORBIDE MONONITRATE 30 MILLIGRAM(S): 60 TABLET, EXTENDED RELEASE ORAL at 12:44

## 2019-07-16 RX ADMIN — Medication 1200 MILLIGRAM(S): at 05:58

## 2019-07-16 RX ADMIN — NYSTATIN CREAM 1 APPLICATION(S): 100000 CREAM TOPICAL at 23:19

## 2019-07-16 RX ADMIN — HEPARIN SODIUM 5000 UNIT(S): 5000 INJECTION INTRAVENOUS; SUBCUTANEOUS at 05:57

## 2019-07-16 RX ADMIN — Medication 40 MILLIGRAM(S): at 17:42

## 2019-07-16 RX ADMIN — Medication 81 MILLIGRAM(S): at 12:44

## 2019-07-16 RX ADMIN — HEPARIN SODIUM 5000 UNIT(S): 5000 INJECTION INTRAVENOUS; SUBCUTANEOUS at 17:42

## 2019-07-16 RX ADMIN — CARVEDILOL PHOSPHATE 25 MILLIGRAM(S): 80 CAPSULE, EXTENDED RELEASE ORAL at 17:43

## 2019-07-16 RX ADMIN — CARBIDOPA AND LEVODOPA 1 TABLET(S): 25; 100 TABLET ORAL at 12:44

## 2019-07-16 NOTE — DIETITIAN INITIAL EVALUATION ADULT. - ENERGY NEEDS
Energy: 7989-8150 kcal/day (MSJx1.2-1.3 AF)    Protein: 63-75 g/day (1-1.2 g/kg ABW)    Fluids: 1 mL/kcal

## 2019-07-16 NOTE — DIETITIAN INITIAL EVALUATION ADULT. - RD TO REMAIN AVAILABLE
yes/Nutrition Intervention: Meals & Snacks. Monitoring/Evaluation: Energy intake, glucose profile, renal profile, nutrition focused physical findings, body composition.

## 2019-07-16 NOTE — DIETITIAN INITIAL EVALUATION ADULT. - OTHER INFO
Pertinent Medical Information: p/w cough and SOB x 3 days.   pt c/o worsening SOB, even at rest. also had intermitted chest pain/dyspnea with breathing, pain 5/10, now resolved. CHF exacerbation  noted - on lasix.    Pertinent Subjective Information: Pertinent Medical Information: p/w cough and SOB x 3 days.   pt c/o worsening SOB, even at rest. also had intermitted chest pain/dyspnea with breathing, pain 5/10, now resolved. CHF exacerbation  noted - on lasix.    Pertinent Subjective Information: Low salt, heart healthy diet PTP. NKFA. No supplements. Basic knowledge of heart healthy nutrition therapy. No known h/o unintentional wt loss. UBW ~61.4 - 63.6 kg.

## 2019-07-17 ENCOUNTER — TRANSCRIPTION ENCOUNTER (OUTPATIENT)
Age: 84
End: 2019-07-17

## 2019-07-17 VITALS
TEMPERATURE: 99 F | SYSTOLIC BLOOD PRESSURE: 168 MMHG | DIASTOLIC BLOOD PRESSURE: 67 MMHG | RESPIRATION RATE: 16 BRPM | HEART RATE: 71 BPM

## 2019-07-17 RX ORDER — BUDESONIDE AND FORMOTEROL FUMARATE DIHYDRATE 160; 4.5 UG/1; UG/1
1 AEROSOL RESPIRATORY (INHALATION)
Qty: 1 | Refills: 3
Start: 2019-07-17 | End: 2019-11-13

## 2019-07-17 RX ORDER — AMIODARONE HYDROCHLORIDE 400 MG/1
1 TABLET ORAL
Qty: 30 | Refills: 3
Start: 2019-07-17 | End: 2019-11-13

## 2019-07-17 RX ORDER — ALBUTEROL 90 UG/1
2 AEROSOL, METERED ORAL
Qty: 1 | Refills: 3
Start: 2019-07-17 | End: 2019-11-13

## 2019-07-17 RX ADMIN — Medication 40 MILLIGRAM(S): at 05:35

## 2019-07-17 RX ADMIN — AMIODARONE HYDROCHLORIDE 200 MILLIGRAM(S): 400 TABLET ORAL at 05:34

## 2019-07-17 RX ADMIN — ISOSORBIDE MONONITRATE 30 MILLIGRAM(S): 60 TABLET, EXTENDED RELEASE ORAL at 11:59

## 2019-07-17 RX ADMIN — CARVEDILOL PHOSPHATE 25 MILLIGRAM(S): 80 CAPSULE, EXTENDED RELEASE ORAL at 05:34

## 2019-07-17 RX ADMIN — Medication 3 MILLILITER(S): at 13:04

## 2019-07-17 RX ADMIN — Medication 1200 MILLIGRAM(S): at 05:34

## 2019-07-17 RX ADMIN — CARBIDOPA AND LEVODOPA 1 TABLET(S): 25; 100 TABLET ORAL at 11:59

## 2019-07-17 RX ADMIN — Medication 40 MILLIGRAM(S): at 17:27

## 2019-07-17 RX ADMIN — HEPARIN SODIUM 5000 UNIT(S): 5000 INJECTION INTRAVENOUS; SUBCUTANEOUS at 17:28

## 2019-07-17 RX ADMIN — DULOXETINE HYDROCHLORIDE 60 MILLIGRAM(S): 30 CAPSULE, DELAYED RELEASE ORAL at 11:59

## 2019-07-17 RX ADMIN — Medication 81 MILLIGRAM(S): at 11:59

## 2019-07-17 RX ADMIN — BUDESONIDE AND FORMOTEROL FUMARATE DIHYDRATE 2 PUFF(S): 160; 4.5 AEROSOL RESPIRATORY (INHALATION) at 07:50

## 2019-07-17 RX ADMIN — HEPARIN SODIUM 5000 UNIT(S): 5000 INJECTION INTRAVENOUS; SUBCUTANEOUS at 05:34

## 2019-07-17 RX ADMIN — CARVEDILOL PHOSPHATE 25 MILLIGRAM(S): 80 CAPSULE, EXTENDED RELEASE ORAL at 17:27

## 2019-07-17 RX ADMIN — FAMOTIDINE 20 MILLIGRAM(S): 10 INJECTION INTRAVENOUS at 11:59

## 2019-07-17 RX ADMIN — Medication 75 MICROGRAM(S): at 05:34

## 2019-07-17 RX ADMIN — Medication 1200 MILLIGRAM(S): at 17:28

## 2019-07-17 RX ADMIN — CHLORHEXIDINE GLUCONATE 1 APPLICATION(S): 213 SOLUTION TOPICAL at 05:35

## 2019-07-17 RX ADMIN — Medication 3 MILLILITER(S): at 07:48

## 2019-07-17 RX ADMIN — Medication 40 MILLIGRAM(S): at 05:34

## 2019-07-17 RX ADMIN — NYSTATIN CREAM 1 APPLICATION(S): 100000 CREAM TOPICAL at 05:33

## 2019-07-17 RX ADMIN — NYSTATIN CREAM 1 APPLICATION(S): 100000 CREAM TOPICAL at 17:27

## 2019-07-17 NOTE — PROGRESS NOTE ADULT - ASSESSMENT
84 yo f h/o Afib CHF HTN CKD Parkinson’s hypothyroid hyperlipidemia p/w CHF exacerbation   # CHF exacerbation   Admit to medicine   Lasix   O2 d/c   Daily weights   Nebs prn   # pmhx HTN Afib Parkinson’s hypothyroid hyperlipidemia  Continue home meds    # DVT ppx   # GI ppx     congestion bronchitits     -completed zithromax  - d/c  steroids   - cont symbicort     Problem/Plan - 1:  ·  Problem: CHF exacerbation.  Plan: # CHF exacerbation   Admit to medicine   Ipo Lasix  qd   O2 prn   Daily weights   Nebs prn.

## 2019-07-17 NOTE — DISCHARGE NOTE NURSING/CASE MANAGEMENT/SOCIAL WORK - NSDCDPATPORTLINK_GEN_ALL_CORE
You can access the [facility], offered by [facility], by registering with the following website: [email or web address]

## 2019-07-17 NOTE — PHARMACOTHERAPY INTERVENTION NOTE - COMMENTS
The patient was educated on the purpose of her diuretic and BB and how to take med. She was also educated on potential side effects, daily weights, and sodium restriction with diet.
I spoke with the PA regarding the re-initiation of the patient's Losartan. The patient's renal function and potassium are both within normal limits. The PA stated she would further investigate and see if we can restart.

## 2019-07-17 NOTE — DISCHARGE NOTE PROVIDER - CARE PROVIDER_API CALL
Unruly Curry)  Internal Medicine  [address] [place] [address], [address]  [place] [address]  Phone: [phone]  Fax: [phone]  Follow Up Time: 1 week

## 2019-07-17 NOTE — PROGRESS NOTE ADULT - SUBJECTIVE AND OBJECTIVE BOX
86 yo f h/o Afib CHF HTN CKD Parkinson’s hypothyroid hyperlipidemia presents with cough and SOB x 3 days.   pt c/o worsening SOB, even at rest. also had intermitted chest pain/dyspnea with breathing, pain 5/10, now resolved   Denies fever, chills, CP N/V/D numbness/tingling   improving , xray noted - patient was seen on 07/16 note did not save     PAST MEDICAL & SURGICAL HISTORY:  Parkinson disease  Hypothyroid  Rib fractures  Congestive heart failure  Hypertension  No significant past surgical history    MEDICATIONS  (STANDING):  ALBUTerol/ipratropium for Nebulization 3 milliLiter(s) Nebulizer every 6 hours  amiodarone    Tablet 200 milliGRAM(s) Oral daily  aspirin  chewable 81 milliGRAM(s) Oral daily  atorvastatin 10 milliGRAM(s) Oral at bedtime  buDESOnide 160 MICROgram(s)/formoterol 4.5 MICROgram(s) Inhaler 2 Puff(s) Inhalation two times a day  carbidopa/levodopa  10/100 1 Tablet(s) Oral at bedtime  carbidopa/levodopa  25/100 1 Tablet(s) Oral daily  carvedilol Oral Tab/Cap - Peds 25 milliGRAM(s) Oral two times a day  chlorhexidine 4% Liquid 1 Application(s) Topical <User Schedule>  DULoxetine 60 milliGRAM(s) Oral daily  famotidine    Tablet 20 milliGRAM(s) Oral daily  furosemide    Tablet 40 milliGRAM(s) Oral two times a day  furosemide   Injectable 20 milliGRAM(s) IV Push every 12 hours  guaiFENesin ER 1200 milliGRAM(s) Oral every 12 hours  heparin  Injectable 5000 Unit(s) SubCutaneous every 12 hours  isosorbide   mononitrate ER Tablet (IMDUR) 30 milliGRAM(s) Oral daily  levothyroxine 75 MICROGram(s) Oral daily  methylPREDNISolone sodium succinate Injectable 40 milliGRAM(s) IV Push daily  pregabalin 100 milliGRAM(s) Oral two times a day    MEDICATIONS  (PRN):  acetaminophen   Tablet .. 650 milliGRAM(s) Oral every 6 hours PRN Temp greater or equal to 38C (100.4F), Mild Pain (1 - 3)  ibuprofen  Tablet. 400 milliGRAM(s) Oral every 8 hours PRN Moderate Pain (4 - 6)  temazepam 30 milliGRAM(s) Oral at bedtime PRN Insomnia      Vital Signs Last 24 Hrs  T(C): 36.1 (17 Jul 2019 05:27), Max: 37.1 (16 Jul 2019 21:00)  T(F): 97 (17 Jul 2019 05:27), Max: 98.7 (16 Jul 2019 21:00)  HR: 54 (17 Jul 2019 05:27) (54 - 73)  BP: 177/74 (17 Jul 2019 05:27) (134/62 - 177/74)  BP(mean): --  RR: 16 (17 Jul 2019 05:27) (16 - 16)  SpO2: --    Gen: lying in bed, NAD   	HEENT: PERRL No nasal discharge. Moist mucus membranes.  	Neck: Supple, non tender, full range of motion.  	CV: RRR no murmurs, rubs, or gallops. +S1S2.   	Pulm: speaking in full sentences occasional wheeze   	Abd: soft NT ND +BS.   	Ext: Warm and well perfused x4, moving all extremities, b/l pedal edema  decreased   	Psy: Cooperative, appropriate.   Skin: Warm, dry, no rash                                      13.7   9.94  )-----------( 199      ( 16 Jul 2019 11:30 )             41.8   07-16    141  |  97<L>  |  62<HH>  ----------------------------<  332<H>  4.1   |  31  |  1.0    Ca    9.1      16 Jul 2019 11:30    TPro  6.6  /  Alb  3.8  /  TBili  0.4  /  DBili  x   /  AST  24  /  ALT  20  /  AlkPhos  109  07-16    EXAM:  XR CHEST PA LAT 2V            PROCEDURE DATE:  07/16/2019            INTERPRETATION:  Clinical History / Reason for exam: Shortness of breath    Comparison : Chest radiograph 4/19/2018.    Technique/Positioning: Frontal and lateral views.    Findings:    Support devices: None.    Cardiac/mediastinum/hilum: Cardiomegaly    Lung parenchyma/Pleura: Mild pulmonary vascular congestion. No evidence   of consolidation or pneumothorax.    Skeleton/soft tissues: No interval changes.    Impression:      Cardiomegaly with mild pulmonary vascular congestion.                       DARIAN JONES M.D., ATTENDING RADIOLOGIST  This document has been electronically signed. Jul 16 2019  3:05PM

## 2019-07-17 NOTE — DISCHARGE NOTE PROVIDER - HOSPITAL COURSE
86 yo f h/o Afib CHF HTN CKD Parkinson’s hypothyroid hyperlipidemia presents with cough and SOB x 3 days.   pt c/o worsening SOB, even at rest. also had intermitted chest pain/dyspnea with breathing, pain 5/10, now resolved   Denies fever, chills, CP N/V/D numbness/tingling     improving , xray noted - patient was seen on 07/16 note did not save     PROCEDURE DATE:  07/16/2019                      INTERPRETATION:  Clinical History / Reason for exam: Shortness of breath        Comparison : Chest radiograph 4/19/2018.        Technique/Positioning: Frontal and lateral views.        Findings:        Support devices: None.        Cardiac/mediastinum/hilum: Cardiomegaly        Lung parenchyma/Pleura: Mild pulmonary vascular congestion. No evidence     of consolidation or pneumothorax.        Skeleton/soft tissues: No interval changes.        Impression:          Cardiomegaly with mild pulmonary vascular congestion.                                             DARIAN JONES M.D., ATTENDING RADIOLOGIST    This document has been electronically signed. Jul 16 2019  3:05PM            Assessment and Plan:     · Assessment        86 yo f h/o Afib CHF HTN CKD Parkinson’s hypothyroid hyperlipidemia p/w CHF exacerbation     # CHF exacerbation     Admit to medicine     Lasix     O2 d/c     Daily weights     Nebs prn     # pmhx HTN Afib Parkinson’s hypothyroid hyperlipidemia    Continue home meds        # DVT ppx     # GI ppx         congestion bronchitits         -completed zithromax    - d/c  steroids     - cont symbicort         Problem/Plan - 1:    ·  Problem: CHF exacerbation.  Plan: # CHF exacerbation     Admit to medicine     Ipo Lasix  qd     O2 prn     Daily weights     Nebs prn.

## 2019-07-18 NOTE — PATIENT PROFILE ADULT. - MEDICATION HERBAL REMEDIES, PROFILE
ANTICOAGULATION FOLLOW-UP CLINIC VISIT    Patient Name:  Nigel Rodarte Jr.  Date:  2019  Contact Type:  Face to Face    SUBJECTIVE:  Patient Findings     Positives:   Change in diet/appetite (he thinks he may be eating more greens/veggies during the summer months)        Clinical Outcomes     Negatives:   Major bleeding event, Thromboembolic event, Anticoagulation-related hospital admission, Anticoagulation-related ED visit, Anticoagulation-related fatality           OBJECTIVE    INR Protime   Date Value Ref Range Status   2019 1.8 (A) 0.86 - 1.14 Final       ASSESSMENT / PLAN  INR assessment SUB    Recheck INR In: 2 WEEKS    INR Location Clinic      Anticoagulation Summary  As of 2019    INR goal:   2.0-3.0   TTR:   65.6 % (2.1 y)   INR used for dosin.8! (2019)   Warfarin maintenance plan:   7.5 mg (5 mg x 1.5) every day   Full warfarin instructions:   7.5 mg every day   Weekly warfarin total:   52.5 mg   Plan last modified:   Gladys Herrera RN (2019)   Next INR check:   2019   Target end date:   Indefinite    Indications    Atrial fibrillation (H) [I48.91] [I48.91]  Long term current use of anticoagulants with INR goal of 2.0-3.0 [Z79.01]             Anticoagulation Episode Summary     INR check location:       Preferred lab:       Send INR reminders to:   [facility] HEART INR NURSE    Comments:         Anticoagulation Care Providers     Provider Role Specialty Phone number    Grzegorz Malhotra MD Responsible Cardiology [phone]            See the Encounter Report to view Anticoagulation Flowsheet and Dosing Calendar (Go to Encounters tab in chart review, and find the Anticoagulation Therapy Visit)    INR 1.8 No change in meds. He thinks he may be eating more greens/veggies during the summer months. No abnormal bleeding or bruising or clotting symptoms. Will increase dosing schedule back to 7.5 mg daily (his previously successful dosing schedule) and recheck in 2   weeks.  Dosage adjustment made based on physician directed care plan.    Gladys Herrera RN                  no

## 2019-07-19 VITALS
SYSTOLIC BLOOD PRESSURE: 120 MMHG | DIASTOLIC BLOOD PRESSURE: 80 MMHG | TEMPERATURE: 98.3 F | HEART RATE: 66 BPM | RESPIRATION RATE: 15 BRPM | OXYGEN SATURATION: 93 %

## 2019-07-19 DIAGNOSIS — I50.9 HEART FAILURE, UNSPECIFIED: ICD-10-CM

## 2019-07-19 DIAGNOSIS — J40 BRONCHITIS, NOT SPECIFIED AS ACUTE OR CHRONIC: ICD-10-CM

## 2019-07-19 RX ORDER — CARBIDOPA AND LEVODOPA 10; 100 MG/1; MG/1
10-100 TABLET ORAL
Refills: 0 | Status: ACTIVE | COMMUNITY
Start: 2019-07-19

## 2019-07-19 RX ORDER — CARVEDILOL 25 MG/1
25 TABLET, FILM COATED ORAL TWICE DAILY
Refills: 0 | Status: ACTIVE | COMMUNITY
Start: 2019-07-19

## 2019-07-19 RX ORDER — FUROSEMIDE 40 MG/1
40 TABLET ORAL DAILY
Refills: 0 | Status: ACTIVE | COMMUNITY
Start: 2019-07-19

## 2019-07-19 RX ORDER — PREGABALIN 100 MG/1
100 CAPSULE ORAL TWICE DAILY
Refills: 0 | Status: ACTIVE | COMMUNITY
Start: 2019-07-19

## 2019-07-19 RX ORDER — ISOSORBIDE MONONITRATE 30 MG/1
30 TABLET, EXTENDED RELEASE ORAL DAILY
Refills: 0 | Status: ACTIVE | COMMUNITY
Start: 2019-07-19

## 2019-07-19 RX ORDER — LOSARTAN POTASSIUM 100 MG/1
100 TABLET, FILM COATED ORAL DAILY
Refills: 0 | Status: ACTIVE | COMMUNITY
Start: 2019-07-19

## 2019-07-19 RX ORDER — DULOXETINE HYDROCHLORIDE 60 MG/1
60 CAPSULE, DELAYED RELEASE ORAL TWICE DAILY
Refills: 0 | Status: ACTIVE | COMMUNITY
Start: 2019-07-19

## 2019-07-19 RX ORDER — BUDESONIDE AND FORMOTEROL FUMARATE DIHYDRATE 160; 4.5 UG/1; UG/1
160-4.5 AEROSOL RESPIRATORY (INHALATION)
Refills: 0 | Status: ACTIVE | COMMUNITY
Start: 2019-07-19

## 2019-07-19 RX ORDER — ASPIRIN ENTERIC COATED TABLETS 81 MG 81 MG/1
81 TABLET, DELAYED RELEASE ORAL DAILY
Qty: 30 | Refills: 3 | Status: ACTIVE | COMMUNITY
Start: 2019-07-19

## 2019-07-19 RX ORDER — CARBIDOPA AND LEVODOPA 25; 100 MG/1; MG/1
25-100 TABLET ORAL
Refills: 0 | Status: ACTIVE | COMMUNITY
Start: 2019-07-19

## 2019-07-19 RX ORDER — AMIODARONE HYDROCHLORIDE 200 MG/1
200 TABLET ORAL DAILY
Refills: 0 | Status: ACTIVE | COMMUNITY
Start: 2019-07-19

## 2019-07-19 NOTE — HISTORY OF PRESENT ILLNESS
[FreeTextEntry1] : Eval at home for SOB, s/p recent hospitalization for CHF.  patient is unable to leave home as it requires a considerable and taxing effort, requires assistive devices to leave home.\par  [de-identified] : This is an 86 yo f h/o Afib CHF HTN CKD Parkinson’s hypothyroid hyperlipidemia presented with cough and SOB x 3 days.  Pt was admitted with CHF and diuresed with IV lasix.  She was also treated with zithromax for bronchitis.  and enrolled in the STARS program. Upon arrival to home pt is lying down in bed, in no apparent distress, respirations are easy.  Daughter is present.  pt had a visit from [facility]- CARLIN RN yesterday.  Daughter will schedule f/u with pts PCP- Dr. Curry. \par \par

## 2019-07-19 NOTE — COUNSELING
[Fall prevention counseling provided] : fall prevention  [de-identified] : [employer] T[employer] STARS teaching: Enforced with patient need for daily weights. Advised to call for an increase of greater than 2 lbs. in a day or 5 pounds in a week. Adhere to low salt diet and educated patient on foods that should be avoided such as processed or fast food. Limit fluids to 1 liter a day which is 4-5 glasses. Continue medications as ordered.  Follow up with Cardiologist. Contact information given, patient advised to call with any questions/concerns\par

## 2019-07-19 NOTE — REVIEW OF SYSTEMS
[Fatigue] : fatigue [Lower Ext Edema] : no lower extremity edema [Dyspnea on Exertion] : dyspnea on exertion [Muscle Weakness] : muscle weakness [Negative] : Heme/Lymph [FreeTextEntry9] : w

## 2019-07-19 NOTE — PLAN
[FreeTextEntry1] : chf- c/w lasix, c/w losartan,  daily weights\par bronchitis- c/w symbicort, albuterol\par

## 2019-07-19 NOTE — PHYSICAL EXAM
[No Acute Distress] : no acute distress [Well Nourished] : well nourished [Well Developed] : well developed [Well-Appearing] : well-appearing [No Respiratory Distress] : no respiratory distress  [Normal Rate] : normal rate  [Normal S1, S2] : normal S1 and S2 [Pedal Pulses Present] : the pedal pulses are present [No Edema] : there was no peripheral edema [Normal] : affect was normal and insight and judgment were intact [de-identified] : b/l rachel, b/l wheeze   some cleared with cough [de-identified] : weakness pmh: parkinsons

## 2019-08-07 NOTE — CHART NOTE - NSCHARTNOTEFT_GEN_A_CORE
patient present with CHf EF  55% gfr $5 ; Acute on Chronic diastolic Heart failure with Stage 3 A chronic kidney disease present on admission

## 2019-08-11 ENCOUNTER — INPATIENT (INPATIENT)
Facility: HOSPITAL | Age: 84
LOS: 5 days | Discharge: HOME | End: 2019-08-17
Attending: INTERNAL MEDICINE | Admitting: INTERNAL MEDICINE
Payer: MEDICARE

## 2019-08-11 VITALS
DIASTOLIC BLOOD PRESSURE: 69 MMHG | RESPIRATION RATE: 18 BRPM | TEMPERATURE: 98 F | HEART RATE: 61 BPM | OXYGEN SATURATION: 95 % | SYSTOLIC BLOOD PRESSURE: 166 MMHG

## 2019-08-11 PROBLEM — G20 PARKINSON'S DISEASE: Chronic | Status: ACTIVE | Noted: 2019-07-09

## 2019-08-11 LAB
ALBUMIN SERPL ELPH-MCNC: 4 G/DL — SIGNIFICANT CHANGE UP (ref 3.5–5.2)
ALP SERPL-CCNC: 106 U/L — SIGNIFICANT CHANGE UP (ref 30–115)
ALT FLD-CCNC: 12 U/L — SIGNIFICANT CHANGE UP (ref 0–41)
ANION GAP SERPL CALC-SCNC: 13 MMOL/L — SIGNIFICANT CHANGE UP (ref 7–14)
APTT BLD: 33.6 SEC — SIGNIFICANT CHANGE UP (ref 27–39.2)
AST SERPL-CCNC: 26 U/L — SIGNIFICANT CHANGE UP (ref 0–41)
BASOPHILS # BLD AUTO: 0.03 K/UL — SIGNIFICANT CHANGE UP (ref 0–0.2)
BASOPHILS NFR BLD AUTO: 0.5 % — SIGNIFICANT CHANGE UP (ref 0–1)
BILIRUB SERPL-MCNC: 1.5 MG/DL — HIGH (ref 0.2–1.2)
BUN SERPL-MCNC: 27 MG/DL — HIGH (ref 10–20)
CALCIUM SERPL-MCNC: 9 MG/DL — SIGNIFICANT CHANGE UP (ref 8.5–10.1)
CHLORIDE SERPL-SCNC: 101 MMOL/L — SIGNIFICANT CHANGE UP (ref 98–110)
CK MB CFR SERPL CALC: 1.6 NG/ML — SIGNIFICANT CHANGE UP (ref 0.6–6.3)
CK SERPL-CCNC: 35 U/L — SIGNIFICANT CHANGE UP (ref 0–225)
CO2 SERPL-SCNC: 28 MMOL/L — SIGNIFICANT CHANGE UP (ref 17–32)
CREAT SERPL-MCNC: 1.1 MG/DL — SIGNIFICANT CHANGE UP (ref 0.7–1.5)
EOSINOPHIL # BLD AUTO: 0.06 K/UL — SIGNIFICANT CHANGE UP (ref 0–0.7)
EOSINOPHIL NFR BLD AUTO: 1 % — SIGNIFICANT CHANGE UP (ref 0–8)
GLUCOSE SERPL-MCNC: 240 MG/DL — HIGH (ref 70–99)
HCT VFR BLD CALC: 35.6 % — LOW (ref 37–47)
HGB BLD-MCNC: 11.7 G/DL — LOW (ref 12–16)
IMM GRANULOCYTES NFR BLD AUTO: 0.5 % — HIGH (ref 0.1–0.3)
INR BLD: 1.31 RATIO — HIGH (ref 0.65–1.3)
LYMPHOCYTES # BLD AUTO: 0.82 K/UL — LOW (ref 1.2–3.4)
LYMPHOCYTES # BLD AUTO: 14.2 % — LOW (ref 20.5–51.1)
MCHC RBC-ENTMCNC: 29 PG — SIGNIFICANT CHANGE UP (ref 27–31)
MCHC RBC-ENTMCNC: 32.9 G/DL — SIGNIFICANT CHANGE UP (ref 32–37)
MCV RBC AUTO: 88.1 FL — SIGNIFICANT CHANGE UP (ref 81–99)
MONOCYTES # BLD AUTO: 0.58 K/UL — SIGNIFICANT CHANGE UP (ref 0.1–0.6)
MONOCYTES NFR BLD AUTO: 10 % — HIGH (ref 1.7–9.3)
NEUTROPHILS # BLD AUTO: 4.26 K/UL — SIGNIFICANT CHANGE UP (ref 1.4–6.5)
NEUTROPHILS NFR BLD AUTO: 73.8 % — SIGNIFICANT CHANGE UP (ref 42.2–75.2)
NRBC # BLD: 0 /100 WBCS — SIGNIFICANT CHANGE UP (ref 0–0)
NT-PROBNP SERPL-SCNC: HIGH PG/ML (ref 0–300)
PLATELET # BLD AUTO: 134 K/UL — SIGNIFICANT CHANGE UP (ref 130–400)
POTASSIUM SERPL-MCNC: 3.5 MMOL/L — SIGNIFICANT CHANGE UP (ref 3.5–5)
POTASSIUM SERPL-SCNC: 3.5 MMOL/L — SIGNIFICANT CHANGE UP (ref 3.5–5)
PROT SERPL-MCNC: 6.4 G/DL — SIGNIFICANT CHANGE UP (ref 6–8)
PROTHROM AB SERPL-ACNC: 15 SEC — HIGH (ref 9.95–12.87)
RBC # BLD: 4.04 M/UL — LOW (ref 4.2–5.4)
RBC # FLD: 18.6 % — HIGH (ref 11.5–14.5)
SODIUM SERPL-SCNC: 142 MMOL/L — SIGNIFICANT CHANGE UP (ref 135–146)
TROPONIN T SERPL-MCNC: <0.01 NG/ML — SIGNIFICANT CHANGE UP
TROPONIN T SERPL-MCNC: <0.01 NG/ML — SIGNIFICANT CHANGE UP
WBC # BLD: 5.78 K/UL — SIGNIFICANT CHANGE UP (ref 4.8–10.8)
WBC # FLD AUTO: 5.78 K/UL — SIGNIFICANT CHANGE UP (ref 4.8–10.8)

## 2019-08-11 PROCEDURE — 71045 X-RAY EXAM CHEST 1 VIEW: CPT | Mod: 26

## 2019-08-11 PROCEDURE — 99285 EMERGENCY DEPT VISIT HI MDM: CPT

## 2019-08-11 RX ORDER — HEPARIN SODIUM 5000 [USP'U]/ML
5000 INJECTION INTRAVENOUS; SUBCUTANEOUS EVERY 12 HOURS
Refills: 0 | Status: DISCONTINUED | OUTPATIENT
Start: 2019-08-11 | End: 2019-08-17

## 2019-08-11 RX ORDER — FUROSEMIDE 40 MG
40 TABLET ORAL EVERY 12 HOURS
Refills: 0 | Status: DISCONTINUED | OUTPATIENT
Start: 2019-08-11 | End: 2019-08-15

## 2019-08-11 RX ORDER — CARBIDOPA AND LEVODOPA 25; 100 MG/1; MG/1
1 TABLET ORAL DAILY
Refills: 0 | Status: DISCONTINUED | OUTPATIENT
Start: 2019-08-11 | End: 2019-08-17

## 2019-08-11 RX ORDER — ATORVASTATIN CALCIUM 80 MG/1
10 TABLET, FILM COATED ORAL AT BEDTIME
Refills: 0 | Status: DISCONTINUED | OUTPATIENT
Start: 2019-08-11 | End: 2019-08-17

## 2019-08-11 RX ORDER — BUDESONIDE AND FORMOTEROL FUMARATE DIHYDRATE 160; 4.5 UG/1; UG/1
2 AEROSOL RESPIRATORY (INHALATION)
Refills: 0 | Status: DISCONTINUED | OUTPATIENT
Start: 2019-08-11 | End: 2019-08-17

## 2019-08-11 RX ORDER — CARBIDOPA AND LEVODOPA 25; 100 MG/1; MG/1
1 TABLET ORAL AT BEDTIME
Refills: 0 | Status: DISCONTINUED | OUTPATIENT
Start: 2019-08-11 | End: 2019-08-17

## 2019-08-11 RX ORDER — FUROSEMIDE 40 MG
40 TABLET ORAL ONCE
Refills: 0 | Status: COMPLETED | OUTPATIENT
Start: 2019-08-11 | End: 2019-08-11

## 2019-08-11 RX ORDER — DULOXETINE HYDROCHLORIDE 30 MG/1
60 CAPSULE, DELAYED RELEASE ORAL DAILY
Refills: 0 | Status: DISCONTINUED | OUTPATIENT
Start: 2019-08-11 | End: 2019-08-17

## 2019-08-11 RX ORDER — ISOSORBIDE MONONITRATE 60 MG/1
30 TABLET, EXTENDED RELEASE ORAL DAILY
Refills: 0 | Status: DISCONTINUED | OUTPATIENT
Start: 2019-08-11 | End: 2019-08-17

## 2019-08-11 RX ORDER — IPRATROPIUM/ALBUTEROL SULFATE 18-103MCG
3 AEROSOL WITH ADAPTER (GRAM) INHALATION EVERY 6 HOURS
Refills: 0 | Status: DISCONTINUED | OUTPATIENT
Start: 2019-08-11 | End: 2019-08-17

## 2019-08-11 RX ORDER — TEMAZEPAM 15 MG/1
30 CAPSULE ORAL AT BEDTIME
Refills: 0 | Status: DISCONTINUED | OUTPATIENT
Start: 2019-08-11 | End: 2019-08-17

## 2019-08-11 RX ORDER — CARVEDILOL PHOSPHATE 80 MG/1
25 CAPSULE, EXTENDED RELEASE ORAL EVERY 12 HOURS
Refills: 0 | Status: DISCONTINUED | OUTPATIENT
Start: 2019-08-11 | End: 2019-08-17

## 2019-08-11 RX ORDER — POTASSIUM CHLORIDE 20 MEQ
20 PACKET (EA) ORAL DAILY
Refills: 0 | Status: DISCONTINUED | OUTPATIENT
Start: 2019-08-11 | End: 2019-08-17

## 2019-08-11 RX ORDER — ASPIRIN/CALCIUM CARB/MAGNESIUM 324 MG
81 TABLET ORAL DAILY
Refills: 0 | Status: DISCONTINUED | OUTPATIENT
Start: 2019-08-11 | End: 2019-08-17

## 2019-08-11 RX ORDER — AMIODARONE HYDROCHLORIDE 400 MG/1
200 TABLET ORAL DAILY
Refills: 0 | Status: DISCONTINUED | OUTPATIENT
Start: 2019-08-11 | End: 2019-08-17

## 2019-08-11 RX ORDER — CHLORHEXIDINE GLUCONATE 213 G/1000ML
1 SOLUTION TOPICAL
Refills: 0 | Status: DISCONTINUED | OUTPATIENT
Start: 2019-08-11 | End: 2019-08-17

## 2019-08-11 RX ORDER — LOSARTAN POTASSIUM 100 MG/1
50 TABLET, FILM COATED ORAL DAILY
Refills: 0 | Status: DISCONTINUED | OUTPATIENT
Start: 2019-08-11 | End: 2019-08-15

## 2019-08-11 RX ORDER — FOLIC ACID 0.8 MG
1 TABLET ORAL DAILY
Refills: 0 | Status: DISCONTINUED | OUTPATIENT
Start: 2019-08-11 | End: 2019-08-17

## 2019-08-11 RX ORDER — PANTOPRAZOLE SODIUM 20 MG/1
40 TABLET, DELAYED RELEASE ORAL
Refills: 0 | Status: DISCONTINUED | OUTPATIENT
Start: 2019-08-11 | End: 2019-08-17

## 2019-08-11 RX ORDER — LOSARTAN POTASSIUM 100 MG/1
100 TABLET, FILM COATED ORAL DAILY
Refills: 0 | Status: DISCONTINUED | OUTPATIENT
Start: 2019-08-11 | End: 2019-08-11

## 2019-08-11 RX ORDER — LEVOTHYROXINE SODIUM 125 MCG
75 TABLET ORAL DAILY
Refills: 0 | Status: DISCONTINUED | OUTPATIENT
Start: 2019-08-11 | End: 2019-08-17

## 2019-08-11 RX ADMIN — BUDESONIDE AND FORMOTEROL FUMARATE DIHYDRATE 2 PUFF(S): 160; 4.5 AEROSOL RESPIRATORY (INHALATION) at 21:21

## 2019-08-11 RX ADMIN — TEMAZEPAM 30 MILLIGRAM(S): 15 CAPSULE ORAL at 21:24

## 2019-08-11 RX ADMIN — CARBIDOPA AND LEVODOPA 1 TABLET(S): 25; 100 TABLET ORAL at 21:22

## 2019-08-11 RX ADMIN — ATORVASTATIN CALCIUM 10 MILLIGRAM(S): 80 TABLET, FILM COATED ORAL at 21:22

## 2019-08-11 RX ADMIN — CARVEDILOL PHOSPHATE 25 MILLIGRAM(S): 80 CAPSULE, EXTENDED RELEASE ORAL at 21:22

## 2019-08-11 RX ADMIN — Medication 40 MILLIGRAM(S): at 17:20

## 2019-08-11 RX ADMIN — Medication 100 MILLIGRAM(S): at 21:22

## 2019-08-11 NOTE — CHART NOTE - NSCHARTNOTEFT_GEN_A_CORE
Pt admitted for CHF exacerbation - given lasix in ER- has not voided yet. RN to obtain bedside bladder sono to eval for retention  Also, pt ordered for cardiac enzymes at 10 pm and in AM - labs to be F/U salomón Hutson made aware

## 2019-08-11 NOTE — PROGRESS NOTE ADULT - SUBJECTIVE AND OBJECTIVE BOX
Briefly pt seen and examined in the ER as a STAR pt alert notification from Dr. Rodriges.   Pt is speaking in short sentences, unable to ambulate, overall general weakness and sob.  CXR: Diffuse congestion  BNP > 17K from 4K prior to distarge  Neck: ++ elevated JVD   Lungs: Diffused Crackles bilateral   Overall in CHF Exarcerbation and needs inpatient admission for further optimization   Discussed with ER Attending and Dr. Rodriges 5pm.  Pls admit to Dr. Henning Service via PA Service Briefly pt seen and examined in the ER as a STAR pt alert notification from Dr. Rodriges.   Pt is speaking in short sentences, unable to ambulate, overall general weakness and sob.  CXR: Diffuse congestion  BNP > 17K from 4K prior to distarge  Neck: ++ elevated JVD   Lungs: Diffused Crackles bilateral   Overall in HFpEF Exarcerbation and needs inpatient admission for further optimization with IV Lasix 40mg q12, I/Os, Daily wts. Daily BMPs.   Resume all home meds.   Discussed with ER Attending and Dr. Rodriges 5pm.  Pls admit to Dr. Henning Service via PA Service

## 2019-08-11 NOTE — H&P ADULT - ASSESSMENT
86 yo f h/o Afib CHF HTN CKD Parkinson’s hypothyroid hyperlipidemia p/w CHF exacerbation     1. CHF exacerbation   Admit to medicine   IV Lasix   O2 prn   Daily weights   Nebs prn     2. pmhx HTN Afib Parkinson’s hypothyroid hyperlipidemia  Continue home meds    3. DVT ppx   4. GI ppx   5. STAR patient 86 yo f h/o Afib CHF HTN CKD Parkinson’s hypothyroid hyperlipidemia p/w CHF exacerbation     1. CHF exacerbation   Admit to medicine   IV Lasix   O2 prn   Daily weights   Nebs prn   serial CE's    2. pmhx HTN Afib Parkinson’s hypothyroid hyperlipidemia  Continue home meds    3. DVT ppx   4. GI ppx   5. STAR patient

## 2019-08-11 NOTE — H&P ADULT - HISTORY OF PRESENT ILLNESS
87 yr old female with c/o SOB/chest discomfort  and generalized weakness/difficulty walking x few days.  She has recent admission last month for CHF exacerbation.   She denies any leg swelling/cough/fevers; speaking in short sentences but without any dyspnea.  SHe lives at home with daughter; does not use oxygen at home; uses 2-3 pillows at night to sleep.  Currently denies any chest discomfort 87 yr old female with c/o SOB/chest discomfort  and generalized weakness/difficulty walking x few days.  She has recent admission last month for CHF exacerbation.   She denies any leg swelling/cough/fevers; speaking in short sentences but without any dyspnea.  SHe lives at home with daughter; does not use oxygen at home; uses 2-3 pillows at night to sleep.  Currently denies any chest discomfort. She was given lasix 40 mg IV x 1 dose in ER

## 2019-08-11 NOTE — ED PROVIDER NOTE - PROGRESS NOTE DETAILS
Discussed case with hospitalist Dr. Melissa, pt is a star patient and will obs for further evaluations

## 2019-08-11 NOTE — H&P ADULT - NSHPPHYSICALEXAM_GEN_ALL_CORE
Gen: no dyspnea/use of accessory breathing muscles; in NAD  lungs: clear B/L  cvs: S1S2  abd: soft, (+) ecchymosis noted to righ tlower abdomen; NT/ND  ext: (+) 2+ pitting edema B/L LE

## 2019-08-11 NOTE — ED PROVIDER NOTE - CLINICAL SUMMARY MEDICAL DECISION MAKING FREE TEXT BOX
pw SOB, chest discomfort, Found to be in CHF exacerbation. Spoke to Dr. Martins - [facility] hospitalist who discussed with Dr. Rodriges. See note - stating pt unlikely to improve on obs status and will require admission. Will admit patient for ongoing management.

## 2019-08-11 NOTE — ED PROVIDER NOTE - PHYSICAL EXAMINATION
CONSTITUTIONAL: Well-appearing; well-nourished; in no apparent distress.   NECK: Supple; non-tender; no cervical lymphadenopathy.   CARDIOVASCULAR: Normal S1, S2; no murmurs, rubs, or gallops.   RESPIRATORY: No signs of respiratory distress. Pt speaking full sentences. No tachypnea. + rales/rhonchi b/l  GI/: Normal bowel sounds; non-distended; non-tender; no palpable organomegaly.   MS: No evidence of trauma or deformity. Normal ROM in all four extremities; non-tender to palpation; distal pulses are normal.   Extrem: + mild nonpitting edema. No calf ttp  SKIN: Normal for age and race; warm; dry; good turgor; no apparent lesions or exudate.   NEURO/PSYCH: A & O x 4; grossly unremarkable. mood and manner are appropriate.

## 2019-08-11 NOTE — ED CLERICAL - NS ED CLERK NOTE PRE-ARRIVAL INFORMATION; ADDITIONAL PRE-ARRIVAL INFORMATION
This patient is enrolled in the STAR readmission reduction initiative and has active care navigation. This patient can be followed up by the care navigation team within 24 hours.  To arrange close follow-up or to obtain additional clinical information, please call the contact number above. The on call [facility] Hospitalist has been notified and will coordinate care in concert with the ED Physician including consults as necessary. Call [phone] to reach the hospitalist. You may also call the Hospitalist Division at  at either site. Consider CDU for management per guidelines

## 2019-08-11 NOTE — H&P ADULT - NSHPLABSRESULTS_GEN_ALL_CORE
Vital Signs Last 24 Hrs  T(C): 36.9 (11 Aug 2019 14:14), Max: 36.9 (11 Aug 2019 14:14)  T(F): 98.4 (11 Aug 2019 14:14), Max: 98.4 (11 Aug 2019 14:14)  HR: 58 (11 Aug 2019 14:14) (58 - 61)  BP: 117/57 (11 Aug 2019 14:14) (117/57 - 166/69)  BP(mean): --  RR: 18 (11 Aug 2019 14:14) (18 - 18)  SpO2: 95% (11 Aug 2019 14:14) (95% - 95%)    Labs:  CAPILLARY BLOOD GLUCOSE                              11.7   5.78  )-----------( 134      ( 11 Aug 2019 13:22 )             35.6       Auto Neutrophil %: 73.8 % (08-11-19 @ 13:22)  Auto Immature Granulocyte %: 0.5 % (08-11-19 @ 13:22)    08-11    142  |  101  |  27<H>  ----------------------------<  240<H>  3.5   |  28  |  1.1      Calcium, Total Serum: 9.0 mg/dL (08-11-19 @ 13:22)      LFTs:             6.4  | 1.5  | 26       ------------------[106     ( 11 Aug 2019 13:22 )  4.0  | x    | 12          Lipase:x      Amylase:x             Coags:     15.00  ----< 1.31    ( 11 Aug 2019 13:22 )     33.6        CARDIAC MARKERS ( 11 Aug 2019 13:22 )  x     / <0.01 ng/mL / x     / x     / x    RADIOLOGY:  Xray Chest 1 View-PORTABLE IMMEDIATE (08.11.19 @ 13:33) >    Impression:      Cardiomegaly and interstitial edema. Vital Signs Last 24 Hrs  T(C): 36.9 (11 Aug 2019 14:14), Max: 36.9 (11 Aug 2019 14:14)  T(F): 98.4 (11 Aug 2019 14:14), Max: 98.4 (11 Aug 2019 14:14)  HR: 58 (11 Aug 2019 14:14) (58 - 61)  BP: 117/57 (11 Aug 2019 14:14) (117/57 - 166/69)  BP(mean): --  RR: 18 (11 Aug 2019 14:14) (18 - 18)  SpO2: 95% (11 Aug 2019 14:14) (95% - 95%)    Labs:  CAPILLARY BLOOD GLUCOSE                              11.7   5.78  )-----------( 134      ( 11 Aug 2019 13:22 )             35.6       Auto Neutrophil %: 73.8 % (08-11-19 @ 13:22)  Auto Immature Granulocyte %: 0.5 % (08-11-19 @ 13:22)    08-11    142  |  101  |  27<H>  ----------------------------<  240<H>  3.5   |  28  |  1.1      Calcium, Total Serum: 9.0 mg/dL (08-11-19 @ 13:22)      LFTs:             6.4  | 1.5  | 26       ------------------[106     ( 11 Aug 2019 13:22 )  4.0  | x    | 12          Lipase:x      Amylase:x         Serum Pro-Brain Natriuretic Peptide (08.11.19 @ 13:22)    Serum Pro-Brain Natriuretic Peptide: 89686 pg/mL      Coags:     15.00  ----< 1.31    ( 11 Aug 2019 13:22 )     33.6        CARDIAC MARKERS ( 11 Aug 2019 13:22 )  x     / <0.01 ng/mL / x     / x     / x    RADIOLOGY:  Xray Chest 1 View-PORTABLE IMMEDIATE (08.11.19 @ 13:33) >    Impression:      Cardiomegaly and interstitial edema.

## 2019-08-11 NOTE — ED PROVIDER NOTE - ATTENDING CONTRIBUTION TO CARE
87 y F PMH hypothyroidism, HTN, CHF, parkinson's a fib (not on AC), recently admitted and discharge for management of CHF c/o sob and chest discomfort yesterday. Today persistently SOB, especially with exertion, ok at rest. PT and family insist on med compliance. No fever, chills, nausea, vomiting, diarrhea, dysuria, hematuria.   Exam: NAD, NCAT, HEENT: mmm, EOMI, PERRLA, Neck: supple, nontender, nl ROM, Heart: systolic murmur, irr ir, normal rate, JVD, Lungs: BCTA, no signs of increased WOB, Abd: NTND, no guarding or rebound, no hernia palpated, no CVAT. MSK: chest, back, and ext nontender, nl rom, no deformity. trace pedal edema. Neuro: A&Ox3, normal strength, nl sensation throughout, normal speech.  A/P: Eval ACS, CHF exacerbation, electrolyte abnormality,

## 2019-08-11 NOTE — ED PROVIDER NOTE - NS ED ROS FT
used Constitutional: no fever, chills, no recent weight loss, change in appetite or malaise  ENT: no rhinorrhea/ear pain/sore throat  Cardiac: + sob and chest discomfort. + edema.  Respiratory: No cough or respiratory distress  GI: No nausea, vomiting, diarrhea or abdominal pain.  : No dysuria, frequency, urgency or hematuria  MS: no pain to back or extremities, no loss of ROM, no weakness  Neuro: No headache or weakness. No LOC.  Skin: No skin rash.  Endocrine: + hx of hypothyroidism  Except as documented in the HPI, all other systems are negative.

## 2019-08-11 NOTE — ED PROVIDER NOTE - OBJECTIVE STATEMENT
87 year old F with hx of hypothyroidism, HTN, CHF, parkinson's a fib (not on AC)  c/o sob and chest discomfort yesterday. Pt was admitted last month for CHF exacerbation and sts symptoms feels similar now.+ sob at rest and mid sternal chest discomfort. Chest discomfort is intermittent with no radiation/ no exacerbating/relieving factors. Denies any cough, fever/uri symptoms, orthopnea, leg swelling, sick contacts, n/v/d/abdominal pain, hx of DVT/PE.

## 2019-08-12 LAB
ALBUMIN SERPL ELPH-MCNC: 3.6 G/DL — SIGNIFICANT CHANGE UP (ref 3.5–5.2)
ALP SERPL-CCNC: 88 U/L — SIGNIFICANT CHANGE UP (ref 30–115)
ALT FLD-CCNC: <5 U/L — SIGNIFICANT CHANGE UP (ref 0–41)
ANION GAP SERPL CALC-SCNC: 11 MMOL/L — SIGNIFICANT CHANGE UP (ref 7–14)
AST SERPL-CCNC: 20 U/L — SIGNIFICANT CHANGE UP (ref 0–41)
BILIRUB DIRECT SERPL-MCNC: 0.5 MG/DL — HIGH (ref 0–0.2)
BILIRUB INDIRECT FLD-MCNC: 1 MG/DL — SIGNIFICANT CHANGE UP (ref 0.2–1.2)
BILIRUB SERPL-MCNC: 1.5 MG/DL — HIGH (ref 0.2–1.2)
BUN SERPL-MCNC: 27 MG/DL — HIGH (ref 10–20)
CALCIUM SERPL-MCNC: 8.7 MG/DL — SIGNIFICANT CHANGE UP (ref 8.5–10.1)
CHLORIDE SERPL-SCNC: 100 MMOL/L — SIGNIFICANT CHANGE UP (ref 98–110)
CK MB CFR SERPL CALC: 1.4 NG/ML — SIGNIFICANT CHANGE UP (ref 0.6–6.3)
CK SERPL-CCNC: 26 U/L — SIGNIFICANT CHANGE UP (ref 0–225)
CO2 SERPL-SCNC: 30 MMOL/L — SIGNIFICANT CHANGE UP (ref 17–32)
CREAT SERPL-MCNC: 0.9 MG/DL — SIGNIFICANT CHANGE UP (ref 0.7–1.5)
GLUCOSE SERPL-MCNC: 110 MG/DL — HIGH (ref 70–99)
HCT VFR BLD CALC: 34.4 % — LOW (ref 37–47)
HGB BLD-MCNC: 11.3 G/DL — LOW (ref 12–16)
MAGNESIUM SERPL-MCNC: 1.8 MG/DL — SIGNIFICANT CHANGE UP (ref 1.8–2.4)
MCHC RBC-ENTMCNC: 29 PG — SIGNIFICANT CHANGE UP (ref 27–31)
MCHC RBC-ENTMCNC: 32.8 G/DL — SIGNIFICANT CHANGE UP (ref 32–37)
MCV RBC AUTO: 88.4 FL — SIGNIFICANT CHANGE UP (ref 81–99)
NRBC # BLD: 0 /100 WBCS — SIGNIFICANT CHANGE UP (ref 0–0)
NT-PROBNP SERPL-SCNC: 8658 PG/ML — HIGH (ref 0–300)
PHOSPHATE SERPL-MCNC: 3 MG/DL — SIGNIFICANT CHANGE UP (ref 2.1–4.9)
PLATELET # BLD AUTO: 119 K/UL — LOW (ref 130–400)
POTASSIUM SERPL-MCNC: 3 MMOL/L — LOW (ref 3.5–5)
POTASSIUM SERPL-SCNC: 3 MMOL/L — LOW (ref 3.5–5)
PROT SERPL-MCNC: 5.6 G/DL — LOW (ref 6–8)
RBC # BLD: 3.89 M/UL — LOW (ref 4.2–5.4)
RBC # FLD: 18.6 % — HIGH (ref 11.5–14.5)
SODIUM SERPL-SCNC: 141 MMOL/L — SIGNIFICANT CHANGE UP (ref 135–146)
TROPONIN T SERPL-MCNC: <0.01 NG/ML — SIGNIFICANT CHANGE UP
WBC # BLD: 4.71 K/UL — LOW (ref 4.8–10.8)
WBC # FLD AUTO: 4.71 K/UL — LOW (ref 4.8–10.8)

## 2019-08-12 RX ADMIN — PANTOPRAZOLE SODIUM 40 MILLIGRAM(S): 20 TABLET, DELAYED RELEASE ORAL at 05:45

## 2019-08-12 RX ADMIN — HEPARIN SODIUM 5000 UNIT(S): 5000 INJECTION INTRAVENOUS; SUBCUTANEOUS at 17:42

## 2019-08-12 RX ADMIN — Medication 100 MILLIGRAM(S): at 05:45

## 2019-08-12 RX ADMIN — CARBIDOPA AND LEVODOPA 1 TABLET(S): 25; 100 TABLET ORAL at 21:05

## 2019-08-12 RX ADMIN — ISOSORBIDE MONONITRATE 30 MILLIGRAM(S): 60 TABLET, EXTENDED RELEASE ORAL at 11:37

## 2019-08-12 RX ADMIN — CARBIDOPA AND LEVODOPA 1 TABLET(S): 25; 100 TABLET ORAL at 13:49

## 2019-08-12 RX ADMIN — Medication 40 MILLIGRAM(S): at 05:45

## 2019-08-12 RX ADMIN — Medication 40 MILLIGRAM(S): at 17:41

## 2019-08-12 RX ADMIN — Medication 1 MILLIGRAM(S): at 11:37

## 2019-08-12 RX ADMIN — TEMAZEPAM 30 MILLIGRAM(S): 15 CAPSULE ORAL at 21:08

## 2019-08-12 RX ADMIN — ATORVASTATIN CALCIUM 10 MILLIGRAM(S): 80 TABLET, FILM COATED ORAL at 21:05

## 2019-08-12 RX ADMIN — BUDESONIDE AND FORMOTEROL FUMARATE DIHYDRATE 2 PUFF(S): 160; 4.5 AEROSOL RESPIRATORY (INHALATION) at 07:38

## 2019-08-12 RX ADMIN — HEPARIN SODIUM 5000 UNIT(S): 5000 INJECTION INTRAVENOUS; SUBCUTANEOUS at 05:46

## 2019-08-12 RX ADMIN — Medication 20 MILLIEQUIVALENT(S): at 11:37

## 2019-08-12 RX ADMIN — Medication 81 MILLIGRAM(S): at 11:37

## 2019-08-12 RX ADMIN — LOSARTAN POTASSIUM 50 MILLIGRAM(S): 100 TABLET, FILM COATED ORAL at 05:46

## 2019-08-12 RX ADMIN — Medication 75 MICROGRAM(S): at 05:45

## 2019-08-12 RX ADMIN — Medication 100 MILLIGRAM(S): at 17:42

## 2019-08-12 RX ADMIN — DULOXETINE HYDROCHLORIDE 60 MILLIGRAM(S): 30 CAPSULE, DELAYED RELEASE ORAL at 11:37

## 2019-08-12 RX ADMIN — BUDESONIDE AND FORMOTEROL FUMARATE DIHYDRATE 2 PUFF(S): 160; 4.5 AEROSOL RESPIRATORY (INHALATION) at 21:04

## 2019-08-12 NOTE — CONSULT NOTE ADULT - SUBJECTIVE AND OBJECTIVE BOX
CARDIOLOGY CONSULT NOTE     CHIEF COMPLAINT/REASON FOR CONSULT:    HPI:  87 yr old female with c/o SOB/chest discomfort  and generalized weakness/difficulty walking x few days.  She has recent admission last month for CHF exacerbation.   She denies any leg swelling/cough/fevers; speaking in short sentences but without any dyspnea.  SHe lives at home with daughter; does not use oxygen at home; uses 2-3 pillows at night to sleep.  Currently denies any chest discomfort. She was given lasix 40 mg IV x 1 dose in ER (11 Aug 2019 18:36)      PAST MEDICAL & SURGICAL HISTORY:  Parkinson disease  Hypothyroid  Rib fractures  Congestive heart failure  Hypertension  No significant past surgical history      Cardiac Risks:   [ x]HTN, [ ] DM, [ ] Smoking, [ ] FH,  [ ] Lipids        MEDICATIONS:  MEDICATIONS  (STANDING):  amiodarone    Tablet 200 milliGRAM(s) Oral daily  aspirin  chewable 81 milliGRAM(s) Oral daily  atorvastatin 10 milliGRAM(s) Oral at bedtime  buDESOnide 160 MICROgram(s)/formoterol 4.5 MICROgram(s) Inhaler 2 Puff(s) Inhalation two times a day  carbidopa/levodopa  10/100 1 Tablet(s) Oral at bedtime  carbidopa/levodopa  25/100 1 Tablet(s) Oral daily  carvedilol 25 milliGRAM(s) Oral every 12 hours  chlorhexidine 4% Liquid 1 Application(s) Topical <User Schedule>  DULoxetine 60 milliGRAM(s) Oral daily  folic acid 1 milliGRAM(s) Oral daily  furosemide   Injectable 40 milliGRAM(s) IV Push every 12 hours  heparin  Injectable 5000 Unit(s) SubCutaneous every 12 hours  isosorbide   mononitrate ER Tablet (IMDUR) 30 milliGRAM(s) Oral daily  levothyroxine 75 MICROGram(s) Oral daily  losartan 50 milliGRAM(s) Oral daily  pantoprazole    Tablet 40 milliGRAM(s) Oral before breakfast  potassium chloride   Powder 20 milliEquivalent(s) Oral daily  pregabalin 100 milliGRAM(s) Oral two times a day      FAMILY HISTORY:  No pertinent family history in first degree relatives      SOCIAL HISTORY:      [ ] Marital status    Allergies    No Known Allergies        	    REVIEW OF SYSTEMS:  CONSTITUTIONAL: No fever, weight loss, or fatigue  EYES: No eye pain, visual disturbances, or discharge  ENMT:  No difficulty hearing, tinnitus, vertigo; No sinus or throat pain  NECK: No pain or stiffness  RESPIRATORY: No cough, wheezing, chills or hemoptysis; No Shortness of Breath  CARDIOVASCULAR: No chest pain, palpitations, passing out, dizziness, or leg swelling  GASTROINTESTINAL: No abdominal or epigastric pain. No nausea, vomiting, or hematemesis; No diarrhea or constipation. No melena or hematochezia.  GENITOURINARY: No dysuria, frequency, hematuria, or incontinence  NEUROLOGICAL: No headaches, memory loss, loss of strength, numbness, or tremors  SKIN: No itching, burning, rashes, or lesions   	      PHYSICAL EXAM:  T(C): 35.9 (08-12-19 @ 05:55), Max: 37.2 (08-11-19 @ 21:03)  HR: 44 (08-12-19 @ 05:55) (44 - 65)  BP: 159/63 (08-12-19 @ 05:55) (117/57 - 166/69)  RR: 16 (08-12-19 @ 05:55) (16 - 18)  SpO2: 95% (08-11-19 @ 14:14) (95% - 95%)  Wt(kg): --  I&O's Summary    11 Aug 2019 07:01  -  12 Aug 2019 07:00  --------------------------------------------------------  IN: 0 mL / OUT: 600 mL / NET: -600 mL        Appearance: Normal	  Psychiatry: A & O x 3, Mood & affect appropriate  HEENT:   Normal oral mucosa, PERRL, EOMI	  Lymphatic: No lymphadenopathy  Cardiovascular: Normal S1 S2,irreg No JVD, No murmurs  Respiratory: Lungs clear to auscultation	  Gastrointestinal:  Soft, Non-tender, + BS	  Skin: No rashes, No ecchymoses, No cyanosis	  Neurologic: Non-focal  Extremities: Normal range of motion, No clubbing, cyanosis or edema  Vascular: Peripheral pulses palpable 2+ bilaterally      ECG:  	afib lphb  Ns-st    	  LABS:	 	    CARDIAC MARKERS:          Serum Pro-Brain Natriuretic Peptide: 8658 pg/mL (08-12 @ 06:40)  Serum Pro-Brain Natriuretic Peptide: 53872 pg/mL (08-11 @ 13:22)                            11.3   4.71  )-----------( 119      ( 12 Aug 2019 06:40 )             34.4     08-12    141  |  100  |  27<H>  ----------------------------<  110<H>  3.0<L>   |  30  |  0.9    Ca    8.7      12 Aug 2019 06:40  Phos  3.0     08-12  Mg     1.8     08-12    TPro  5.6<L>  /  Alb  3.6  /  TBili  1.5<H>  /  DBili  0.5<H>  /  AST  20  /  ALT  <5  /  AlkPhos  88  08-12    PT/INR - ( 11 Aug 2019 13:22 )   PT: 15.00 sec;   INR: 1.31 ratio         PTT - ( 11 Aug 2019 13:22 )  PTT:33.6 sec  proBNP: Serum Pro-Brain Natriuretic Peptide: 8658 pg/mL (08-12 @ 06:40)  Serum Pro-Brain Natriuretic Peptide: 79521 pg/mL (08-11 @ 13:22)

## 2019-08-12 NOTE — CONSULT NOTE ADULT - SUBJECTIVE AND OBJECTIVE BOX
Patient is a 87y old  Female who presents with a chief complaint of generalized weakness and SOB x 1 day (11 Aug 2019 18:36)      HPI:  87 yr old female with c/o SOB/chest discomfort  and generalized weakness/difficulty walking x few days.  She has recent admission last month for CHF exacerbation.   She denies any leg swelling/cough/fevers; speaking in short sentences but without any dyspnea.  She lives at home with daughter; does not use oxygen at home; uses 2-3 pillows at night to sleep.  Currently denies any chest discomfort. She was given lasix 40 mg IV x 1 dose in ER (11 Aug 2019 18:36)      PAST MEDICAL & SURGICAL HISTORY:  Parkinson disease  Hypothyroid  Rib fractures  Congestive heart failure  Hypertension  No significant past surgical history      SOCIAL HX:   neg x3    FAMILY HISTORY:  No pertinent family history in first degree relatives      Allergies    No Known Allergies    Intolerances      PHYSICAL EXAM  Vital Signs Last 24 Hrs  T(C): 35.9 (12 Aug 2019 05:55), Max: 37.2 (11 Aug 2019 21:03)  T(F): 96.7 (12 Aug 2019 05:55), Max: 98.9 (11 Aug 2019 21:03)  HR: 44 (12 Aug 2019 05:55) (44 - 65)  BP: 159/63 (12 Aug 2019 05:55) (117/57 - 166/69)  RR: 16 (12 Aug 2019 05:55) (16 - 18)  SpO2: 95% (11 Aug 2019 14:14) (95% - 95%)  I&O's Summary    11 Aug 2019 07:01  -  12 Aug 2019 07:00  --------------------------------------------------------  IN: 0 mL / OUT: 600 mL / NET: -600 mL      General: Comfortable in bed  HEENT:  On NC  Lymph node: No palpable LN             Lungs: Bilateral crackles   Cardiovascular: RRR, S1S2  Abdomen: BS+ve; soft non tender  Extremities:  Skin:   Neurological:  AAOx3; No focal deficit      LABS:                          11.3   4.71  )-----------( 119      ( 12 Aug 2019 06:40 )             34.4    08-12    141  |  100  |  27<H>  ----------------------------<  110<H>  3.0<L>   |  30  |  0.9    Ca    8.7      12 Aug 2019 06:40  Phos  3.0     08-12  Mg     1.8     08-12    TPro  5.6<L>  /  Alb  3.6  /  TBili  1.5<H>  /  DBili  0.5<H>  /  AST  20  /  ALT  <5  /  AlkPhos  88  08-12    PT/INR - ( 11 Aug 2019 13:22 )   PT: 15.00 sec;   INR: 1.31 ratio     PTT - ( 11 Aug 2019 13:22 )  PTT:33.6 sec                                            CARDIAC MARKERS ( 12 Aug 2019 06:40 )  x     / <0.01 ng/mL / 26 U/L / x     / 1.4 ng/mL  CARDIAC MARKERS ( 11 Aug 2019 21:39 )  x     / <0.01 ng/mL / 35 U/L / x     / 1.6 ng/mL  CARDIAC MARKERS ( 11 Aug 2019 13:22 )  x     / <0.01 ng/mL / x     / x     / x        LIVER FUNCTIONS - ( 12 Aug 2019 06:40 )  Alb: 3.6 g/dL / Pro: 5.6 g/dL / ALK PHOS: 88 U/L / ALT: <5 U/L / AST: 20 U/L / GGT: x            Serum Pro-Brain Natriuretic Peptide: 8658 pg/mL (08-12-19 @ 06:40)  Serum Pro-Brain Natriuretic Peptide: 40900 pg/mL (08-11-19 @ 13:22)    MEDICATIONS  (STANDING):  amiodarone    Tablet 200 milliGRAM(s) Oral daily  aspirin  chewable 81 milliGRAM(s) Oral daily  atorvastatin 10 milliGRAM(s) Oral at bedtime  buDESOnide 160 MICROgram(s)/formoterol 4.5 MICROgram(s) Inhaler 2 Puff(s) Inhalation two times a day  carbidopa/levodopa  10/100 1 Tablet(s) Oral at bedtime  carbidopa/levodopa  25/100 1 Tablet(s) Oral daily  carvedilol 25 milliGRAM(s) Oral every 12 hours  chlorhexidine 4% Liquid 1 Application(s) Topical <User Schedule>  DULoxetine 60 milliGRAM(s) Oral daily  folic acid 1 milliGRAM(s) Oral daily  furosemide   Injectable 40 milliGRAM(s) IV Push every 12 hours  heparin  Injectable 5000 Unit(s) SubCutaneous every 12 hours  isosorbide   mononitrate ER Tablet (IMDUR) 30 milliGRAM(s) Oral daily  levothyroxine 75 MICROGram(s) Oral daily  losartan 50 milliGRAM(s) Oral daily  pantoprazole    Tablet 40 milliGRAM(s) Oral before breakfast  potassium chloride   Powder 20 milliEquivalent(s) Oral daily  pregabalin 100 milliGRAM(s) Oral two times a day    MEDICATIONS  (PRN):  ALBUTerol/ipratropium for Nebulization 3 milliLiter(s) Nebulizer every 6 hours PRN Shortness of Breath and/or Wheezing  temazepam 30 milliGRAM(s) Oral at bedtime PRN Insomnia      Radiology:   Cxr: Increased bilateral interstitial markings; Enlarged cardiac silhouette Patient is a 87y old  Female who presents with a chief complaint of generalized weakness and SOB x 1 day (11 Aug 2019 18:36)      HPI:  87 yr old female with c/o SOB/chest discomfort  and generalized weakness/difficulty walking x few days.  She has recent admission last month for CHF exacerbation.   She denies any leg swelling/cough/fevers; speaking in short sentences but without any dyspnea.  She lives at home with daughter; does not use oxygen at home; uses 2-3 pillows at night to sleep.  Currently denies any chest discomfort. She was given lasix 40 mg IV x 1 dose in ER (11 Aug 2019 18:36)      PAST MEDICAL & SURGICAL HISTORY:  Parkinson disease  Hypothyroid  Rib fractures  Congestive heart failure  Hypertension  No significant past surgical history      SOCIAL HX:   neg x3    FAMILY HISTORY:  No pertinent family history in first degree relatives      Allergies    No Known Allergies    Intolerances      PHYSICAL EXAM  Vital Signs Last 24 Hrs  T(C): 35.9 (12 Aug 2019 05:55), Max: 37.2 (11 Aug 2019 21:03)  T(F): 96.7 (12 Aug 2019 05:55), Max: 98.9 (11 Aug 2019 21:03)  HR: 44 (12 Aug 2019 05:55) (44 - 65)  BP: 159/63 (12 Aug 2019 05:55) (117/57 - 166/69)  RR: 16 (12 Aug 2019 05:55) (16 - 18)  SpO2: 95% (11 Aug 2019 14:14) (95% - 95%)  I&O's Summary    11 Aug 2019 07:01  -  12 Aug 2019 07:00  --------------------------------------------------------  IN: 0 mL / OUT: 600 mL / NET: -600 mL      General: Comfortable in bed  HEENT:  On RA  Lymph node: No palpable LN             Lungs: Bilateral crackles   Cardiovascular: irregular, S1S2  Abdomen: BS+ve; soft non tender  Extremities: No edema   Neurological:  AAOx3; No focal deficit      LABS:                          11.3   4.71  )-----------( 119      ( 12 Aug 2019 06:40 )             34.4    08-12    141  |  100  |  27<H>  ----------------------------<  110<H>  3.0<L>   |  30  |  0.9    Ca    8.7      12 Aug 2019 06:40  Phos  3.0     08-12  Mg     1.8     08-12    TPro  5.6<L>  /  Alb  3.6  /  TBili  1.5<H>  /  DBili  0.5<H>  /  AST  20  /  ALT  <5  /  AlkPhos  88  08-12    PT/INR - ( 11 Aug 2019 13:22 )   PT: 15.00 sec;   INR: 1.31 ratio     PTT - ( 11 Aug 2019 13:22 )  PTT:33.6 sec                                            CARDIAC MARKERS ( 12 Aug 2019 06:40 )  x     / <0.01 ng/mL / 26 U/L / x     / 1.4 ng/mL  CARDIAC MARKERS ( 11 Aug 2019 21:39 )  x     / <0.01 ng/mL / 35 U/L / x     / 1.6 ng/mL  CARDIAC MARKERS ( 11 Aug 2019 13:22 )  x     / <0.01 ng/mL / x     / x     / x        LIVER FUNCTIONS - ( 12 Aug 2019 06:40 )  Alb: 3.6 g/dL / Pro: 5.6 g/dL / ALK PHOS: 88 U/L / ALT: <5 U/L / AST: 20 U/L / GGT: x            Serum Pro-Brain Natriuretic Peptide: 8658 pg/mL (08-12-19 @ 06:40)  Serum Pro-Brain Natriuretic Peptide: 13600 pg/mL (08-11-19 @ 13:22)    MEDICATIONS  (STANDING):  amiodarone    Tablet 200 milliGRAM(s) Oral daily  aspirin  chewable 81 milliGRAM(s) Oral daily  atorvastatin 10 milliGRAM(s) Oral at bedtime  buDESOnide 160 MICROgram(s)/formoterol 4.5 MICROgram(s) Inhaler 2 Puff(s) Inhalation two times a day  carbidopa/levodopa  10/100 1 Tablet(s) Oral at bedtime  carbidopa/levodopa  25/100 1 Tablet(s) Oral daily  carvedilol 25 milliGRAM(s) Oral every 12 hours  chlorhexidine 4% Liquid 1 Application(s) Topical <User Schedule>  DULoxetine 60 milliGRAM(s) Oral daily  folic acid 1 milliGRAM(s) Oral daily  furosemide   Injectable 40 milliGRAM(s) IV Push every 12 hours  heparin  Injectable 5000 Unit(s) SubCutaneous every 12 hours  isosorbide   mononitrate ER Tablet (IMDUR) 30 milliGRAM(s) Oral daily  levothyroxine 75 MICROGram(s) Oral daily  losartan 50 milliGRAM(s) Oral daily  pantoprazole    Tablet 40 milliGRAM(s) Oral before breakfast  potassium chloride   Powder 20 milliEquivalent(s) Oral daily  pregabalin 100 milliGRAM(s) Oral two times a day    MEDICATIONS  (PRN):  ALBUTerol/ipratropium for Nebulization 3 milliLiter(s) Nebulizer every 6 hours PRN Shortness of Breath and/or Wheezing  temazepam 30 milliGRAM(s) Oral at bedtime PRN Insomnia      Radiology:   Cxr: Increased bilateral interstitial markings; Enlarged cardiac silhouette

## 2019-08-12 NOTE — PROGRESS NOTE ADULT - SUBJECTIVE AND OBJECTIVE BOX
87 yr old female with c/o SOB/chest discomfort  and generalized weakness/difficulty walking x few days.  She has recent admission last month for CHF exacerbation.   She denies any leg swelling/cough/fevers; speaking in short sentences but without any dyspnea.  SHe lives at home with daughter; does not use oxygen at home; uses 2-3 pillows at night to sleep.  Currently denies any chest discomfort. She was given lasix 40 mg IV x 1 dose in ER    PAST MEDICAL & SURGICAL HISTORY:  Parkinson disease  Hypothyroid  Rib fractures  Congestive heart failure  Hypertension  No significant past surgical history    MEDICATIONS  (STANDING):  amiodarone    Tablet 200 milliGRAM(s) Oral daily  aspirin  chewable 81 milliGRAM(s) Oral daily  atorvastatin 10 milliGRAM(s) Oral at bedtime  buDESOnide 160 MICROgram(s)/formoterol 4.5 MICROgram(s) Inhaler 2 Puff(s) Inhalation two times a day  carbidopa/levodopa  10/100 1 Tablet(s) Oral at bedtime  carbidopa/levodopa  25/100 1 Tablet(s) Oral daily  carvedilol 25 milliGRAM(s) Oral every 12 hours  chlorhexidine 4% Liquid 1 Application(s) Topical <User Schedule>  DULoxetine 60 milliGRAM(s) Oral daily  folic acid 1 milliGRAM(s) Oral daily  furosemide   Injectable 40 milliGRAM(s) IV Push every 12 hours  heparin  Injectable 5000 Unit(s) SubCutaneous every 12 hours  isosorbide   mononitrate ER Tablet (IMDUR) 30 milliGRAM(s) Oral daily  levothyroxine 75 MICROGram(s) Oral daily  losartan 50 milliGRAM(s) Oral daily  pantoprazole    Tablet 40 milliGRAM(s) Oral before breakfast  potassium chloride   Powder 20 milliEquivalent(s) Oral daily  pregabalin 100 milliGRAM(s) Oral two times a day    MEDICATIONS  (PRN):  ALBUTerol/ipratropium for Nebulization 3 milliLiter(s) Nebulizer every 6 hours PRN Shortness of Breath and/or Wheezing  temazepam 30 milliGRAM(s) Oral at bedtime PRN Insomnia    Vital Signs Last 24 Hrs  T(C): 35.9 (12 Aug 2019 05:55), Max: 37.2 (11 Aug 2019 21:03)  T(F): 96.7 (12 Aug 2019 05:55), Max: 98.9 (11 Aug 2019 21:03)  HR: 44 (12 Aug 2019 05:55) (44 - 65)  BP: 159/63 (12 Aug 2019 05:55) (117/57 - 166/69)  BP(mean): --  RR: 16 (12 Aug 2019 05:55) (16 - 18)  SpO2: 95% (11 Aug 2019 14:14) (95% - 95%)    Gen: lying in bed, NAD   	HEENT: PERRL No nasal discharge. Moist mucus membranes.  	Neck: Supple, non tender, full range of motion.  	CV: RRR no murmurs, rubs, or gallops. +S1S2.   	Pulm: speaking in full sentences, b/l rhochi and crackles   	Abd: soft NT ND +BS.   	Ext: Warm and well perfused x4, moving all extremities, b/l pedal edema   	Psy: Cooperative, appropriate.   Skin: Warm, dry, no rash                          11.3   4.71  )-----------( 119      ( 12 Aug 2019 06:40 )             34.4   08-12    141  |  100  |  27<H>  ----------------------------<  110<H>  3.0<L>   |  30  |  0.9    Ca    8.7      12 Aug 2019 06:40  Phos  3.0     08-12  Mg     1.8     08-12    TPro  5.6<L>  /  Alb  3.6  /  TBili  1.5<H>  /  DBili  0.5<H>  /  AST  20  /  ALT  <5  /  AlkPhos  88  08-12    Serum Pro-Brain Natriuretic Peptide (08.12.19 @ 06:40)    Serum Pro-Brain Natriuretic Peptide: 8658 pg/mL    CARDIAC MARKERS ( 12 Aug 2019 06:40 )  x     / <0.01 ng/mL / 26 U/L / x     / 1.4 ng/mL  CARDIAC MARKERS ( 11 Aug 2019 21:39 )  x     / <0.01 ng/mL / 35 U/L / x     / 1.6 ng/mL  CARDIAC MARKERS ( 11 Aug 2019 13:22 )  x     / <0.01 ng/mL / x     / x     / x

## 2019-08-12 NOTE — PROGRESS NOTE ADULT - SUBJECTIVE AND OBJECTIVE BOX
87 yr old female with c/o SOB/chest discomfort  and generalized weakness/difficulty walking x few days.  She has recent admission last month for CHF exacerbation.   She denies any leg swelling/cough/fevers; speaking in short sentences but without any dyspnea.  SHe lives at home with daughter; does not use oxygen at home; uses 2-3 pillows at night to sleep.  Currently denies any chest discomfort. She was given lasix 40 mg IV x 1 dose in ER    PAST MEDICAL & SURGICAL HISTORY:  Parkinson disease  Hypothyroid  Rib fractures  Congestive heart failure  Hypertension  No significant past surgical history    MEDICATIONS  (STANDING):  amiodarone    Tablet 200 milliGRAM(s) Oral daily  aspirin  chewable 81 milliGRAM(s) Oral daily  atorvastatin 10 milliGRAM(s) Oral at bedtime  buDESOnide 160 MICROgram(s)/formoterol 4.5 MICROgram(s) Inhaler 2 Puff(s) Inhalation two times a day  carbidopa/levodopa  10/100 1 Tablet(s) Oral at bedtime  carbidopa/levodopa  25/100 1 Tablet(s) Oral daily  carvedilol 25 milliGRAM(s) Oral every 12 hours  chlorhexidine 4% Liquid 1 Application(s) Topical <User Schedule>  DULoxetine 60 milliGRAM(s) Oral daily  folic acid 1 milliGRAM(s) Oral daily  furosemide   Injectable 40 milliGRAM(s) IV Push every 12 hours  heparin  Injectable 5000 Unit(s) SubCutaneous every 12 hours  isosorbide   mononitrate ER Tablet (IMDUR) 30 milliGRAM(s) Oral daily  levothyroxine 75 MICROGram(s) Oral daily  losartan 50 milliGRAM(s) Oral daily  pantoprazole    Tablet 40 milliGRAM(s) Oral before breakfast  potassium chloride   Powder 20 milliEquivalent(s) Oral daily  pregabalin 100 milliGRAM(s) Oral two times a day    MEDICATIONS  (PRN):  ALBUTerol/ipratropium for Nebulization 3 milliLiter(s) Nebulizer every 6 hours PRN Shortness of Breath and/or Wheezing  temazepam 30 milliGRAM(s) Oral at bedtime PRN Insomnia    Vital Signs Last 24 Hrs  T(C): 35.9 (12 Aug 2019 05:55), Max: 37.2 (11 Aug 2019 21:03)  T(F): 96.7 (12 Aug 2019 05:55), Max: 98.9 (11 Aug 2019 21:03)  HR: 44 (12 Aug 2019 05:55) (44 - 65)  BP: 159/63 (12 Aug 2019 05:55) (117/57 - 166/69)  BP(mean): --  RR: 16 (12 Aug 2019 05:55) (16 - 18)  SpO2: 95% (11 Aug 2019 14:14) (95% - 95%)    Gen: lying in bed, NAD   HEENT: PERRL No nasal discharge. Moist mucus membranes.  Neck: Supple, non tender, full range of motion.  CV: RRR no murmurs, rubs, or gallops. +S1S2.   Pulm: speaking in full sentences occasional wheeze   Abd: soft NT ND +BS.   Ext: Warm and well perfused x4, moving all extremities, b/l pedal edema  decreased   Psy: Cooperative, appropriate.   Skin: Warm, dry, no rash

## 2019-08-13 LAB
ANION GAP SERPL CALC-SCNC: 10 MMOL/L — SIGNIFICANT CHANGE UP (ref 7–14)
BUN SERPL-MCNC: 30 MG/DL — HIGH (ref 10–20)
CALCIUM SERPL-MCNC: 8.4 MG/DL — LOW (ref 8.5–10.1)
CHLORIDE SERPL-SCNC: 102 MMOL/L — SIGNIFICANT CHANGE UP (ref 98–110)
CO2 SERPL-SCNC: 31 MMOL/L — SIGNIFICANT CHANGE UP (ref 17–32)
CREAT SERPL-MCNC: 1.1 MG/DL — SIGNIFICANT CHANGE UP (ref 0.7–1.5)
GLUCOSE SERPL-MCNC: 114 MG/DL — HIGH (ref 70–99)
POTASSIUM SERPL-MCNC: 3.3 MMOL/L — LOW (ref 3.5–5)
POTASSIUM SERPL-SCNC: 3.3 MMOL/L — LOW (ref 3.5–5)
SODIUM SERPL-SCNC: 143 MMOL/L — SIGNIFICANT CHANGE UP (ref 135–146)

## 2019-08-13 RX ADMIN — CARBIDOPA AND LEVODOPA 1 TABLET(S): 25; 100 TABLET ORAL at 11:45

## 2019-08-13 RX ADMIN — CARBIDOPA AND LEVODOPA 1 TABLET(S): 25; 100 TABLET ORAL at 21:24

## 2019-08-13 RX ADMIN — Medication 40 MILLIGRAM(S): at 18:00

## 2019-08-13 RX ADMIN — Medication 100 MILLIGRAM(S): at 05:13

## 2019-08-13 RX ADMIN — Medication 40 MILLIGRAM(S): at 05:13

## 2019-08-13 RX ADMIN — LOSARTAN POTASSIUM 50 MILLIGRAM(S): 100 TABLET, FILM COATED ORAL at 05:13

## 2019-08-13 RX ADMIN — ATORVASTATIN CALCIUM 10 MILLIGRAM(S): 80 TABLET, FILM COATED ORAL at 21:24

## 2019-08-13 RX ADMIN — Medication 100 MILLIGRAM(S): at 18:00

## 2019-08-13 RX ADMIN — BUDESONIDE AND FORMOTEROL FUMARATE DIHYDRATE 2 PUFF(S): 160; 4.5 AEROSOL RESPIRATORY (INHALATION) at 21:24

## 2019-08-13 RX ADMIN — BUDESONIDE AND FORMOTEROL FUMARATE DIHYDRATE 2 PUFF(S): 160; 4.5 AEROSOL RESPIRATORY (INHALATION) at 09:55

## 2019-08-13 RX ADMIN — ISOSORBIDE MONONITRATE 30 MILLIGRAM(S): 60 TABLET, EXTENDED RELEASE ORAL at 11:45

## 2019-08-13 RX ADMIN — Medication 75 MICROGRAM(S): at 05:13

## 2019-08-13 RX ADMIN — HEPARIN SODIUM 5000 UNIT(S): 5000 INJECTION INTRAVENOUS; SUBCUTANEOUS at 17:58

## 2019-08-13 RX ADMIN — Medication 81 MILLIGRAM(S): at 11:45

## 2019-08-13 RX ADMIN — HEPARIN SODIUM 5000 UNIT(S): 5000 INJECTION INTRAVENOUS; SUBCUTANEOUS at 05:13

## 2019-08-13 RX ADMIN — AMIODARONE HYDROCHLORIDE 200 MILLIGRAM(S): 400 TABLET ORAL at 11:45

## 2019-08-13 RX ADMIN — DULOXETINE HYDROCHLORIDE 60 MILLIGRAM(S): 30 CAPSULE, DELAYED RELEASE ORAL at 11:45

## 2019-08-13 RX ADMIN — PANTOPRAZOLE SODIUM 40 MILLIGRAM(S): 20 TABLET, DELAYED RELEASE ORAL at 05:13

## 2019-08-13 RX ADMIN — Medication 20 MILLIEQUIVALENT(S): at 11:47

## 2019-08-13 RX ADMIN — Medication 1 MILLIGRAM(S): at 11:45

## 2019-08-14 LAB
ANION GAP SERPL CALC-SCNC: 10 MMOL/L — SIGNIFICANT CHANGE UP (ref 7–14)
BUN SERPL-MCNC: 26 MG/DL — HIGH (ref 10–20)
CALCIUM SERPL-MCNC: 9 MG/DL — SIGNIFICANT CHANGE UP (ref 8.5–10.1)
CHLORIDE SERPL-SCNC: 103 MMOL/L — SIGNIFICANT CHANGE UP (ref 98–110)
CO2 SERPL-SCNC: 31 MMOL/L — SIGNIFICANT CHANGE UP (ref 17–32)
CREAT SERPL-MCNC: 0.9 MG/DL — SIGNIFICANT CHANGE UP (ref 0.7–1.5)
GLUCOSE SERPL-MCNC: 122 MG/DL — HIGH (ref 70–99)
HCT VFR BLD CALC: 35.8 % — LOW (ref 37–47)
HGB BLD-MCNC: 11.7 G/DL — LOW (ref 12–16)
MCHC RBC-ENTMCNC: 28.9 PG — SIGNIFICANT CHANGE UP (ref 27–31)
MCHC RBC-ENTMCNC: 32.7 G/DL — SIGNIFICANT CHANGE UP (ref 32–37)
MCV RBC AUTO: 88.4 FL — SIGNIFICANT CHANGE UP (ref 81–99)
NRBC # BLD: 0 /100 WBCS — SIGNIFICANT CHANGE UP (ref 0–0)
PLATELET # BLD AUTO: 164 K/UL — SIGNIFICANT CHANGE UP (ref 130–400)
POTASSIUM SERPL-MCNC: 3.5 MMOL/L — SIGNIFICANT CHANGE UP (ref 3.5–5)
POTASSIUM SERPL-SCNC: 3.5 MMOL/L — SIGNIFICANT CHANGE UP (ref 3.5–5)
RBC # BLD: 4.05 M/UL — LOW (ref 4.2–5.4)
RBC # FLD: 18.6 % — HIGH (ref 11.5–14.5)
SODIUM SERPL-SCNC: 144 MMOL/L — SIGNIFICANT CHANGE UP (ref 135–146)
WBC # BLD: 4.68 K/UL — LOW (ref 4.8–10.8)
WBC # FLD AUTO: 4.68 K/UL — LOW (ref 4.8–10.8)

## 2019-08-14 RX ADMIN — Medication 1 MILLIGRAM(S): at 11:20

## 2019-08-14 RX ADMIN — Medication 20 MILLIEQUIVALENT(S): at 11:20

## 2019-08-14 RX ADMIN — Medication 100 MILLIGRAM(S): at 05:21

## 2019-08-14 RX ADMIN — LOSARTAN POTASSIUM 50 MILLIGRAM(S): 100 TABLET, FILM COATED ORAL at 05:22

## 2019-08-14 RX ADMIN — CARVEDILOL PHOSPHATE 25 MILLIGRAM(S): 80 CAPSULE, EXTENDED RELEASE ORAL at 18:40

## 2019-08-14 RX ADMIN — PANTOPRAZOLE SODIUM 40 MILLIGRAM(S): 20 TABLET, DELAYED RELEASE ORAL at 05:22

## 2019-08-14 RX ADMIN — Medication 81 MILLIGRAM(S): at 11:19

## 2019-08-14 RX ADMIN — ATORVASTATIN CALCIUM 10 MILLIGRAM(S): 80 TABLET, FILM COATED ORAL at 21:29

## 2019-08-14 RX ADMIN — TEMAZEPAM 30 MILLIGRAM(S): 15 CAPSULE ORAL at 21:45

## 2019-08-14 RX ADMIN — ISOSORBIDE MONONITRATE 30 MILLIGRAM(S): 60 TABLET, EXTENDED RELEASE ORAL at 11:20

## 2019-08-14 RX ADMIN — HEPARIN SODIUM 5000 UNIT(S): 5000 INJECTION INTRAVENOUS; SUBCUTANEOUS at 05:22

## 2019-08-14 RX ADMIN — CARBIDOPA AND LEVODOPA 1 TABLET(S): 25; 100 TABLET ORAL at 21:29

## 2019-08-14 RX ADMIN — BUDESONIDE AND FORMOTEROL FUMARATE DIHYDRATE 2 PUFF(S): 160; 4.5 AEROSOL RESPIRATORY (INHALATION) at 21:29

## 2019-08-14 RX ADMIN — Medication 75 MICROGRAM(S): at 05:22

## 2019-08-14 RX ADMIN — HEPARIN SODIUM 5000 UNIT(S): 5000 INJECTION INTRAVENOUS; SUBCUTANEOUS at 18:42

## 2019-08-14 RX ADMIN — Medication 40 MILLIGRAM(S): at 05:22

## 2019-08-14 RX ADMIN — CARBIDOPA AND LEVODOPA 1 TABLET(S): 25; 100 TABLET ORAL at 11:20

## 2019-08-14 RX ADMIN — BUDESONIDE AND FORMOTEROL FUMARATE DIHYDRATE 2 PUFF(S): 160; 4.5 AEROSOL RESPIRATORY (INHALATION) at 11:19

## 2019-08-14 RX ADMIN — DULOXETINE HYDROCHLORIDE 60 MILLIGRAM(S): 30 CAPSULE, DELAYED RELEASE ORAL at 11:20

## 2019-08-14 RX ADMIN — Medication 100 MILLIGRAM(S): at 18:39

## 2019-08-14 RX ADMIN — Medication 40 MILLIGRAM(S): at 18:41

## 2019-08-14 NOTE — CONSULT NOTE ADULT - REASON FOR ADMISSION
generalized weakness and SOB x 1 day

## 2019-08-14 NOTE — CONSULT NOTE ADULT - SUBJECTIVE AND OBJECTIVE BOX
HPI:  87 yr old female with c/o SOB/chest discomfort  and generalized weakness/difficulty walking x few days.  She has recent admission last month for CHF exacerbation.   She denies any leg swelling/cough/fevers; speaking in short sentences but without any dyspnea.  SHe lives at home with daughter; does not use oxygen at home; uses 2-3 pillows at night to sleep.  Currently denies any chest discomfort. She was given lasix 40 mg IV x 1 dose in ER .     PTN  REFERRED TO ACUTE  REHAB  FOR  EVAL AND  TX   PAST MEDICAL & SURGICAL HISTORY:  Parkinson disease  Hypothyroid  Rib fractures  Congestive heart failure  Hypertension  No significant past surgical history      Hospital Course:    TODAY'S SUBJECTIVE & REVIEW OF SYMPTOMS:     Constitutional WNL   Cardio WNL   Resp WNL   GI WNL  Heme WNL  Endo WNL  Skin WNL  MSK WNL  Neuro WNL  Cognitive WNL  Psych WNL      MEDICATIONS  (STANDING):  amiodarone    Tablet 200 milliGRAM(s) Oral daily  aspirin  chewable 81 milliGRAM(s) Oral daily  atorvastatin 10 milliGRAM(s) Oral at bedtime  buDESOnide 160 MICROgram(s)/formoterol 4.5 MICROgram(s) Inhaler 2 Puff(s) Inhalation two times a day  carbidopa/levodopa  10/100 1 Tablet(s) Oral at bedtime  carbidopa/levodopa  25/100 1 Tablet(s) Oral daily  carvedilol 25 milliGRAM(s) Oral every 12 hours  chlorhexidine 4% Liquid 1 Application(s) Topical <User Schedule>  DULoxetine 60 milliGRAM(s) Oral daily  folic acid 1 milliGRAM(s) Oral daily  furosemide   Injectable 40 milliGRAM(s) IV Push every 12 hours  heparin  Injectable 5000 Unit(s) SubCutaneous every 12 hours  isosorbide   mononitrate ER Tablet (IMDUR) 30 milliGRAM(s) Oral daily  levothyroxine 75 MICROGram(s) Oral daily  losartan 50 milliGRAM(s) Oral daily  pantoprazole    Tablet 40 milliGRAM(s) Oral before breakfast  potassium chloride   Powder 20 milliEquivalent(s) Oral daily  pregabalin 100 milliGRAM(s) Oral two times a day    MEDICATIONS  (PRN):  ALBUTerol/ipratropium for Nebulization 3 milliLiter(s) Nebulizer every 6 hours PRN Shortness of Breath and/or Wheezing  temazepam 30 milliGRAM(s) Oral at bedtime PRN Insomnia      FAMILY HISTORY:  No pertinent family history in first degree relatives      Allergies    No Known Allergies    Intolerances        SOCIAL HISTORY:    [  ] Etoh  [  ] Smoking  [  ] Substance abuse     Home Environment:  [  ] Home Alone  [ x ] Lives with Family         dtr  [  ] Home Health Aid    Dwelling:  [  ] Apartment  [x  ] Private House  [  ] Adult Home  [  ] Skilled Nursing Facility      [  ] Short Term  [  ] Long Term  [ x] Stairs       Elevator [  ]    FUNCTIONAL STATUS PTA: (Check all that apply)  Ambulation: [x  ]Independent    [  ] Dependent     [  ] Non-Ambulatory  Assistive Device: [  ] SA Cane  [  ]  Q Cane  [x  ] Walker  [  ]  Wheelchair  ADL : [ x ] Independent  [  ]  Dependent       Vital Signs Last 24 Hrs  T(C): 35.6 (14 Aug 2019 05:15), Max: 36.6 (13 Aug 2019 13:37)  T(F): 96.1 (14 Aug 2019 05:15), Max: 97.8 (13 Aug 2019 13:37)  HR: 54 (14 Aug 2019 06:35) (53 - 66)  BP: 185/79 (14 Aug 2019 06:35) (150/70 - 197/82)  BP(mean): --  RR: 16 (14 Aug 2019 05:15) (16 - 16)  SpO2: --      PHYSICAL EXAM: Alert & Oriented X 2  GENERAL: NAD, well-groomed, well-developed  HEAD:  Atraumatic, Normocephalic  EYES: EOMI, PERRLA, conjunctiva and sclera clear  NECK: Supple, No JVD, Normal thyroid  CHEST/LUNG: Clear to percussion bilaterally; No rales, rhonchi, wheezing, or rubs  HEART: Regular rate and rhythm; No murmurs, rubs, or gallops  ABDOMEN: Soft, Nontender, Nondistended; Bowel sounds present  EXTREMITIES:  2+ Peripheral Pulses, No clubbing, cyanosis, or edema    NERVOUS SYSTEM:  Cranial Nerves 2-12 intact [ x ] Abnormal  [  ]  ROM: WFL all extremities [  ]  Abnormal [x  ]  Motor Strength: WFL all extremities  [  ]  Abnormal [ x ]  Sensation: intact to light touch [  ] Abnormal [  x]  Reflexes: Symmetric [  ]  Abnormal [ x ]    FUNCTIONAL STATUS:  Bed Mobility: Independent [  ]  Supervision [  ]  Needs Assistance [ x]  N/A [  ]  Transfers: Independent [  ]  Supervision [  ]  Needs Assistance [  x]  N/A [  ]   Ambulation: Independent [  ]  Supervision [  ]  Needs Assistance [x  ]  N/A [  ]  ADL: Independent [  ] Requires Assistance [  ] N/A [ x ]  SEE PT/OT IE NOTES    LABS:                        11.7   4.68  )-----------( 164      ( 14 Aug 2019 06:41 )             35.8     08-14    144  |  103  |  26<H>  ----------------------------<  122<H>  3.5   |  31  |  0.9    Ca    9.0      14 Aug 2019 06:41            RADIOLOGY & ADDITIONAL STUDIES:    Assesment:

## 2019-08-14 NOTE — PROGRESS NOTE ADULT - SUBJECTIVE AND OBJECTIVE BOX
87 yr old female with c/o SOB/chest discomfort  and generalized weakness/difficulty walking x few days.  She has recent admission last month for CHF exacerbation.   She denies any leg swelling/cough/fevers; speaking in short sentences but without any dyspnea.  SHe lives at home with daughter; does not use oxygen at home; uses 2-3 pillows at night to sleep.  Currently denies any chest discomfort. She was given lasix 40 mg IV x 1 dose in ER    Interval : sleeping this am , less short of breath remains on IV lasix     PAST MEDICAL & SURGICAL HISTORY:  Parkinson disease  Hypothyroid  Rib fractures  Congestive heart failure  Hypertension  No significant past surgical history    MEDICATIONS  (STANDING):  amiodarone    Tablet 200 milliGRAM(s) Oral daily  aspirin  chewable 81 milliGRAM(s) Oral daily  atorvastatin 10 milliGRAM(s) Oral at bedtime  buDESOnide 160 MICROgram(s)/formoterol 4.5 MICROgram(s) Inhaler 2 Puff(s) Inhalation two times a day  carbidopa/levodopa  10/100 1 Tablet(s) Oral at bedtime  carbidopa/levodopa  25/100 1 Tablet(s) Oral daily  carvedilol 25 milliGRAM(s) Oral every 12 hours  chlorhexidine 4% Liquid 1 Application(s) Topical <User Schedule>  DULoxetine 60 milliGRAM(s) Oral daily  folic acid 1 milliGRAM(s) Oral daily  furosemide   Injectable 40 milliGRAM(s) IV Push every 12 hours  heparin  Injectable 5000 Unit(s) SubCutaneous every 12 hours  isosorbide   mononitrate ER Tablet (IMDUR) 30 milliGRAM(s) Oral daily  levothyroxine 75 MICROGram(s) Oral daily  losartan 50 milliGRAM(s) Oral daily  pantoprazole    Tablet 40 milliGRAM(s) Oral before breakfast  potassium chloride   Powder 20 milliEquivalent(s) Oral daily  pregabalin 100 milliGRAM(s) Oral two times a day    MEDICATIONS  (PRN):  ALBUTerol/ipratropium for Nebulization 3 milliLiter(s) Nebulizer every 6 hours PRN Shortness of Breath and/or Wheezing  temazepam 30 milliGRAM(s) Oral at bedtime PRN Insomnia    Vital Signs Last 24 Hrs  T(C): 35.6 (14 Aug 2019 05:15), Max: 36.6 (13 Aug 2019 13:37)  T(F): 96.1 (14 Aug 2019 05:15), Max: 97.8 (13 Aug 2019 13:37)  HR: 53 (14 Aug 2019 05:15) (53 - 66)  BP: 197/82 (14 Aug 2019 05:15) (150/70 - 197/82)  BP(mean): --  RR: 16 (14 Aug 2019 05:15) (16 - 16)  SpO2: 98% (13 Aug 2019 08:49) (98% - 98%)    Gen: lying in bed, NAD   	HEENT: PERRL No nasal discharge. Moist mucus membranes.  	Neck: Supple, non tender, full range of motion.  	CV: RRR no murmurs, rubs, or gallops. +S1S2.   	Pulm: speaking in full sentences, b/l rhochi and crackles   	Abd: soft NT ND +BS.   	Ext: Warm and well perfused x4, moving all extremities, b/l pedal edema   	Psy: Cooperative, appropriate.   Skin: Warm, dry, no rash                          11.3   4.71  )-----------( 119      ( 12 Aug 2019 06:40 )             34.4   08-13    143  |  102  |  30<H>  ----------------------------<  114<H>  3.3<L>   |  31  |  1.1    Ca    8.4<L>      13 Aug 2019 06:43  Phos  3.0     08-12  Mg     1.8     08-12    TPro  5.6<L>  /  Alb  3.6  /  TBili  1.5<H>  /  DBili  0.5<H>  /  AST  20  /  ALT  <5  /  AlkPhos  88  08-12                            Serum Pro-Brain Natriuretic Peptide (08.12.19 @ 06:40)    Serum Pro-Brain Natriuretic Peptide: 8658 pg/mL    CARDIAC MARKERS ( 12 Aug 2019 06:40 )  x     / <0.01 ng/mL / 26 U/L / x     / 1.4 ng/mL  CARDIAC MARKERS ( 11 Aug 2019 21:39 )  x     / <0.01 ng/mL / 35 U/L / x     / 1.6 ng/mL  CARDIAC MARKERS ( 11 Aug 2019 13:22 )  x     / <0.01 ng/mL / x     / x     / x

## 2019-08-14 NOTE — CONSULT NOTE ADULT - ASSESSMENT
IMPRESSION: Rehab of 86 y/o  f rehab  for  decline  pk  dis  gd      PRECAUTIONS: [  ] Cardiac  [  ] Respiratory  [  ] Seizures [  ] Contact Isolation  [  ] Droplet Isolation  [ FALL ] Other    Weight Bearing Status:     RECOMMENDATION:    Out of Bed to Chair     DVT/Decubiti Prophylaxis    REHAB PLAN:     [ xx  ] Bedside P/T 3-5 times a week   [   ]   Bedside O/T  2-3 times a week             [   ] No Rehab Therapy Indicated                   [   ]  Speech Therapy   Conditioning/ROM                                    ADL  Bed Mobility                                               Conditioning/ROM  Transfers                                                     Bed Mobility  Sitting /Standing Balance                         Transfers                                        Gait Training                                               Sitting/Standing Balance  Stair Training [   ]Applicable                    Home equipment Eval                                                                        Splinting  [   ] Only      GOALS:   ADL   [x   ]   Independent                    Transfers  [ x  ] Independent                          Ambulation  [x ] Independent     [ x   ] With device                            [  x ]  CG                                                         [ x  ]  CG                                                                  [  x ] CG                            [    ] Min A                                                   [   ] Min A                                                              [   ] Min  A          DISCHARGE PLAN:   [   ]  Good candidate for Intensive Rehabilitation/Hospital based-[facility]A [facility]                                             Will tolerate 3hrs Intensive Rehab Daily                                       [   xx ]  Short Term Rehab in Skilled Nursing Facility                                       [    ]  Home with Outpatient or VN services                                         [    ]  Possible Candidate for Intensive Hospital based Rehab
Impression:   SOB secondary to pulmonary edema   HO HF   HO Afib    Recommendations:  Optimize cardiac therapy   Cardio eval  Keep I<O; Replete lytes   TTE  O2 via NC to keep So2 >92%  OOB as tolerated   DVT Px
Patient had volume overload. Now appears better. Afib rate controlled. Anticoagulate if candidate. But probably not.  Follow lytes . Switch to po lasix soon

## 2019-08-15 LAB
ANION GAP SERPL CALC-SCNC: 12 MMOL/L — SIGNIFICANT CHANGE UP (ref 7–14)
BUN SERPL-MCNC: 28 MG/DL — HIGH (ref 10–20)
CALCIUM SERPL-MCNC: 9 MG/DL — SIGNIFICANT CHANGE UP (ref 8.5–10.1)
CHLORIDE SERPL-SCNC: 101 MMOL/L — SIGNIFICANT CHANGE UP (ref 98–110)
CO2 SERPL-SCNC: 32 MMOL/L — SIGNIFICANT CHANGE UP (ref 17–32)
CREAT SERPL-MCNC: 1 MG/DL — SIGNIFICANT CHANGE UP (ref 0.7–1.5)
GLUCOSE SERPL-MCNC: 107 MG/DL — HIGH (ref 70–99)
HCT VFR BLD CALC: 37 % — SIGNIFICANT CHANGE UP (ref 37–47)
HGB BLD-MCNC: 11.8 G/DL — LOW (ref 12–16)
MCHC RBC-ENTMCNC: 28 PG — SIGNIFICANT CHANGE UP (ref 27–31)
MCHC RBC-ENTMCNC: 31.9 G/DL — LOW (ref 32–37)
MCV RBC AUTO: 87.9 FL — SIGNIFICANT CHANGE UP (ref 81–99)
NRBC # BLD: 0 /100 WBCS — SIGNIFICANT CHANGE UP (ref 0–0)
NT-PROBNP SERPL-SCNC: 2625 PG/ML — HIGH (ref 0–300)
PLATELET # BLD AUTO: 171 K/UL — SIGNIFICANT CHANGE UP (ref 130–400)
POTASSIUM SERPL-MCNC: 3.6 MMOL/L — SIGNIFICANT CHANGE UP (ref 3.5–5)
POTASSIUM SERPL-SCNC: 3.6 MMOL/L — SIGNIFICANT CHANGE UP (ref 3.5–5)
RBC # BLD: 4.21 M/UL — SIGNIFICANT CHANGE UP (ref 4.2–5.4)
RBC # FLD: 18.1 % — HIGH (ref 11.5–14.5)
SODIUM SERPL-SCNC: 145 MMOL/L — SIGNIFICANT CHANGE UP (ref 135–146)
WBC # BLD: 5.01 K/UL — SIGNIFICANT CHANGE UP (ref 4.8–10.8)
WBC # FLD AUTO: 5.01 K/UL — SIGNIFICANT CHANGE UP (ref 4.8–10.8)

## 2019-08-15 RX ORDER — FUROSEMIDE 40 MG
40 TABLET ORAL DAILY
Refills: 0 | Status: DISCONTINUED | OUTPATIENT
Start: 2019-08-16 | End: 2019-08-17

## 2019-08-15 RX ORDER — SENNA PLUS 8.6 MG/1
2 TABLET ORAL AT BEDTIME
Refills: 0 | Status: DISCONTINUED | OUTPATIENT
Start: 2019-08-15 | End: 2019-08-17

## 2019-08-15 RX ORDER — LOSARTAN POTASSIUM 100 MG/1
100 TABLET, FILM COATED ORAL DAILY
Refills: 0 | Status: DISCONTINUED | OUTPATIENT
Start: 2019-08-15 | End: 2019-08-17

## 2019-08-15 RX ORDER — DOCUSATE SODIUM 100 MG
100 CAPSULE ORAL
Refills: 0 | Status: DISCONTINUED | OUTPATIENT
Start: 2019-08-15 | End: 2019-08-17

## 2019-08-15 RX ORDER — MAGNESIUM HYDROXIDE 400 MG/1
30 TABLET, CHEWABLE ORAL DAILY
Refills: 0 | Status: DISCONTINUED | OUTPATIENT
Start: 2019-08-15 | End: 2019-08-17

## 2019-08-15 RX ADMIN — Medication 1 MILLIGRAM(S): at 13:05

## 2019-08-15 RX ADMIN — Medication 100 MILLIGRAM(S): at 05:56

## 2019-08-15 RX ADMIN — DULOXETINE HYDROCHLORIDE 60 MILLIGRAM(S): 30 CAPSULE, DELAYED RELEASE ORAL at 13:05

## 2019-08-15 RX ADMIN — BUDESONIDE AND FORMOTEROL FUMARATE DIHYDRATE 2 PUFF(S): 160; 4.5 AEROSOL RESPIRATORY (INHALATION) at 21:45

## 2019-08-15 RX ADMIN — CARBIDOPA AND LEVODOPA 1 TABLET(S): 25; 100 TABLET ORAL at 13:05

## 2019-08-15 RX ADMIN — CHLORHEXIDINE GLUCONATE 1 APPLICATION(S): 213 SOLUTION TOPICAL at 13:43

## 2019-08-15 RX ADMIN — LOSARTAN POTASSIUM 50 MILLIGRAM(S): 100 TABLET, FILM COATED ORAL at 05:56

## 2019-08-15 RX ADMIN — Medication 75 MICROGRAM(S): at 05:56

## 2019-08-15 RX ADMIN — HEPARIN SODIUM 5000 UNIT(S): 5000 INJECTION INTRAVENOUS; SUBCUTANEOUS at 05:55

## 2019-08-15 RX ADMIN — Medication 100 MILLIGRAM(S): at 21:44

## 2019-08-15 RX ADMIN — Medication 100 MILLIGRAM(S): at 18:24

## 2019-08-15 RX ADMIN — CARVEDILOL PHOSPHATE 25 MILLIGRAM(S): 80 CAPSULE, EXTENDED RELEASE ORAL at 18:24

## 2019-08-15 RX ADMIN — Medication 20 MILLIEQUIVALENT(S): at 13:05

## 2019-08-15 RX ADMIN — Medication 40 MILLIGRAM(S): at 05:56

## 2019-08-15 RX ADMIN — ISOSORBIDE MONONITRATE 30 MILLIGRAM(S): 60 TABLET, EXTENDED RELEASE ORAL at 13:05

## 2019-08-15 RX ADMIN — SENNA PLUS 2 TABLET(S): 8.6 TABLET ORAL at 21:44

## 2019-08-15 RX ADMIN — HEPARIN SODIUM 5000 UNIT(S): 5000 INJECTION INTRAVENOUS; SUBCUTANEOUS at 18:24

## 2019-08-15 RX ADMIN — CARBIDOPA AND LEVODOPA 1 TABLET(S): 25; 100 TABLET ORAL at 21:44

## 2019-08-15 RX ADMIN — PANTOPRAZOLE SODIUM 40 MILLIGRAM(S): 20 TABLET, DELAYED RELEASE ORAL at 05:56

## 2019-08-15 RX ADMIN — ATORVASTATIN CALCIUM 10 MILLIGRAM(S): 80 TABLET, FILM COATED ORAL at 21:44

## 2019-08-15 RX ADMIN — Medication 81 MILLIGRAM(S): at 13:06

## 2019-08-15 RX ADMIN — BUDESONIDE AND FORMOTEROL FUMARATE DIHYDRATE 2 PUFF(S): 160; 4.5 AEROSOL RESPIRATORY (INHALATION) at 08:16

## 2019-08-15 NOTE — PHYSICAL THERAPY INITIAL EVALUATION ADULT - PLANNED THERAPY INTERVENTIONS, PT EVAL
motor coordination training/strengthening/balance training/bed mobility training/neuromuscular re-education/transfer training/gait training/postural re-education

## 2019-08-15 NOTE — PHYSICAL THERAPY INITIAL EVALUATION ADULT - ADDITIONAL COMMENTS
Pt reports independent ambulation indoors on level surface holding onto furniture, needs supervision, sometime assist when negotiating stairs. Bed mobility and transfer sit to stand: independent at times needs assist

## 2019-08-15 NOTE — PROGRESS NOTE ADULT - SUBJECTIVE AND OBJECTIVE BOX
87 yr old female with c/o SOB/chest discomfort  and generalized weakness/difficulty walking x few days.  She has recent admission last month for CHF exacerbation.   She denies any leg swelling/cough/fevers; speaking in short sentences but without any dyspnea.  SHe lives at home with daughter; does not use oxygen at home; uses 2-3 pillows at night to sleep.  Currently denies any chest discomfort. She was given lasix 40 mg IV x 1 dose in ER    Interval : sleeping this am , less short of breath remains on IV lasix     PAST MEDICAL & SURGICAL HISTORY:  Parkinson disease  Hypothyroid  Rib fractures  Congestive heart failure  Hypertension  No significant past surgical history    MEDICATIONS  (STANDING):  amiodarone    Tablet 200 milliGRAM(s) Oral daily  aspirin  chewable 81 milliGRAM(s) Oral daily  atorvastatin 10 milliGRAM(s) Oral at bedtime  buDESOnide 160 MICROgram(s)/formoterol 4.5 MICROgram(s) Inhaler 2 Puff(s) Inhalation two times a day  carbidopa/levodopa  10/100 1 Tablet(s) Oral at bedtime  carbidopa/levodopa  25/100 1 Tablet(s) Oral daily  carvedilol 25 milliGRAM(s) Oral every 12 hours  chlorhexidine 4% Liquid 1 Application(s) Topical <User Schedule>  DULoxetine 60 milliGRAM(s) Oral daily  folic acid 1 milliGRAM(s) Oral daily  furosemide   Injectable 40 milliGRAM(s) IV Push every 12 hours  heparin  Injectable 5000 Unit(s) SubCutaneous every 12 hours  isosorbide   mononitrate ER Tablet (IMDUR) 30 milliGRAM(s) Oral daily  levothyroxine 75 MICROGram(s) Oral daily  losartan 50 milliGRAM(s) Oral daily  pantoprazole    Tablet 40 milliGRAM(s) Oral before breakfast  potassium chloride   Powder 20 milliEquivalent(s) Oral daily  pregabalin 100 milliGRAM(s) Oral two times a day    MEDICATIONS  (PRN):  ALBUTerol/ipratropium for Nebulization 3 milliLiter(s) Nebulizer every 6 hours PRN Shortness of Breath and/or Wheezing  temazepam 30 milliGRAM(s) Oral at bedtime PRN Insomnia    Vital Signs Last 24 Hrs  T(C): 35.9 (15 Aug 2019 05:00), Max: 37.1 (14 Aug 2019 21:04)  T(F): 96.6 (15 Aug 2019 05:00), Max: 98.8 (14 Aug 2019 21:04)  HR: 49 (15 Aug 2019 05:00) (49 - 59)  BP: 183/74 (15 Aug 2019 05:00) (158/96 - 185/79)  BP(mean): --  RR: 16 (15 Aug 2019 05:00) (16 - 16)  SpO2: 95% (14 Aug 2019 11:01) (95% - 95%)    Gen: lying in bed, NAD   	HEENT: PERRL No nasal discharge. Moist mucus membranes.  	Neck: Supple, non tender, full range of motion.  	CV: RRR no murmurs, rubs, or gallops. +S1S2.   	Pulm: speaking in full sentences, b/l rhochi and crackles   	Abd: soft NT ND +BS.   	Ext: Warm and well perfused x4, moving all extremities, b/l pedal edema   	Psy: Cooperative, appropriate.   Skin: Warm, dry, no rash                           11.7   4.68  )-----------( 164      ( 14 Aug 2019 06:41 )             35.8   08-14    144  |  103  |  26<H>  ----------------------------<  122<H>  3.5   |  31  |  0.9    Ca    9.0      14 Aug 2019 06:41                              Serum Pro-Brain Natriuretic Peptide (08.12.19 @ 06:40)    Serum Pro-Brain Natriuretic Peptide: 8658 pg/mL    CARDIAC MARKERS ( 12 Aug 2019 06:40 )  x     / <0.01 ng/mL / 26 U/L / x     / 1.4 ng/mL  CARDIAC MARKERS ( 11 Aug 2019 21:39 )  x     / <0.01 ng/mL / 35 U/L / x     / 1.6 ng/mL  CARDIAC MARKERS ( 11 Aug 2019 13:22 )  x     / <0.01 ng/mL / x     / x     / x

## 2019-08-15 NOTE — PHYSICAL THERAPY INITIAL EVALUATION ADULT - IMPAIRMENTS FOUND, PT EVAL
muscle strength/posture/gait, locomotion, and balance/poor safety awareness/aerobic capacity/endurance

## 2019-08-15 NOTE — PHYSICAL THERAPY INITIAL EVALUATION ADULT - GENERAL OBSERVATIONS, REHAB EVAL
9: 35 to 10: 00. Chart reviewed, pt received semireclined in bed, NAD, no c/o pain, +sanchez. Pt reports recent fall at home

## 2019-08-16 ENCOUNTER — TRANSCRIPTION ENCOUNTER (OUTPATIENT)
Age: 84
End: 2019-08-16

## 2019-08-16 LAB
ALBUMIN SERPL ELPH-MCNC: 3.5 G/DL — SIGNIFICANT CHANGE UP (ref 3.5–5.2)
ALP SERPL-CCNC: 113 U/L — SIGNIFICANT CHANGE UP (ref 30–115)
ALT FLD-CCNC: <5 U/L — SIGNIFICANT CHANGE UP (ref 0–41)
ANION GAP SERPL CALC-SCNC: 12 MMOL/L — SIGNIFICANT CHANGE UP (ref 7–14)
AST SERPL-CCNC: 17 U/L — SIGNIFICANT CHANGE UP (ref 0–41)
BILIRUB SERPL-MCNC: 0.6 MG/DL — SIGNIFICANT CHANGE UP (ref 0.2–1.2)
BUN SERPL-MCNC: 32 MG/DL — HIGH (ref 10–20)
CALCIUM SERPL-MCNC: 9.1 MG/DL — SIGNIFICANT CHANGE UP (ref 8.5–10.1)
CHLORIDE SERPL-SCNC: 100 MMOL/L — SIGNIFICANT CHANGE UP (ref 98–110)
CO2 SERPL-SCNC: 30 MMOL/L — SIGNIFICANT CHANGE UP (ref 17–32)
CREAT SERPL-MCNC: 1.1 MG/DL — SIGNIFICANT CHANGE UP (ref 0.7–1.5)
GLUCOSE SERPL-MCNC: 115 MG/DL — HIGH (ref 70–99)
HCT VFR BLD CALC: 36.7 % — LOW (ref 37–47)
HGB BLD-MCNC: 12 G/DL — SIGNIFICANT CHANGE UP (ref 12–16)
MCHC RBC-ENTMCNC: 28.4 PG — SIGNIFICANT CHANGE UP (ref 27–31)
MCHC RBC-ENTMCNC: 32.7 G/DL — SIGNIFICANT CHANGE UP (ref 32–37)
MCV RBC AUTO: 87 FL — SIGNIFICANT CHANGE UP (ref 81–99)
NRBC # BLD: 0 /100 WBCS — SIGNIFICANT CHANGE UP (ref 0–0)
PLATELET # BLD AUTO: 169 K/UL — SIGNIFICANT CHANGE UP (ref 130–400)
POTASSIUM SERPL-MCNC: 3.9 MMOL/L — SIGNIFICANT CHANGE UP (ref 3.5–5)
POTASSIUM SERPL-SCNC: 3.9 MMOL/L — SIGNIFICANT CHANGE UP (ref 3.5–5)
PROT SERPL-MCNC: 5.9 G/DL — LOW (ref 6–8)
RBC # BLD: 4.22 M/UL — SIGNIFICANT CHANGE UP (ref 4.2–5.4)
RBC # FLD: 18.1 % — HIGH (ref 11.5–14.5)
SODIUM SERPL-SCNC: 142 MMOL/L — SIGNIFICANT CHANGE UP (ref 135–146)
WBC # BLD: 5.25 K/UL — SIGNIFICANT CHANGE UP (ref 4.8–10.8)
WBC # FLD AUTO: 5.25 K/UL — SIGNIFICANT CHANGE UP (ref 4.8–10.8)

## 2019-08-16 RX ORDER — PANTOPRAZOLE SODIUM 20 MG/1
1 TABLET, DELAYED RELEASE ORAL
Qty: 0 | Refills: 0 | DISCHARGE
Start: 2019-08-16

## 2019-08-16 RX ORDER — ALBUTEROL 90 UG/1
2 AEROSOL, METERED ORAL
Qty: 1 | Refills: 3
Start: 2019-08-16 | End: 2019-12-13

## 2019-08-16 RX ORDER — SENNA PLUS 8.6 MG/1
2 TABLET ORAL
Qty: 0 | Refills: 0 | DISCHARGE
Start: 2019-08-16

## 2019-08-16 RX ORDER — POLYETHYLENE GLYCOL 3350 17 G/17G
17 POWDER, FOR SOLUTION ORAL ONCE
Refills: 0 | Status: COMPLETED | OUTPATIENT
Start: 2019-08-16 | End: 2019-08-16

## 2019-08-16 RX ORDER — DOCUSATE SODIUM 100 MG
1 CAPSULE ORAL
Qty: 0 | Refills: 0 | DISCHARGE
Start: 2019-08-16

## 2019-08-16 RX ADMIN — Medication 100 MILLIGRAM(S): at 05:58

## 2019-08-16 RX ADMIN — Medication 40 MILLIGRAM(S): at 05:59

## 2019-08-16 RX ADMIN — CHLORHEXIDINE GLUCONATE 1 APPLICATION(S): 213 SOLUTION TOPICAL at 08:42

## 2019-08-16 RX ADMIN — Medication 20 MILLIEQUIVALENT(S): at 13:14

## 2019-08-16 RX ADMIN — Medication 1 MILLIGRAM(S): at 13:14

## 2019-08-16 RX ADMIN — BUDESONIDE AND FORMOTEROL FUMARATE DIHYDRATE 2 PUFF(S): 160; 4.5 AEROSOL RESPIRATORY (INHALATION) at 20:00

## 2019-08-16 RX ADMIN — DULOXETINE HYDROCHLORIDE 60 MILLIGRAM(S): 30 CAPSULE, DELAYED RELEASE ORAL at 13:14

## 2019-08-16 RX ADMIN — Medication 81 MILLIGRAM(S): at 13:15

## 2019-08-16 RX ADMIN — ISOSORBIDE MONONITRATE 30 MILLIGRAM(S): 60 TABLET, EXTENDED RELEASE ORAL at 13:14

## 2019-08-16 RX ADMIN — HEPARIN SODIUM 5000 UNIT(S): 5000 INJECTION INTRAVENOUS; SUBCUTANEOUS at 18:27

## 2019-08-16 RX ADMIN — CARBIDOPA AND LEVODOPA 1 TABLET(S): 25; 100 TABLET ORAL at 13:15

## 2019-08-16 RX ADMIN — CARBIDOPA AND LEVODOPA 1 TABLET(S): 25; 100 TABLET ORAL at 21:52

## 2019-08-16 RX ADMIN — HEPARIN SODIUM 5000 UNIT(S): 5000 INJECTION INTRAVENOUS; SUBCUTANEOUS at 05:59

## 2019-08-16 RX ADMIN — PANTOPRAZOLE SODIUM 40 MILLIGRAM(S): 20 TABLET, DELAYED RELEASE ORAL at 05:58

## 2019-08-16 RX ADMIN — Medication 100 MILLIGRAM(S): at 18:27

## 2019-08-16 RX ADMIN — ATORVASTATIN CALCIUM 10 MILLIGRAM(S): 80 TABLET, FILM COATED ORAL at 21:51

## 2019-08-16 RX ADMIN — POLYETHYLENE GLYCOL 3350 17 GRAM(S): 17 POWDER, FOR SOLUTION ORAL at 13:14

## 2019-08-16 RX ADMIN — TEMAZEPAM 30 MILLIGRAM(S): 15 CAPSULE ORAL at 21:58

## 2019-08-16 RX ADMIN — Medication 75 MICROGRAM(S): at 05:59

## 2019-08-16 RX ADMIN — LOSARTAN POTASSIUM 100 MILLIGRAM(S): 100 TABLET, FILM COATED ORAL at 05:59

## 2019-08-16 NOTE — PROGRESS NOTE ADULT - ASSESSMENT
86 yo f h/o Afib CHF HTN CKD Parkinson’s hypothyroid hyperlipidemia p/w CHF exacerbation  advance care cpr     1. CHF exacerbation   Admit to medicine   IV Lasix   O2 prn   Daily weights   Nebs prn   serial CE's  - rehab medicine evaluation     2. pmhx HTN Afib Parkinson’s hypothyroid hyperlipidemia  Continue home meds    3. DVT ppx   4. GI ppx   5. STAR patient
84 yo f h/o Afib CHF HTN CKD Parkinson’s hypothyroid hyperlipidemia p/w CHF exacerbation  advance care cpr     1. CHF exacerbation   Admit to medicine   IV Lasix   O2 prn   Daily weights   Nebs prn   serial CE's    2. pmhx HTN Afib Parkinson’s hypothyroid hyperlipidemia  Continue home meds    3. DVT ppx   4. GI ppx   5. STAR patient
84 yo f h/o Afib CHF HTN CKD Parkinson’s hypothyroid hyperlipidemia p/w CHF exacerbation  advance care cpr     1. CHF exacerbation acute on chronic       IV Lasix   O2 prn   Daily weights   Nebs prn   serial CE's  - rehab medicine evaluation - recommending STR    2. pmhx HTN Afib Parkinson’s hypothyroid hyperlipidemia    - bp high   - will increased losartan   - k improved     3. DVT ppx   4. GI ppx   5. STAR patient
86 yo f h/o Afib CHF HTN CKD Parkinson’s hypothyroid hyperlipidemia p/w CHF exacerbation     1. CHF exacerbation   Admit to medicine   IV Lasix   O2 prn   Daily weights   Nebs prn   serial CE's    2. pmhx HTN Afib Parkinson’s hypothyroid hyperlipidemia  Continue home meds    3. DVT ppx   4. GI ppx   5. STAR patient    advance care CPR
86 yo f h/o Afib CHF HTN CKD Parkinson’s hypothyroid hyperlipidemia p/w CHF exacerbation  advance care cpr     1. CHF exacerbation acute on chronic      IV lasix to PO   O2 prn   Daily weights   Nebs prn   serial CE's  - rehab medicine evaluation - recommending STR patient refusing    2. pmhx HTN Afib Parkinson’s hypothyroid hyperlipidemia    - bp high   -  increased losartan   - k improved     3. DVT ppx   4. GI ppx   5. STAR patient    disposition home with home care

## 2019-08-16 NOTE — CHART NOTE - NSCHARTNOTEFT_GEN_A_CORE
S/P sanchez catheter removal, tentative for discharge but waiting for patient to void.  -cancelled D/C order for now till patient voids  -Prepare for discharge placed

## 2019-08-16 NOTE — DISCHARGE NOTE PROVIDER - NSDCCPCAREPLAN_GEN_ALL_CORE_FT
PRINCIPAL DISCHARGE DIAGNOSIS  Diagnosis: Fluid overload  Assessment and Plan of Treatment: continue all cardiac meds as ordered, restrict fluid and sodium intake

## 2019-08-16 NOTE — DIETITIAN INITIAL EVALUATION ADULT. - PHYSICAL APPEARANCE
overweight/Pt reports UBW ~143# x1 week ago, Lower wt in EMR is 148#, edema 1_+ b/l ankles noted. Will use stated UBW for calculations. skin WDL BS 19. No physical signs of wasting observed. BMI: 27.0. IBW: 105#+/-10%

## 2019-08-16 NOTE — DIETITIAN INITIAL EVALUATION ADULT. - FACTORS AFF FOOD INTAKE
Pt with good appetite and PO intake, consistently consuming >50% and often ~75% of meal trays. PT says food is soft enough for her, pt has dentures. PTA pt supplements daily multi. NKFA. Last BM 8/16- pt with constipation throughout LOS, LIP aware.

## 2019-08-16 NOTE — DIETITIAN INITIAL EVALUATION ADULT. - OTHER INFO
Pt presented to ED c/o generalized weakness and SOB x1 day. Pmdx CHF exacerbation acute on chronic. Hx HTN, Afib, hypothyroid, hyperlipidemia.

## 2019-08-16 NOTE — PROGRESS NOTE ADULT - SUBJECTIVE AND OBJECTIVE BOX
87 yr old female with c/o SOB/chest discomfort  and generalized weakness/difficulty walking x few days.  She has recent admission last month for CHF exacerbation.   She denies any leg swelling/cough/fevers; speaking in short sentences but without any dyspnea.  SHe lives at home with daughter; does not use oxygen at home; uses 2-3 pillows at night to sleep.  Currently denies any chest discomfort. She was given lasix 40 mg IV x 1 dose in ER    Interval : sleeping this am , less short of breath remains on IV lasix     PAST MEDICAL & SURGICAL HISTORY:  Parkinson disease  Hypothyroid  Rib fractures  Congestive heart failure  Hypertension  No significant past surgical history    MEDICATIONS  (STANDING):  amiodarone    Tablet 200 milliGRAM(s) Oral daily  aspirin  chewable 81 milliGRAM(s) Oral daily  atorvastatin 10 milliGRAM(s) Oral at bedtime  buDESOnide 160 MICROgram(s)/formoterol 4.5 MICROgram(s) Inhaler 2 Puff(s) Inhalation two times a day  carbidopa/levodopa  10/100 1 Tablet(s) Oral at bedtime  carbidopa/levodopa  25/100 1 Tablet(s) Oral daily  carvedilol 25 milliGRAM(s) Oral every 12 hours  chlorhexidine 4% Liquid 1 Application(s) Topical <User Schedule>  docusate sodium 100 milliGRAM(s) Oral two times a day  DULoxetine 60 milliGRAM(s) Oral daily  folic acid 1 milliGRAM(s) Oral daily  furosemide    Tablet 40 milliGRAM(s) Oral daily  heparin  Injectable 5000 Unit(s) SubCutaneous every 12 hours  isosorbide   mononitrate ER Tablet (IMDUR) 30 milliGRAM(s) Oral daily  levothyroxine 75 MICROGram(s) Oral daily  losartan 100 milliGRAM(s) Oral daily  pantoprazole    Tablet 40 milliGRAM(s) Oral before breakfast  potassium chloride   Powder 20 milliEquivalent(s) Oral daily  pregabalin 100 milliGRAM(s) Oral two times a day  senna 2 Tablet(s) Oral at bedtime    MEDICATIONS  (PRN):  ALBUTerol/ipratropium for Nebulization 3 milliLiter(s) Nebulizer every 6 hours PRN Shortness of Breath and/or Wheezing  bisacodyl Suppository 10 milliGRAM(s) Rectal daily PRN Constipation  magnesium hydroxide Suspension 30 milliLiter(s) Oral daily PRN Constipation  temazepam 30 milliGRAM(s) Oral at bedtime PRN Insomnia    Vital Signs Last 24 Hrs  T(C): 36.1 (16 Aug 2019 05:33), Max: 37 (15 Aug 2019 13:57)  T(F): 96.9 (16 Aug 2019 05:33), Max: 98.6 (15 Aug 2019 13:57)  HR: 51 (16 Aug 2019 05:33) (51 - 64)  BP: 186/74 (16 Aug 2019 05:33) (157/67 - 188/81)  BP(mean): --  RR: 16 (16 Aug 2019 05:33) (16 - 16)  SpO2: 99% (15 Aug 2019 08:04) (99% - 99%)    Gen: lying in bed, NAD   	HEENT: PERRL No nasal discharge. Moist mucus membranes.  	Neck: Supple, non tender, full range of motion.  	CV: RRR no murmurs, rubs, or gallops. +S1S2.   	Pulm: speaking in full sentences, b/l air entry   	Abd: soft NT ND +BS.   	Ext: Warm and well perfused x4, moving all extremities, b/l pedal edema   	Psy: Cooperative, appropriate.   Skin: Warm, dry, no rash                                                   11.8   5.01  )-----------( 171      ( 15 Aug 2019 06:43 )             37.0   08-15    145  |  101  |  28<H>  ----------------------------<  107<H>  3.6   |  32  |  1.0    Ca    9.0      15 Aug 2019 06:43    Serum Pro-Brain Natriuretic Peptide (08.15.19 @ 06:43)    Serum Pro-Brain Natriuretic Peptide: 2625 pg/mL        CARDIAC MARKERS ( 12 Aug 2019 06:40 )  x     / <0.01 ng/mL / 26 U/L / x     / 1.4 ng/mL  CARDIAC MARKERS ( 11 Aug 2019 21:39 )  x     / <0.01 ng/mL / 35 U/L / x     / 1.6 ng/mL  CARDIAC MARKERS ( 11 Aug 2019 13:22 )  x     / <0.01 ng/mL / x     / x     / x

## 2019-08-16 NOTE — DIETITIAN INITIAL EVALUATION ADULT. - ENERGY NEEDS
estimated energy needs: 1230-1335kcal (MSJx.[id number])  estimated protein needs: 65-78g (1.0-1.2g/kg)  estimated fluid needs: per LIP

## 2019-08-16 NOTE — DISCHARGE NOTE PROVIDER - NSDCFUADDINST_GEN_ALL_CORE_FT
Follow up with Dr Curry on 8/19/19: Monday or 8/20/19: Tuesday Follow up with Dr Curry on 8/19/19: Monday or 8/20/19: Tuesday  -Bring these discharge papers and the list of all meds to your follow up visit to review  with Dr Curry  -This discharge and all meds discussed with Dr Curry

## 2019-08-16 NOTE — PROGRESS NOTE ADULT - REASON FOR ADMISSION
SOB
generalized weakness and SOB x 1 day

## 2019-08-16 NOTE — DISCHARGE NOTE PROVIDER - CARE PROVIDER_API CALL
Unruly Curry)  Internal Medicine  [address] [place] [address], [address]  [place] [address]  Phone: [phone]  Fax: [phone]  Follow Up Time:

## 2019-08-16 NOTE — DIETITIAN INITIAL EVALUATION ADULT. - PERTINENT MEDS FT
heparin, lasix, miralax, dulcolax, colace, cozaar, milk of magnesia, senna, duoneb, pacerone, lipitor, folic acid, coreg, synthroid, protonix, KCl

## 2019-08-17 ENCOUNTER — TRANSCRIPTION ENCOUNTER (OUTPATIENT)
Age: 84
End: 2019-08-17

## 2019-08-17 VITALS — HEART RATE: 53 BPM | SYSTOLIC BLOOD PRESSURE: 153 MMHG | TEMPERATURE: 97 F | DIASTOLIC BLOOD PRESSURE: 67 MMHG

## 2019-08-17 RX ADMIN — ISOSORBIDE MONONITRATE 30 MILLIGRAM(S): 60 TABLET, EXTENDED RELEASE ORAL at 13:17

## 2019-08-17 RX ADMIN — CARBIDOPA AND LEVODOPA 1 TABLET(S): 25; 100 TABLET ORAL at 13:19

## 2019-08-17 RX ADMIN — PANTOPRAZOLE SODIUM 40 MILLIGRAM(S): 20 TABLET, DELAYED RELEASE ORAL at 06:08

## 2019-08-17 RX ADMIN — Medication 1 MILLIGRAM(S): at 13:17

## 2019-08-17 RX ADMIN — Medication 40 MILLIGRAM(S): at 06:06

## 2019-08-17 RX ADMIN — Medication 75 MICROGRAM(S): at 06:08

## 2019-08-17 RX ADMIN — Medication 81 MILLIGRAM(S): at 13:18

## 2019-08-17 RX ADMIN — CARVEDILOL PHOSPHATE 25 MILLIGRAM(S): 80 CAPSULE, EXTENDED RELEASE ORAL at 06:06

## 2019-08-17 RX ADMIN — DULOXETINE HYDROCHLORIDE 60 MILLIGRAM(S): 30 CAPSULE, DELAYED RELEASE ORAL at 13:18

## 2019-08-17 RX ADMIN — HEPARIN SODIUM 5000 UNIT(S): 5000 INJECTION INTRAVENOUS; SUBCUTANEOUS at 06:09

## 2019-08-17 RX ADMIN — Medication 100 MILLIGRAM(S): at 06:08

## 2019-08-17 RX ADMIN — Medication 20 MILLIEQUIVALENT(S): at 13:19

## 2019-08-17 RX ADMIN — BUDESONIDE AND FORMOTEROL FUMARATE DIHYDRATE 2 PUFF(S): 160; 4.5 AEROSOL RESPIRATORY (INHALATION) at 08:19

## 2019-08-17 NOTE — DISCHARGE NOTE NURSING/CASE MANAGEMENT/SOCIAL WORK - NSDCDPATPORTLINK_GEN_ALL_CORE
You can access the [facility] Patient Portal, offered by [facility], by registering with the following website: [email or web address]

## 2019-08-21 DIAGNOSIS — E03.9 HYPOTHYROIDISM, UNSPECIFIED: ICD-10-CM

## 2019-08-21 DIAGNOSIS — I13.0 HYPERTENSIVE HEART AND CHRONIC KIDNEY DISEASE WITH HEART FAILURE AND STAGE 1 THROUGH STAGE 4 CHRONIC KIDNEY DISEASE, OR UNSPECIFIED CHRONIC KIDNEY DISEASE: ICD-10-CM

## 2019-08-21 DIAGNOSIS — G20 PARKINSON'S DISEASE: ICD-10-CM

## 2019-08-21 DIAGNOSIS — Z91.81 HISTORY OF FALLING: ICD-10-CM

## 2019-08-21 DIAGNOSIS — I50.33 ACUTE ON CHRONIC DIASTOLIC (CONGESTIVE) HEART FAILURE: ICD-10-CM

## 2019-08-21 DIAGNOSIS — N18.9 CHRONIC KIDNEY DISEASE, UNSPECIFIED: ICD-10-CM

## 2019-08-21 DIAGNOSIS — I48.91 UNSPECIFIED ATRIAL FIBRILLATION: ICD-10-CM

## 2019-09-03 ENCOUNTER — INPATIENT (INPATIENT)
Facility: HOSPITAL | Age: 84
LOS: 29 days | Discharge: NURSING, REHAB OR LONG-TERM CARE | End: 2019-10-03
Attending: INTERNAL MEDICINE | Admitting: INTERNAL MEDICINE
Payer: MEDICARE

## 2019-09-03 VITALS
HEART RATE: 72 BPM | HEIGHT: 60 IN | RESPIRATION RATE: 20 BRPM | WEIGHT: 143.96 LBS | OXYGEN SATURATION: 97 % | TEMPERATURE: 100 F

## 2019-09-03 DIAGNOSIS — E03.9 HYPOTHYROIDISM, UNSPECIFIED: ICD-10-CM

## 2019-09-03 DIAGNOSIS — I10 ESSENTIAL (PRIMARY) HYPERTENSION: ICD-10-CM

## 2019-09-03 DIAGNOSIS — R53.1 WEAKNESS: ICD-10-CM

## 2019-09-03 DIAGNOSIS — I50.9 HEART FAILURE, UNSPECIFIED: ICD-10-CM

## 2019-09-03 DIAGNOSIS — R00.1 BRADYCARDIA, UNSPECIFIED: ICD-10-CM

## 2019-09-03 LAB
ALBUMIN SERPL ELPH-MCNC: 3.9 G/DL — SIGNIFICANT CHANGE UP (ref 3.5–5.2)
ALP SERPL-CCNC: 110 U/L — SIGNIFICANT CHANGE UP (ref 30–115)
ALT FLD-CCNC: 5 U/L — SIGNIFICANT CHANGE UP (ref 0–41)
ANION GAP SERPL CALC-SCNC: 10 MMOL/L — SIGNIFICANT CHANGE UP (ref 7–14)
AST SERPL-CCNC: 20 U/L — SIGNIFICANT CHANGE UP (ref 0–41)
BASOPHILS # BLD AUTO: 0.04 K/UL — SIGNIFICANT CHANGE UP (ref 0–0.2)
BASOPHILS NFR BLD AUTO: 0.8 % — SIGNIFICANT CHANGE UP (ref 0–1)
BILIRUB SERPL-MCNC: 0.7 MG/DL — SIGNIFICANT CHANGE UP (ref 0.2–1.2)
BUN SERPL-MCNC: 40 MG/DL — HIGH (ref 10–20)
CALCIUM SERPL-MCNC: 9.3 MG/DL — SIGNIFICANT CHANGE UP (ref 8.5–10.1)
CHLORIDE SERPL-SCNC: 104 MMOL/L — SIGNIFICANT CHANGE UP (ref 98–110)
CO2 SERPL-SCNC: 30 MMOL/L — SIGNIFICANT CHANGE UP (ref 17–32)
CREAT SERPL-MCNC: 1.2 MG/DL — SIGNIFICANT CHANGE UP (ref 0.7–1.5)
EOSINOPHIL # BLD AUTO: 0.13 K/UL — SIGNIFICANT CHANGE UP (ref 0–0.7)
EOSINOPHIL NFR BLD AUTO: 2.6 % — SIGNIFICANT CHANGE UP (ref 0–8)
GLUCOSE SERPL-MCNC: 133 MG/DL — HIGH (ref 70–99)
HCT VFR BLD CALC: 34.6 % — LOW (ref 37–47)
HGB BLD-MCNC: 10.9 G/DL — LOW (ref 12–16)
IMM GRANULOCYTES NFR BLD AUTO: 0.4 % — HIGH (ref 0.1–0.3)
LACTATE SERPL-SCNC: 1 MMOL/L — SIGNIFICANT CHANGE UP (ref 0.5–2.2)
LYMPHOCYTES # BLD AUTO: 0.89 K/UL — LOW (ref 1.2–3.4)
LYMPHOCYTES # BLD AUTO: 18 % — LOW (ref 20.5–51.1)
MAGNESIUM SERPL-MCNC: 2.1 MG/DL — SIGNIFICANT CHANGE UP (ref 1.8–2.4)
MCHC RBC-ENTMCNC: 28.5 PG — SIGNIFICANT CHANGE UP (ref 27–31)
MCHC RBC-ENTMCNC: 31.5 G/DL — LOW (ref 32–37)
MCV RBC AUTO: 90.6 FL — SIGNIFICANT CHANGE UP (ref 81–99)
MONOCYTES # BLD AUTO: 0.37 K/UL — SIGNIFICANT CHANGE UP (ref 0.1–0.6)
MONOCYTES NFR BLD AUTO: 7.5 % — SIGNIFICANT CHANGE UP (ref 1.7–9.3)
NEUTROPHILS # BLD AUTO: 3.49 K/UL — SIGNIFICANT CHANGE UP (ref 1.4–6.5)
NEUTROPHILS NFR BLD AUTO: 70.7 % — SIGNIFICANT CHANGE UP (ref 42.2–75.2)
NRBC # BLD: 0 /100 WBCS — SIGNIFICANT CHANGE UP (ref 0–0)
PLATELET # BLD AUTO: 112 K/UL — LOW (ref 130–400)
POTASSIUM SERPL-MCNC: 3.8 MMOL/L — SIGNIFICANT CHANGE UP (ref 3.5–5)
POTASSIUM SERPL-SCNC: 3.8 MMOL/L — SIGNIFICANT CHANGE UP (ref 3.5–5)
PROT SERPL-MCNC: 6.4 G/DL — SIGNIFICANT CHANGE UP (ref 6–8)
RBC # BLD: 3.82 M/UL — LOW (ref 4.2–5.4)
RBC # FLD: 18.8 % — HIGH (ref 11.5–14.5)
SODIUM SERPL-SCNC: 144 MMOL/L — SIGNIFICANT CHANGE UP (ref 135–146)
TROPONIN T SERPL-MCNC: <0.01 NG/ML — SIGNIFICANT CHANGE UP
WBC # BLD: 4.94 K/UL — SIGNIFICANT CHANGE UP (ref 4.8–10.8)
WBC # FLD AUTO: 4.94 K/UL — SIGNIFICANT CHANGE UP (ref 4.8–10.8)

## 2019-09-03 PROCEDURE — 99221 1ST HOSP IP/OBS SF/LOW 40: CPT

## 2019-09-03 PROCEDURE — 72170 X-RAY EXAM OF PELVIS: CPT | Mod: 26

## 2019-09-03 PROCEDURE — 99285 EMERGENCY DEPT VISIT HI MDM: CPT

## 2019-09-03 PROCEDURE — 71045 X-RAY EXAM CHEST 1 VIEW: CPT | Mod: 26

## 2019-09-03 RX ORDER — CHLORHEXIDINE GLUCONATE 213 G/1000ML
1 SOLUTION TOPICAL
Refills: 0 | Status: DISCONTINUED | OUTPATIENT
Start: 2019-09-03 | End: 2019-10-03

## 2019-09-03 RX ORDER — SENNA PLUS 8.6 MG/1
2 TABLET ORAL AT BEDTIME
Refills: 0 | Status: DISCONTINUED | OUTPATIENT
Start: 2019-09-03 | End: 2019-10-03

## 2019-09-03 RX ORDER — FAMOTIDINE 10 MG/ML
1 INJECTION INTRAVENOUS
Qty: 0 | Refills: 0 | DISCHARGE

## 2019-09-03 RX ORDER — TEMAZEPAM 15 MG/1
30 CAPSULE ORAL AT BEDTIME
Refills: 0 | Status: DISCONTINUED | OUTPATIENT
Start: 2019-09-03 | End: 2019-09-03

## 2019-09-03 RX ORDER — ISOSORBIDE MONONITRATE 60 MG/1
1 TABLET, EXTENDED RELEASE ORAL
Qty: 0 | Refills: 0 | DISCHARGE

## 2019-09-03 RX ORDER — CARBIDOPA AND LEVODOPA 25; 100 MG/1; MG/1
1 TABLET ORAL DAILY
Refills: 0 | Status: DISCONTINUED | OUTPATIENT
Start: 2019-09-03 | End: 2019-10-03

## 2019-09-03 RX ORDER — ASPIRIN/CALCIUM CARB/MAGNESIUM 324 MG
81 TABLET ORAL DAILY
Refills: 0 | Status: DISCONTINUED | OUTPATIENT
Start: 2019-09-03 | End: 2019-10-03

## 2019-09-03 RX ORDER — DOCUSATE SODIUM 100 MG
100 CAPSULE ORAL
Refills: 0 | Status: DISCONTINUED | OUTPATIENT
Start: 2019-09-03 | End: 2019-09-07

## 2019-09-03 RX ORDER — BUDESONIDE AND FORMOTEROL FUMARATE DIHYDRATE 160; 4.5 UG/1; UG/1
2 AEROSOL RESPIRATORY (INHALATION)
Refills: 0 | Status: DISCONTINUED | OUTPATIENT
Start: 2019-09-03 | End: 2019-10-03

## 2019-09-03 RX ORDER — PANTOPRAZOLE SODIUM 20 MG/1
40 TABLET, DELAYED RELEASE ORAL
Refills: 0 | Status: DISCONTINUED | OUTPATIENT
Start: 2019-09-03 | End: 2019-10-03

## 2019-09-03 RX ORDER — ENOXAPARIN SODIUM 100 MG/ML
30 INJECTION SUBCUTANEOUS DAILY
Refills: 0 | Status: DISCONTINUED | OUTPATIENT
Start: 2019-09-03 | End: 2019-10-03

## 2019-09-03 RX ORDER — INFLUENZA VIRUS VACCINE 15; 15; 15; 15 UG/.5ML; UG/.5ML; UG/.5ML; UG/.5ML
0.5 SUSPENSION INTRAMUSCULAR ONCE
Refills: 0 | Status: COMPLETED | OUTPATIENT
Start: 2019-09-03 | End: 2019-09-03

## 2019-09-03 RX ORDER — DULOXETINE HYDROCHLORIDE 30 MG/1
60 CAPSULE, DELAYED RELEASE ORAL DAILY
Refills: 0 | Status: DISCONTINUED | OUTPATIENT
Start: 2019-09-03 | End: 2019-10-03

## 2019-09-03 RX ORDER — SODIUM CHLORIDE 9 MG/ML
1000 INJECTION INTRAMUSCULAR; INTRAVENOUS; SUBCUTANEOUS
Refills: 0 | Status: DISCONTINUED | OUTPATIENT
Start: 2019-09-03 | End: 2019-09-05

## 2019-09-03 RX ORDER — NYSTATIN CREAM 100000 [USP'U]/G
1 CREAM TOPICAL
Refills: 0 | Status: DISCONTINUED | OUTPATIENT
Start: 2019-09-03 | End: 2019-10-03

## 2019-09-03 RX ORDER — FUROSEMIDE 40 MG
40 TABLET ORAL DAILY
Refills: 0 | Status: DISCONTINUED | OUTPATIENT
Start: 2019-09-03 | End: 2019-09-07

## 2019-09-03 RX ORDER — ENOXAPARIN SODIUM 100 MG/ML
40 INJECTION SUBCUTANEOUS DAILY
Refills: 0 | Status: DISCONTINUED | OUTPATIENT
Start: 2019-09-03 | End: 2019-09-03

## 2019-09-03 RX ORDER — FOLIC ACID 0.8 MG
1 TABLET ORAL
Qty: 0 | Refills: 0 | DISCHARGE

## 2019-09-03 RX ORDER — LEVOTHYROXINE SODIUM 125 MCG
75 TABLET ORAL DAILY
Refills: 0 | Status: DISCONTINUED | OUTPATIENT
Start: 2019-09-03 | End: 2019-10-03

## 2019-09-03 RX ORDER — CARBIDOPA AND LEVODOPA 25; 100 MG/1; MG/1
1 TABLET ORAL DAILY
Refills: 0 | Status: DISCONTINUED | OUTPATIENT
Start: 2019-09-03 | End: 2019-09-04

## 2019-09-03 RX ORDER — POTASSIUM CHLORIDE 20 MEQ
20 PACKET (EA) ORAL DAILY
Refills: 0 | Status: DISCONTINUED | OUTPATIENT
Start: 2019-09-03 | End: 2019-09-07

## 2019-09-03 RX ADMIN — TEMAZEPAM 30 MILLIGRAM(S): 15 CAPSULE ORAL at 21:00

## 2019-09-03 RX ADMIN — Medication 100 MILLIGRAM(S): at 20:23

## 2019-09-03 RX ADMIN — SENNA PLUS 2 TABLET(S): 8.6 TABLET ORAL at 21:00

## 2019-09-03 RX ADMIN — SODIUM CHLORIDE 75 MILLILITER(S): 9 INJECTION INTRAMUSCULAR; INTRAVENOUS; SUBCUTANEOUS at 21:01

## 2019-09-03 NOTE — ED PROVIDER NOTE - PHYSICAL EXAMINATION
GEN: Alert & Oriented x 3, No acute distress. Calm, appropriate.  Head and Neck: Normocephalic, atraumatic.   ENT:Oral mucosa pink, moist without lesions.   Eyes: PERRL. No conjunctival injection. No scleral icterus.   RESP: Lungs clear to auscult bilat. no wheezes, rhonchi or rales. No retractions. Equal air entry.  CARDIO: bradycardic rate and irregular rhythm, no murmurs, rubs or gallops. Normal S1, S2. Radial pulses 2+ bilaterally. No lower extremity edema. Distal pedal pulses present.  ABD: Soft, Nondistended. No rebound tenderness/guarding. No pulsatile mass. No tenderness with palpation x 4 quadrants.   MS: no midline tenderness throughout. no tenderness with palpation to extremities. Full ROM of extremities.  SKIN: no rashes/lesions, no petechiae, no ecchymosis.  NEURO: CN II-XII grossly intact.  Speech and cognition normal.

## 2019-09-03 NOTE — ED ADULT TRIAGE NOTE - CHIEF COMPLAINT QUOTE
BIBA as per pts daughter, has been feeling generalized weakness from home since Sunday. Pt fell 3 days ago.

## 2019-09-03 NOTE — H&P ADULT - NSHPLABSRESULTS_GEN_ALL_CORE
10.9   4.94  )-----------( 112      ( 03 Sep 2019 15:00 )             34.6       09-03    144  |  104  |  40<H>  ----------------------------<  133<H>  3.8   |  30  |  1.2    Ca    9.3      03 Sep 2019 15:00  Mg     2.1     09-03    TPro  6.4  /  Alb  3.9  /  TBili  0.7  /  DBili  x   /  AST  20  /  ALT  5   /  AlkPhos  110  09-03          Magnesium, Serum: 2.1 mg/dL (09-03-19 @ 15:00)                  Lactate Trend  09-03 @ 15:50 Lactate:1.0       CARDIAC MARKERS ( 03 Sep 2019 15:00 )  x     / <0.01 ng/mL / x     / x     / x

## 2019-09-03 NOTE — ED PROVIDER NOTE - OBJECTIVE STATEMENT
The pt is a 87y Female with PMH CHF, Afib on ASA, HTN, parkinsons is presenting to ED from home with weakness x 3 days. Pt endorsing generalized weakness. pt unable to get up to ambulate. pt states she fell on sunday when trying to stand from sitting. pt fell onto bottom, pt denies hitting head LOC. pt denies cp, f/c/n/v/d, abd pain, urinary symptoms, cp, sob, palpitations lower extremity swelling, melena, bloody stools. pt found to be bradycardic via EMS.

## 2019-09-03 NOTE — H&P ADULT - ASSESSMENT
88 yo F admitted to medicine 2/2 bradycardia and weakness.     Plan:   - Admit to soft tele for cardiac monitoring   - Cardiology consult   - Hold antihypertensives till cleared by Cardiology   - PT Evaluation for fall   - C/w home meds   - Monitor VS   - IV Fluids   - DVT/GI PPX (Lovenox/Protonix)  - CHG 4% Bath QD and PRN  - Discussed the above and plan with Dr. Curry

## 2019-09-03 NOTE — PATIENT PROFILE ADULT - DO YOU FEEL UNSAFE AT WORK?
Reason for Disposition   Painful insect bite (Exception: caterpillar)    Protocols used: INSECT BITE-PEDIATRIC-    Patient was camping yesterday and was bit by a bug. There is a 3inch bite on the back of her left thigh. Bump is red, warm and swollen. Denies fever. Bite is painful and itching. Denies red streak. All information documented in this encounter was provided by the caller/patient via the telephone. This triage is a result of a call to [facility] a Nurse. Please do not respond to the triage nurse through this encounter. Any subsequent communication should be directly with the patient.
not applicable

## 2019-09-03 NOTE — ED PROVIDER NOTE - CLINICAL SUMMARY MEDICAL DECISION MAKING FREE TEXT BOX
pt with HR 45 afib,   hemodynamically stable - d/w Dr. Villarreal intensivist  recommend admit to LRT and he will eval

## 2019-09-03 NOTE — ED PROVIDER NOTE - ATTENDING CONTRIBUTION TO CARE
87 F to ED with weakness at NH,   pt sent to ED for eval with Low HR and difficulty ambulating.  No trauma or fall,  HPI,/ ROS limited  bradycardia, irreg, abd s nt nd +bs

## 2019-09-03 NOTE — H&P ADULT - HISTORY OF PRESENT ILLNESS
88 yo F presents to the ED c/o bradycardia, noticed by visiting Nurse today and she called EMS. Pt also states she felt forward and flat on Sunday 9/1/19 and has been anxious to climb up or down the stairs. Pt ambulates with a walker at home. Pt denies any similar episodes, traveling, exposure to ticks, fevers, chills, headaches, changes in mentation, visual changes, CP, SOB, abdominal pain, dysuria, changes in BM, rashes, skin discolorations, new medications. Admits to nausea but without vomiting.

## 2019-09-03 NOTE — ED PROVIDER NOTE - NS ED ROS FT
GEN: (-) fever, (-)chills, (-) malaise  HEENT: (-) vision changes, (-) HA  CV: (-) chest pain, (-) palpitations, (-) edema  PULM: (-) cough, (-) wheezing, (-) dyspnea  GI: (-) abdominal pain,(-) Nausea, (-) Vomiting, (-) Diarrhea, (-) Melena  NEURO: (+) weakness, (-) paresthesias, (-) syncope, (-) lightheadedness, (-) dizziness  : (-) dysuria, (-) frequency, (-) urgency, (-)hematuria  MS: (-) back pain, (-) joint pain, (-)myalgias, (-) swelling  SKIN: (-) rashes, (-) new lesions  HEME: (-) bleeding, (-) ecchymosis

## 2019-09-03 NOTE — H&P ADULT - NSHPPHYSICALEXAM_GEN_ALL_CORE
VITALS: ICU Vital Signs Last 24 Hrs  T(C): 37.6 (03 Sep 2019 14:48), Max: 37.6 (03 Sep 2019 14:48)  T(F): 99.7 (03 Sep 2019 14:48), Max: 99.7 (03 Sep 2019 14:48)  HR: 53 (03 Sep 2019 17:54) (42 - 72)  BP: 150/55 (03 Sep 2019 17:54) (150/55 - 155/55)  RR: 18 (03 Sep 2019 17:54) (18 - 20)  SpO2: 99% (03 Sep 2019 17:54) (96% - 99%)      GENERAL: NAD, lying in bed  HEAD:  Atraumatic, Normocephalic  EYES: EOMI, PERRLA, conjunctiva and sclera clear  ENT: Moist mucous membranes  NECK: Supple, No JVD  CHEST/LUNG: Clear to auscultation bilaterally; No rales, rhonchi, wheezing, or rubs. Unlabored respirations  HEART: +Bradycardic, No murmurs, rubs, or gallops  ABDOMEN: Bowel sounds present; Soft, Nontender, Nondistended  EXTREMITIES:  2+ Peripheral Pulses, brisk capillary refill. No clubbing, cyanosis, or edema  NERVOUS SYSTEM:  Alert & Oriented X3, speech clear.   MSK: FROM all 4 extremities, full and equal strength  SKIN: R knee abrasion

## 2019-09-04 LAB
ALBUMIN SERPL ELPH-MCNC: 3.8 G/DL — SIGNIFICANT CHANGE UP (ref 3.5–5.2)
ALP SERPL-CCNC: 98 U/L — SIGNIFICANT CHANGE UP (ref 30–115)
ALT FLD-CCNC: 9 U/L — SIGNIFICANT CHANGE UP (ref 0–41)
ANION GAP SERPL CALC-SCNC: 12 MMOL/L — SIGNIFICANT CHANGE UP (ref 7–14)
AST SERPL-CCNC: 18 U/L — SIGNIFICANT CHANGE UP (ref 0–41)
BASOPHILS # BLD AUTO: 0.04 K/UL — SIGNIFICANT CHANGE UP (ref 0–0.2)
BASOPHILS NFR BLD AUTO: 0.8 % — SIGNIFICANT CHANGE UP (ref 0–1)
BILIRUB SERPL-MCNC: 0.8 MG/DL — SIGNIFICANT CHANGE UP (ref 0.2–1.2)
BUN SERPL-MCNC: 38 MG/DL — HIGH (ref 10–20)
CALCIUM SERPL-MCNC: 8.4 MG/DL — LOW (ref 8.5–10.1)
CHLORIDE SERPL-SCNC: 108 MMOL/L — SIGNIFICANT CHANGE UP (ref 98–110)
CO2 SERPL-SCNC: 25 MMOL/L — SIGNIFICANT CHANGE UP (ref 17–32)
CREAT SERPL-MCNC: 1.1 MG/DL — SIGNIFICANT CHANGE UP (ref 0.7–1.5)
EOSINOPHIL # BLD AUTO: 0.14 K/UL — SIGNIFICANT CHANGE UP (ref 0–0.7)
EOSINOPHIL NFR BLD AUTO: 2.8 % — SIGNIFICANT CHANGE UP (ref 0–8)
GLUCOSE SERPL-MCNC: 101 MG/DL — HIGH (ref 70–99)
HCT VFR BLD CALC: 34.2 % — LOW (ref 37–47)
HGB BLD-MCNC: 11 G/DL — LOW (ref 12–16)
IMM GRANULOCYTES NFR BLD AUTO: 0.6 % — HIGH (ref 0.1–0.3)
LYMPHOCYTES # BLD AUTO: 0.98 K/UL — LOW (ref 1.2–3.4)
LYMPHOCYTES # BLD AUTO: 19.8 % — LOW (ref 20.5–51.1)
MAGNESIUM SERPL-MCNC: 2 MG/DL — SIGNIFICANT CHANGE UP (ref 1.8–2.4)
MCHC RBC-ENTMCNC: 28.6 PG — SIGNIFICANT CHANGE UP (ref 27–31)
MCHC RBC-ENTMCNC: 32.2 G/DL — SIGNIFICANT CHANGE UP (ref 32–37)
MCV RBC AUTO: 89.1 FL — SIGNIFICANT CHANGE UP (ref 81–99)
MONOCYTES # BLD AUTO: 0.47 K/UL — SIGNIFICANT CHANGE UP (ref 0.1–0.6)
MONOCYTES NFR BLD AUTO: 9.5 % — HIGH (ref 1.7–9.3)
NEUTROPHILS # BLD AUTO: 3.3 K/UL — SIGNIFICANT CHANGE UP (ref 1.4–6.5)
NEUTROPHILS NFR BLD AUTO: 66.5 % — SIGNIFICANT CHANGE UP (ref 42.2–75.2)
NRBC # BLD: 0 /100 WBCS — SIGNIFICANT CHANGE UP (ref 0–0)
PLATELET # BLD AUTO: 89 K/UL — LOW (ref 130–400)
POTASSIUM SERPL-MCNC: 3.5 MMOL/L — SIGNIFICANT CHANGE UP (ref 3.5–5)
POTASSIUM SERPL-SCNC: 3.5 MMOL/L — SIGNIFICANT CHANGE UP (ref 3.5–5)
PROT SERPL-MCNC: 5.8 G/DL — LOW (ref 6–8)
RBC # BLD: 3.84 M/UL — LOW (ref 4.2–5.4)
RBC # FLD: 18.8 % — HIGH (ref 11.5–14.5)
SODIUM SERPL-SCNC: 145 MMOL/L — SIGNIFICANT CHANGE UP (ref 135–146)
WBC # BLD: 4.96 K/UL — SIGNIFICANT CHANGE UP (ref 4.8–10.8)
WBC # FLD AUTO: 4.96 K/UL — SIGNIFICANT CHANGE UP (ref 4.8–10.8)

## 2019-09-04 RX ORDER — CARBIDOPA AND LEVODOPA 25; 100 MG/1; MG/1
1 TABLET ORAL AT BEDTIME
Refills: 0 | Status: DISCONTINUED | OUTPATIENT
Start: 2019-09-04 | End: 2019-10-03

## 2019-09-04 RX ORDER — CARVEDILOL PHOSPHATE 80 MG/1
12.5 CAPSULE, EXTENDED RELEASE ORAL EVERY 12 HOURS
Refills: 0 | Status: DISCONTINUED | OUTPATIENT
Start: 2019-09-04 | End: 2019-09-08

## 2019-09-04 RX ADMIN — CARBIDOPA AND LEVODOPA 1 TABLET(S): 25; 100 TABLET ORAL at 22:10

## 2019-09-04 RX ADMIN — Medication 100 MILLIGRAM(S): at 08:47

## 2019-09-04 RX ADMIN — SENNA PLUS 2 TABLET(S): 8.6 TABLET ORAL at 22:10

## 2019-09-04 RX ADMIN — CARVEDILOL PHOSPHATE 12.5 MILLIGRAM(S): 80 CAPSULE, EXTENDED RELEASE ORAL at 18:23

## 2019-09-04 RX ADMIN — CHLORHEXIDINE GLUCONATE 1 APPLICATION(S): 213 SOLUTION TOPICAL at 05:35

## 2019-09-04 RX ADMIN — ENOXAPARIN SODIUM 30 MILLIGRAM(S): 100 INJECTION SUBCUTANEOUS at 14:09

## 2019-09-04 RX ADMIN — Medication 20 MILLIEQUIVALENT(S): at 14:08

## 2019-09-04 RX ADMIN — Medication 40 MILLIGRAM(S): at 05:36

## 2019-09-04 RX ADMIN — DULOXETINE HYDROCHLORIDE 60 MILLIGRAM(S): 30 CAPSULE, DELAYED RELEASE ORAL at 14:09

## 2019-09-04 RX ADMIN — NYSTATIN CREAM 1 APPLICATION(S): 100000 CREAM TOPICAL at 05:37

## 2019-09-04 RX ADMIN — Medication 100 MILLIGRAM(S): at 18:08

## 2019-09-04 RX ADMIN — PANTOPRAZOLE SODIUM 40 MILLIGRAM(S): 20 TABLET, DELAYED RELEASE ORAL at 06:04

## 2019-09-04 RX ADMIN — NYSTATIN CREAM 1 APPLICATION(S): 100000 CREAM TOPICAL at 18:09

## 2019-09-04 RX ADMIN — Medication 81 MILLIGRAM(S): at 14:09

## 2019-09-04 RX ADMIN — BUDESONIDE AND FORMOTEROL FUMARATE DIHYDRATE 2 PUFF(S): 160; 4.5 AEROSOL RESPIRATORY (INHALATION) at 22:09

## 2019-09-04 RX ADMIN — CARBIDOPA AND LEVODOPA 1 TABLET(S): 25; 100 TABLET ORAL at 14:09

## 2019-09-04 RX ADMIN — BUDESONIDE AND FORMOTEROL FUMARATE DIHYDRATE 2 PUFF(S): 160; 4.5 AEROSOL RESPIRATORY (INHALATION) at 08:47

## 2019-09-04 RX ADMIN — Medication 75 MICROGRAM(S): at 05:36

## 2019-09-04 RX ADMIN — SODIUM CHLORIDE 75 MILLILITER(S): 9 INJECTION INTRAMUSCULAR; INTRAVENOUS; SUBCUTANEOUS at 13:02

## 2019-09-04 NOTE — CONSULT NOTE ADULT - ASSESSMENT
Patient lying flat . No sob. No overt CHF. She had symptoms as above. Beta held . Would reduce dose . But not stop. Monitor for worsening chf

## 2019-09-04 NOTE — PROGRESS NOTE ADULT - ASSESSMENT
Plan:   - Admit to soft tele for cardiac monitoring   - Cardiology consult   - Hold antihypertensives till cleared by Cardiology   - PT Evaluation for fall   - C/w home meds   - Monitor VS   - IV Fluids   - DVT/GI PPX (Lovenox/Protonix)  - CHG 4% Bath QD and PRN  - Discussed the above and plan with Dr. Curry       Problem/Plan - 1:  ·  Problem: Bradycardia.  Plan: - See above  - Monitor VS   - Soft tele monitoring   - Cardiology CS.     Problem/Plan - 2:  ·  Problem: Weakness.  Plan: - See above.     Problem/Plan - 3:  ·  Problem: Essential hypertension.  Plan: - C/w Home meds   - Low Salt/DASH diet   - Monitor VS.     Problem/Plan - 4:  ·  Problem: Hypothyroid.  Plan: - C/w home meds.     Problem/Plan - 5:  ·  Problem: Congestive heart failure.  Plan: - C/w home meds   - Daily weight checks.     diposition pending   advance care cpr

## 2019-09-04 NOTE — CONSULT NOTE ADULT - ASSESSMENT
IMPRESSION: Rehab of 88 y/o  f rehab  for  decline  pk  dis  gd      PRECAUTIONS: [  ] Cardiac  [  ] Respiratory  [  ] Seizures [  ] Contact Isolation  [  ] Droplet Isolation  [ FALL ] Other    Weight Bearing Status:     RECOMMENDATION:    Out of Bed to Chair     DVT/Decubiti Prophylaxis    REHAB PLAN:     [  xx ] Bedside P/T 3-5 times a week   [   ]   Bedside O/T  2-3 times a week             [   ] No Rehab Therapy Indicated                   [   ]  Speech Therapy   Conditioning/ROM                                    ADL  Bed Mobility                                               Conditioning/ROM  Transfers                                                     Bed Mobility  Sitting /Standing Balance                         Transfers                                        Gait Training                                               Sitting/Standing Balance  Stair Training [   ]Applicable                    Home equipment Eval                                                                        Splinting  [   ] Only      GOALS:   ADL   [  x ]   Independent                    Transfers  [ x  ] Independent                          Ambulation  [ x  ] Independent     [ x   ] With device                            [  x ]  CG                                                         [  x ]  CG                                                                  [ x  ] CG                            [    ] Min A                                                   [   ] Min A                                                              [   ] Min  A          DISCHARGE PLAN:   [   ]  Good candidate for Intensive Rehabilitation/Hospital based-[facility]A [facility]                                             Will tolerate 3hrs Intensive Rehab Daily                                       [   xx ]  Short Term Rehab in Skilled Nursing Facility                                       [    ]  Home with Outpatient or VN services                                         [    ]  Possible Candidate for Intensive Hospital based Rehab

## 2019-09-04 NOTE — PHYSICAL THERAPY INITIAL EVALUATION ADULT - GAIT DEVIATIONS NOTED, PT EVAL
decreased fabricio/decreased step length/decreased weight-shifting ability/stooped posture, dec heel strike/pushoff , dec amplitude

## 2019-09-04 NOTE — PROGRESS NOTE ADULT - SUBJECTIVE AND OBJECTIVE BOX
88 yo F presents to the ED c/o bradycardia, noticed by visiting Nurse today and she called EMS. Pt also states she felt forward and flat on Sunday 9/1/19 and has been anxious to climb up or down the stairs. Pt ambulates with a walker at home. Pt denies any similar episodes, traveling, exposure to ticks, fevers, chills, headaches, changes in mentation, visual changes, CP, SOB, abdominal pain, dysuria, changes in BM, rashes, skin discolorations, new medications. Admits to nausea but without vomiting.     PAST MEDICAL & SURGICAL HISTORY:  Parkinson disease  Hypothyroid  Rib fractures  Congestive heart failure  Hypertension  No significant past surgical history    MEDICATIONS  (STANDING):  aspirin  chewable 81 milliGRAM(s) Oral daily  buDESOnide 160 MICROgram(s)/formoterol 4.5 MICROgram(s) Inhaler 2 Puff(s) Inhalation two times a day  carbidopa/levodopa  10/100 1 Tablet(s) Oral daily  carbidopa/levodopa  25/100 1 Tablet(s) Oral daily  chlorhexidine 4% Liquid 1 Application(s) Topical <User Schedule>  docusate sodium 100 milliGRAM(s) Oral two times a day  DULoxetine 60 milliGRAM(s) Oral daily  enoxaparin Injectable 30 milliGRAM(s) SubCutaneous daily  furosemide    Tablet 40 milliGRAM(s) Oral daily  influenza   Vaccine 0.5 milliLiter(s) IntraMuscular once  levothyroxine 75 MICROGram(s) Oral daily  nystatin Powder 1 Application(s) Topical two times a day  pantoprazole    Tablet 40 milliGRAM(s) Oral before breakfast  potassium chloride   Powder 20 milliEquivalent(s) Oral daily  pregabalin 100 milliGRAM(s) Oral two times a day  senna 2 Tablet(s) Oral at bedtime  sodium chloride 0.9%. 1000 milliLiter(s) (75 mL/Hr) IV Continuous <Continuous>    MEDICATIONS  (PRN):    Vital Signs Last 24 Hrs  T(C): 35.8 (04 Sep 2019 05:28), Max: 37.6 (03 Sep 2019 14:48)  T(F): 96.5 (04 Sep 2019 05:28), Max: 99.7 (03 Sep 2019 14:48)  HR: 58 (04 Sep 2019 05:28) (40 - 72)  BP: 169/70 (04 Sep 2019 05:28) (150/55 - 173/73)  BP(mean): --  RR: 16 (04 Sep 2019 05:28) (16 - 20)  SpO2: 97% (03 Sep 2019 21:10) (96% - 99%)    GENERAL: NAD, lying in bed  	HEAD:  Atraumatic, Normocephalic  	EYES: EOMI, PERRLA, conjunctiva and sclera clear  	ENT: Moist mucous membranes  	NECK: Supple, No JVD  	CHEST/LUNG: Clear to auscultation bilaterally; No rales, rhonchi, wheezing, or rubs. Unlabored respirations  	HEART: +Bradycardic, No murmurs, rubs, or gallops  	ABDOMEN: Bowel sounds present; Soft, Nontender, Nondistended  	EXTREMITIES:  2+ Peripheral Pulses, brisk capillary refill. No clubbing, cyanosis, or edema  	NERVOUS SYSTEM:  Alert & Oriented X3, speech clear.   	MSK: FROM all 4 extremities, full and equal strength  SKIN: R knee abrasion                            10.9   4.94  )-----------( 112      ( 03 Sep 2019 15:00 )             34.6   09-03    144  |  104  |  40<H>  ----------------------------<  133<H>  3.8   |  30  |  1.2    Ca    9.3      03 Sep 2019 15:00  Mg     2.1     09-03    TPro  6.4  /  Alb  3.9  /  TBili  0.7  /  DBili  x   /  AST  20  /  ALT  5   /  AlkPhos  110  09-03

## 2019-09-05 LAB
ANION GAP SERPL CALC-SCNC: 14 MMOL/L — SIGNIFICANT CHANGE UP (ref 7–14)
BUN SERPL-MCNC: 31 MG/DL — HIGH (ref 10–20)
CALCIUM SERPL-MCNC: 8.5 MG/DL — SIGNIFICANT CHANGE UP (ref 8.5–10.1)
CHLORIDE SERPL-SCNC: 106 MMOL/L — SIGNIFICANT CHANGE UP (ref 98–110)
CO2 SERPL-SCNC: 23 MMOL/L — SIGNIFICANT CHANGE UP (ref 17–32)
CREAT SERPL-MCNC: 1.2 MG/DL — SIGNIFICANT CHANGE UP (ref 0.7–1.5)
GLUCOSE SERPL-MCNC: 158 MG/DL — HIGH (ref 70–99)
HCT VFR BLD CALC: 36.2 % — LOW (ref 37–47)
HGB BLD-MCNC: 11.4 G/DL — LOW (ref 12–16)
MCHC RBC-ENTMCNC: 28.6 PG — SIGNIFICANT CHANGE UP (ref 27–31)
MCHC RBC-ENTMCNC: 31.5 G/DL — LOW (ref 32–37)
MCV RBC AUTO: 90.7 FL — SIGNIFICANT CHANGE UP (ref 81–99)
NRBC # BLD: 0 /100 WBCS — SIGNIFICANT CHANGE UP (ref 0–0)
PLATELET # BLD AUTO: 93 K/UL — LOW (ref 130–400)
POTASSIUM SERPL-MCNC: 3.9 MMOL/L — SIGNIFICANT CHANGE UP (ref 3.5–5)
POTASSIUM SERPL-SCNC: 3.9 MMOL/L — SIGNIFICANT CHANGE UP (ref 3.5–5)
RBC # BLD: 3.99 M/UL — LOW (ref 4.2–5.4)
RBC # FLD: 18.9 % — HIGH (ref 11.5–14.5)
SODIUM SERPL-SCNC: 143 MMOL/L — SIGNIFICANT CHANGE UP (ref 135–146)
WBC # BLD: 7.83 K/UL — SIGNIFICANT CHANGE UP (ref 4.8–10.8)
WBC # FLD AUTO: 7.83 K/UL — SIGNIFICANT CHANGE UP (ref 4.8–10.8)

## 2019-09-05 RX ORDER — TEMAZEPAM 15 MG/1
15 CAPSULE ORAL AT BEDTIME
Refills: 0 | Status: DISCONTINUED | OUTPATIENT
Start: 2019-09-05 | End: 2019-09-08

## 2019-09-05 RX ADMIN — Medication 20 MILLIEQUIVALENT(S): at 11:27

## 2019-09-05 RX ADMIN — Medication 100 MILLIGRAM(S): at 17:49

## 2019-09-05 RX ADMIN — NYSTATIN CREAM 1 APPLICATION(S): 100000 CREAM TOPICAL at 17:50

## 2019-09-05 RX ADMIN — ENOXAPARIN SODIUM 30 MILLIGRAM(S): 100 INJECTION SUBCUTANEOUS at 11:27

## 2019-09-05 RX ADMIN — NYSTATIN CREAM 1 APPLICATION(S): 100000 CREAM TOPICAL at 05:43

## 2019-09-05 RX ADMIN — SENNA PLUS 2 TABLET(S): 8.6 TABLET ORAL at 21:11

## 2019-09-05 RX ADMIN — Medication 75 MICROGRAM(S): at 05:42

## 2019-09-05 RX ADMIN — Medication 100 MILLIGRAM(S): at 05:42

## 2019-09-05 RX ADMIN — DULOXETINE HYDROCHLORIDE 60 MILLIGRAM(S): 30 CAPSULE, DELAYED RELEASE ORAL at 11:26

## 2019-09-05 RX ADMIN — Medication 81 MILLIGRAM(S): at 11:26

## 2019-09-05 RX ADMIN — Medication 40 MILLIGRAM(S): at 05:42

## 2019-09-05 RX ADMIN — CARBIDOPA AND LEVODOPA 1 TABLET(S): 25; 100 TABLET ORAL at 11:27

## 2019-09-05 RX ADMIN — TEMAZEPAM 15 MILLIGRAM(S): 15 CAPSULE ORAL at 23:38

## 2019-09-05 RX ADMIN — SODIUM CHLORIDE 75 MILLILITER(S): 9 INJECTION INTRAMUSCULAR; INTRAVENOUS; SUBCUTANEOUS at 08:15

## 2019-09-05 RX ADMIN — PANTOPRAZOLE SODIUM 40 MILLIGRAM(S): 20 TABLET, DELAYED RELEASE ORAL at 05:42

## 2019-09-05 RX ADMIN — CARVEDILOL PHOSPHATE 12.5 MILLIGRAM(S): 80 CAPSULE, EXTENDED RELEASE ORAL at 05:42

## 2019-09-05 RX ADMIN — CARBIDOPA AND LEVODOPA 1 TABLET(S): 25; 100 TABLET ORAL at 21:11

## 2019-09-05 RX ADMIN — BUDESONIDE AND FORMOTEROL FUMARATE DIHYDRATE 2 PUFF(S): 160; 4.5 AEROSOL RESPIRATORY (INHALATION) at 08:14

## 2019-09-05 RX ADMIN — CARVEDILOL PHOSPHATE 12.5 MILLIGRAM(S): 80 CAPSULE, EXTENDED RELEASE ORAL at 17:49

## 2019-09-05 NOTE — PROGRESS NOTE ADULT - SUBJECTIVE AND OBJECTIVE BOX
86 yo F presents to the ED c/o bradycardia, noticed by visiting Nurse today and she called EMS. Pt also states she felt forward and flat on Sunday 9/1/19 and has been anxious to climb up or down the stairs. Pt ambulates with a walker at home. Pt denies any similar episodes, traveling, exposure to ticks, fevers, chills, headaches, changes in mentation, visual changes, CP, SOB, abdominal pain, dysuria, changes in BM, rashes, skin discolorations, new medications. Admits to nausea but without vomiting.  seen by cardiology and physical therapy ;     PAST MEDICAL & SURGICAL HISTORY:  Parkinson disease  Hypothyroid  Rib fractures  Congestive heart failure  Hypertension  No significant past surgical history      MEDICATIONS  (STANDING):  aspirin  chewable 81 milliGRAM(s) Oral daily  buDESOnide 160 MICROgram(s)/formoterol 4.5 MICROgram(s) Inhaler 2 Puff(s) Inhalation two times a day  carbidopa/levodopa  10/100 1 Tablet(s) Oral daily  carbidopa/levodopa  25/100 1 Tablet(s) Oral at bedtime  carvedilol 12.5 milliGRAM(s) Oral every 12 hours  chlorhexidine 4% Liquid 1 Application(s) Topical <User Schedule>  docusate sodium 100 milliGRAM(s) Oral two times a day  DULoxetine 60 milliGRAM(s) Oral daily  enoxaparin Injectable 30 milliGRAM(s) SubCutaneous daily  furosemide    Tablet 40 milliGRAM(s) Oral daily  influenza   Vaccine 0.5 milliLiter(s) IntraMuscular once  levothyroxine 75 MICROGram(s) Oral daily  nystatin Powder 1 Application(s) Topical two times a day  pantoprazole    Tablet 40 milliGRAM(s) Oral before breakfast  potassium chloride   Powder 20 milliEquivalent(s) Oral daily  pregabalin 100 milliGRAM(s) Oral two times a day  senna 2 Tablet(s) Oral at bedtime    MEDICATIONS  (PRN):    Vital Signs Last 24 Hrs  T(C): 36.9 (05 Sep 2019 05:20), Max: 36.9 (05 Sep 2019 05:20)  T(F): 98.4 (05 Sep 2019 05:20), Max: 98.4 (05 Sep 2019 05:20)  HR: 86 (05 Sep 2019 05:20) (59 - 86)  BP: 171/88 (05 Sep 2019 05:20) (151/70 - 210/90)  BP(mean): --  RR: 16 (05 Sep 2019 05:20) (16 - 18)  SpO2: 94% (04 Sep 2019 18:17) (88% - 94%)      GENERAL: NAD, lying in bed  	HEAD:  Atraumatic, Normocephalic  	EYES: EOMI, PERRLA, conjunctiva and sclera clear  	ENT: Moist mucous membranes  	NECK: Supple, No JVD  	CHEST/LUNG: Clear to auscultation bilaterally; No rales, rhonchi, wheezing, or rubs. Unlabored respirations  	HEART: +Bradycardic, No murmurs, rubs, or gallops  	ABDOMEN: Bowel sounds present; Soft, Nontender, Nondistended  	EXTREMITIES:  2+ Peripheral Pulses, brisk capillary refill. No clubbing, cyanosis, or edema  	NERVOUS SYSTEM:  Alert & Oriented X3, speech clear.   	MSK: FROM all 4 extremities, full and equal strength  SKIN: R knee abrasion                                        11.0   4.96  )-----------( 89       ( 04 Sep 2019 07:00 )             34.2   09-04    145  |  108  |  38<H>  ----------------------------<  101<H>  3.5   |  25  |  1.1    Ca    8.4<L>      04 Sep 2019 07:00  Mg     2.0     09-04    TPro  5.8<L>  /  Alb  3.8  /  TBili  0.8  /  DBili  x   /  AST  18  /  ALT  9   /  AlkPhos  98  09-04

## 2019-09-05 NOTE — PROGRESS NOTE ADULT - ASSESSMENT
87 year old female admitted s/p fall episode of bradycardia ; sinemet    Cardiac / CHF  / HTN      - pulse better   - continue lasix   - monitor weight and pulse     Parkinson's     - continue Sinemet     Hypothyroidism     - levothyroixne     advcance noted     disposition home with services

## 2019-09-05 NOTE — CHART NOTE - NSCHARTNOTEFT_GEN_A_CORE
Spoke w/ Dr. Curry - Pt may be discharged home tomorrow w/ VNS.    Will f/u w/ cardiology for possible d/c tele. Spoke w/ Dr. Curry - Pt may be discharged home tomorrow w/ services - physiatry recommended bedside PT 3-5x/week. Spoke w/ Dr. Curry - Pt may be discharged tomorrow - home w/ services vs. STR         - physiatry recommended STR w/ bedside PT 3-5x/week. Spoke w/ Dr. Curry - Pt may be discharged tomorrow - STR    - physiatry recommended STR w/ bedside PT 3-5x/week.    Spoke w/ Dr. Scott about discontinuing telemetry. Pt may be taken off tele if her HR and BP remain normal while ambulating OR if pt is unable to ambulate. If during ambulation HR/BP are abnormal patient must remain on telemetry 2/2 decrease in coreg yesterday.

## 2019-09-06 ENCOUNTER — TRANSCRIPTION ENCOUNTER (OUTPATIENT)
Age: 84
End: 2019-09-06

## 2019-09-06 LAB
ALBUMIN SERPL ELPH-MCNC: 4.3 G/DL — SIGNIFICANT CHANGE UP (ref 3.5–5.2)
ALP SERPL-CCNC: 136 U/L — HIGH (ref 30–115)
ALT FLD-CCNC: 37 U/L — SIGNIFICANT CHANGE UP (ref 0–41)
ANION GAP SERPL CALC-SCNC: 14 MMOL/L — SIGNIFICANT CHANGE UP (ref 7–14)
ANION GAP SERPL CALC-SCNC: 21 MMOL/L — HIGH (ref 7–14)
APTT BLD: 28.3 SEC — SIGNIFICANT CHANGE UP (ref 27–39.2)
AST SERPL-CCNC: 105 U/L — HIGH (ref 0–41)
BILIRUB SERPL-MCNC: 2.1 MG/DL — HIGH (ref 0.2–1.2)
BUN SERPL-MCNC: 34 MG/DL — HIGH (ref 10–20)
BUN SERPL-MCNC: 34 MG/DL — HIGH (ref 10–20)
CALCIUM SERPL-MCNC: 8.9 MG/DL — SIGNIFICANT CHANGE UP (ref 8.5–10.1)
CALCIUM SERPL-MCNC: 8.9 MG/DL — SIGNIFICANT CHANGE UP (ref 8.5–10.1)
CHLORIDE SERPL-SCNC: 104 MMOL/L — SIGNIFICANT CHANGE UP (ref 98–110)
CHLORIDE SERPL-SCNC: 106 MMOL/L — SIGNIFICANT CHANGE UP (ref 98–110)
CK MB CFR SERPL CALC: 2.6 NG/ML — SIGNIFICANT CHANGE UP (ref 0.6–6.3)
CK SERPL-CCNC: 111 U/L — SIGNIFICANT CHANGE UP (ref 0–225)
CO2 SERPL-SCNC: 18 MMOL/L — SIGNIFICANT CHANGE UP (ref 17–32)
CO2 SERPL-SCNC: 22 MMOL/L — SIGNIFICANT CHANGE UP (ref 17–32)
CREAT SERPL-MCNC: 1.2 MG/DL — SIGNIFICANT CHANGE UP (ref 0.7–1.5)
CREAT SERPL-MCNC: 1.3 MG/DL — SIGNIFICANT CHANGE UP (ref 0.7–1.5)
GLUCOSE BLDC GLUCOMTR-MCNC: 200 MG/DL — HIGH (ref 70–99)
GLUCOSE SERPL-MCNC: 80 MG/DL — SIGNIFICANT CHANGE UP (ref 70–99)
GLUCOSE SERPL-MCNC: 98 MG/DL — SIGNIFICANT CHANGE UP (ref 70–99)
HCT VFR BLD CALC: 42.2 % — SIGNIFICANT CHANGE UP (ref 37–47)
HGB BLD-MCNC: 13.5 G/DL — SIGNIFICANT CHANGE UP (ref 12–16)
INR BLD: 1.25 RATIO — SIGNIFICANT CHANGE UP (ref 0.65–1.3)
MAGNESIUM SERPL-MCNC: 2 MG/DL — SIGNIFICANT CHANGE UP (ref 1.8–2.4)
MCHC RBC-ENTMCNC: 28.5 PG — SIGNIFICANT CHANGE UP (ref 27–31)
MCHC RBC-ENTMCNC: 32 G/DL — SIGNIFICANT CHANGE UP (ref 32–37)
MCV RBC AUTO: 89.2 FL — SIGNIFICANT CHANGE UP (ref 81–99)
NRBC # BLD: 0 /100 WBCS — SIGNIFICANT CHANGE UP (ref 0–0)
PLATELET # BLD AUTO: 82 K/UL — LOW (ref 130–400)
POTASSIUM SERPL-MCNC: 4.6 MMOL/L — SIGNIFICANT CHANGE UP (ref 3.5–5)
POTASSIUM SERPL-MCNC: 7 MMOL/L — CRITICAL HIGH (ref 3.5–5)
POTASSIUM SERPL-SCNC: 4.6 MMOL/L — SIGNIFICANT CHANGE UP (ref 3.5–5)
POTASSIUM SERPL-SCNC: 7 MMOL/L — CRITICAL HIGH (ref 3.5–5)
PROT SERPL-MCNC: 7.7 G/DL — SIGNIFICANT CHANGE UP (ref 6–8)
PROTHROM AB SERPL-ACNC: 14.3 SEC — HIGH (ref 9.95–12.87)
RBC # BLD: 4.73 M/UL — SIGNIFICANT CHANGE UP (ref 4.2–5.4)
RBC # FLD: 18.8 % — HIGH (ref 11.5–14.5)
SODIUM SERPL-SCNC: 140 MMOL/L — SIGNIFICANT CHANGE UP (ref 135–146)
SODIUM SERPL-SCNC: 145 MMOL/L — SIGNIFICANT CHANGE UP (ref 135–146)
TROPONIN T SERPL-MCNC: <0.01 NG/ML — SIGNIFICANT CHANGE UP
TROPONIN T SERPL-MCNC: <0.01 NG/ML — SIGNIFICANT CHANGE UP
WBC # BLD: 9.11 K/UL — SIGNIFICANT CHANGE UP (ref 4.8–10.8)
WBC # FLD AUTO: 9.11 K/UL — SIGNIFICANT CHANGE UP (ref 4.8–10.8)

## 2019-09-06 PROCEDURE — 99222 1ST HOSP IP/OBS MODERATE 55: CPT | Mod: 25

## 2019-09-06 PROCEDURE — 43247 EGD REMOVE FOREIGN BODY: CPT

## 2019-09-06 PROCEDURE — 70490 CT SOFT TISSUE NECK W/O DYE: CPT | Mod: 26

## 2019-09-06 PROCEDURE — 71045 X-RAY EXAM CHEST 1 VIEW: CPT | Mod: 26

## 2019-09-06 PROCEDURE — 43241 EGD TUBE/CATH INSERTION: CPT | Mod: XU

## 2019-09-06 PROCEDURE — 71045 X-RAY EXAM CHEST 1 VIEW: CPT | Mod: 26,77

## 2019-09-06 PROCEDURE — 71250 CT THORAX DX C-: CPT | Mod: 26

## 2019-09-06 PROCEDURE — 99291 CRITICAL CARE FIRST HOUR: CPT

## 2019-09-06 RX ORDER — MIDAZOLAM HYDROCHLORIDE 1 MG/ML
2 INJECTION, SOLUTION INTRAMUSCULAR; INTRAVENOUS ONCE
Refills: 0 | Status: DISCONTINUED | OUTPATIENT
Start: 2019-09-06 | End: 2019-09-07

## 2019-09-06 RX ORDER — PROPOFOL 10 MG/ML
10 INJECTION, EMULSION INTRAVENOUS
Qty: 1000 | Refills: 0 | Status: DISCONTINUED | OUTPATIENT
Start: 2019-09-06 | End: 2019-09-08

## 2019-09-06 RX ORDER — MIDAZOLAM HYDROCHLORIDE 1 MG/ML
2 INJECTION, SOLUTION INTRAMUSCULAR; INTRAVENOUS ONCE
Refills: 0 | Status: DISCONTINUED | OUTPATIENT
Start: 2019-09-06 | End: 2019-09-06

## 2019-09-06 RX ORDER — FENTANYL CITRATE 50 UG/ML
0.5 INJECTION INTRAVENOUS
Qty: 2500 | Refills: 0 | Status: DISCONTINUED | OUTPATIENT
Start: 2019-09-06 | End: 2019-09-07

## 2019-09-06 RX ORDER — CHLORHEXIDINE GLUCONATE 213 G/1000ML
15 SOLUTION TOPICAL EVERY 12 HOURS
Refills: 0 | Status: DISCONTINUED | OUTPATIENT
Start: 2019-09-06 | End: 2019-09-18

## 2019-09-06 RX ORDER — FENTANYL CITRATE 50 UG/ML
50 INJECTION INTRAVENOUS ONCE
Refills: 0 | Status: DISCONTINUED | OUTPATIENT
Start: 2019-09-06 | End: 2019-09-06

## 2019-09-06 RX ADMIN — MIDAZOLAM HYDROCHLORIDE 2 MILLIGRAM(S): 1 INJECTION, SOLUTION INTRAMUSCULAR; INTRAVENOUS at 15:45

## 2019-09-06 RX ADMIN — PANTOPRAZOLE SODIUM 40 MILLIGRAM(S): 20 TABLET, DELAYED RELEASE ORAL at 06:00

## 2019-09-06 RX ADMIN — PROPOFOL 3.92 MICROGRAM(S)/KG/MIN: 10 INJECTION, EMULSION INTRAVENOUS at 18:00

## 2019-09-06 RX ADMIN — Medication 75 MICROGRAM(S): at 05:59

## 2019-09-06 RX ADMIN — Medication 100 MILLIGRAM(S): at 06:00

## 2019-09-06 RX ADMIN — FENTANYL CITRATE 3.27 MICROGRAM(S)/KG/HR: 50 INJECTION INTRAVENOUS at 10:00

## 2019-09-06 RX ADMIN — Medication 40 MILLIGRAM(S): at 05:59

## 2019-09-06 RX ADMIN — Medication 2 MILLIGRAM(S): at 09:24

## 2019-09-06 RX ADMIN — NYSTATIN CREAM 1 APPLICATION(S): 100000 CREAM TOPICAL at 18:30

## 2019-09-06 RX ADMIN — NYSTATIN CREAM 1 APPLICATION(S): 100000 CREAM TOPICAL at 06:00

## 2019-09-06 RX ADMIN — PROPOFOL 3.92 MICROGRAM(S)/KG/MIN: 10 INJECTION, EMULSION INTRAVENOUS at 21:30

## 2019-09-06 RX ADMIN — MIDAZOLAM HYDROCHLORIDE 2 MILLIGRAM(S): 1 INJECTION, SOLUTION INTRAMUSCULAR; INTRAVENOUS at 15:57

## 2019-09-06 RX ADMIN — ENOXAPARIN SODIUM 30 MILLIGRAM(S): 100 INJECTION SUBCUTANEOUS at 15:59

## 2019-09-06 RX ADMIN — CARVEDILOL PHOSPHATE 12.5 MILLIGRAM(S): 80 CAPSULE, EXTENDED RELEASE ORAL at 06:00

## 2019-09-06 RX ADMIN — CHLORHEXIDINE GLUCONATE 15 MILLILITER(S): 213 SOLUTION TOPICAL at 18:28

## 2019-09-06 NOTE — AIRWAY PLACEMENT NOTE ADULT - POST AIRWAY PLACEMENT ASSESSMENT:
positive end tidal CO2 noted/chest excursion noted/breath sounds bilateral/breath sounds equal/CXR pending/skin color improved

## 2019-09-06 NOTE — CONSULT NOTE ADULT - ASSESSMENT
Cardio respiratory arrest  aspiration of breakfast  no sign pneumonia/pneumonitis as of yet  rosalind cardia      Monitor in ICU  keep sedated  daily CXR  pulmonary toilet  R/O MI  cardiac management  nutrition  weaning 24-48H as long as no worsening in resp status.

## 2019-09-06 NOTE — PROVIDER CONTACT NOTE (OTHER) - SITUATION
Pt has not urinated all night.  PLaced on bedpan but unable to go.  Bladder sono done, approx 400 urine in bladder.  Will straight cath for urine.

## 2019-09-06 NOTE — CHART NOTE - NSCHARTNOTEFT_GEN_A_CORE
Patient hasn't urinated all night and there is about 400 ml urine on bladder scan. She is getting Lasix. Will order straight cath for urine residual

## 2019-09-06 NOTE — PROGRESS NOTE ADULT - SUBJECTIVE AND OBJECTIVE BOX
86 yo F presents to the ED c/o bradycardia, noticed by visiting Nurse today and she called EMS. Pt also states she felt forward and flat on Sunday 9/1/19 and has been anxious to climb up or down the stairs. Pt ambulates with a walker at home. Pt denies any similar episodes, traveling, exposure to ticks, fevers, chills, headaches, changes in mentation, visual changes, CP, SOB, abdominal pain, dysuria, changes in BM, rashes, skin discolorations, new medications. Admits to nausea but without vomiting.  seen by cardiology and physical therapy ;     difficulty urinating bladder scan + 400 cc - straight cathed     PAST MEDICAL & SURGICAL HISTORY:  Parkinson disease  Hypothyroid  Rib fractures  Congestive heart failure  Hypertension  No significant past surgical history      MEDICATIONS  (STANDING):  aspirin  chewable 81 milliGRAM(s) Oral daily  buDESOnide 160 MICROgram(s)/formoterol 4.5 MICROgram(s) Inhaler 2 Puff(s) Inhalation two times a day  carbidopa/levodopa  10/100 1 Tablet(s) Oral daily  carbidopa/levodopa  25/100 1 Tablet(s) Oral at bedtime  carvedilol 12.5 milliGRAM(s) Oral every 12 hours  chlorhexidine 4% Liquid 1 Application(s) Topical <User Schedule>  docusate sodium 100 milliGRAM(s) Oral two times a day  DULoxetine 60 milliGRAM(s) Oral daily  enoxaparin Injectable 30 milliGRAM(s) SubCutaneous daily  furosemide    Tablet 40 milliGRAM(s) Oral daily  influenza   Vaccine 0.5 milliLiter(s) IntraMuscular once  levothyroxine 75 MICROGram(s) Oral daily  nystatin Powder 1 Application(s) Topical two times a day  pantoprazole    Tablet 40 milliGRAM(s) Oral before breakfast  potassium chloride   Powder 20 milliEquivalent(s) Oral daily  pregabalin 100 milliGRAM(s) Oral two times a day  senna 2 Tablet(s) Oral at bedtime    MEDICATIONS  (PRN):    Vital Signs Last 24 Hrs  T(C): 36.8 (06 Sep 2019 05:15), Max: 37.4 (05 Sep 2019 21:00)  T(F): 98.3 (06 Sep 2019 05:15), Max: 99.3 (05 Sep 2019 21:00)  HR: 54 (06 Sep 2019 05:15) (54 - 69)  BP: 153/69 (06 Sep 2019 05:15) (148/66 - 154/69)  BP(mean): --  RR: 16 (06 Sep 2019 05:15) (16 - 16)  SpO2: 96% (05 Sep 2019 23:41) (92% - 96%)    GENERAL: NAD, lying in bed  	HEAD:  Atraumatic, Normocephalic  	EYES: EOMI, PERRLA, conjunctiva and sclera clear  	ENT: Moist mucous membranes  	NECK: Supple, No JVD  	CHEST/LUNG: Clear to auscultation bilaterally; No rales, rhonchi, wheezing, or rubs. Unlabored respirations  	HEART: +Bradycardic, No murmurs, rubs, or gallops  	ABDOMEN: Bowel sounds present; Soft, Nontender, Nondistended  	EXTREMITIES:  2+ Peripheral Pulses, brisk capillary refill. No clubbing, cyanosis, or edema  	NERVOUS SYSTEM:  Alert & Oriented X3, speech clear.   	MSK: FROM all 4 extremities, full and equal strength  SKIN: R knee abrasion                                        11.4   7.83  )-----------( 93       ( 05 Sep 2019 11:39 )             36.2   09-05    143  |  106  |  31<H>  ----------------------------<  158<H>  3.9   |  23  |  1.2    Ca    8.5      05 Sep 2019 11:39

## 2019-09-06 NOTE — PROGRESS NOTE ADULT - ASSESSMENT
87 year old female admitted s/p fall episode of bradycardia ; sinemet    Cardiac / CHF  / HTN      - pulse better   - continue lasix   - monitor weight and pulse     # urinary retention     - straight monitor voiding   - get patient up     Parkinson's     - continue Sinemet     Hypothyroidism     - levothyroixne     advance noted     dispostion - STR

## 2019-09-06 NOTE — CONSULT NOTE ADULT - ASSESSMENT
86 yo female pmh bradycardia s/p rapid response today, intubated GI consulted for endoscopic NG tube placement as NG tube attempts unsuccessful.     Problem 1-NG tube coiling due to possible food-bolus  Rec  -Endoscopic Guided NG tube placement  -Consent in chart 88 yo female pmh bradycardia s/p rapid response today, intubated GI consulted for endoscopic NG tube placement as NG tube attempts unsuccessful.     Problem 1-NG tube coiling due to possible food bolus  Rec  -Endoscopic Guided NG tube placement  -Consent in chart  -NPO  -PPI IV BID 86 yo female pmh bradycardia s/p rapid response today, intubated GI consulted for endoscopic NG tube placement as NG tube attempts unsuccessful.     Problem 1-NG tube coiling due to possible food bolus  Rec  -Endoscopic Guided NG tube placement  -Consent in chart  -NPO  -PPI IV BID    Addendum:  EGD performed:see note large food impaction entire length of esophagus  Food removed and NGT endoscopically placed  CT verified position and excluded perforation.  Followup with Unit team.

## 2019-09-06 NOTE — CHART NOTE - NSCHARTNOTEFT_GEN_A_CORE
Upper Endoscopy Report -Preliminary  (EGD)  Full Formal Note to follow    ESOPHAGUS: Completely full with food debrie from 20 cm to GE junction.  Mild diffuse erythema  Narrowing at GE junction  Food removed with Booth net, as well as food being pushed into stomach.  esophagus completely cleared of food.  NGT placed under direct endoscopic guidance.    STOMACH: Mild diffuse gastritis  Submucosal lesion 1 cm in fundus, rule out GISt  DUODENUM: normal    Impression:  Food impaction  Successful ngt placment    Recommendations:  CT neck tonight in view of multiple passes  of NG  prior to endoscopy to rule out injury to esophagus.  After patient has recovered, consider egd with dilation of narrowing at GE junction  CT abdomen to evaluate for GIST electively  Do not use tube until position verified by XRAY or CT

## 2019-09-06 NOTE — RAPID RESPONSE TEAM SUMMARY - NSSITUATIONBACKGROUNDRRT_GEN_ALL_CORE
Patient found to be hypoxic and bradycardic.  This occurred after eating breakfast.  Rapid Response called.  Pt's lips turned blue and she was unresponsive.  Pt then became pulseless and code blue was initiated.  Patient given Epi and compressions started. Discussed with pt's daughter, Mame, she wants the patient to remain a full code.  Patient's pulse came back and she was intubated.  Approved for transfer to ICU.  Daughter, Mame, is aware.   Spent over 50 min on code.

## 2019-09-07 LAB
ALBUMIN SERPL ELPH-MCNC: 3.6 G/DL — SIGNIFICANT CHANGE UP (ref 3.5–5.2)
ALP SERPL-CCNC: 133 U/L — HIGH (ref 30–115)
ALT FLD-CCNC: 29 U/L — SIGNIFICANT CHANGE UP (ref 0–41)
ANION GAP SERPL CALC-SCNC: 14 MMOL/L — SIGNIFICANT CHANGE UP (ref 7–14)
ANION GAP SERPL CALC-SCNC: 18 MMOL/L — HIGH (ref 7–14)
AST SERPL-CCNC: 43 U/L — HIGH (ref 0–41)
BASOPHILS # BLD AUTO: 0.02 K/UL — SIGNIFICANT CHANGE UP (ref 0–0.2)
BASOPHILS NFR BLD AUTO: 0.3 % — SIGNIFICANT CHANGE UP (ref 0–1)
BILIRUB DIRECT SERPL-MCNC: 0.9 MG/DL — HIGH (ref 0–0.2)
BILIRUB INDIRECT FLD-MCNC: 1.4 MG/DL — HIGH (ref 0.2–1.2)
BILIRUB SERPL-MCNC: 2.3 MG/DL — HIGH (ref 0.2–1.2)
BUN SERPL-MCNC: 30 MG/DL — HIGH (ref 10–20)
BUN SERPL-MCNC: 34 MG/DL — HIGH (ref 10–20)
CALCIUM SERPL-MCNC: 8.9 MG/DL — SIGNIFICANT CHANGE UP (ref 8.5–10.1)
CALCIUM SERPL-MCNC: 8.9 MG/DL — SIGNIFICANT CHANGE UP (ref 8.5–10.1)
CHLORIDE SERPL-SCNC: 106 MMOL/L — SIGNIFICANT CHANGE UP (ref 98–110)
CHLORIDE SERPL-SCNC: 107 MMOL/L — SIGNIFICANT CHANGE UP (ref 98–110)
CO2 SERPL-SCNC: 23 MMOL/L — SIGNIFICANT CHANGE UP (ref 17–32)
CO2 SERPL-SCNC: 24 MMOL/L — SIGNIFICANT CHANGE UP (ref 17–32)
CREAT SERPL-MCNC: 1.1 MG/DL — SIGNIFICANT CHANGE UP (ref 0.7–1.5)
CREAT SERPL-MCNC: 1.2 MG/DL — SIGNIFICANT CHANGE UP (ref 0.7–1.5)
EOSINOPHIL # BLD AUTO: 0.12 K/UL — SIGNIFICANT CHANGE UP (ref 0–0.7)
EOSINOPHIL NFR BLD AUTO: 1.6 % — SIGNIFICANT CHANGE UP (ref 0–8)
GLUCOSE SERPL-MCNC: 103 MG/DL — HIGH (ref 70–99)
GLUCOSE SERPL-MCNC: 137 MG/DL — HIGH (ref 70–99)
HCT VFR BLD CALC: 35.5 % — LOW (ref 37–47)
HGB BLD-MCNC: 11.4 G/DL — LOW (ref 12–16)
IMM GRANULOCYTES NFR BLD AUTO: 0.4 % — HIGH (ref 0.1–0.3)
LYMPHOCYTES # BLD AUTO: 1.04 K/UL — LOW (ref 1.2–3.4)
LYMPHOCYTES # BLD AUTO: 14 % — LOW (ref 20.5–51.1)
MAGNESIUM SERPL-MCNC: 1.9 MG/DL — SIGNIFICANT CHANGE UP (ref 1.8–2.4)
MCHC RBC-ENTMCNC: 28.1 PG — SIGNIFICANT CHANGE UP (ref 27–31)
MCHC RBC-ENTMCNC: 32.1 G/DL — SIGNIFICANT CHANGE UP (ref 32–37)
MCV RBC AUTO: 87.7 FL — SIGNIFICANT CHANGE UP (ref 81–99)
MONOCYTES # BLD AUTO: 0.66 K/UL — HIGH (ref 0.1–0.6)
MONOCYTES NFR BLD AUTO: 8.9 % — SIGNIFICANT CHANGE UP (ref 1.7–9.3)
NEUTROPHILS # BLD AUTO: 5.55 K/UL — SIGNIFICANT CHANGE UP (ref 1.4–6.5)
NEUTROPHILS NFR BLD AUTO: 74.8 % — SIGNIFICANT CHANGE UP (ref 42.2–75.2)
NRBC # BLD: 0 /100 WBCS — SIGNIFICANT CHANGE UP (ref 0–0)
PLATELET # BLD AUTO: 92 K/UL — LOW (ref 130–400)
POTASSIUM SERPL-MCNC: 3.3 MMOL/L — LOW (ref 3.5–5)
POTASSIUM SERPL-MCNC: 3.9 MMOL/L — SIGNIFICANT CHANGE UP (ref 3.5–5)
POTASSIUM SERPL-SCNC: 3.3 MMOL/L — LOW (ref 3.5–5)
POTASSIUM SERPL-SCNC: 3.9 MMOL/L — SIGNIFICANT CHANGE UP (ref 3.5–5)
PROT SERPL-MCNC: 5.9 G/DL — LOW (ref 6–8)
RBC # BLD: 4.05 M/UL — LOW (ref 4.2–5.4)
RBC # FLD: 18.6 % — HIGH (ref 11.5–14.5)
SODIUM SERPL-SCNC: 145 MMOL/L — SIGNIFICANT CHANGE UP (ref 135–146)
SODIUM SERPL-SCNC: 147 MMOL/L — HIGH (ref 135–146)
TROPONIN T SERPL-MCNC: <0.01 NG/ML — SIGNIFICANT CHANGE UP
WBC # BLD: 7.42 K/UL — SIGNIFICANT CHANGE UP (ref 4.8–10.8)
WBC # FLD AUTO: 7.42 K/UL — SIGNIFICANT CHANGE UP (ref 4.8–10.8)

## 2019-09-07 PROCEDURE — 71045 X-RAY EXAM CHEST 1 VIEW: CPT | Mod: 26

## 2019-09-07 RX ORDER — POTASSIUM CHLORIDE 20 MEQ
40 PACKET (EA) ORAL ONCE
Refills: 0 | Status: COMPLETED | OUTPATIENT
Start: 2019-09-07 | End: 2019-09-07

## 2019-09-07 RX ADMIN — CHLORHEXIDINE GLUCONATE 15 MILLILITER(S): 213 SOLUTION TOPICAL at 06:11

## 2019-09-07 RX ADMIN — PROPOFOL 3.92 MICROGRAM(S)/KG/MIN: 10 INJECTION, EMULSION INTRAVENOUS at 12:43

## 2019-09-07 RX ADMIN — CHLORHEXIDINE GLUCONATE 1 APPLICATION(S): 213 SOLUTION TOPICAL at 06:11

## 2019-09-07 RX ADMIN — Medication 40 MILLIEQUIVALENT(S): at 20:51

## 2019-09-07 RX ADMIN — NYSTATIN CREAM 1 APPLICATION(S): 100000 CREAM TOPICAL at 06:14

## 2019-09-07 RX ADMIN — ENOXAPARIN SODIUM 30 MILLIGRAM(S): 100 INJECTION SUBCUTANEOUS at 13:19

## 2019-09-07 RX ADMIN — SENNA PLUS 2 TABLET(S): 8.6 TABLET ORAL at 22:00

## 2019-09-07 RX ADMIN — CARBIDOPA AND LEVODOPA 1 TABLET(S): 25; 100 TABLET ORAL at 21:59

## 2019-09-07 RX ADMIN — NYSTATIN CREAM 1 APPLICATION(S): 100000 CREAM TOPICAL at 17:33

## 2019-09-07 RX ADMIN — CHLORHEXIDINE GLUCONATE 15 MILLILITER(S): 213 SOLUTION TOPICAL at 17:19

## 2019-09-07 RX ADMIN — PROPOFOL 3.92 MICROGRAM(S)/KG/MIN: 10 INJECTION, EMULSION INTRAVENOUS at 07:00

## 2019-09-07 RX ADMIN — PROPOFOL 3.92 MICROGRAM(S)/KG/MIN: 10 INJECTION, EMULSION INTRAVENOUS at 16:30

## 2019-09-07 NOTE — DIETITIAN INITIAL EVALUATION ADULT. - PERTINENT MEDS FT
aspirin, lovenox, fentanyl, propfol at 4 mL/hr (106 kcal), restoril, sinemet, carvedilol, colace, cymbalta, lasix, synthroid, protonix, Kcl, lyrica, senna

## 2019-09-07 NOTE — DIETITIAN INITIAL EVALUATION ADULT. - ENERGY NEEDS
Calories: 70-80% of PSE 1941=104-8027 kcal vs 11-14 kcal/kg ABW= 770-980 kcal vs 22-25 kcal/kg IBW= 6915-3052 kcal (average is ~900-1000 kcal/day)   Protein: 1.5-2.0 g/kg IBW due to obese/intubated= 68-91 g/day   Fluid: per CCU team

## 2019-09-07 NOTE — PROGRESS NOTE ADULT - SUBJECTIVE AND OBJECTIVE BOX
Patient is a 87y old  Female who presents with a chief complaint of Weakness and bradycardia (07 Sep 2019 06:58)        Interval Events: No overnight events.    REVIEW OF SYSTEMS:  Unable to obtain due to patient's condition.        OBJECTIVE:  ICU Vital Signs Last 24 Hrs  T(C): 36.6 (07 Sep 2019 03:00), Max: 37.8 (06 Sep 2019 15:06)  T(F): 97.9 (07 Sep 2019 03:00), Max: 100 (06 Sep 2019 15:06)  HR: 55 (07 Sep 2019 06:32) (48 - 81)  BP: 150/65 (07 Sep 2019 06:32) (147/87 - 223/105)  BP(mean): 94 (07 Sep 2019 06:32) (91 - 150)    RR: 15 (07 Sep 2019 06:32) (15 - 20)  SpO2: 100% (07 Sep 2019 06:32) (100% - 100%)    Mode: AC/ CMV (Assist Control/ Continuous Mandatory Ventilation), RR (machine): 15, TV (machine): 450, FiO2: 50, MAP: 10, PIP: 27    09-06 @ 07:01  -  09-07 @ 07:00  --------------------------------------------------------  IN: 331 mL / OUT: 2365 mL / NET: -2034 mL      CAPILLARY BLOOD GLUCOSE      POCT Blood Glucose.: 200 mg/dL (06 Sep 2019 09:26)      PHYSICAL EXAM:  General: sedated  HEENT: Atraumatic, normocephalic.                 Mallampatti Grade                 No nasal congestion.                No tonsillar or pharyngeal exudates.  Lymph Nodes: No palpable lymphadenopathy neck, supraclavicular region  Neck: No JVD. No carotid bruit, no thyromegally  Respiratory: Normal chest expansion                         Normal percussion                         Normal and equal air entry                        +rales.  Cardiovascular: S1 S2 normal. No murmurs, rubs or gallops.   Abdomen: Soft, non-tender, non-distended. No organomegaly.  Extremities: Warm to touch. Peripheral pulse palpable. No pedal edema.   Skin: No rashes or skin lesions, warm dry    HOSPITAL MEDICATIONS:  MEDICATIONS  (STANDING):  aspirin  chewable 81 milliGRAM(s) Oral daily  buDESOnide 160 MICROgram(s)/formoterol 4.5 MICROgram(s) Inhaler 2 Puff(s) Inhalation two times a day  carbidopa/levodopa  10/100 1 Tablet(s) Oral daily  carbidopa/levodopa  25/100 1 Tablet(s) Oral at bedtime  carvedilol 12.5 milliGRAM(s) Oral every 12 hours  chlorhexidine 0.12% Liquid 15 milliLiter(s) Oral Mucosa every 12 hours  chlorhexidine 4% Liquid 1 Application(s) Topical <User Schedule>  docusate sodium 100 milliGRAM(s) Oral two times a day  DULoxetine 60 milliGRAM(s) Oral daily  enoxaparin Injectable 30 milliGRAM(s) SubCutaneous daily  furosemide    Tablet 40 milliGRAM(s) Oral daily  influenza   Vaccine 0.5 milliLiter(s) IntraMuscular once  levothyroxine 75 MICROGram(s) Oral daily  nystatin Powder 1 Application(s) Topical two times a day  pantoprazole    Tablet 40 milliGRAM(s) Oral before breakfast  potassium chloride   Powder 20 milliEquivalent(s) Oral daily  pregabalin 100 milliGRAM(s) Oral two times a day  propofol Infusion 10 MICROgram(s)/kG/Min (3.918 mL/Hr) IV Continuous <Continuous>  senna 2 Tablet(s) Oral at bedtime    MEDICATIONS  (PRN):  temazepam 15 milliGRAM(s) Oral at bedtime PRN Insomnia      LABS:                        11.4   7.42  )-----------( 92       ( 07 Sep 2019 05:41 )             35.5     09-07    147<H>  |  106  |  34<H>  ----------------------------<  103<H>  3.9   |  23  |  1.2    Ca    8.9      07 Sep 2019 05:41  Mg     1.9     09-07    TPro  5.9<L>  /  Alb  3.6  /  TBili  2.3<H>  /  DBili  0.9<H>  /  AST  43<H>  /  ALT  29  /  AlkPhos  133<H>  09-07    PT/INR - ( 06 Sep 2019 15:27 )   PT: 14.30 sec;   INR: 1.25 ratio         PTT - ( 06 Sep 2019 15:27 )  PTT:28.3 sec    Arterial Blood Gas:  09-06 @ 18:16  7.49/34/105/26/98/2.5  ABG lactate: --  Arterial Blood Gas:  09-06 @ 10:28  7.44/39/315/26/100/1.8  ABG lactate: --        Mode: AC/ CMV (Assist Control/ Continuous Mandatory Ventilation)  RR (machine): 15  TV (machine): 450  FiO2: 50  MAP: 10  PIP: 27      ABG - ( 06 Sep 2019 18:16 )  pH, Arterial: 7.49  pH, Blood: x     /  pCO2: 34    /  pO2: 105   / HCO3: 26    / Base Excess: 2.5   /  SaO2: 98                  RADIOLOGY:Radiology personally reviewed.

## 2019-09-07 NOTE — DIETITIAN INITIAL EVALUATION ADULT. - DIET TYPE
NPO at this time If patient to be initiated on TF, consider osmolite 1.5 at goal rate of 20 mL/hr and prosource TF packet q 6 hrs (4 packets/day) to provide 880 kcal, 74 g protein, 365 mL free water. Additional 106 kcal provided by propofol. Additional flushes per CCU team.

## 2019-09-07 NOTE — PROGRESS NOTE ADULT - SUBJECTIVE AND OBJECTIVE BOX
Patient is a 87y old  Female who presents with a chief complaint of Weakness and bradycardia (07 Sep 2019 05:58)      T(F): 97.9 (09-07-19 @ 03:00), Max: 100 (09-06-19 @ 15:06)  HR: 48 (09-07-19 @ 00:30)  BP: 155/67 (09-06-19 @ 23:27)  RR: 15 (09-07-19 @ 05:00)  SpO2: 100% (09-07-19 @ 00:30) (100% - 100%)    PHYSICAL EXAM:  GENERAL: NAD, well-groomed, well-developed  HEAD:  Atraumatic, Normocephalic  EYES: EOMI, PERRLA, conjunctiva and sclera clear  ENMT: No tonsillar erythema, exudates, or enlargement; Moist mucous membranes, Good dentition, No lesions  NECK: Supple, No JVD, Normal thyroid  NERVOUS SYSTEM:  Alert & Oriented X3,  Motor Strength 5/5 B/L upper and lower extremities  CHEST/LUNG: Clear to percussion bilaterally; No rales, rhonchi, wheezing, or rubs  HEART: Regular rate and rhythm; No murmurs, rubs, or gallops  ABDOMEN: Soft, Nontender, Nondistended; Bowel sounds present  EXTREMITIES:   No clubbing, cyanosis, or edema  LYMPH: No lymphadenopathy noted  SKIN: No rashes or lesions    labs  09-06    145  |  106  |  34<H>  ----------------------------<  80  4.6   |  18  |  1.2    Ca    8.9      06 Sep 2019 19:07  Mg     2.0     09-06    TPro  7.7  /  Alb  4.3  /  TBili  2.1<H>  /  DBili  x   /  AST  105<H>  /  ALT  37  /  AlkPhos  136<H>  09-06                          13.5   9.11  )-----------( 82       ( 06 Sep 2019 15:27 )             42.2       PT/INR - ( 06 Sep 2019 15:27 )   PT: 14.30 sec;   INR: 1.25 ratio         PTT - ( 06 Sep 2019 15:27 )  PTT:28.3 sec  Mode: AC/ CMV (Assist Control/ Continuous Mandatory Ventilation)  RR (machine): 15  TV (machine): 450  FiO2: 50  MAP: 10  PIP: 27    Troponin T, Serum: <0.01 ng/mL (09-06-19 @ 19:07)  Creatine Kinase, Serum: 111 U/L (09-06-19 @ 15:27)  Troponin T, Serum: <0.01 ng/mL (09-06-19 @ 15:27)      aspirin  chewable 81 milliGRAM(s) Oral daily  buDESOnide 160 MICROgram(s)/formoterol 4.5 MICROgram(s) Inhaler 2 Puff(s) Inhalation two times a day  carbidopa/levodopa  10/100 1 Tablet(s) Oral daily  carbidopa/levodopa  25/100 1 Tablet(s) Oral at bedtime  carvedilol 12.5 milliGRAM(s) Oral every 12 hours  chlorhexidine 0.12% Liquid 15 milliLiter(s) Oral Mucosa every 12 hours  chlorhexidine 4% Liquid 1 Application(s) Topical <User Schedule>  docusate sodium 100 milliGRAM(s) Oral two times a day  DULoxetine 60 milliGRAM(s) Oral daily  enoxaparin Injectable 30 milliGRAM(s) SubCutaneous daily  fentaNYL   Infusion. 0.5 MICROgram(s)/kG/Hr IV Continuous <Continuous>  furosemide    Tablet 40 milliGRAM(s) Oral daily  influenza   Vaccine 0.5 milliLiter(s) IntraMuscular once  levothyroxine 75 MICROGram(s) Oral daily  midazolam Injectable 2 milliGRAM(s) IV Push once PRN  nystatin Powder 1 Application(s) Topical two times a day  pantoprazole    Tablet 40 milliGRAM(s) Oral before breakfast  potassium chloride   Powder 20 milliEquivalent(s) Oral daily  pregabalin 100 milliGRAM(s) Oral two times a day  propofol Infusion 10 MICROgram(s)/kG/Min IV Continuous <Continuous>  senna 2 Tablet(s) Oral at bedtime  temazepam 15 milliGRAM(s) Oral at bedtime PRN

## 2019-09-07 NOTE — PROGRESS NOTE ADULT - SUBJECTIVE AND OBJECTIVE BOX
88 yo F presents to the ED c/o bradycardia, noticed by visiting Nurse today and she called EMS. Pt also states she felt forward and flat on Sunday 9/1/19 and has been anxious to climb up or down the stairs. Pt ambulates with a walker at home. Pt denies any similar episodes, traveling, exposure to ticks, fevers, chills, headaches, changes in mentation, visual changes, CP, SOB, abdominal pain, dysuria, changes in BM, rashes, skin discolorations, new medications. Admits to nausea but without vomiting.  seen by cardiology and physical therapy ;     interval : rapid response - intubated respiratory arrest aspiration     PAST MEDICAL & SURGICAL HISTORY:  Parkinson disease  Hypothyroid  Rib fractures  Congestive heart failure  Hypertension  No significant past surgical history    MEDICATIONS  (STANDING):  aspirin  chewable 81 milliGRAM(s) Oral daily  buDESOnide 160 MICROgram(s)/formoterol 4.5 MICROgram(s) Inhaler 2 Puff(s) Inhalation two times a day  carbidopa/levodopa  10/100 1 Tablet(s) Oral daily  carbidopa/levodopa  25/100 1 Tablet(s) Oral at bedtime  carvedilol 12.5 milliGRAM(s) Oral every 12 hours  chlorhexidine 0.12% Liquid 15 milliLiter(s) Oral Mucosa every 12 hours  chlorhexidine 4% Liquid 1 Application(s) Topical <User Schedule>  docusate sodium 100 milliGRAM(s) Oral two times a day  DULoxetine 60 milliGRAM(s) Oral daily  enoxaparin Injectable 30 milliGRAM(s) SubCutaneous daily  fentaNYL   Infusion. 0.5 MICROgram(s)/kG/Hr (3.265 mL/Hr) IV Continuous <Continuous>  furosemide    Tablet 40 milliGRAM(s) Oral daily  influenza   Vaccine 0.5 milliLiter(s) IntraMuscular once  levothyroxine 75 MICROGram(s) Oral daily  nystatin Powder 1 Application(s) Topical two times a day  pantoprazole    Tablet 40 milliGRAM(s) Oral before breakfast  potassium chloride   Powder 20 milliEquivalent(s) Oral daily  pregabalin 100 milliGRAM(s) Oral two times a day  propofol Infusion 10 MICROgram(s)/kG/Min (3.918 mL/Hr) IV Continuous <Continuous>  senna 2 Tablet(s) Oral at bedtime    MEDICATIONS  (PRN):  midazolam Injectable 2 milliGRAM(s) IV Push once PRN Agitation  temazepam 15 milliGRAM(s) Oral at bedtime PRN Insomnia      ICU Vital Signs Last 24 Hrs  T(C): 36.6 (07 Sep 2019 03:00), Max: 37.8 (06 Sep 2019 15:06)  T(F): 97.9 (07 Sep 2019 03:00), Max: 100 (06 Sep 2019 15:06)  HR: 48 (07 Sep 2019 00:30) (48 - 81)  BP: 155/67 (06 Sep 2019 23:27) (147/87 - 223/105)  BP(mean): 96 (06 Sep 2019 23:27) (96 - 150)  ABP: --  ABP(mean): --  RR: 15 (07 Sep 2019 05:00) (15 - 20)  SpO2: 100% (07 Sep 2019 00:30) (100% - 100%)    Mode: AC/ CMV (Assist Control/ Continuous Mandatory Ventilation)  RR (machine): 15  TV (machine): 450  FiO2: 50  MAP: 10  PIP: 27      GENERAL: NAD, lying in bed  	HEAD:  Atraumatic, Normocephalic  	EYES: EOMI, PERRLA, conjunctiva and sclera clear  	ENT: Moist mucous membranes + endotracheal tube   	NECK: Supple, No JVD  	CHEST/LUNG: Clear to auscultation bilaterally; No rales, rhonchi, wheezing, or rubs. Unlabored respirations  	HEART: +Bradycardic, No murmurs, rubs, or gallops  	ABDOMEN: Bowel sounds present; Soft, Nontender, Nondistended  	EXTREMITIES:  2+ Peripheral Pulses, brisk capillary refill. No clubbing, cyanosis, or edema  	NERVOUS SYSTEM:  Alert & Oriented X3, speech clear.   	MSK: FROM all 4 extremities, full and equal strength  SKIN: R knee abrasion                             13.5   9.11  )-----------( 82       ( 06 Sep 2019 15:27 )             42.2     09-06    145  |  106  |  34<H>  ----------------------------<  80  4.6   |  18  |  1.2    Ca    8.9      06 Sep 2019 19:07  Mg     2.0     09-06    TPro  7.7  /  Alb  4.3  /  TBili  2.1<H>  /  DBili  x   /  AST  105<H>  /  ALT  37  /  AlkPhos  136<H>  09-06    EXAM:  XR CHEST PORTABLE URGENT 1V            PROCEDURE DATE:  09/06/2019            INTERPRETATION:  Clinical History / Reason for exam: Respiratory distress    Comparison : Chest radiograph 9/3/2019.    Technique/Positioning: Frontal.    Findings:    Support devices: ET tube lower trachea NG tube in mid thorax presumably   in mid esophagus.    Cardiac/mediastinum/hilum: Cardiomegaly unchanged.    Lung parenchyma/Pleura: Within normal limits.    Skeleton/soft tissues: Unremarkable.    Impression:    NG tube in mid thorax presumably in mid esophagus  No radiographic evidence of acute cardiopulmonary disease. A call back   request was submitted                  MERLIN KAPOOR M.D., ATTENDING RADIOLOGIST  This document has been electronically signed. Sep  6 2019 11:59AM

## 2019-09-07 NOTE — PROGRESS NOTE ADULT - ASSESSMENT
Appreciate events. On vent . Responsive. HR better off propofol . GI to see patient . Check cxr. Continue beta. GI DVT prophylaxis,. Prognosis guarded

## 2019-09-07 NOTE — DIETITIAN INITIAL EVALUATION ADULT. - OTHER INFO
Patient admitted with chief complaint of weakness and bradycardia. Patient developed respiratory failure and intubated 9/6, NPO at this time. Nutrition history unable to be obtained due to intubation. Noted with NKFA.

## 2019-09-07 NOTE — PROGRESS NOTE ADULT - ASSESSMENT
Cardio respiratory arrest  aspiration of breakfast  no sign pneumonia/pneumonitis as of yet  LLL atelectasis  rosalind cardia  hypernatremia      Monitor in ICU  keep sedated  daily CXR  hold Lasix  free water via NG  nutrition  pulmonary toilet  R/O MI  cardiac management  weaning 24-48H as long as no worsening in resp status.

## 2019-09-07 NOTE — DIETITIAN INITIAL EVALUATION ADULT. - PHYSICAL APPEARANCE
BMI 30. Observed with mild muscle wasting to temporal region. Intubated/sedated. Skin intact. Noted to be 62.5 kg BMI 30. Observed with mild muscle wasting to temporal region. Intubated/sedated. Skin intact. Noted to be 62.5 kg on 7/14/19, question accuracy of 12% wt gain x ~7 weeks.

## 2019-09-08 LAB
ALBUMIN SERPL ELPH-MCNC: 3.2 G/DL — LOW (ref 3.5–5.2)
ALP SERPL-CCNC: 121 U/L — HIGH (ref 30–115)
ALT FLD-CCNC: 13 U/L — SIGNIFICANT CHANGE UP (ref 0–41)
ANION GAP SERPL CALC-SCNC: 15 MMOL/L — HIGH (ref 7–14)
APPEARANCE UR: ABNORMAL
AST SERPL-CCNC: 22 U/L — SIGNIFICANT CHANGE UP (ref 0–41)
BACTERIA # UR AUTO: SIGNIFICANT CHANGE UP /HPF
BASOPHILS # BLD AUTO: 0.01 K/UL — SIGNIFICANT CHANGE UP (ref 0–0.2)
BASOPHILS NFR BLD AUTO: 0.2 % — SIGNIFICANT CHANGE UP (ref 0–1)
BILIRUB SERPL-MCNC: 2.7 MG/DL — HIGH (ref 0.2–1.2)
BILIRUB UR-MCNC: ABNORMAL
BUN SERPL-MCNC: 31 MG/DL — HIGH (ref 10–20)
CALCIUM SERPL-MCNC: 8.4 MG/DL — LOW (ref 8.5–10.1)
CHLORIDE SERPL-SCNC: 108 MMOL/L — SIGNIFICANT CHANGE UP (ref 98–110)
CO2 SERPL-SCNC: 21 MMOL/L — SIGNIFICANT CHANGE UP (ref 17–32)
COLOR SPEC: SIGNIFICANT CHANGE UP
COMMENT - URINE: SIGNIFICANT CHANGE UP
CREAT SERPL-MCNC: 1.1 MG/DL — SIGNIFICANT CHANGE UP (ref 0.7–1.5)
DIFF PNL FLD: ABNORMAL
EOSINOPHIL # BLD AUTO: 0.04 K/UL — SIGNIFICANT CHANGE UP (ref 0–0.7)
EOSINOPHIL NFR BLD AUTO: 0.7 % — SIGNIFICANT CHANGE UP (ref 0–8)
EPI CELLS # UR: ABNORMAL /HPF
GLUCOSE SERPL-MCNC: 141 MG/DL — HIGH (ref 70–99)
GLUCOSE UR QL: NEGATIVE MG/DL — SIGNIFICANT CHANGE UP
HCT VFR BLD CALC: 34.1 % — LOW (ref 37–47)
HGB BLD-MCNC: 11 G/DL — LOW (ref 12–16)
IMM GRANULOCYTES NFR BLD AUTO: 0.2 % — SIGNIFICANT CHANGE UP (ref 0.1–0.3)
KETONES UR-MCNC: NEGATIVE — SIGNIFICANT CHANGE UP
LEUKOCYTE ESTERASE UR-ACNC: NEGATIVE — SIGNIFICANT CHANGE UP
LYMPHOCYTES # BLD AUTO: 0.5 K/UL — LOW (ref 1.2–3.4)
LYMPHOCYTES # BLD AUTO: 8.1 % — LOW (ref 20.5–51.1)
MCHC RBC-ENTMCNC: 28 PG — SIGNIFICANT CHANGE UP (ref 27–31)
MCHC RBC-ENTMCNC: 32.3 G/DL — SIGNIFICANT CHANGE UP (ref 32–37)
MCV RBC AUTO: 86.8 FL — SIGNIFICANT CHANGE UP (ref 81–99)
MONOCYTES # BLD AUTO: 0.46 K/UL — SIGNIFICANT CHANGE UP (ref 0.1–0.6)
MONOCYTES NFR BLD AUTO: 7.5 % — SIGNIFICANT CHANGE UP (ref 1.7–9.3)
NEUTROPHILS # BLD AUTO: 5.12 K/UL — SIGNIFICANT CHANGE UP (ref 1.4–6.5)
NEUTROPHILS NFR BLD AUTO: 83.3 % — HIGH (ref 42.2–75.2)
NITRITE UR-MCNC: NEGATIVE — SIGNIFICANT CHANGE UP
NRBC # BLD: 0 /100 WBCS — SIGNIFICANT CHANGE UP (ref 0–0)
PH UR: 5.5 — SIGNIFICANT CHANGE UP (ref 5–8)
PLATELET # BLD AUTO: 92 K/UL — LOW (ref 130–400)
POTASSIUM SERPL-MCNC: 3.4 MMOL/L — LOW (ref 3.5–5)
POTASSIUM SERPL-SCNC: 3.4 MMOL/L — LOW (ref 3.5–5)
PROT SERPL-MCNC: 5.4 G/DL — LOW (ref 6–8)
PROT UR-MCNC: 30 MG/DL
RBC # BLD: 3.93 M/UL — LOW (ref 4.2–5.4)
RBC # FLD: 18.6 % — HIGH (ref 11.5–14.5)
RBC CASTS # UR COMP ASSIST: >50 /HPF
SODIUM SERPL-SCNC: 144 MMOL/L — SIGNIFICANT CHANGE UP (ref 135–146)
SP GR SPEC: 1.01 — SIGNIFICANT CHANGE UP (ref 1.01–1.03)
UROBILINOGEN FLD QL: 4 MG/DL (ref 0.2–0.2)
WBC # BLD: 6.14 K/UL — SIGNIFICANT CHANGE UP (ref 4.8–10.8)
WBC # FLD AUTO: 6.14 K/UL — SIGNIFICANT CHANGE UP (ref 4.8–10.8)
WBC UR QL: SIGNIFICANT CHANGE UP /HPF

## 2019-09-08 PROCEDURE — 99231 SBSQ HOSP IP/OBS SF/LOW 25: CPT

## 2019-09-08 PROCEDURE — 71045 X-RAY EXAM CHEST 1 VIEW: CPT | Mod: 26,77

## 2019-09-08 PROCEDURE — 71045 X-RAY EXAM CHEST 1 VIEW: CPT | Mod: 26

## 2019-09-08 RX ORDER — MIDAZOLAM HYDROCHLORIDE 1 MG/ML
0.02 INJECTION, SOLUTION INTRAMUSCULAR; INTRAVENOUS
Qty: 100 | Refills: 0 | Status: DISCONTINUED | OUTPATIENT
Start: 2019-09-08 | End: 2019-09-14

## 2019-09-08 RX ORDER — CARVEDILOL PHOSPHATE 80 MG/1
6.25 CAPSULE, EXTENDED RELEASE ORAL EVERY 12 HOURS
Refills: 0 | Status: DISCONTINUED | OUTPATIENT
Start: 2019-09-08 | End: 2019-09-11

## 2019-09-08 RX ORDER — CEFEPIME 1 G/1
INJECTION, POWDER, FOR SOLUTION INTRAMUSCULAR; INTRAVENOUS
Refills: 0 | Status: DISCONTINUED | OUTPATIENT
Start: 2019-09-08 | End: 2019-09-19

## 2019-09-08 RX ORDER — CEFEPIME 1 G/1
2000 INJECTION, POWDER, FOR SOLUTION INTRAMUSCULAR; INTRAVENOUS EVERY 12 HOURS
Refills: 0 | Status: DISCONTINUED | OUTPATIENT
Start: 2019-09-09 | End: 2019-09-19

## 2019-09-08 RX ORDER — CEFEPIME 1 G/1
2000 INJECTION, POWDER, FOR SOLUTION INTRAMUSCULAR; INTRAVENOUS ONCE
Refills: 0 | Status: COMPLETED | OUTPATIENT
Start: 2019-09-08 | End: 2019-09-08

## 2019-09-08 RX ORDER — POTASSIUM CHLORIDE 20 MEQ
20 PACKET (EA) ORAL ONCE
Refills: 0 | Status: COMPLETED | OUTPATIENT
Start: 2019-09-08 | End: 2019-09-08

## 2019-09-08 RX ORDER — VANCOMYCIN HCL 1 G
1000 VIAL (EA) INTRAVENOUS ONCE
Refills: 0 | Status: COMPLETED | OUTPATIENT
Start: 2019-09-08 | End: 2019-09-08

## 2019-09-08 RX ORDER — ACETAMINOPHEN 500 MG
650 TABLET ORAL EVERY 6 HOURS
Refills: 0 | Status: DISCONTINUED | OUTPATIENT
Start: 2019-09-08 | End: 2019-10-03

## 2019-09-08 RX ADMIN — CARBIDOPA AND LEVODOPA 1 TABLET(S): 25; 100 TABLET ORAL at 11:07

## 2019-09-08 RX ADMIN — PANTOPRAZOLE SODIUM 40 MILLIGRAM(S): 20 TABLET, DELAYED RELEASE ORAL at 06:07

## 2019-09-08 RX ADMIN — CARBIDOPA AND LEVODOPA 1 TABLET(S): 25; 100 TABLET ORAL at 21:28

## 2019-09-08 RX ADMIN — Medication 100 MILLIGRAM(S): at 17:43

## 2019-09-08 RX ADMIN — DULOXETINE HYDROCHLORIDE 60 MILLIGRAM(S): 30 CAPSULE, DELAYED RELEASE ORAL at 11:07

## 2019-09-08 RX ADMIN — NYSTATIN CREAM 1 APPLICATION(S): 100000 CREAM TOPICAL at 17:44

## 2019-09-08 RX ADMIN — ENOXAPARIN SODIUM 30 MILLIGRAM(S): 100 INJECTION SUBCUTANEOUS at 11:07

## 2019-09-08 RX ADMIN — Medication 100 MILLIGRAM(S): at 06:13

## 2019-09-08 RX ADMIN — Medication 650 MILLIGRAM(S): at 18:33

## 2019-09-08 RX ADMIN — SENNA PLUS 2 TABLET(S): 8.6 TABLET ORAL at 21:28

## 2019-09-08 RX ADMIN — CHLORHEXIDINE GLUCONATE 15 MILLILITER(S): 213 SOLUTION TOPICAL at 17:44

## 2019-09-08 RX ADMIN — Medication 81 MILLIGRAM(S): at 11:07

## 2019-09-08 RX ADMIN — CEFEPIME 100 MILLIGRAM(S): 1 INJECTION, POWDER, FOR SOLUTION INTRAMUSCULAR; INTRAVENOUS at 17:46

## 2019-09-08 RX ADMIN — Medication 75 MICROGRAM(S): at 06:09

## 2019-09-08 RX ADMIN — Medication 250 MILLIGRAM(S): at 17:46

## 2019-09-08 RX ADMIN — CARVEDILOL PHOSPHATE 6.25 MILLIGRAM(S): 80 CAPSULE, EXTENDED RELEASE ORAL at 17:43

## 2019-09-08 RX ADMIN — NYSTATIN CREAM 1 APPLICATION(S): 100000 CREAM TOPICAL at 06:07

## 2019-09-08 RX ADMIN — CHLORHEXIDINE GLUCONATE 15 MILLILITER(S): 213 SOLUTION TOPICAL at 06:08

## 2019-09-08 RX ADMIN — Medication 50 MILLIEQUIVALENT(S): at 14:09

## 2019-09-08 RX ADMIN — Medication 650 MILLIGRAM(S): at 18:43

## 2019-09-08 RX ADMIN — CHLORHEXIDINE GLUCONATE 1 APPLICATION(S): 213 SOLUTION TOPICAL at 06:06

## 2019-09-08 NOTE — PROGRESS NOTE ADULT - ASSESSMENT
87 year old female admitted s/p fall episode of bradycardia ; sinemet ; rapid response respiratory failute     # Respiratory Failure - aspiration     - continue vent parameters per Pulm / Critical care   - on IV sedation Fentanyl / Propofol monitor neurologic statu   - wean as tolerated   - SLP evaluation when extubated   - follow official ct results     # Cardiac / CHF  / HTN      - pulse better   - continue lasix   - monitor weight and pulse        # Parkinson's     - continue Sinemet     # Hypothyroidism     - levothyroxine     advance care  noted  cpr full code    dispostion -

## 2019-09-08 NOTE — PROGRESS NOTE ADULT - ASSESSMENT
87 female who were seeing for dislodged NGT that could not be replaced. Bedside EGD showed food in the stomach that was cleared. NGT was then placed successfully. Subsequent imaging showed no evidence of perforation.     -Continue to use NGT  -recall as needed

## 2019-09-08 NOTE — PROGRESS NOTE ADULT - ASSESSMENT
On vent . Having secretions,   Check cxr. Continue beta. GI DVT prophylaxis,.  Correct k. Try wean Propofol  Prognosis guarded

## 2019-09-08 NOTE — PROGRESS NOTE ADULT - SUBJECTIVE AND OBJECTIVE BOX
Gastroenterology progress note:     Patient is a 87y old  Female who presents with a chief complaint of Weakness and bradycardia (08 Sep 2019 09:49)       Admitted on: 09-03-19    We are following the patient for: NGT dislodged     Interval History: S/p EGD with removal of food from her esophagus with subsequent successful placement of NGT. CT chest and neck showed no evidence of perforation and confirmed position of NGT. Unable to obtain further history         PAST MEDICAL & SURGICAL HISTORY:  Parkinson disease  Hypothyroid  Rib fractures  Congestive heart failure  Hypertension  No significant past surgical history      MEDICATIONS  (STANDING):  aspirin  chewable 81 milliGRAM(s) Oral daily  buDESOnide 160 MICROgram(s)/formoterol 4.5 MICROgram(s) Inhaler 2 Puff(s) Inhalation two times a day  carbidopa/levodopa  10/100 1 Tablet(s) Oral daily  carbidopa/levodopa  25/100 1 Tablet(s) Oral at bedtime  carvedilol 6.25 milliGRAM(s) Oral every 12 hours  chlorhexidine 0.12% Liquid 15 milliLiter(s) Oral Mucosa every 12 hours  chlorhexidine 4% Liquid 1 Application(s) Topical <User Schedule>  DULoxetine 60 milliGRAM(s) Oral daily  enoxaparin Injectable 30 milliGRAM(s) SubCutaneous daily  influenza   Vaccine 0.5 milliLiter(s) IntraMuscular once  levothyroxine 75 MICROGram(s) Oral daily  midazolam Infusion 0.02 mG/kG/Hr (1.306 mL/Hr) IV Continuous <Continuous>  nystatin Powder 1 Application(s) Topical two times a day  pantoprazole    Tablet 40 milliGRAM(s) Oral before breakfast  pregabalin 100 milliGRAM(s) Oral two times a day  senna 2 Tablet(s) Oral at bedtime       Allergies  No Known Allergies      Review of Systems:   [facility]    Physical Examination:  T(C): 36.9 (09-08-19 @ 07:05), Max: 37.7 (09-07-19 @ 11:29)  HR: 50 (09-08-19 @ 04:55) (50 - 62)  BP: 155/67 (09-07-19 @ 21:30) (140/61 - 185/77)  RR: 13 (09-08-19 @ 07:05) (13 - 21)  SpO2: 99% (09-08-19 @ 05:07) (95% - 100%)      09-07-19 @ 07:01  -  09-08-19 @ 07:00  --------------------------------------------------------  IN: 1083 mL / OUT: 830 mL / NET: 253 mL    09-08-19 @ 07:01  -  09-08-19 @ 10:00  --------------------------------------------------------  IN: 0 mL / OUT: 20 mL / NET: -20 mL      Constitutional: No acute distress. NGT in place  Respiratory:  No signs of respiratory distress. intubated. Rhonchi bilaterally  Cardiovascular:  S1 S2, Regular rate and rhythm.  Abdominal: Abdomen is soft, symmetric, and non-tender without distention. There are no visible lesions or scars. Bowel sounds are present and normoactive in all four quadrants. No masses, hepatomegaly, or splenomegaly are noted.   Skin: No rashes, No Jaundice.        Data:                        11.0   6.14  )-----------( 92       ( 08 Sep 2019 07:09 )             34.1     Hgb trend:  11.0  09-08-19 @ 07:09  11.4  09-07-19 @ 05:41  13.5  09-06-19 @ 15:27  11.4  09-05-19 @ 11:39        09-08    144  |  108  |  31<H>  ----------------------------<  141<H>  3.4<L>   |  21  |  1.1    Ca    8.4<L>      08 Sep 2019 07:09  Mg     1.9     09-07    TPro  5.4<L>  /  Alb  3.2<L>  /  TBili  2.7<H>  /  DBili  x   /  AST  22  /  ALT  13  /  AlkPhos  121<H>  09-08    Liver panel trend:  TBili 2.7   /   AST 22   /   ALT 13   /   AlkP 121   /   Tptn 5.4   /   Alb 3.2    /   DBili --      09-08  TBili 2.3   /   AST 43   /   ALT 29   /   AlkP 133   /   Tptn 5.9   /   Alb 3.6    /   DBili 0.9      09-07  TBili 2.1   /      /   ALT 37   /   AlkP 136   /   Tptn 7.7   /   Alb 4.3    /   DBili --      09-06  TBili 0.8   /   AST 18   /   ALT 9   /   AlkP 98   /   Tptn 5.8   /   Alb 3.8    /   DBili --      09-04  TBili 0.7   /   AST 20   /   ALT 5   /   AlkP 110   /   Tptn 6.4   /   Alb 3.9    /   DBili --      09-03      PT/INR - ( 06 Sep 2019 15:27 )   PT: 14.30 sec;   INR: 1.25 ratio         PTT - ( 06 Sep 2019 15:27 )  PTT:28.3 sec

## 2019-09-08 NOTE — PROGRESS NOTE ADULT - SUBJECTIVE AND OBJECTIVE BOX
86 yo F presents to the ED c/o bradycardia, noticed by visiting Nurse today and she called EMS. Pt also states she felt forward and flat on 19 and has been anxious to climb up or down the stairs. Pt ambulates with a walker at home. Pt denies any similar episodes, traveling, exposure to ticks, fevers, chills, headaches, changes in mentation, visual changes, CP, SOB, abdominal pain, dysuria, changes in BM, rashes, skin discolorations, new medications. Admits to nausea but without vomiting.  seen by cardiology and physical therapy ;     interval : rapid response - intubated respiratory arrest aspiration     PAST MEDICAL & SURGICAL HISTORY:  Parkinson disease  Hypothyroid  Rib fractures  Congestive heart failure  Hypertension  No significant past surgical history    MEDICATIONS  (STANDING):  aspirin  chewable 81 milliGRAM(s) Oral daily  buDESOnide 160 MICROgram(s)/formoterol 4.5 MICROgram(s) Inhaler 2 Puff(s) Inhalation two times a day  carbidopa/levodopa  10/100 1 Tablet(s) Oral daily  carbidopa/levodopa  25/100 1 Tablet(s) Oral at bedtime  carvedilol 12.5 milliGRAM(s) Oral every 12 hours  chlorhexidine 0.12% Liquid 15 milliLiter(s) Oral Mucosa every 12 hours  chlorhexidine 4% Liquid 1 Application(s) Topical <User Schedule>  docusate sodium 100 milliGRAM(s) Oral two times a day  DULoxetine 60 milliGRAM(s) Oral daily  enoxaparin Injectable 30 milliGRAM(s) SubCutaneous daily  fentaNYL   Infusion. 0.5 MICROgram(s)/kG/Hr (3.265 mL/Hr) IV Continuous <Continuous>  furosemide    Tablet 40 milliGRAM(s) Oral daily  influenza   Vaccine 0.5 milliLiter(s) IntraMuscular once  levothyroxine 75 MICROGram(s) Oral daily  nystatin Powder 1 Application(s) Topical two times a day  pantoprazole    Tablet 40 milliGRAM(s) Oral before breakfast  potassium chloride   Powder 20 milliEquivalent(s) Oral daily  pregabalin 100 milliGRAM(s) Oral two times a day  propofol Infusion 10 MICROgram(s)/kG/Min (3.918 mL/Hr) IV Continuous <Continuous>  senna 2 Tablet(s) Oral at bedtime    MEDICATIONS  (PRN):  midazolam Injectable 2 milliGRAM(s) IV Push once PRN Agitation  temazepam 15 milliGRAM(s) Oral at bedtime PRN Insomnia    ICU Vital Signs Last 24 Hrs  T(C): 37.4 (08 Sep 2019 11:00), Max: 37.4 (08 Sep 2019 11:00)  T(F): 99.4 (08 Sep 2019 11:00), Max: 99.4 (08 Sep 2019 11:00)  HR: 58 (08 Sep 2019 11:00) (46 - 59)  BP: 145/65 (08 Sep 2019 11:00) (117/56 - 165/72)  BP(mean): 93 (08 Sep 2019 11:00) (81 - 103)  ABP: --  ABP(mean): --  RR: 14 (08 Sep 2019 11:00) (13 - 15)  SpO2: 99% (08 Sep 2019 11:00) (95% - 100%)    Mode: Auto Mode: PRVC/ Volume Support  RR (machine): 12  TV (machine): 450  FiO2: 50  PEEP: 5  MAP: 9  PIP: 28        GENERAL: NAD, lying in bed  	HEAD:  Atraumatic, Normocephalic  	EYES: EOMI, PERRLA, conjunctiva and sclera clear  	ENT: Moist mucous membranes + endotracheal tube   	NECK: Supple, No JVD  	CHEST/LUNG: Clear to auscultation bilaterally; No rales, rhonchi, wheezing, or rubs. Unlabored respirations  	HEART: +Bradycardic, No murmurs, rubs, or gallops  	ABDOMEN: Bowel sounds present; Soft, Nontender, Nondistended  	EXTREMITIES:  2+ Peripheral Pulses, brisk capillary refill. No clubbing, cyanosis, or edema  	NERVOUS SYSTEM:  Alert & Oriented X3, speech clear.   	MSK: FROM all 4 extremities, full and equal strength  SKIN: R knee abrasion                            11.0   6.14  )-----------( 92       ( 08 Sep 2019 07:09 )             34.1   09-    144  |  108  |  31<H>  ----------------------------<  141<H>  3.4<L>   |  21  |  1.1    Ca    8.4<L>      08 Sep 2019 07:09  Mg     1.9     09-07    TPro  5.4<L>  /  Alb  3.2<L>  /  TBili  2.7<H>  /  DBili  x   /  AST  22  /  ALT  13  /  AlkPhos  121<H>        EXAM:  XR CHEST PORTABLE ROUTINE 1V            PROCEDURE DATE:  2019            INTERPRETATION:  Clinical History / Reason for exam: Respiratory failure    Comparison : Chest radiograph 2019.    Technique/Positionin frontal views.    Findings:    Support devices: Endotracheal tube with its tip just above the isaac.   Retraction of 2 to 3 cm may be of benefit. Stable positioning of NG tube..    Cardiac/mediastinum/hilum: Cardiomegaly, thoracic aortic calcification..    Lung parenchyma/Pleura: Bilateral opacities/pleural effusions.    Skeleton/soft tissues: Stable.    Impression:     Support devices as above. Repositioning may be of benefit.    Otherwise stable exam.                                BLAISE JOHNSTON M.D., ATTENDING RADIOLOGIST  This document has been electronically signed. Sep  8 2019  7:22AM

## 2019-09-08 NOTE — PROGRESS NOTE ADULT - SUBJECTIVE AND OBJECTIVE BOX
Patient is a 87y old  Female who presents with a chief complaint of Weakness and bradycardia (08 Sep 2019 07:09)        Interval Events: No overnight events.    REVIEW OF SYSTEMS:  Unable to obtain due to patient's condition.        OBJECTIVE:  ICU Vital Signs Last 24 Hrs  T(C): 36.9 (08 Sep 2019 07:05), Max: 37.7 (07 Sep 2019 11:29)  T(F): 98.5 (08 Sep 2019 07:05), Max: 99.8 (07 Sep 2019 11:29)  HR: 50 (08 Sep 2019 04:55) (50 - 62)  BP: 155/67 (07 Sep 2019 21:30) (140/61 - 185/77)  BP(mean): 96 (07 Sep 2019 21:30) (88 - 110)  RR: 13 (08 Sep 2019 07:05) (13 - 21)  SpO2: 99% (08 Sep 2019 05:07) (95% - 100%)    Mode: Auto Mode: PRVC/ Volume Support, RR (machine): 12, TV (machine): 450, FiO2: 50, PEEP: 5, MAP: 11, PIP: 33    09-07 @ 07:01  -  09-08 @ 07:00  --------------------------------------------------------  IN: 1083 mL / OUT: 830 mL / NET: 253 mL    09-08 @ 07:01 - 09-08 @ 09:49  --------------------------------------------------------  IN: 0 mL / OUT: 20 mL / NET: -20 mL      CAPILLARY BLOOD GLUCOSE          PHYSICAL EXAM:  General: sedated  HEENT: Atraumatic, normocephalic.                 Mallampatti Grade                 No nasal congestion.                No tonsillar or pharyngeal exudates.  Lymph Nodes: No palpable lymphadenopathy neck, supraclavicular region  Neck: No JVD. No carotid bruit, no thyromegally  Respiratory: Normal chest expansion                         Normal percussion                         Normal and equal air entry                        scattered  rhonchi   Cardiovascular: S1 S2 normal. No murmurs, rubs or gallops.   Abdomen: Soft, non-tender, non-distended. No organomegaly.  Extremities: Warm to touch. Peripheral pulse palpable. No pedal edema.   Skin: No rashes or skin lesions, warm dry  Neurological: Motor and sensory examination equal and normal in all four extremities.  Psychiatry: Appropriate mood and affect.    HOSPITAL MEDICATIONS:  MEDICATIONS  (STANDING):  aspirin  chewable 81 milliGRAM(s) Oral daily  buDESOnide 160 MICROgram(s)/formoterol 4.5 MICROgram(s) Inhaler 2 Puff(s) Inhalation two times a day  carbidopa/levodopa  10/100 1 Tablet(s) Oral daily  carbidopa/levodopa  25/100 1 Tablet(s) Oral at bedtime  carvedilol 6.25 milliGRAM(s) Oral every 12 hours  chlorhexidine 0.12% Liquid 15 milliLiter(s) Oral Mucosa every 12 hours  chlorhexidine 4% Liquid 1 Application(s) Topical <User Schedule>  DULoxetine 60 milliGRAM(s) Oral daily  enoxaparin Injectable 30 milliGRAM(s) SubCutaneous daily  influenza   Vaccine 0.5 milliLiter(s) IntraMuscular once  levothyroxine 75 MICROGram(s) Oral daily  midazolam Infusion 0.02 mG/kG/Hr (1.306 mL/Hr) IV Continuous <Continuous>  nystatin Powder 1 Application(s) Topical two times a day  pantoprazole    Tablet 40 milliGRAM(s) Oral before breakfast  pregabalin 100 milliGRAM(s) Oral two times a day  senna 2 Tablet(s) Oral at bedtime    MEDICATIONS  (PRN):      LABS:                        11.0   6.14  )-----------( 92       ( 08 Sep 2019 07:09 )             34.1     09-08    144  |  108  |  31<H>  ----------------------------<  141<H>  3.4<L>   |  21  |  1.1    Ca    8.4<L>      08 Sep 2019 07:09  Mg     1.9     09-07    TPro  5.4<L>  /  Alb  3.2<L>  /  TBili  2.7<H>  /  DBili  x   /  AST  22  /  ALT  13  /  AlkPhos  121<H>  09-08    PT/INR - ( 06 Sep 2019 15:27 )   PT: 14.30 sec;   INR: 1.25 ratio         PTT - ( 06 Sep 2019 15:27 )  PTT:28.3 sec    Arterial Blood Gas:  09-08 @ 03:58  7.49/32/98/24/98/1.0  ABG lactate: --  Arterial Blood Gas:  09-07 @ 15:35  7.51/33/72/26/96/3.1  ABG lactate: --  Arterial Blood Gas:  09-07 @ 09:01  7.51/34/103/27/99/4.3  ABG lactate: --  Arterial Blood Gas:  09-06 @ 18:16  7.49/34/105/26/98/2.5  ABG lactate: --  Arterial Blood Gas:  09-06 @ 10:28  7.44/39/315/26/100/1.8  ABG lactate: --        Mode: Auto Mode: PRVC/ Volume Support  RR (machine): 12  TV (machine): 450  FiO2: 50  PEEP: 5  MAP: 11  PIP: 33      ABG - ( 08 Sep 2019 03:58 )  pH, Arterial: 7.49  pH, Blood: x     /  pCO2: 32    /  pO2: 98    / HCO3: 24    / Base Excess: 1.0   /  SaO2: 98                  RADIOLOGY:Radiology personally reviewed.

## 2019-09-09 LAB
ALBUMIN SERPL ELPH-MCNC: 3.2 G/DL — LOW (ref 3.5–5.2)
ALP SERPL-CCNC: 130 U/L — HIGH (ref 30–115)
ALT FLD-CCNC: 9 U/L — SIGNIFICANT CHANGE UP (ref 0–41)
ANION GAP SERPL CALC-SCNC: 16 MMOL/L — HIGH (ref 7–14)
AST SERPL-CCNC: 16 U/L — SIGNIFICANT CHANGE UP (ref 0–41)
BASE EXCESS BLDA CALC-SCNC: -0.9 MMOL/L — SIGNIFICANT CHANGE UP (ref -2–2)
BASOPHILS # BLD AUTO: 0.02 K/UL — SIGNIFICANT CHANGE UP (ref 0–0.2)
BASOPHILS NFR BLD AUTO: 0.3 % — SIGNIFICANT CHANGE UP (ref 0–1)
BILIRUB SERPL-MCNC: 3.4 MG/DL — HIGH (ref 0.2–1.2)
BUN SERPL-MCNC: 34 MG/DL — HIGH (ref 10–20)
CALCIUM SERPL-MCNC: 8.5 MG/DL — SIGNIFICANT CHANGE UP (ref 8.5–10.1)
CHLORIDE SERPL-SCNC: 105 MMOL/L — SIGNIFICANT CHANGE UP (ref 98–110)
CO2 SERPL-SCNC: 21 MMOL/L — SIGNIFICANT CHANGE UP (ref 17–32)
CREAT SERPL-MCNC: 1.2 MG/DL — SIGNIFICANT CHANGE UP (ref 0.7–1.5)
CULTURE RESULTS: NO GROWTH — SIGNIFICANT CHANGE UP
EOSINOPHIL # BLD AUTO: 0.15 K/UL — SIGNIFICANT CHANGE UP (ref 0–0.7)
EOSINOPHIL NFR BLD AUTO: 2.1 % — SIGNIFICANT CHANGE UP (ref 0–8)
GLUCOSE SERPL-MCNC: 143 MG/DL — HIGH (ref 70–99)
HCO3 BLDA-SCNC: 23 MMOL/L — SIGNIFICANT CHANGE UP (ref 23–27)
HCT VFR BLD CALC: 33.6 % — LOW (ref 37–47)
HGB BLD-MCNC: 11 G/DL — LOW (ref 12–16)
HOROWITZ INDEX BLDA+IHG-RTO: 40 — SIGNIFICANT CHANGE UP
IMM GRANULOCYTES NFR BLD AUTO: 0.6 % — HIGH (ref 0.1–0.3)
LYMPHOCYTES # BLD AUTO: 0.36 K/UL — LOW (ref 1.2–3.4)
LYMPHOCYTES # BLD AUTO: 5.1 % — LOW (ref 20.5–51.1)
MAGNESIUM SERPL-MCNC: 1.8 MG/DL — SIGNIFICANT CHANGE UP (ref 1.8–2.4)
MANUAL SMEAR VERIFICATION: SIGNIFICANT CHANGE UP
MCHC RBC-ENTMCNC: 28.7 PG — SIGNIFICANT CHANGE UP (ref 27–31)
MCHC RBC-ENTMCNC: 32.7 G/DL — SIGNIFICANT CHANGE UP (ref 32–37)
MCV RBC AUTO: 87.7 FL — SIGNIFICANT CHANGE UP (ref 81–99)
MONOCYTES # BLD AUTO: 0.56 K/UL — SIGNIFICANT CHANGE UP (ref 0.1–0.6)
MONOCYTES NFR BLD AUTO: 7.9 % — SIGNIFICANT CHANGE UP (ref 1.7–9.3)
NEUTROPHILS # BLD AUTO: 5.93 K/UL — SIGNIFICANT CHANGE UP (ref 1.4–6.5)
NEUTROPHILS NFR BLD AUTO: 84 % — HIGH (ref 42.2–75.2)
NEUTS BAND # BLD: 5 % — SIGNIFICANT CHANGE UP (ref 0–6)
NRBC # BLD: 0 /100 WBCS — SIGNIFICANT CHANGE UP (ref 0–0)
NRBC # BLD: 0 /100 — SIGNIFICANT CHANGE UP (ref 0–0)
OVALOCYTES BLD QL SMEAR: SLIGHT — SIGNIFICANT CHANGE UP
PCO2 BLDA: 36 MMHG — LOW (ref 38–42)
PH BLDA: 7.41 — SIGNIFICANT CHANGE UP (ref 7.38–7.42)
PLAT MORPH BLD: ABNORMAL
PLATELET # BLD AUTO: 97 K/UL — LOW (ref 130–400)
PLATELET CLUMP BLD QL SMEAR: SIGNIFICANT CHANGE UP
PLATELET COUNT - ESTIMATE: ABNORMAL
PO2 BLDA: 106 MMHG — HIGH (ref 78–95)
POTASSIUM SERPL-MCNC: 3.9 MMOL/L — SIGNIFICANT CHANGE UP (ref 3.5–5)
POTASSIUM SERPL-SCNC: 3.9 MMOL/L — SIGNIFICANT CHANGE UP (ref 3.5–5)
PROT SERPL-MCNC: 5.7 G/DL — LOW (ref 6–8)
RBC # BLD: 3.83 M/UL — LOW (ref 4.2–5.4)
RBC # FLD: 18.5 % — HIGH (ref 11.5–14.5)
RBC BLD AUTO: ABNORMAL
SAO2 % BLDA: 99 % — HIGH (ref 94–98)
SODIUM SERPL-SCNC: 142 MMOL/L — SIGNIFICANT CHANGE UP (ref 135–146)
SPECIMEN SOURCE: SIGNIFICANT CHANGE UP
WBC # BLD: 7.06 K/UL — SIGNIFICANT CHANGE UP (ref 4.8–10.8)
WBC # FLD AUTO: 7.06 K/UL — SIGNIFICANT CHANGE UP (ref 4.8–10.8)

## 2019-09-09 PROCEDURE — 71045 X-RAY EXAM CHEST 1 VIEW: CPT | Mod: 26

## 2019-09-09 RX ORDER — FUROSEMIDE 40 MG
20 TABLET ORAL ONCE
Refills: 0 | Status: COMPLETED | OUTPATIENT
Start: 2019-09-09 | End: 2019-09-09

## 2019-09-09 RX ORDER — FUROSEMIDE 40 MG
40 TABLET ORAL ONCE
Refills: 0 | Status: DISCONTINUED | OUTPATIENT
Start: 2019-09-09 | End: 2019-09-09

## 2019-09-09 RX ORDER — FENTANYL CITRATE 50 UG/ML
0.5 INJECTION INTRAVENOUS
Qty: 5000 | Refills: 0 | Status: DISCONTINUED | OUTPATIENT
Start: 2019-09-09 | End: 2019-09-09

## 2019-09-09 RX ORDER — FENTANYL CITRATE 50 UG/ML
0.5 INJECTION INTRAVENOUS
Qty: 2500 | Refills: 0 | Status: DISCONTINUED | OUTPATIENT
Start: 2019-09-09 | End: 2019-09-14

## 2019-09-09 RX ADMIN — FENTANYL CITRATE 3.4 MICROGRAM(S)/KG/HR: 50 INJECTION INTRAVENOUS at 20:00

## 2019-09-09 RX ADMIN — PANTOPRAZOLE SODIUM 40 MILLIGRAM(S): 20 TABLET, DELAYED RELEASE ORAL at 06:03

## 2019-09-09 RX ADMIN — CARVEDILOL PHOSPHATE 6.25 MILLIGRAM(S): 80 CAPSULE, EXTENDED RELEASE ORAL at 05:24

## 2019-09-09 RX ADMIN — CEFEPIME 100 MILLIGRAM(S): 1 INJECTION, POWDER, FOR SOLUTION INTRAMUSCULAR; INTRAVENOUS at 05:25

## 2019-09-09 RX ADMIN — Medication 650 MILLIGRAM(S): at 06:54

## 2019-09-09 RX ADMIN — CEFEPIME 100 MILLIGRAM(S): 1 INJECTION, POWDER, FOR SOLUTION INTRAMUSCULAR; INTRAVENOUS at 17:29

## 2019-09-09 RX ADMIN — Medication 75 MICROGRAM(S): at 05:24

## 2019-09-09 RX ADMIN — FENTANYL CITRATE 3.4 MICROGRAM(S)/KG/HR: 50 INJECTION INTRAVENOUS at 10:47

## 2019-09-09 RX ADMIN — CARBIDOPA AND LEVODOPA 1 TABLET(S): 25; 100 TABLET ORAL at 21:22

## 2019-09-09 RX ADMIN — ENOXAPARIN SODIUM 30 MILLIGRAM(S): 100 INJECTION SUBCUTANEOUS at 11:14

## 2019-09-09 RX ADMIN — NYSTATIN CREAM 1 APPLICATION(S): 100000 CREAM TOPICAL at 17:39

## 2019-09-09 RX ADMIN — BUDESONIDE AND FORMOTEROL FUMARATE DIHYDRATE 2 PUFF(S): 160; 4.5 AEROSOL RESPIRATORY (INHALATION) at 13:26

## 2019-09-09 RX ADMIN — CHLORHEXIDINE GLUCONATE 15 MILLILITER(S): 213 SOLUTION TOPICAL at 17:30

## 2019-09-09 RX ADMIN — SENNA PLUS 2 TABLET(S): 8.6 TABLET ORAL at 21:21

## 2019-09-09 RX ADMIN — CHLORHEXIDINE GLUCONATE 15 MILLILITER(S): 213 SOLUTION TOPICAL at 05:24

## 2019-09-09 RX ADMIN — Medication 100 MILLIGRAM(S): at 05:24

## 2019-09-09 RX ADMIN — BUDESONIDE AND FORMOTEROL FUMARATE DIHYDRATE 2 PUFF(S): 160; 4.5 AEROSOL RESPIRATORY (INHALATION) at 19:17

## 2019-09-09 RX ADMIN — DULOXETINE HYDROCHLORIDE 60 MILLIGRAM(S): 30 CAPSULE, DELAYED RELEASE ORAL at 17:30

## 2019-09-09 RX ADMIN — Medication 100 MILLIGRAM(S): at 17:32

## 2019-09-09 RX ADMIN — Medication 20 MILLIGRAM(S): at 10:36

## 2019-09-09 RX ADMIN — Medication 81 MILLIGRAM(S): at 11:14

## 2019-09-09 RX ADMIN — CARBIDOPA AND LEVODOPA 1 TABLET(S): 25; 100 TABLET ORAL at 11:14

## 2019-09-09 RX ADMIN — CHLORHEXIDINE GLUCONATE 1 APPLICATION(S): 213 SOLUTION TOPICAL at 05:25

## 2019-09-09 RX ADMIN — NYSTATIN CREAM 1 APPLICATION(S): 100000 CREAM TOPICAL at 05:26

## 2019-09-09 RX ADMIN — Medication 650 MILLIGRAM(S): at 06:04

## 2019-09-09 NOTE — PROGRESS NOTE ADULT - SUBJECTIVE AND OBJECTIVE BOX
Patient is a 87y old  Female who presents with a chief complaint of Weakness and bradycardia (09 Sep 2019 09:24)      PAST MEDICAL & SURGICAL HISTORY:  Parkinson disease  Hypothyroid  Rib fractures  Congestive heart failure  Hypertension  No significant past surgical history      MEDICATIONS  (STANDING):  aspirin  chewable 81 milliGRAM(s) Oral daily  buDESOnide 160 MICROgram(s)/formoterol 4.5 MICROgram(s) Inhaler 2 Puff(s) Inhalation two times a day  carbidopa/levodopa  10/100 1 Tablet(s) Oral daily  carbidopa/levodopa  25/100 1 Tablet(s) Oral at bedtime  carvedilol 6.25 milliGRAM(s) Oral every 12 hours  cefepime   IVPB 2000 milliGRAM(s) IV Intermittent every 12 hours  cefepime   IVPB      chlorhexidine 0.12% Liquid 15 milliLiter(s) Oral Mucosa every 12 hours  chlorhexidine 4% Liquid 1 Application(s) Topical <User Schedule>  DULoxetine 60 milliGRAM(s) Oral daily  enoxaparin Injectable 30 milliGRAM(s) SubCutaneous daily  fentaNYL   Infusion. 0.5 MICROgram(s)/kG/Hr (3.395 mL/Hr) IV Continuous <Continuous>  influenza   Vaccine 0.5 milliLiter(s) IntraMuscular once  levothyroxine 75 MICROGram(s) Oral daily  midazolam Infusion 0.02 mG/kG/Hr (1.306 mL/Hr) IV Continuous <Continuous>  nystatin Powder 1 Application(s) Topical two times a day  pantoprazole    Tablet 40 milliGRAM(s) Oral before breakfast  pregabalin 100 milliGRAM(s) Oral two times a day  senna 2 Tablet(s) Oral at bedtime    MEDICATIONS  (PRN):  acetaminophen  Suppository .. 650 milliGRAM(s) Rectal every 6 hours PRN Temp greater or equal to 38C (100.4F)      Overnight events:    Vital Signs Last 24 Hrs  T(C): 37.1 (09 Sep 2019 11:00), Max: 38.2 (09 Sep 2019 04:00)  T(F): 98.8 (09 Sep 2019 11:00), Max: 100.7 (09 Sep 2019 04:00)  HR: 51 (09 Sep 2019 13:00) (51 - 66)  BP: 138/65 (09 Sep 2019 13:00) (121/56 - 163/70)  BP(mean): 94 (09 Sep 2019 13:00) (81 - 112)  RR: 12 (09 Sep 2019 13:00) (12 - 18)  SpO2: 99% (09 Sep 2019 13:00) (93% - 100%)  CAPILLARY BLOOD GLUCOSE        I&O's Summary    08 Sep 2019 07:01  -  09 Sep 2019 07:00  --------------------------------------------------------  IN: 1070 mL / OUT: 740 mL / NET: 330 mL    09 Sep 2019 07:01  -  09 Sep 2019 13:55  --------------------------------------------------------  IN: 281 mL / OUT: 230 mL / NET: 51 mL        Physical Exam:    GEN: intubated, opening eyes  HEAD:  Atraumatic, Normocephalic  EYES: conjunctiva and sclera clear  ENT: Moist mucous membranes    NECK: Supple, No JVD  CHEST/LUNG: Clear to auscultation bilaterally; No rales, rhonchi, wheezing, or rubs.  HEART: No murmurs, rubs, or gallops, HR 52  ABDOMEN: Bowel sounds present; Soft, Nontender, Nondistended  EXTREMITIES:  No clubbing, cyanosis, or edema  MSK: FROM all 4 extremities      Labs:                        11.0   7.06  )-----------( 97       ( 09 Sep 2019 08:30 )             33.6                 142  |  105  |  34<H>  ----------------------------<  143<H>  3.9   |  21  |  1.2    Ca    8.5      09 Sep 2019 08:29  Mg     1.8         TPro  5.7<L>  /  Alb  3.2<L>  /  TBili  3.4<H>  /  DBili  x   /  AST  16  /  ALT  9   /  AlkPhos  130<H>      LIVER FUNCTIONS - ( 09 Sep 2019 08:29 )  Alb: 3.2 g/dL / Pro: 5.7 g/dL / ALK PHOS: 130 U/L / ALT: 9 U/L / AST: 16 U/L / GGT: x                       ABG - ( 09 Sep 2019 06:25 )  pH, Arterial: 7.41  pH, Blood: x     /  pCO2: 36    /  pO2: 106   / HCO3: 23    / Base Excess: -0.9  /  SaO2: 99                Urinalysis Basic - ( 08 Sep 2019 18:15 )    Color: Nunu / Appearance: Slightly Cloudy / S.015 / pH: x  Gluc: x / Ketone: Negative  / Bili: Moderate / Urobili: 4.0 mg/dL   Blood: x / Protein: 30 mg/dL / Nitrite: Negative   Leuk Esterase: Negative / RBC: >50 /HPF / WBC 3-5 /HPF   Sq Epi: x / Non Sq Epi: Few /HPF / Bacteria: Occasional /HPF

## 2019-09-09 NOTE — PROGRESS NOTE ADULT - SUBJECTIVE AND OBJECTIVE BOX
86 yo F presents to the ED c/o bradycardia, noticed by visiting Nurse today and she called EMS. Pt also states she felt forward and flat on 19 and has been anxious to climb up or down the stairs. Pt ambulates with a walker at home. Pt denies any similar episodes, traveling, exposure to ticks, fevers, chills, headaches, changes in mentation, visual changes, CP, SOB, abdominal pain, dysuria, changes in BM, rashes, skin discolorations, new medications. Admits to nausea but without vomiting.  seen by cardiology and physical therapy ;     interval : rapid response - intubated respiratory arrest aspiration     PAST MEDICAL & SURGICAL HISTORY:  Parkinson disease  Hypothyroid  Rib fractures  Congestive heart failure  Hypertension  No significant past surgical history    MEDICATIONS  (STANDING):  aspirin  chewable 81 milliGRAM(s) Oral daily  buDESOnide 160 MICROgram(s)/formoterol 4.5 MICROgram(s) Inhaler 2 Puff(s) Inhalation two times a day  carbidopa/levodopa  10/100 1 Tablet(s) Oral daily  carbidopa/levodopa  25/100 1 Tablet(s) Oral at bedtime  carvedilol 6.25 milliGRAM(s) Oral every 12 hours  cefepime   IVPB 2000 milliGRAM(s) IV Intermittent every 12 hours  cefepime   IVPB      chlorhexidine 0.12% Liquid 15 milliLiter(s) Oral Mucosa every 12 hours  chlorhexidine 4% Liquid 1 Application(s) Topical <User Schedule>  DULoxetine 60 milliGRAM(s) Oral daily  enoxaparin Injectable 30 milliGRAM(s) SubCutaneous daily  influenza   Vaccine 0.5 milliLiter(s) IntraMuscular once  levothyroxine 75 MICROGram(s) Oral daily  midazolam Infusion 0.02 mG/kG/Hr (1.306 mL/Hr) IV Continuous <Continuous>  nystatin Powder 1 Application(s) Topical two times a day  pantoprazole    Tablet 40 milliGRAM(s) Oral before breakfast  pregabalin 100 milliGRAM(s) Oral two times a day    ICU Vital Signs Last 24 Hrs  T(C): 38.2 (09 Sep 2019 04:00), Max: 38.2 (09 Sep 2019 04:00)  T(F): 100.7 (09 Sep 2019 04:00), Max: 100.7 (09 Sep 2019 04:00)  HR: 63 (09 Sep 2019 04:00) (46 - 66)  BP: 147/67 (09 Sep 2019 04:00) (117/56 - 168/72)  BP(mean): 96 (09 Sep 2019 04:00) (81 - 112)  ABP: --  ABP(mean): --  RR: 15 (09 Sep 2019 05:07) (12 - 18)  SpO2: 98% (09 Sep 2019 05:07) (93% - 100%)  senna 2 Tablet(s) Oral at bedtime    MEDICATIONS  (PRN):  acetaminophen  Suppository .. 650 milliGRAM(s) Rectal every 6 hours PRN Temp greater or equal to 38C (100.4F)      Mode: Auto Mode: PRVC/ Volume Support  RR (machine): 12  TV (machine): 450  FiO2: 40  PEEP: 5  MAP: 12  PIP: 34      GENERAL: NAD, lying in bed  	HEAD:  Atraumatic, Normocephalic  	EYES: EOMI, PERRLA, conjunctiva and sclera clear  	ENT: Moist mucous membranes + endotracheal tube   	NECK: Supple, No JVD  	CHEST/LUNG: Clear to auscultation bilaterally; No rales, rhonchi, wheezing, or rubs. Unlabored respirations  	HEART: +Bradycardic, No murmurs, rubs, or gallops  	ABDOMEN: Bowel sounds present; Soft, Nontender, Nondistended  	EXTREMITIES:  2+ Peripheral Pulses, brisk capillary refill. No clubbing, cyanosis, or edema  	NERVOUS SYSTEM:  Alert & Oriented X3, speech clear.   	MSK: FROM all 4 extremities, full and equal strength  SKIN: R knee abrasion                            11.0   6.14  )-----------( 92       ( 08 Sep 2019 07:09 )             34.1       144  |  108  |  31<H>  ----------------------------<  141<H>  3.4<L>   |  21  |  1.1    Ca    8.4<L>      08 Sep 2019 07:09  Mg     1.9         TPro  5.4<L>  /  Alb  3.2<L>  /  TBili  2.7<H>  /  DBili  x   /  AST  22  /  ALT  13  /  AlkPhos  121<H>        EXAM:  XR CHEST PORTABLE ROUTINE 1V            PROCEDURE DATE:  2019            INTERPRETATION:  Clinical History / Reason for exam: Respiratory failure    Comparison : Chest radiograph 2019.    Technique/Positionin frontal views.    Findings:    Support devices: Endotracheal tube with its tip just above the isaac.   Retraction of 2 to 3 cm may be of benefit. Stable positioning of NG tube..    Cardiac/mediastinum/hilum: Cardiomegaly, thoracic aortic calcification..    Lung parenchyma/Pleura: Bilateral opacities/pleural effusions.    Skeleton/soft tissues: Stable.    Impression:     Support devices as above. Repositioning may be of benefit.    Otherwise stable exam.                                BLAISE JOHNSTON M.D., ATTENDING RADIOLOGIST  This document has been electronically signed. Sep  8 2019  7:22AM

## 2019-09-09 NOTE — PROGRESS NOTE ADULT - SUBJECTIVE AND OBJECTIVE BOX
Patient is a 87y old  Female who presents with a chief complaint of Weakness and bradycardia (09 Sep 2019 07:23)      Over Night Events:  Patient seen and examined still vented not ion sedation or pressors   failed weaning trail       ROS:  See HPI    PHYSICAL EXAM    ICU Vital Signs Last 24 Hrs  T(C): 36.7 (09 Sep 2019 07:07), Max: 38.2 (09 Sep 2019 04:00)  T(F): 98.1 (09 Sep 2019 07:07), Max: 100.7 (09 Sep 2019 04:00)  HR: 59 (09 Sep 2019 08:00) (56 - 66)  BP: 150/67 (09 Sep 2019 08:00) (121/56 - 168/72)  BP(mean): 97 (09 Sep 2019 08:00) (81 - 112)  ABP: --  ABP(mean): --  RR: 16 (09 Sep 2019 08:00) (12 - 18)  SpO2: 99% (09 Sep 2019 08:00) (93% - 100%)      General: Awake   HEENT:     jacob            Lymph Nodes: NO cervical LN   Lungs: Bilateral crackles   Cardiovascular: Regular   Abdomen: Soft, Positive BS  Extremities: No clubbing   Skin: warm   Neurological:  follow command   Musculoskeletal: move all ext     I&O's Detail    08 Sep 2019 07:01  -  09 Sep 2019 07:00  --------------------------------------------------------  IN:    Free Water: 1000 mL    IV PiggyBack: 50 mL    propofol Infusion: 20 mL  Total IN: 1070 mL    OUT:    Indwelling Catheter - Urethral: 740 mL  Total OUT: 740 mL    Total NET: 330 mL      09 Sep 2019 07:01  -  09 Sep 2019 09:24  --------------------------------------------------------  IN:  Total IN: 0 mL    OUT:    Indwelling Catheter - Urethral: 40 mL  Total OUT: 40 mL    Total NET: -40 mL          LABS:                          11.0   7.06  )-----------( 97       ( 09 Sep 2019 08:30 )             33.6         08 Sep 2019 07:09    144    |  108    |  31     ----------------------------<  141    3.4     |  21     |  1.1      Ca    8.4        08 Sep 2019 07:09                                                                                      Urinalysis Basic - ( 08 Sep 2019 18:15 )    Color: Nunu / Appearance: Slightly Cloudy / S.015 / pH: x  Gluc: x / Ketone: Negative  / Bili: Moderate / Urobili: 4.0 mg/dL   Blood: x / Protein: 30 mg/dL / Nitrite: Negative   Leuk Esterase: Negative / RBC: >50 /HPF / WBC 3-5 /HPF   Sq Epi: x / Non Sq Epi: Few /HPF / Bacteria: Occasional /HPF                                                                                                             Mode: CPAP with PS  FiO2: 40  PEEP: 5  PS: 5                                      ABG - ( 09 Sep 2019 06:25 )  pH, Arterial: 7.41  pH, Blood: x     /  pCO2: 36    /  pO2: 106   / HCO3: 23    / Base Excess: -0.9  /  SaO2: 99                  MEDICATIONS  (STANDING):  aspirin  chewable 81 milliGRAM(s) Oral daily  buDESOnide 160 MICROgram(s)/formoterol 4.5 MICROgram(s) Inhaler 2 Puff(s) Inhalation two times a day  carbidopa/levodopa  10/100 1 Tablet(s) Oral daily  carbidopa/levodopa  25/100 1 Tablet(s) Oral at bedtime  carvedilol 6.25 milliGRAM(s) Oral every 12 hours  cefepime   IVPB 2000 milliGRAM(s) IV Intermittent every 12 hours  cefepime   IVPB      chlorhexidine 0.12% Liquid 15 milliLiter(s) Oral Mucosa every 12 hours  chlorhexidine 4% Liquid 1 Application(s) Topical <User Schedule>  DULoxetine 60 milliGRAM(s) Oral daily  enoxaparin Injectable 30 milliGRAM(s) SubCutaneous daily  influenza   Vaccine 0.5 milliLiter(s) IntraMuscular once  levothyroxine 75 MICROGram(s) Oral daily  midazolam Infusion 0.02 mG/kG/Hr (1.306 mL/Hr) IV Continuous <Continuous>  nystatin Powder 1 Application(s) Topical two times a day  pantoprazole    Tablet 40 milliGRAM(s) Oral before breakfast  pregabalin 100 milliGRAM(s) Oral two times a day  senna 2 Tablet(s) Oral at bedtime    MEDICATIONS  (PRN):  acetaminophen  Suppository .. 650 milliGRAM(s) Rectal every 6 hours PRN Temp greater or equal to 38C (100.4F)          Xrays:  TLC:  OG:  ET tube:                                                                                       ECHO:  [facility] ICU:

## 2019-09-09 NOTE — PROGRESS NOTE ADULT - ASSESSMENT
IMPRESSION:  Cardio respiratory arrest  aspiration of breakfast  no sign pneumonia/pneumonitis as of yet  LLL atelectasis  rosalind cardia  hypernatremia        PLAN:    CNS: fentanyl if needed     HEENT: jacob    PULMONARY:  Bronch herman   CARDIOVASCULAR: echo echo lasix 40 mg once     GI: GI prophylaxis. NG  Feeding     RENAL: follow lytes     INFECTIOUS DISEASE: follow DTA and cx   continue abx     HEMATOLOGICAL:  DVT prophylaxis.    ENDOCRINE:  Follow up FS.  Insulin protocol if needed.    MUSCULOSKELETAL:        CRITICAL CARE TIME SPENT:

## 2019-09-09 NOTE — PROGRESS NOTE ADULT - ASSESSMENT
87 year old female admitted s/p fall episode of bradycardia ; sinemet ; rapid response respiratory failure ; episode of temp     # Respiratory Failure - aspiration     - on Cefempiome  - continue vent parameters per Pulm / Critical care   - on IV sedation Fentanyl / Propofol monitor neurologic status   - wean as tolerated   - SLP evaluation when extubated        # Cardiac / CHF  / HTN      - pulse better   - continue off lasix monitor   - monitor weight and pulse        # Parkinson's     - continue Sinemet     # Hypothyroidism     - levothyroxine     advance care  noted  cpr full code    dispostion -

## 2019-09-09 NOTE — PROGRESS NOTE ADULT - ASSESSMENT
87 year old female admitted s/p fall episode of bradycardia   was rapid response after aspirating breakfast on 9/6/19  CPR and epi given, ROSC, intubated    # Respiratory Failure - aspiration   - on cefepime  - IV fentanyl for sedation  - having copious secretions, bronch tomorrow  -IV lasix 20mg once stat  - f/u blood, urine, DTA (received by lab)   -echo ordered      # Cardiac / CHF  / HTN    - pulse better   - 20mg IV lasix once   - monitor weight and pulse     # Parkinson's   - continue Sinemet     # Hypothyroidism   - levothyroxine     enoxaparin dvt ppx  full code 87 year old female admitted s/p fall episode of bradycardia   was rapid response after aspirating breakfast on 9/6/19  CPR and epi given, ROSC, intubated    # Respiratory Failure - aspiration   - on cefepime  - IV fentanyl for sedation  - having copious secretions, bronch tomorrow - consent obtained from daughter over phone - Mrs. Violette Knight at [phone]  -IV lasix 20mg once stat  - f/u blood, urine, DTA (received by lab)   -echo ordered      # Cardiac / CHF  / HTN    - pulse better   - 20mg IV lasix once   - monitor weight and pulse     # Parkinson's   - continue Sinemet     # Hypothyroidism   - levothyroxine     enoxaparin dvt ppx  full code

## 2019-09-09 NOTE — PROGRESS NOTE ADULT - SUBJECTIVE AND OBJECTIVE BOX
Patient is a 87y old  Female who presents with a chief complaint of Weakness and bradycardia (09 Sep 2019 05:45)      T(F): 100.7 (09-09-19 @ 04:00), Max: 100.7 (09-09-19 @ 04:00)  HR: 64 (09-09-19 @ 05:00)  BP: 163/70 (09-09-19 @ 05:00)  RR: 15 (09-09-19 @ 05:07)  SpO2: 98% (09-09-19 @ 05:07) (93% - 100%)    PHYSICAL EXAM:  GENERAL: NAD, well-groomed, well-developed  HEAD:  Atraumatic, Normocephalic  EYES: EOMI, PERRLA, conjunctiva and sclera clear  ENMT: No tonsillar erythema, exudates, or enlargement; Moist mucous membranes, Good dentition, No lesions  NECK: Supple, No JVD, Normal thyroid  NERVOUS SYSTEM:  Alert & Oriented X3,  Motor Strength 5/5 B/L upper and lower extremities  CHEST/LUNG: Clear to percussion bilaterally; No rales, rhonchi, wheezing, or rubs  HEART: Regular rate and rhythm; No murmurs, rubs, or gallops  ABDOMEN: Soft, Nontender, Nondistended; Bowel sounds present  EXTREMITIES:   No clubbing, cyanosis, or edema  LYMPH: No lymphadenopathy noted  SKIN: No rashes or lesions    labs  09-08    144  |  108  |  31<H>  ----------------------------<  141<H>  3.4<L>   |  21  |  1.1    Ca    8.4<L>      08 Sep 2019 07:09    TPro  5.4<L>  /  Alb  3.2<L>  /  TBili  2.7<H>  /  DBili  x   /  AST  22  /  ALT  13  /  AlkPhos  121<H>  09-08                          11.0   6.14  )-----------( 92       ( 08 Sep 2019 07:09 )             34.1         Mode: Auto Mode: PRVC/ Volume Support  RR (machine): 12  TV (machine): 450  FiO2: 40  PEEP: 5  MAP: 12  PIP: 34        acetaminophen  Suppository .. 650 milliGRAM(s) Rectal every 6 hours PRN  aspirin  chewable 81 milliGRAM(s) Oral daily  buDESOnide 160 MICROgram(s)/formoterol 4.5 MICROgram(s) Inhaler 2 Puff(s) Inhalation two times a day  carbidopa/levodopa  10/100 1 Tablet(s) Oral daily  carbidopa/levodopa  25/100 1 Tablet(s) Oral at bedtime  carvedilol 6.25 milliGRAM(s) Oral every 12 hours  cefepime   IVPB 2000 milliGRAM(s) IV Intermittent every 12 hours  cefepime   IVPB      chlorhexidine 0.12% Liquid 15 milliLiter(s) Oral Mucosa every 12 hours  chlorhexidine 4% Liquid 1 Application(s) Topical <User Schedule>  DULoxetine 60 milliGRAM(s) Oral daily  enoxaparin Injectable 30 milliGRAM(s) SubCutaneous daily  influenza   Vaccine 0.5 milliLiter(s) IntraMuscular once  levothyroxine 75 MICROGram(s) Oral daily  midazolam Infusion 0.02 mG/kG/Hr IV Continuous <Continuous>  nystatin Powder 1 Application(s) Topical two times a day  pantoprazole    Tablet 40 milliGRAM(s) Oral before breakfast  pregabalin 100 milliGRAM(s) Oral two times a day  senna 2 Tablet(s) Oral at bedtime

## 2019-09-09 NOTE — PROGRESS NOTE ADULT - ASSESSMENT
On vent . Having secretions,  Off sedation . Awake responsive. Check cxr. Continue beta. GI DVT prophylaxis,.  Correct k. HR acceptable on current beta. Prognosis guarded

## 2019-09-10 LAB
ALBUMIN SERPL ELPH-MCNC: 2.8 G/DL — LOW (ref 3.5–5.2)
ALP SERPL-CCNC: 124 U/L — HIGH (ref 30–115)
ALT FLD-CCNC: <5 U/L — SIGNIFICANT CHANGE UP (ref 0–41)
ANION GAP SERPL CALC-SCNC: 13 MMOL/L — SIGNIFICANT CHANGE UP (ref 7–14)
AST SERPL-CCNC: 14 U/L — SIGNIFICANT CHANGE UP (ref 0–41)
BILIRUB SERPL-MCNC: 2.2 MG/DL — HIGH (ref 0.2–1.2)
BUN SERPL-MCNC: 39 MG/DL — HIGH (ref 10–20)
CALCIUM SERPL-MCNC: 8.6 MG/DL — SIGNIFICANT CHANGE UP (ref 8.5–10.1)
CHLORIDE SERPL-SCNC: 103 MMOL/L — SIGNIFICANT CHANGE UP (ref 98–110)
CO2 SERPL-SCNC: 21 MMOL/L — SIGNIFICANT CHANGE UP (ref 17–32)
CREAT SERPL-MCNC: 1.2 MG/DL — SIGNIFICANT CHANGE UP (ref 0.7–1.5)
GLUCOSE SERPL-MCNC: 85 MG/DL — SIGNIFICANT CHANGE UP (ref 70–99)
GRAM STN FLD: SIGNIFICANT CHANGE UP
HCT VFR BLD CALC: 33 % — LOW (ref 37–47)
HGB BLD-MCNC: 10.9 G/DL — LOW (ref 12–16)
MCHC RBC-ENTMCNC: 28.6 PG — SIGNIFICANT CHANGE UP (ref 27–31)
MCHC RBC-ENTMCNC: 33 G/DL — SIGNIFICANT CHANGE UP (ref 32–37)
MCV RBC AUTO: 86.6 FL — SIGNIFICANT CHANGE UP (ref 81–99)
NRBC # BLD: 0 /100 WBCS — SIGNIFICANT CHANGE UP (ref 0–0)
PHOSPHATE SERPL-MCNC: 3.3 MG/DL — SIGNIFICANT CHANGE UP (ref 2.1–4.9)
PLATELET # BLD AUTO: 114 K/UL — LOW (ref 130–400)
POTASSIUM SERPL-MCNC: 3.7 MMOL/L — SIGNIFICANT CHANGE UP (ref 3.5–5)
POTASSIUM SERPL-SCNC: 3.7 MMOL/L — SIGNIFICANT CHANGE UP (ref 3.5–5)
PROT SERPL-MCNC: 5.2 G/DL — LOW (ref 6–8)
RBC # BLD: 3.81 M/UL — LOW (ref 4.2–5.4)
RBC # FLD: 18 % — HIGH (ref 11.5–14.5)
SODIUM SERPL-SCNC: 137 MMOL/L — SIGNIFICANT CHANGE UP (ref 135–146)
SPECIMEN SOURCE: SIGNIFICANT CHANGE UP
WBC # BLD: 6.94 K/UL — SIGNIFICANT CHANGE UP (ref 4.8–10.8)
WBC # FLD AUTO: 6.94 K/UL — SIGNIFICANT CHANGE UP (ref 4.8–10.8)

## 2019-09-10 PROCEDURE — 71045 X-RAY EXAM CHEST 1 VIEW: CPT | Mod: 26

## 2019-09-10 RX ORDER — LIDOCAINE 4 G/100G
30 CREAM TOPICAL ONCE
Refills: 0 | Status: COMPLETED | OUTPATIENT
Start: 2019-09-10 | End: 2019-09-10

## 2019-09-10 RX ORDER — MULTIVIT-MIN/FERROUS GLUCONATE 9 MG/15 ML
1 LIQUID (ML) ORAL DAILY
Refills: 0 | Status: DISCONTINUED | OUTPATIENT
Start: 2019-09-10 | End: 2019-09-10

## 2019-09-10 RX ORDER — MIDAZOLAM HYDROCHLORIDE 1 MG/ML
2 INJECTION, SOLUTION INTRAMUSCULAR; INTRAVENOUS ONCE
Refills: 0 | Status: DISCONTINUED | OUTPATIENT
Start: 2019-09-10 | End: 2019-09-10

## 2019-09-10 RX ADMIN — BUDESONIDE AND FORMOTEROL FUMARATE DIHYDRATE 2 PUFF(S): 160; 4.5 AEROSOL RESPIRATORY (INHALATION) at 20:46

## 2019-09-10 RX ADMIN — FENTANYL CITRATE 3.4 MICROGRAM(S)/KG/HR: 50 INJECTION INTRAVENOUS at 08:03

## 2019-09-10 RX ADMIN — BUDESONIDE AND FORMOTEROL FUMARATE DIHYDRATE 2 PUFF(S): 160; 4.5 AEROSOL RESPIRATORY (INHALATION) at 07:50

## 2019-09-10 RX ADMIN — CARBIDOPA AND LEVODOPA 1 TABLET(S): 25; 100 TABLET ORAL at 11:27

## 2019-09-10 RX ADMIN — CHLORHEXIDINE GLUCONATE 1 APPLICATION(S): 213 SOLUTION TOPICAL at 05:17

## 2019-09-10 RX ADMIN — PANTOPRAZOLE SODIUM 40 MILLIGRAM(S): 20 TABLET, DELAYED RELEASE ORAL at 05:21

## 2019-09-10 RX ADMIN — FENTANYL CITRATE 3.4 MICROGRAM(S)/KG/HR: 50 INJECTION INTRAVENOUS at 06:30

## 2019-09-10 RX ADMIN — LIDOCAINE 30 MILLILITER(S): 4 CREAM TOPICAL at 07:34

## 2019-09-10 RX ADMIN — CHLORHEXIDINE GLUCONATE 15 MILLILITER(S): 213 SOLUTION TOPICAL at 17:56

## 2019-09-10 RX ADMIN — SENNA PLUS 2 TABLET(S): 8.6 TABLET ORAL at 21:34

## 2019-09-10 RX ADMIN — CARBIDOPA AND LEVODOPA 1 TABLET(S): 25; 100 TABLET ORAL at 21:34

## 2019-09-10 RX ADMIN — ENOXAPARIN SODIUM 30 MILLIGRAM(S): 100 INJECTION SUBCUTANEOUS at 11:28

## 2019-09-10 RX ADMIN — Medication 100 MILLIGRAM(S): at 05:18

## 2019-09-10 RX ADMIN — CEFEPIME 100 MILLIGRAM(S): 1 INJECTION, POWDER, FOR SOLUTION INTRAMUSCULAR; INTRAVENOUS at 17:55

## 2019-09-10 RX ADMIN — NYSTATIN CREAM 1 APPLICATION(S): 100000 CREAM TOPICAL at 17:56

## 2019-09-10 RX ADMIN — CEFEPIME 100 MILLIGRAM(S): 1 INJECTION, POWDER, FOR SOLUTION INTRAMUSCULAR; INTRAVENOUS at 05:17

## 2019-09-10 RX ADMIN — CHLORHEXIDINE GLUCONATE 15 MILLILITER(S): 213 SOLUTION TOPICAL at 05:17

## 2019-09-10 RX ADMIN — Medication 75 MICROGRAM(S): at 05:18

## 2019-09-10 RX ADMIN — Medication 1 TABLET(S): at 17:56

## 2019-09-10 RX ADMIN — FENTANYL CITRATE 3.4 MICROGRAM(S)/KG/HR: 50 INJECTION INTRAVENOUS at 20:39

## 2019-09-10 RX ADMIN — Medication 100 MILLIGRAM(S): at 17:55

## 2019-09-10 RX ADMIN — NYSTATIN CREAM 1 APPLICATION(S): 100000 CREAM TOPICAL at 05:18

## 2019-09-10 RX ADMIN — MIDAZOLAM HYDROCHLORIDE 2 MILLIGRAM(S): 1 INJECTION, SOLUTION INTRAMUSCULAR; INTRAVENOUS at 10:19

## 2019-09-10 RX ADMIN — Medication 81 MILLIGRAM(S): at 11:28

## 2019-09-10 NOTE — PROGRESS NOTE ADULT - ASSESSMENT
87 year old female admitted s/p fall episode of bradycardia ; sinemet ; rapid response respiratory failure ; episode of temp     # Respiratory Failure - aspiration  secretions     - on Cefepime  - continue vent parameters per Pulm / Critical care   - on IV sedation Fentanyl /midazolamneurologic status   - bronchoscopy   - wean as tolerated   - SLP evaluation when extubated        # Cardiac / CHF  / HTN      - pulse better   - continue off lasix monitor   - monitor weight and pulse        # Parkinson's     - continue Sinemet     # Hypothyroidism     - levothyroxine     # GI and DVT prophylaxsis     - pantoprazxole   - enoxaparin    advance care  noted  cpr full code    dispostion -

## 2019-09-10 NOTE — PROGRESS NOTE ADULT - SUBJECTIVE AND OBJECTIVE BOX
86 yo F presents to the ED c/o bradycardia, noticed by visiting Nurse today and she called EMS. Pt also states she felt forward and flat on Sunday 9/1/19 and has been anxious to climb up or down the stairs. Pt ambulates with a walker at home. Pt denies any similar episodes, traveling, exposure to ticks, fevers, chills, headaches, changes in mentation, visual changes, CP, SOB, abdominal pain, dysuria, changes in BM, rashes, skin discolorations, new medications.      interval : rapid response - intubated respiratory arrest aspiration ; on vent for bronch tofau     PAST MEDICAL & SURGICAL HISTORY:  Parkinson disease  Hypothyroid  Rib fractures  Congestive heart failure  Hypertension  No significant past surgical history      MEDICATIONS  (STANDING):  aspirin  chewable 81 milliGRAM(s) Oral daily  buDESOnide 160 MICROgram(s)/formoterol 4.5 MICROgram(s) Inhaler 2 Puff(s) Inhalation two times a day  carbidopa/levodopa  10/100 1 Tablet(s) Oral daily  carbidopa/levodopa  25/100 1 Tablet(s) Oral at bedtime  carvedilol 6.25 milliGRAM(s) Oral every 12 hours  cefepime   IVPB 2000 milliGRAM(s) IV Intermittent every 12 hours  cefepime   IVPB      chlorhexidine 0.12% Liquid 15 milliLiter(s) Oral Mucosa every 12 hours  chlorhexidine 4% Liquid 1 Application(s) Topical <User Schedule>  DULoxetine 60 milliGRAM(s) Oral daily  enoxaparin Injectable 30 milliGRAM(s) SubCutaneous daily  fentaNYL   Infusion. 0.5 MICROgram(s)/kG/Hr (3.395 mL/Hr) IV Continuous <Continuous>  influenza   Vaccine 0.5 milliLiter(s) IntraMuscular once  levothyroxine 75 MICROGram(s) Oral daily  midazolam Infusion 0.02 mG/kG/Hr (1.306 mL/Hr) IV Continuous <Continuous>  nystatin Powder 1 Application(s) Topical two times a day  pantoprazole    Tablet 40 milliGRAM(s) Oral before breakfast  pregabalin 100 milliGRAM(s) Oral two times a day  senna 2 Tablet(s) Oral at bedtime    MEDICATIONS  (PRN):  acetaminophen  Suppository .. 650 milliGRAM(s) Rectal every 6 hours PRN Temp greater or equal to 38C (100.4F)      ICU Vital Signs Last 24 Hrs  T(C): 37.4 (10 Sep 2019 01:00), Max: 37.4 (10 Sep 2019 01:00)  T(F): 99.4 (10 Sep 2019 01:00), Max: 99.4 (10 Sep 2019 01:00)  HR: 46 (10 Sep 2019 05:00) (45 - 59)  BP: 127/60 (10 Sep 2019 05:00) (122/56 - 155/66)  BP(mean): 86 (10 Sep 2019 05:00) (81 - 111)  ABP: --  ABP(mean): --  RR: 12 (10 Sep 2019 05:00) (12 - 18)  SpO2: 99% (10 Sep 2019 05:00) (98% - 100%)        Mode: Auto Mode: PRVC/ Volume Support  RR (machine): 12  TV (machine): 450  FiO2: 40  PEEP: 5  MAP: 9  PIP: 26      GENERAL: NAD, lying in bed  	HEAD:  Atraumatic, Normocephalic  	EYES: EOMI, PERRLA, conjunctiva and sclera clear  	ENT: Moist mucous membranes + endotracheal tube   	NECK: Supple, No JVD  	CHEST/LUNG: Clear to auscultation bilaterally; No rales, rhonchi, wheezing, or rubs.  	HEART: +Bradycardic, No murmurs, rubs, or gallops  	ABDOMEN: Bowel sounds present; Soft, Nontender, Nondistended  	EXTREMITIES:  2+ Peripheral Pulses, brisk capillary refill. No clubbing, cyanosis, or edema  	NERVOUS SYSTEM:  non focal     MSK: no cyanosis                           11.0   7.06  )-----------( 97       ( 09 Sep 2019 08:30 )             33.6   09-09    142  |  105  |  34<H>  ----------------------------<  143<H>  3.9   |  21  |  1.2    Ca    8.5      09 Sep 2019 08:29  Mg     1.8     09-09    TPro  5.7<L>  /  Alb  3.2<L>  /  TBili  3.4<H>  /  DBili  x   /  AST  16  /  ALT  9   /  AlkPhos  130<H>  09-09    ABG - ( 09 Sep 2019 16:22 )  pH, Arterial: 7.40  pH, Blood: x     /  pCO2: 40    /  pO2: 103   / HCO3: 25    / Base Excess: -0.2  /  SaO2: 98                     EXAM:  XR CHEST PORTABLE ROUTINE 1V            PROCEDURE DATE:  09/09/2019            INTERPRETATION:  Clinical History / Reason for exam: Ventilation    Comparison : Chest radiograph September 8, 2019.    Technique/Positioning: AP.    Findings:    Support devices: An endotracheal tube is seen with tip above the isaac.   An enteric tube is seen with tip in the stomach, and sidehole at the   expected location of the gastroesophageal junction.    Cardiac/mediastinum/hilum: Stable cardiomegaly.    Lung parenchyma/Pleura: Bilateral parenchymal opacities, and basilar   effusions, unchanged.    Skeleton/soft tissues: Stable.    Impression:      Bilateral parenchymal opacities and basilar effusions, unchanged.    ET tube, appropriate position.    Enteric tube with sidehole at the expected location of the   gastroesophageal junction. Consider mild advancement.                      KAREN HEMPHILL M.D., ATTENDING RADIOLOGIST  This document has been electronically signed. Sep  9 2019 10:25AM

## 2019-09-10 NOTE — PROGRESS NOTE ADULT - SUBJECTIVE AND OBJECTIVE BOX
Patient is a 87y old  Female who presents with a chief complaint of Weakness and bradycardia (10 Sep 2019 06:59)      Over Night Events:  Patient seen and examined still vented no pressors   s/p bronch today see procedure note       ROS:  See HPI    PHYSICAL EXAM    ICU Vital Signs Last 24 Hrs  T(C): 36.7 (10 Sep 2019 09:00), Max: 37.4 (10 Sep 2019 01:00)  T(F): 98 (10 Sep 2019 09:00), Max: 99.4 (10 Sep 2019 01:00)  HR: 46 (10 Sep 2019 07:50) (45 - 54)  BP: 138/63 (10 Sep 2019 07:00) (122/56 - 147/62)  BP(mean): 90 (10 Sep 2019 07:00) (81 - 111)  ABP: --  ABP(mean): --  RR: 12 (10 Sep 2019 09:00) (12 - 12)  SpO2: 100% (10 Sep 2019 09:) (98% - 100%)      General: awake   HEENT:     jacob           Lymph Nodes: NO cervical LN   Lungs: Bilateral BS  Cardiovascular: Regular   Abdomen: Soft, Positive BS  Extremities: No clubbing   Skin: warm   Neurological: no focal deficit   Musculoskeletal: move all ext     I&O's Detail    09 Sep 2019 07:01  -  10 Sep 2019 07:00  --------------------------------------------------------  IN:    Enteral Tube Flush: 90 mL    fentaNYL Infusion.: 275.8 mL    Free Water: 1000 mL    IV PiggyBack: 50 mL  Total IN: 1415.8 mL    OUT:    Indwelling Catheter - Urethral: 700 mL  Total OUT: 700 mL    Total NET: 715.8 mL      10 Sep 2019 07:01  -  10 Sep 2019 10:38  --------------------------------------------------------  IN:    fentaNYL Infusion.: 27.2 mL  Total IN: 27.2 mL    OUT:    Indwelling Catheter - Urethral: 45 mL  Total OUT: 45 mL    Total NET: -17.8 mL          LABS:                          10.9   6.94  )-----------( 114      ( 10 Sep 2019 05:47 )             33.0         10 Sep 2019 05:47    137    |  103    |  39     ----------------------------<  85     3.7     |  21     |  1.2      Ca    8.6        10 Sep 2019 05:47  Mg     1.8       09 Sep 2019 08:29    TPro  5.2    /  Alb  2.8    /  TBili  2.2    /  DBili  x      /  AST  14     /  ALT  <5     /  AlkPhos  124    10 Sep 2019 05:47  Amylase x     lipase x                                                                                        Urinalysis Basic - ( 08 Sep 2019 18:15 )    Color: Nunu / Appearance: Slightly Cloudy / S.015 / pH: x  Gluc: x / Ketone: Negative  / Bili: Moderate / Urobili: 4.0 mg/dL   Blood: x / Protein: 30 mg/dL / Nitrite: Negative   Leuk Esterase: Negative / RBC: >50 /HPF / WBC 3-5 /HPF   Sq Epi: x / Non Sq Epi: Few /HPF / Bacteria: Occasional /HPF                                                                Culture - Sputum (collected 08 Sep 2019 22:00)  Source: .Sputum Sputum  Gram Stain (10 Sep 2019 07:12):    Few Squamous epithelial cells per low power field    Few polymorphonuclear leukocytes per low power field    Few Gram variable coccobacilli per oil power field    Culture - Blood (collected 08 Sep 2019 18:44)  Source: .Blood None  Preliminary Report (10 Sep 2019 01:01):    No growth to date.    Culture - Urine (collected 08 Sep 2019 18:15)  Source: .Urine Catheterized  Final Report (09 Sep 2019 18:03):    No growth                                                   Mode: Auto Mode: PRVC/ Volume Support  RR (machine): 12  TV (machine): 450  FiO2: 35  PEEP: 5  MAP: 9  PIP: 28                                      ABG - ( 10 Sep 2019 05:27 )  pH, Arterial: 7.40  pH, Blood: x     /  pCO2: 37    /  pO2: 121   / HCO3: 23    / Base Excess: -1.5  /  SaO2: 99                  MEDICATIONS  (STANDING):  aspirin  chewable 81 milliGRAM(s) Oral daily  buDESOnide 160 MICROgram(s)/formoterol 4.5 MICROgram(s) Inhaler 2 Puff(s) Inhalation two times a day  carbidopa/levodopa  10/100 1 Tablet(s) Oral daily  carbidopa/levodopa  25/100 1 Tablet(s) Oral at bedtime  carvedilol 6.25 milliGRAM(s) Oral every 12 hours  cefepime   IVPB 2000 milliGRAM(s) IV Intermittent every 12 hours  cefepime   IVPB      chlorhexidine 0.12% Liquid 15 milliLiter(s) Oral Mucosa every 12 hours  chlorhexidine 4% Liquid 1 Application(s) Topical <User Schedule>  DULoxetine 60 milliGRAM(s) Oral daily  enoxaparin Injectable 30 milliGRAM(s) SubCutaneous daily  fentaNYL   Infusion. 0.5 MICROgram(s)/kG/Hr (3.395 mL/Hr) IV Continuous <Continuous>  influenza   Vaccine 0.5 milliLiter(s) IntraMuscular once  levothyroxine 75 MICROGram(s) Oral daily  midazolam Infusion 0.02 mG/kG/Hr (1.306 mL/Hr) IV Continuous <Continuous>  nystatin Powder 1 Application(s) Topical two times a day  pantoprazole    Tablet 40 milliGRAM(s) Oral before breakfast  pregabalin 100 milliGRAM(s) Oral two times a day  senna 2 Tablet(s) Oral at bedtime    MEDICATIONS  (PRN):  acetaminophen  Suppository .. 650 milliGRAM(s) Rectal every 6 hours PRN Temp greater or equal to 38C (100.4F)          Xrays:  TLC:  OG:  ET tube:                                                                                    b/l opacity    ECHO:  [facility] ICU:

## 2019-09-10 NOTE — PROGRESS NOTE ADULT - SUBJECTIVE AND OBJECTIVE BOX
Patient is a 87y old  Female who presents with a chief complaint of Weakness and bradycardia (10 Sep 2019 05:31)      T(F): 99.4 (09-10-19 @ 01:00), Max: 99.4 (09-10-19 @ 01:00)  HR: 46 (09-10-19 @ 05:00)  BP: 127/60 (09-10-19 @ 05:00)  RR: 12 (09-10-19 @ 05:00)  SpO2: 99% (09-10-19 @ 05:00) (98% - 100%)    PHYSICAL EXAM:  GENERAL: NAD, well-groomed, well-developed  HEAD:  Atraumatic, Normocephalic  EYES: EOMI, PERRLA, conjunctiva and sclera clear  ENMT: No tonsillar erythema, exudates, or enlargement; Moist mucous membranes, Good dentition, No lesions  NECK: Supple, No JVD, Normal thyroid  NERVOUS SYSTEM:  Alert & Oriented X3,  Motor Strength 5/5 B/L upper and lower extremities  CHEST/LUNG: Clear to percussion bilaterally; No rales, rhonchi, wheezing, or rubs Rhonchi  HEART: Regular rate and rhythm; No murmurs, rubs, or gallops  ABDOMEN: Soft, Nontender, Nondistended; Bowel sounds present  EXTREMITIES:   No clubbing, cyanosis, or edema  LYMPH: No lymphadenopathy noted  SKIN: No rashes or lesions    labs  09-09    142  |  105  |  34<H>  ----------------------------<  143<H>  3.9   |  21  |  1.2    Ca    8.5      09 Sep 2019 08:29  Mg     1.8     09-09    TPro  5.7<L>  /  Alb  3.2<L>  /  TBili  3.4<H>  /  DBili  x   /  AST  16  /  ALT  9   /  AlkPhos  130<H>  09-09                          10.9   6.94  )-----------( 114      ( 10 Sep 2019 05:47 )             33.0       Culture - Blood (collected 08 Sep 2019 18:44)  Source: .Blood None  Preliminary Report (10 Sep 2019 01:01):    No growth to date.    Culture - Urine (collected 08 Sep 2019 18:15)  Source: .Urine Catheterized  Final Report (09 Sep 2019 18:03):    No growth        Mode: Auto Mode: PRVC/ Volume Support  RR (machine): 12  TV (machine): 450  FiO2: 35  PEEP: 5  MAP: 9  PIP: 26        acetaminophen  Suppository .. 650 milliGRAM(s) Rectal every 6 hours PRN  aspirin  chewable 81 milliGRAM(s) Oral daily  buDESOnide 160 MICROgram(s)/formoterol 4.5 MICROgram(s) Inhaler 2 Puff(s) Inhalation two times a day  carbidopa/levodopa  10/100 1 Tablet(s) Oral daily  carbidopa/levodopa  25/100 1 Tablet(s) Oral at bedtime  carvedilol 6.25 milliGRAM(s) Oral every 12 hours  cefepime   IVPB 2000 milliGRAM(s) IV Intermittent every 12 hours  cefepime   IVPB      chlorhexidine 0.12% Liquid 15 milliLiter(s) Oral Mucosa every 12 hours  chlorhexidine 4% Liquid 1 Application(s) Topical <User Schedule>  DULoxetine 60 milliGRAM(s) Oral daily  enoxaparin Injectable 30 milliGRAM(s) SubCutaneous daily  fentaNYL   Infusion. 0.5 MICROgram(s)/kG/Hr IV Continuous <Continuous>  influenza   Vaccine 0.5 milliLiter(s) IntraMuscular once  levothyroxine 75 MICROGram(s) Oral daily  midazolam Infusion 0.02 mG/kG/Hr IV Continuous <Continuous>  nystatin Powder 1 Application(s) Topical two times a day  pantoprazole    Tablet 40 milliGRAM(s) Oral before breakfast  pregabalin 100 milliGRAM(s) Oral two times a day  senna 2 Tablet(s) Oral at bedtime

## 2019-09-10 NOTE — CONSULT NOTE ADULT - ASSESSMENT
IMP:  - acute resp failure  - aspiration  - h/o Parkinson's disease, CHF  pt not malnourished.  However, pt unfed since intubated 9/6, generally because she's been made npo in anticipation of extubation. d/w nurse, resident and Dr Kerns. Do not plan to extubate today, but hope to try again tomorrow. Will therefore start full enteral feedings now.  Est needs by PSE 1249 kcal/d, and protein ~ 70 gm/d.  Osmolite 1.5 at 35 ml/h plus 2 Prosource TF daily - 1323 kcal and 74 gm protein/d.  Please check phos level, maryam prior to attempting extubation.  Dietitian's input appreciated. IMP:  - acute resp failure  - aspiration  - h/o Parkinson's disease, CHF  pt not malnourished.  However, pt unfed since intubated 9/6, generally because she's been made npo in anticipation of extubation. d/w nurse, resident and Dr Kerns. Do not plan to extubate today, but hope to try again tomorrow. Will therefore start full enteral feedings now.  Est needs by PSE 1249 kcal/d, and protein ~ 70 gm/d.  Osmolite 1.5 at 35 ml/h plus 2 Prosource TF daily - 1323 kcal and 74 gm protein/d.  This does not meet RDI, so will add MV once/d.  Please check phos level, maryam prior to attempting extubation.  Dietitian's input appreciated.

## 2019-09-10 NOTE — PROGRESS NOTE ADULT - ASSESSMENT
IMPRESSION:  Cardio respiratory arrest  aspiration of breakfast  no sign pneumonia/pneumonitis as of yet  LLL atelectasis  rosalind cardia  hypernatremia        PLAN:    CNS: sedation as needed     HEENT: jacob    PULMONARY:  s/p bronch follow BAL   will do weaning trial herman bronch has significant secretion   CARDIOVASCULAR: echo keep Is = os   for now   GI: GI prophylaxis. NG  Feeding     RENAL: follow lytes     INFECTIOUS DISEASE: follow DTA and cx follow BAL   continue abx     HEMATOLOGICAL:  DVT prophylaxis.    ENDOCRINE:  Follow up FS.  Insulin protocol if needed.    MUSCULOSKELETAL:        CRITICAL CARE TIME SPENT:

## 2019-09-10 NOTE — CHART NOTE - NSCHARTNOTEFT_GEN_A_CORE
DATE OF PROCEDURE: 9/10/19    PREOPERATIVE DIAGNOSIS: b/l opacity     POSTOPERATIVE DIAGNOSES: PNA       PROCEDURE PERFORMED:  Bronchoscopy,  brushing and washing.         Attending:Dr Kerns         ASSISTANT:          ANESTHESIA:    CONSENT: After explanining the risk and benefit the consent was obtained from daughter over phone       The patient had been previously intubated and was on mechanical ventilatory support. 2mg versed given His FiO2 was increased to 100% during the procedure. The  fiberoptic bronchoscope was introduced through an endotracheal tube adaptor and the tip of the endotracheal tube was noted to be in good position above the isaac.   The Right tracheobronchial tree was inspected closely to the level of the subsegmental bronchi. there was significant mucopurulent secretion and was suctioned   The Left tracheobronchial tree also significant mucopurulent secretion, BAL was performed LLL      The procedure was completed and all samples were submitted for appropriate studies  After adequate clearing of secretions was accomplished, the bronchoscope was removed from the patient and the procedure was ended.   The patient tolerated the procedure well and there were no complications.

## 2019-09-10 NOTE — CHART NOTE - NSCHARTNOTEFT_GEN_A_CORE
Registered Dietitian Follow-Up     Patient Profile Reviewed                           Yes [v]   No []     Nutrition History Previously Obtained        Yes []  No [v]  pt remains intubated     Pertinent Subjective Information: As per RN plan for bronchoscopy today, LIP aware that pt has not been fed since 9/6. No BMs today.      Pertinent Medical Interventions: Pt admitted s/p fall, episode of bradycardia. Rapid response 9/6, intubated, respiratory failure - remains on vent support, ? possible extubation. CHF.         Diet order: no active diet orders; DASH/TLC discontinued 9/6     Anthropometrics:  - Ht. 5'0"  - Wt. 67.9kg (9/9) trending down, h/o CHF noted. Will continue to monitor  - %wt change  - BMI- 29   - IBW 45.5kg +/-10%     Pertinent Lab Data: 9/10 H/H- 10.9/33.0, BUN- 39, ALb- 2.8, GFR- 41     Pertinent Meds: fentanyl, IV abx, coreg, carbidopa/levodopa, cymbalta, levothyroxine, pantoprazole, senna     Physical Findings:  - Appearance: intubated, sedated  - GI function:  BM x1 (9/8)   - Tubes: OGT  - Oral/Mouth cavity:  - Skin: ecchymosis     Nutrition Requirements  Weight Used: 67.9kg -lowest documented and IBW- 45.5kg      Calories: 70-80% of PSE [phone] kcal vs 11-14 kcal/kg ABW= 770-980 kcal vs 22-25 kcal/kg IBW= 1491-2024 kcal (average is ~900-1000 kcal/day)   Protein: 1.5-2.0 g/kg IBW due to obese/intubated= 68-91 g/day   Fluid: per CCU team     Estimated Energy Needs    Continue []  Adjust [v] today   Adjusted Energy Recommendations:  ~ 1200 kcal/day ([id number])        Estimated Protein Needs    Continue []  Adjust [v]  Adjusted Protein Recommendations:   54-68gm/day (1.2-1.5gm/kg IBW- h/o CHF and borderline obesity noted)         Estimated Fluid Needs        Continue [v]  Adjust []  Adjusted Fluid Recommendations:   per CCU team   Nutrient Intake:        [v] Previous Nutrition Diagnosis:  Inadequate Energy Intake.               [v] Ongoing          [] Resolved    [] No active nutrition diagnosis identified at this time     Nutrition Diagnostic #1  Problem:  Etiology:  Statement:     Nutrition Diagnostic #2  Problem:  Etiology:  Statement:     Nutrition Intervention: Enteral nutrition.      Goal/Expected Outcome: Initiation of EN w/in 24-71hrs     Indicator/Monitoring: will monitor energy intake, diet order, labs, body composition, NFPF.     Recommended: when appropriate to start EN please order Osmolite 1.5 @20ml/hr and increase by 10ml in 6 hrs to goal of 30ml/hr via OGT, add Prosource 1pkt q 12hrs for total of 1160kcal, 67gm protein, 550ml h20. Additional fluids per LIP. Registered Dietitian Follow-Up     Patient Profile Reviewed                           Yes [v]   No []     Nutrition History Previously Obtained        Yes []  No [v]  pt remains intubated     Pertinent Subjective Information: As per RN plan for bronchoscopy today, LIP aware that pt has not been fed since 9/6. No BMs today.      Pertinent Medical Interventions: Pt admitted s/p fall, episode of bradycardia. Rapid response 9/6, intubated, respiratory failure - remains on vent support, ? possible extubation. CHF.         Diet order: no active diet orders; DASH/TLC discontinued 9/6     Anthropometrics:  - Ht. 5'0"  - Wt. 67.9kg (9/9) trending down, h/o CHF noted. Will continue to monitor  - %wt change  - BMI- 29   - IBW 45.5kg +/-10%     Pertinent Lab Data: 9/10 H/H- 10.9/33.0, BUN- 39, ALb- 2.8, GFR- 41     Pertinent Meds: fentanyl, IV abx, coreg, carbidopa/levodopa, cymbalta, levothyroxine, pantoprazole, senna     Physical Findings:  - Appearance: intubated, sedated  - GI function:  BM x1 (9/8)   - Tubes: OGT  - Oral/Mouth cavity:  - Skin: ecchymosis     Nutrition Requirements  Weight Used: 67.9kg -lowest documented and IBW- 45.5kg      Calories: 70-80% of PSE 2568=648-1465 kcal vs 11-14 kcal/kg ABW= 770-980 kcal vs 22-25 kcal/kg IBW= 5074-2162 kcal (average is ~900-1000 kcal/day)   Protein: 1.5-2.0 g/kg IBW due to obese/intubated= 68-91 g/day   Fluid: per CCU team     Estimated Energy Needs    Continue []  Adjust [v] today   Adjusted Energy Recommendations:  ~ 1200 kcal/day ([id number])        Estimated Protein Needs    Continue []  Adjust [v]  Adjusted Protein Recommendations:   54-68gm/day (1.2-1.5gm/kg IBW- h/o CHF and borderline obesity noted)         Estimated Fluid Needs        Continue [v]  Adjust []  Adjusted Fluid Recommendations:   per CCU team   Nutrient Intake:        [v] Previous Nutrition Diagnosis:  Inadequate Energy Intake.               [v] Ongoing          [] Resolved    [] No active nutrition diagnosis identified at this time     Nutrition Diagnostic #1  Problem:  Etiology:  Statement:     Nutrition Diagnostic #2  Problem:  Etiology:  Statement:     Nutrition Intervention: Enteral nutrition.      Goal/Expected Outcome: Initiation of EN w/in 24-71hrs     Indicator/Monitoring: will monitor energy intake, diet order, labs, body composition, NFPF.     Recommended: when appropriate to start EN please order Osmolite 1.5 @20ml/hr and increase by 10ml in 6 hrs to goal of 30ml/hr via OGT, add Prosource 1pkt q 12hrs for total of 1160kcal, 67gm protein, 550ml h20. Additional fluids per LIP. Monitor tolerance to feeds/electrolytes

## 2019-09-10 NOTE — PROGRESS NOTE ADULT - ASSESSMENT
87 year old female admitted s/p fall episode of bradycardia   was rapid response after aspirating breakfast on 9/6/19  CPR and epi given, ROSC, intubated    # Respiratory Failure - aspiration   - on cefepime  - midazolam if needed for sedation  - having copious secretions, bronch today- consent obtained from daughter over phone - Mrs. Violette Knight at [phone], rediscussed with daughter today with Dr. Kerns prior to bronch this morning   -on bronchoscopy: The Right tracheobronchial tree was inspected closely to the level of the subsegmental bronchi. there was significant mucopurulent secretion and was suctioned.  The Left tracheobronchial tree also significant mucopurulent secretion, BAL was performed LLL   -f/u Gram stain and cx from BAL and BA wash  - blood, urine cultures: no growth  -deep tracheal aspirate: Few Squamous epithelial cells per low power field, Few polymorphonuclear leukocytes per low power field, Few Gram variable coccobacilli per oil power field   -echo performed  -weaning trial tomorrow     # Cardiac / CHF  / HTN    - pulse better   - 20mg IV lasix once yesterday  - monitor weight and pulse     # Parkinson's   - continue Sinemet     # Hypothyroidism   - levothyroxine     enoxaparin dvt ppx  start tube feeds  full code

## 2019-09-10 NOTE — PROGRESS NOTE ADULT - ASSESSMENT
On vent . Having secretions, But less  Getting sedation . Awake responsive. Check cxr. Hr low on fentanyl  GI DVT prophylaxis,.  May need hold beta on sedation. ? try wean from vent.   Prognosis guarded

## 2019-09-10 NOTE — PROGRESS NOTE ADULT - SUBJECTIVE AND OBJECTIVE BOX
Patient is a 87y old  Female who presents with a chief complaint of Weakness and bradycardia (10 Sep 2019 10:47)      PAST MEDICAL & SURGICAL HISTORY:  Parkinson disease  Hypothyroid  Rib fractures  Congestive heart failure  Hypertension  No significant past surgical history      MEDICATIONS  (STANDING):  aspirin  chewable 81 milliGRAM(s) Oral daily  buDESOnide 160 MICROgram(s)/formoterol 4.5 MICROgram(s) Inhaler 2 Puff(s) Inhalation two times a day  carbidopa/levodopa  10/100 1 Tablet(s) Oral daily  carbidopa/levodopa  25/100 1 Tablet(s) Oral at bedtime  carvedilol 6.25 milliGRAM(s) Oral every 12 hours  cefepime   IVPB 2000 milliGRAM(s) IV Intermittent every 12 hours  cefepime   IVPB      chlorhexidine 0.12% Liquid 15 milliLiter(s) Oral Mucosa every 12 hours  chlorhexidine 4% Liquid 1 Application(s) Topical <User Schedule>  DULoxetine 60 milliGRAM(s) Oral daily  enoxaparin Injectable 30 milliGRAM(s) SubCutaneous daily  fentaNYL   Infusion. 0.5 MICROgram(s)/kG/Hr (3.395 mL/Hr) IV Continuous <Continuous>  influenza   Vaccine 0.5 milliLiter(s) IntraMuscular once  levothyroxine 75 MICROGram(s) Oral daily  midazolam Infusion 0.02 mG/kG/Hr (1.306 mL/Hr) IV Continuous <Continuous>  multivitamin  Chewable 1 Tablet(s) Oral daily  nystatin Powder 1 Application(s) Topical two times a day  pantoprazole    Tablet 40 milliGRAM(s) Oral before breakfast  pregabalin 100 milliGRAM(s) Oral two times a day  senna 2 Tablet(s) Oral at bedtime    MEDICATIONS  (PRN):  acetaminophen  Suppository .. 650 milliGRAM(s) Rectal every 6 hours PRN Temp greater or equal to 38C (100.4F)      Overnight events:    Vital Signs Last 24 Hrs  T(C): 36.7 (10 Sep 2019 09:00), Max: 37.4 (10 Sep 2019 01:00)  T(F): 98 (10 Sep 2019 09:00), Max: 99.4 (10 Sep 2019 01:00)  HR: 52 (10 Sep 2019 13:00) (45 - 52)  BP: 161/70 (10 Sep 2019 13:00) (122/56 - 175/72)  BP(mean): 100 (10 Sep 2019 13:00) (81 - 111)  RR: 17 (10 Sep 2019 13:00) (12 - 20)  SpO2: 100% (10 Sep 2019 13:00) (98% - 100%)  CAPILLARY BLOOD GLUCOSE        I&O's Summary    09 Sep 2019 07:01  -  10 Sep 2019 07:00  --------------------------------------------------------  IN: 1415.8 mL / OUT: 700 mL / NET: 715.8 mL    10 Sep 2019 07:01  -  10 Sep 2019 15:03  --------------------------------------------------------  IN: 353 mL / OUT: 205 mL / NET: 148 mL        Physical Exam:    GEN: intubated, opening eyes  HEAD:  Atraumatic, Normocephalic  EYES: conjunctiva and sclera clear  ENT: Moist mucous membranes    NECK: Supple, No JVD  CHEST/LUNG: Clear to auscultation bilaterally; No rales, rhonchi, wheezing, or rubs.  HEART: No murmurs, rubs, or gallops, HR 52  ABDOMEN: Bowel sounds present; Soft, Nontender, Nondistended  EXTREMITIES:  No clubbing, cyanosis, or edema  MSK: moving all 4 extremities        Labs:                        10.9   6.94  )-----------( 114      ( 10 Sep 2019 05:47 )             33.0             09-10    137  |  103  |  39<H>  ----------------------------<  85  3.7   |  21  |  1.2    Ca    8.6      10 Sep 2019 05:47  Phos  3.3     10  Mg     1.8         TPro  5.2<L>  /  Alb  2.8<L>  /  TBili  2.2<H>  /  DBili  x   /  AST  14  /  ALT  <5  /  AlkPhos  124<H>  09-10    LIVER FUNCTIONS - ( 10 Sep 2019 05:47 )  Alb: 2.8 g/dL / Pro: 5.2 g/dL / ALK PHOS: 124 U/L / ALT: <5 U/L / AST: 14 U/L / GGT: x                       ABG - ( 10 Sep 2019 05:27 )  pH, Arterial: 7.40  pH, Blood: x     /  pCO2: 37    /  pO2: 121   / HCO3: 23    / Base Excess: -1.5  /  SaO2: 99                Urinalysis Basic - ( 08 Sep 2019 18:15 )    Color: Nunu / Appearance: Slightly Cloudy / S.015 / pH: x  Gluc: x / Ketone: Negative  / Bili: Moderate / Urobili: 4.0 mg/dL   Blood: x / Protein: 30 mg/dL / Nitrite: Negative   Leuk Esterase: Negative / RBC: >50 /HPF / WBC 3-5 /HPF   Sq Epi: x / Non Sq Epi: Few /HPF / Bacteria: Occasional /HPF        Culture - Sputum (collected 08 Sep 2019 22:00)  Source: .Sputum Sputum  Gram Stain (10 Sep 2019 07:12):    Few Squamous epithelial cells per low power field    Few polymorphonuclear leukocytes per low power field    Few Gram variable coccobacilli per oil power field    Culture - Blood (collected 08 Sep 2019 18:44)  Source: .Blood None  Preliminary Report (10 Sep 2019 01:01):    No growth to date.    Culture - Urine (collected 08 Sep 2019 18:15)  Source: .Urine Catheterized  Final Report (09 Sep 2019 18:03):    No growth

## 2019-09-11 LAB
-  AMPICILLIN/SULBACTAM: SIGNIFICANT CHANGE UP
-  CEFAZOLIN: SIGNIFICANT CHANGE UP
-  CLINDAMYCIN: SIGNIFICANT CHANGE UP
-  ERYTHROMYCIN: SIGNIFICANT CHANGE UP
-  GENTAMICIN: SIGNIFICANT CHANGE UP
-  LINEZOLID: SIGNIFICANT CHANGE UP
-  OXACILLIN: SIGNIFICANT CHANGE UP
-  PENICILLIN: SIGNIFICANT CHANGE UP
-  RIFAMPIN: SIGNIFICANT CHANGE UP
-  TETRACYCLINE: SIGNIFICANT CHANGE UP
-  TRIMETHOPRIM/SULFAMETHOXAZOLE: SIGNIFICANT CHANGE UP
-  VANCOMYCIN: SIGNIFICANT CHANGE UP
ALBUMIN SERPL ELPH-MCNC: 2.8 G/DL — LOW (ref 3.5–5.2)
ALP SERPL-CCNC: 138 U/L — HIGH (ref 30–115)
ALT FLD-CCNC: <5 U/L — SIGNIFICANT CHANGE UP (ref 0–41)
ANION GAP SERPL CALC-SCNC: 11 MMOL/L — SIGNIFICANT CHANGE UP (ref 7–14)
AST SERPL-CCNC: 14 U/L — SIGNIFICANT CHANGE UP (ref 0–41)
BILIRUB SERPL-MCNC: 1.5 MG/DL — HIGH (ref 0.2–1.2)
BUN SERPL-MCNC: 38 MG/DL — HIGH (ref 10–20)
CALCIUM SERPL-MCNC: 8.6 MG/DL — SIGNIFICANT CHANGE UP (ref 8.5–10.1)
CHLORIDE SERPL-SCNC: 103 MMOL/L — SIGNIFICANT CHANGE UP (ref 98–110)
CO2 SERPL-SCNC: 22 MMOL/L — SIGNIFICANT CHANGE UP (ref 17–32)
CREAT SERPL-MCNC: 1 MG/DL — SIGNIFICANT CHANGE UP (ref 0.7–1.5)
GLUCOSE SERPL-MCNC: 142 MG/DL — HIGH (ref 70–99)
GRAM STN FLD: SIGNIFICANT CHANGE UP
HCT VFR BLD CALC: 33.4 % — LOW (ref 37–47)
HGB BLD-MCNC: 11 G/DL — LOW (ref 12–16)
MAGNESIUM SERPL-MCNC: 2 MG/DL — SIGNIFICANT CHANGE UP (ref 1.8–2.4)
MCHC RBC-ENTMCNC: 28.6 PG — SIGNIFICANT CHANGE UP (ref 27–31)
MCHC RBC-ENTMCNC: 32.9 G/DL — SIGNIFICANT CHANGE UP (ref 32–37)
MCV RBC AUTO: 87 FL — SIGNIFICANT CHANGE UP (ref 81–99)
METHOD TYPE: SIGNIFICANT CHANGE UP
NRBC # BLD: 0 /100 WBCS — SIGNIFICANT CHANGE UP (ref 0–0)
PHOSPHATE SERPL-MCNC: 2.4 MG/DL — SIGNIFICANT CHANGE UP (ref 2.1–4.9)
PLATELET # BLD AUTO: 152 K/UL — SIGNIFICANT CHANGE UP (ref 130–400)
POTASSIUM SERPL-MCNC: 3.7 MMOL/L — SIGNIFICANT CHANGE UP (ref 3.5–5)
POTASSIUM SERPL-SCNC: 3.7 MMOL/L — SIGNIFICANT CHANGE UP (ref 3.5–5)
PROT SERPL-MCNC: 5.1 G/DL — LOW (ref 6–8)
RBC # BLD: 3.84 M/UL — LOW (ref 4.2–5.4)
RBC # FLD: 17.4 % — HIGH (ref 11.5–14.5)
SODIUM SERPL-SCNC: 136 MMOL/L — SIGNIFICANT CHANGE UP (ref 135–146)
SPECIMEN SOURCE: SIGNIFICANT CHANGE UP
WBC # BLD: 7.44 K/UL — SIGNIFICANT CHANGE UP (ref 4.8–10.8)
WBC # FLD AUTO: 7.44 K/UL — SIGNIFICANT CHANGE UP (ref 4.8–10.8)

## 2019-09-11 PROCEDURE — 71045 X-RAY EXAM CHEST 1 VIEW: CPT | Mod: 26

## 2019-09-11 RX ORDER — MIDAZOLAM HYDROCHLORIDE 1 MG/ML
0.02 INJECTION, SOLUTION INTRAMUSCULAR; INTRAVENOUS
Qty: 100 | Refills: 0 | Status: DISCONTINUED | OUTPATIENT
Start: 2019-09-11 | End: 2019-09-14

## 2019-09-11 RX ADMIN — CEFEPIME 100 MILLIGRAM(S): 1 INJECTION, POWDER, FOR SOLUTION INTRAMUSCULAR; INTRAVENOUS at 17:20

## 2019-09-11 RX ADMIN — Medication 1 TABLET(S): at 17:20

## 2019-09-11 RX ADMIN — Medication 81 MILLIGRAM(S): at 11:15

## 2019-09-11 RX ADMIN — NYSTATIN CREAM 1 APPLICATION(S): 100000 CREAM TOPICAL at 17:25

## 2019-09-11 RX ADMIN — CHLORHEXIDINE GLUCONATE 15 MILLILITER(S): 213 SOLUTION TOPICAL at 17:19

## 2019-09-11 RX ADMIN — SENNA PLUS 2 TABLET(S): 8.6 TABLET ORAL at 21:10

## 2019-09-11 RX ADMIN — CHLORHEXIDINE GLUCONATE 15 MILLILITER(S): 213 SOLUTION TOPICAL at 05:21

## 2019-09-11 RX ADMIN — ENOXAPARIN SODIUM 30 MILLIGRAM(S): 100 INJECTION SUBCUTANEOUS at 11:15

## 2019-09-11 RX ADMIN — CEFEPIME 100 MILLIGRAM(S): 1 INJECTION, POWDER, FOR SOLUTION INTRAMUSCULAR; INTRAVENOUS at 05:20

## 2019-09-11 RX ADMIN — PANTOPRAZOLE SODIUM 40 MILLIGRAM(S): 20 TABLET, DELAYED RELEASE ORAL at 05:23

## 2019-09-11 RX ADMIN — CARBIDOPA AND LEVODOPA 1 TABLET(S): 25; 100 TABLET ORAL at 11:16

## 2019-09-11 RX ADMIN — FENTANYL CITRATE 3.4 MICROGRAM(S)/KG/HR: 50 INJECTION INTRAVENOUS at 01:00

## 2019-09-11 RX ADMIN — NYSTATIN CREAM 1 APPLICATION(S): 100000 CREAM TOPICAL at 05:22

## 2019-09-11 RX ADMIN — Medication 75 MICROGRAM(S): at 05:21

## 2019-09-11 RX ADMIN — CHLORHEXIDINE GLUCONATE 1 APPLICATION(S): 213 SOLUTION TOPICAL at 05:21

## 2019-09-11 RX ADMIN — CARBIDOPA AND LEVODOPA 1 TABLET(S): 25; 100 TABLET ORAL at 21:11

## 2019-09-11 NOTE — PROGRESS NOTE ADULT - SUBJECTIVE AND OBJECTIVE BOX
Patient is a 87y old  Female who presents with a chief complaint of Weakness and bradycardia (11 Sep 2019 07:42)      Over Night Events:  Patient seen and examined s/p bronch still has significant secretion via ET tube       ROS:  See HPI    PHYSICAL EXAM    ICU Vital Signs Last 24 Hrs  T(C): 37.8 (11 Sep 2019 07:58), Max: 37.8 (11 Sep 2019 07:58)  T(F): 100 (11 Sep 2019 07:58), Max: 100 (11 Sep 2019 07:58)  HR: 49 (11 Sep 2019 07:05) (45 - 99)  BP: 143/60 (11 Sep 2019 07:05) (116/56 - 166/69)  BP(mean): 87 (11 Sep 2019 07:05) (81 - 115)  ABP: --  ABP(mean): --  RR: 14 (11 Sep 2019 07:05) (12 - 20)  SpO2: 99% (11 Sep 2019 07:05) (97% - 100%)      General: awake   HEENT:      jacob          Lymph Nodes: NO cervical LN   Lungs: Bilateral crackles   Cardiovascular: Regular   Abdomen: Soft, Positive BS  Extremities: No clubbing   Skin: warm   Neurological: no focal deficit   Musculoskeletal: move all ext     I&O's Detail    10 Sep 2019 07:01  -  11 Sep 2019 07:00  --------------------------------------------------------  IN:    Enteral Tube Flush: 60 mL    fentaNYL Infusion.: 231.2 mL    Free Water: 1000 mL    Osmolite: 700 mL    Solution: 100 mL  Total IN: 2091.2 mL    OUT:    Indwelling Catheter - Urethral: 740 mL  Total OUT: 740 mL    Total NET: 1351.2 mL      11 Sep 2019 07:01  -  11 Sep 2019 09:19  --------------------------------------------------------  IN:  Total IN: 0 mL    OUT:    Indwelling Catheter - Urethral: 35 mL  Total OUT: 35 mL    Total NET: -35 mL          LABS:                          11.0   7.44  )-----------( 152      ( 11 Sep 2019 05:30 )             33.4         11 Sep 2019 05:30    136    |  103    |  38     ----------------------------<  142    3.7     |  22     |  1.0      Ca    8.6        11 Sep 2019 05:30  Phos  2.4       11 Sep 2019 05:30  Mg     2.0       11 Sep 2019 05:30    TPro  5.1    /  Alb  2.8    /  TBili  1.5    /  DBili  x      /  AST  14     /  ALT  <5     /  AlkPhos  138    11 Sep 2019 05:30  Amylase x     lipase x                                                                                                                                                    Culture - Bronchial (collected 10 Sep 2019 12:00)  Source: Lavage None  Gram Stain (11 Sep 2019 07:20):    Few polymorphonuclear leukocytes seen per low power field    Few Squamous epithelial cells seen per low power field    No organisms seen per oil power field    Culture - Bronchial (collected 10 Sep 2019 12:00)  Source: Bronch Wash Bronchoalveolar Lavage  Gram Stain (11 Sep 2019 07:21):    Few polymorphonuclear leukocytes seen per low power field    Few Squamous epithelial cells seen per low power field    No organisms seen per oil power field    Culture - Sputum (collected 08 Sep 2019 22:00)  Source: .Sputum Sputum  Gram Stain (10 Sep 2019 07:12):    Few Squamous epithelial cells per low power field    Few polymorphonuclear leukocytes per low power field    Few Gram variable coccobacilli per oil power field  Preliminary Report (10 Sep 2019 19:56):    Numerous Staphylococcus aureus    Normal Respiratory Shahnaz present    Culture - Blood (collected 08 Sep 2019 18:44)  Source: .Blood None  Preliminary Report (10 Sep 2019 01:01):    No growth to date.    Culture - Urine (collected 08 Sep 2019 18:15)  Source: .Urine Catheterized  Final Report (09 Sep 2019 18:03):    No growth                                                   Mode: AC/ CMV (Assist Control/ Continuous Mandatory Ventilation)  RR (machine): 12  TV (machine): 450  FiO2: 40  PEEP: 5  MAP: 10  PIP: 27                                      ABG - ( 11 Sep 2019 03:13 )  pH, Arterial: 7.43  pH, Blood: x     /  pCO2: 34    /  pO2: 183   / HCO3: 23    / Base Excess: -0.9  /  SaO2: 100                 MEDICATIONS  (STANDING):  aspirin  chewable 81 milliGRAM(s) Oral daily  buDESOnide 160 MICROgram(s)/formoterol 4.5 MICROgram(s) Inhaler 2 Puff(s) Inhalation two times a day  carbidopa/levodopa  10/100 1 Tablet(s) Oral daily  carbidopa/levodopa  25/100 1 Tablet(s) Oral at bedtime  carvedilol 6.25 milliGRAM(s) Oral every 12 hours  cefepime   IVPB 2000 milliGRAM(s) IV Intermittent every 12 hours  cefepime   IVPB      chlorhexidine 0.12% Liquid 15 milliLiter(s) Oral Mucosa every 12 hours  chlorhexidine 4% Liquid 1 Application(s) Topical <User Schedule>  DULoxetine 60 milliGRAM(s) Oral daily  enoxaparin Injectable 30 milliGRAM(s) SubCutaneous daily  fentaNYL   Infusion. 0.5 MICROgram(s)/kG/Hr (3.395 mL/Hr) IV Continuous <Continuous>  influenza   Vaccine 0.5 milliLiter(s) IntraMuscular once  levothyroxine 75 MICROGram(s) Oral daily  midazolam Infusion 0.02 mG/kG/Hr (1.306 mL/Hr) IV Continuous <Continuous>  multivitamin  Chewable 1 Tablet(s) Oral daily  nystatin Powder 1 Application(s) Topical two times a day  pantoprazole    Tablet 40 milliGRAM(s) Oral before breakfast  senna 2 Tablet(s) Oral at bedtime    MEDICATIONS  (PRN):  acetaminophen  Suppository .. 650 milliGRAM(s) Rectal every 6 hours PRN Temp greater or equal to 38C (100.4F)          Xrays:  TLC:  OG:  ET tube:                                                                                    b/l opacity    ECHO:  [facility] ICU:

## 2019-09-11 NOTE — PROGRESS NOTE ADULT - ASSESSMENT
IMPRESSION:  Cardio respiratory arrest  aspiration of breakfast  no sign pneumonia/pneumonitis as of yet  LLL atelectasis  rosalind cardia  hypernatremia        PLAN:    CNS: sedation as needed     HEENT: jacob    PULMONARY:  follow bAL   cxr significant opacity still require frequent suction for secretion via et tube    will hold on extubation   CARDIOVASCULAR: echo keep Is = os   for now   GI: GI prophylaxis. NG  Feeding     RENAL: follow lytes     INFECTIOUS DISEASE: follow DTA and cx follow BAL   continue abx if spike temp add vanco     HEMATOLOGICAL:  DVT prophylaxis.    ENDOCRINE:  Follow up FS.  Insulin protocol if needed.    MUSCULOSKELETAL:        CRITICAL CARE TIME SPENT:

## 2019-09-11 NOTE — PROGRESS NOTE ADULT - ASSESSMENT
87 year old female admitted s/p fall episode of bradycardia ; sinemet ; rapid response respiratory failure ; episode of temp     # Respiratory Failure - aspiration  secretions  s/p bronch    - on Cefepime  - continue vent parameters per Pulm / Critical care   - on IV sedation Fentanyl /midazolam neurologic status   -on bronchoscopy: The Right tracheobronchial tree was inspected closely to the level of the subsegmental bronchi. there was significant mucopurulent secretion and was suctioned.  The Left tracheobronchial tree also significant mucopurulent secretion, BAL was performed LLL   -f/u Gram stain and cx from BAL and BA wash  - blood, urine cultures: no growth  -deep tracheal aspirate: Few Squamous epithelial cells per low power field, Few polymorphonuclear leukocytes per low power field, Few Gram variable coccobacilli per oil power field     - wean as tolerated   - SLP evaluation when extubated        # Cardiac / CHF  / HTN      - pulse better   - continue lasix   - reviewed echo  - monitor weight and pulse        # Parkinson's     - continue Sinemet     # Hypothyroidism     - levothyroxine     # GI and DVT prophylaxsis     - pantoprazxole   - enoxaparin    advance care  noted  cpr full code    dispostion -

## 2019-09-11 NOTE — PROGRESS NOTE ADULT - SUBJECTIVE AND OBJECTIVE BOX
Patient is a 87y old  Female who presents with a chief complaint of Weakness and bradycardia (11 Sep 2019 09:19)      PAST MEDICAL & SURGICAL HISTORY:  Parkinson disease  Hypothyroid  Rib fractures  Congestive heart failure  Hypertension  No significant past surgical history      MEDICATIONS  (STANDING):  aspirin  chewable 81 milliGRAM(s) Oral daily  buDESOnide 160 MICROgram(s)/formoterol 4.5 MICROgram(s) Inhaler 2 Puff(s) Inhalation two times a day  carbidopa/levodopa  10/100 1 Tablet(s) Oral daily  carbidopa/levodopa  25/100 1 Tablet(s) Oral at bedtime  cefepime   IVPB 2000 milliGRAM(s) IV Intermittent every 12 hours  cefepime   IVPB      chlorhexidine 0.12% Liquid 15 milliLiter(s) Oral Mucosa every 12 hours  chlorhexidine 4% Liquid 1 Application(s) Topical <User Schedule>  DULoxetine 60 milliGRAM(s) Oral daily  enoxaparin Injectable 30 milliGRAM(s) SubCutaneous daily  fentaNYL   Infusion. 0.5 MICROgram(s)/kG/Hr (3.395 mL/Hr) IV Continuous <Continuous>  influenza   Vaccine 0.5 milliLiter(s) IntraMuscular once  levothyroxine 75 MICROGram(s) Oral daily  midazolam Infusion 0.02 mG/kG/Hr (1.306 mL/Hr) IV Continuous <Continuous>  midazolam Infusion 0.02 mG/kG/Hr (1.358 mL/Hr) IV Continuous <Continuous>  multivitamin  Chewable 1 Tablet(s) Oral daily  nystatin Powder 1 Application(s) Topical two times a day  pantoprazole    Tablet 40 milliGRAM(s) Oral before breakfast  senna 2 Tablet(s) Oral at bedtime    MEDICATIONS  (PRN):  acetaminophen  Suppository .. 650 milliGRAM(s) Rectal every 6 hours PRN Temp greater or equal to 38C (100.4F)      Overnight events:    Vital Signs Last 24 Hrs  T(C): 36.4 (11 Sep 2019 11:01), Max: 37.8 (11 Sep 2019 07:58)  T(F): 97.5 (11 Sep 2019 11:01), Max: 100 (11 Sep 2019 07:58)  HR: 55 (11 Sep 2019 13:00) (45 - 99)  BP: 168/70 (11 Sep 2019 13:00) (105/51 - 168/70)  BP(mean): 101 (11 Sep 2019 13:00) (74 - 115)  RR: 12 (11 Sep 2019 13:00) (12 - 25)  SpO2: 100% (11 Sep 2019 13:00) (97% - 100%)  CAPILLARY BLOOD GLUCOSE        I&O's Summary    10 Sep 2019 07:01  -  11 Sep 2019 07:00  --------------------------------------------------------  IN: 2091.2 mL / OUT: 740 mL / NET: 1351.2 mL    11 Sep 2019 07:01  -  11 Sep 2019 13:40  --------------------------------------------------------  IN: 438.6 mL / OUT: 135 mL / NET: 303.6 mL        Physical Exam:    GEN: intubated, opening eyes  HEAD:  Atraumatic, Normocephalic  EYES: conjunctiva and sclera clear  ENT: Moist mucous membranes    NECK: Supple, No JVD  CHEST/LUNG: Clear to auscultation bilaterally; No rales, rhonchi, wheezing, or rubs.  HEART: No murmurs, rubs, or gallops, HR 52  ABDOMEN: Bowel sounds present; Soft, Nontender, Nondistended  EXTREMITIES:  No clubbing, cyanosis, or edema  MSK: moving all 4 extremities        Labs:                        11.0   7.44  )-----------( 152      ( 11 Sep 2019 05:30 )             33.4             09-11    136  |  103  |  38<H>  ----------------------------<  142<H>  3.7   |  22  |  1.0    Ca    8.6      11 Sep 2019 05:30  Phos  2.4     09-11  Mg     2.0     09-11    TPro  5.1<L>  /  Alb  2.8<L>  /  TBili  1.5<H>  /  DBili  x   /  AST  14  /  ALT  <5  /  AlkPhos  138<H>  09-11    LIVER FUNCTIONS - ( 11 Sep 2019 05:30 )  Alb: 2.8 g/dL / Pro: 5.1 g/dL / ALK PHOS: 138 U/L / ALT: <5 U/L / AST: 14 U/L / GGT: x                       ABG - ( 11 Sep 2019 03:13 )  pH, Arterial: 7.43  pH, Blood: x     /  pCO2: 34    /  pO2: 183   / HCO3: 23    / Base Excess: -0.9  /  SaO2: 100                   Culture - Bronchial (collected 10 Sep 2019 12:00)  Source: Lavage None  Gram Stain (11 Sep 2019 07:20):    Few polymorphonuclear leukocytes seen per low power field    Few Squamous epithelial cells seen per low power field    No organisms seen per oil power field    Culture - Bronchial (collected 10 Sep 2019 12:00)  Source: Bronch Wash Bronchoalveolar Lavage  Gram Stain (11 Sep 2019 07:21):    Few polymorphonuclear leukocytes seen per low power field    Few Squamous epithelial cells seen per low power field    No organisms seen per oil power field    Culture - Sputum (collected 08 Sep 2019 22:00)  Source: .Sputum Sputum  Gram Stain (10 Sep 2019 07:12):    Few Squamous epithelial cells per low power field    Few polymorphonuclear leukocytes per low power field    Few Gram variable coccobacilli per oil power field  Preliminary Report (10 Sep 2019 19:56):    Numerous Staphylococcus aureus    Normal Respiratory Shahnaz present    Culture - Blood (collected 08 Sep 2019 18:44)  Source: .Blood None  Preliminary Report (10 Sep 2019 01:01):    No growth to date.    Culture - Urine (collected 08 Sep 2019 18:15)  Source: .Urine Catheterized  Final Report (09 Sep 2019 18:03):    No growth

## 2019-09-11 NOTE — PROGRESS NOTE ADULT - SUBJECTIVE AND OBJECTIVE BOX
Patient is a 87y old  Female who presents with a chief complaint of Weakness and bradycardia (11 Sep 2019 05:53)      T(F): 98.2 (09-11-19 @ 07:05), Max: 98.2 (09-11-19 @ 07:05)  HR: 90 (09-11-19 @ 05:00)  BP: 142/63 (09-11-19 @ 05:00)  RR: 14 (09-11-19 @ 07:05)  SpO2: 97% (09-11-19 @ 00:21) (97% - 100%)    PHYSICAL EXAM:  GENERAL: NAD, well-groomed, well-developed  HEAD:  Atraumatic, Normocephalic  EYES: EOMI, PERRLA, conjunctiva and sclera clear  ENMT: No tonsillar erythema, exudates, or enlargement; Moist mucous membranes, Good dentition, No lesions  NECK: Supple, No JVD, Normal thyroid  NERVOUS SYSTEM:  Alert & Oriented X3,  Motor Strength 5/5 B/L upper and lower extremities  CHEST/LUNG: Clear to percussion bilaterally; No rales, rhonchi, wheezing, or rubs  HEART: Regular rate and rhythm; No murmurs, rubs, or gallops  ABDOMEN: Soft, Nontender, Nondistended; Bowel sounds present  EXTREMITIES:   No clubbing, cyanosis, or edema  LYMPH: No lymphadenopathy noted  SKIN: No rashes or lesions    labs  09-11    136  |  103  |  38<H>  ----------------------------<  142<H>  3.7   |  22  |  1.0    Ca    8.6      11 Sep 2019 05:30  Phos  2.4     09-11  Mg     2.0     09-11    TPro  5.1<L>  /  Alb  2.8<L>  /  TBili  1.5<H>  /  DBili  x   /  AST  14  /  ALT  <5  /  AlkPhos  138<H>  09-11                          11.0   7.44  )-----------( 152      ( 11 Sep 2019 05:30 )             33.4       Culture - Bronchial (collected 10 Sep 2019 12:00)  Source: Lavage None  Gram Stain (11 Sep 2019 07:20):    Few polymorphonuclear leukocytes seen per low power field    Few Squamous epithelial cells seen per low power field    No organisms seen per oil power field    Culture - Bronchial (collected 10 Sep 2019 12:00)  Source: Bronch Wash Bronchoalveolar Lavage  Gram Stain (11 Sep 2019 07:21):    Few polymorphonuclear leukocytes seen per low power field    Few Squamous epithelial cells seen per low power field    No organisms seen per oil power field    Culture - Sputum (collected 08 Sep 2019 22:00)  Source: .Sputum Sputum  Gram Stain (10 Sep 2019 07:12):    Few Squamous epithelial cells per low power field    Few polymorphonuclear leukocytes per low power field    Few Gram variable coccobacilli per oil power field  Preliminary Report (10 Sep 2019 19:56):    Numerous Staphylococcus aureus    Normal Respiratory Shahnaz present    Culture - Blood (collected 08 Sep 2019 18:44)  Source: .Blood None  Preliminary Report (10 Sep 2019 01:01):    No growth to date.    Culture - Urine (collected 08 Sep 2019 18:15)  Source: .Urine Catheterized  Final Report (09 Sep 2019 18:03):    No growth        Mode: AC/ CMV (Assist Control/ Continuous Mandatory Ventilation)  RR (machine): 12  TV (machine): 450  FiO2: 40  PEEP: 5  MAP: 10  PIP: 27        acetaminophen  Suppository .. 650 milliGRAM(s) Rectal every 6 hours PRN  aspirin  chewable 81 milliGRAM(s) Oral daily  buDESOnide 160 MICROgram(s)/formoterol 4.5 MICROgram(s) Inhaler 2 Puff(s) Inhalation two times a day  carbidopa/levodopa  10/100 1 Tablet(s) Oral daily  carbidopa/levodopa  25/100 1 Tablet(s) Oral at bedtime  carvedilol 6.25 milliGRAM(s) Oral every 12 hours  cefepime   IVPB 2000 milliGRAM(s) IV Intermittent every 12 hours  cefepime   IVPB      chlorhexidine 0.12% Liquid 15 milliLiter(s) Oral Mucosa every 12 hours  chlorhexidine 4% Liquid 1 Application(s) Topical <User Schedule>  DULoxetine 60 milliGRAM(s) Oral daily  enoxaparin Injectable 30 milliGRAM(s) SubCutaneous daily  fentaNYL   Infusion. 0.5 MICROgram(s)/kG/Hr IV Continuous <Continuous>  influenza   Vaccine 0.5 milliLiter(s) IntraMuscular once  levothyroxine 75 MICROGram(s) Oral daily  midazolam Infusion 0.02 mG/kG/Hr IV Continuous <Continuous>  multivitamin  Chewable 1 Tablet(s) Oral daily  nystatin Powder 1 Application(s) Topical two times a day  pantoprazole    Tablet 40 milliGRAM(s) Oral before breakfast  senna 2 Tablet(s) Oral at bedtime

## 2019-09-11 NOTE — PROGRESS NOTE ADULT - SUBJECTIVE AND OBJECTIVE BOX
88 yo F presents to the ED c/o bradycardia, noticed by visiting Nurse today and she called EMS. Pt also states she felt forward and flat on Sunday 9/1/19 and has been anxious to climb up or down the stairs. Pt ambulates with a walker at home. Pt denies any similar episodes, traveling, exposure to ticks, fevers, chills, headaches, changes in mentation, visual changes, CP, SOB, abdominal pain, dysuria, changes in BM, rashes, skin discolorations, new medications.      interval : vented s/p bronchosopy with was     PAST MEDICAL & SURGICAL HISTORY:  Parkinson disease  Hypothyroid  Rib fractures  Congestive heart failure  Hypertension  No significant past surgical history      MEDICATIONS  (STANDING):  aspirin  chewable 81 milliGRAM(s) Oral daily  buDESOnide 160 MICROgram(s)/formoterol 4.5 MICROgram(s) Inhaler 2 Puff(s) Inhalation two times a day  carbidopa/levodopa  10/100 1 Tablet(s) Oral daily  carbidopa/levodopa  25/100 1 Tablet(s) Oral at bedtime  carvedilol 6.25 milliGRAM(s) Oral every 12 hours  cefepime   IVPB 2000 milliGRAM(s) IV Intermittent every 12 hours  cefepime   IVPB      chlorhexidine 0.12% Liquid 15 milliLiter(s) Oral Mucosa every 12 hours  chlorhexidine 4% Liquid 1 Application(s) Topical <User Schedule>  DULoxetine 60 milliGRAM(s) Oral daily  enoxaparin Injectable 30 milliGRAM(s) SubCutaneous daily  fentaNYL   Infusion. 0.5 MICROgram(s)/kG/Hr (3.395 mL/Hr) IV Continuous <Continuous>  influenza   Vaccine 0.5 milliLiter(s) IntraMuscular once  levothyroxine 75 MICROGram(s) Oral daily  midazolam Infusion 0.02 mG/kG/Hr (1.306 mL/Hr) IV Continuous <Continuous>  multivitamin  Chewable 1 Tablet(s) Oral daily  nystatin Powder 1 Application(s) Topical two times a day  pantoprazole    Tablet 40 milliGRAM(s) Oral before breakfast  senna 2 Tablet(s) Oral at bedtime    MEDICATIONS  (PRN):  acetaminophen  Suppository .. 650 milliGRAM(s) Rectal every 6 hours PRN Temp greater or equal to 38C (100.4F)    ICU Vital Signs Last 24 Hrs  T(C): 35.9 (10 Sep 2019 23:40), Max: 37 (10 Sep 2019 07:01)  T(F): 96.7 (10 Sep 2019 23:40), Max: 98.6 (10 Sep 2019 07:01)  HR: 90 (11 Sep 2019 05:00) (45 - 99)  BP: 142/63 (11 Sep 2019 05:00) (116/56 - 175/72)  BP(mean): 90 (10 Sep 2019 21:00) (81 - 115)  ABP: --  ABP(mean): --  RR: 12 (11 Sep 2019 00:21) (12 - 20)  SpO2: 97% (11 Sep 2019 00:21) (97% - 100%)    Mode: AC/ CMV (Assist Control/ Continuous Mandatory Ventilation)  RR (machine): 12  TV (machine): 450  FiO2: 40  PEEP: 5  MAP: 10  PIP: 27        GENERAL: NAD, lying in bed  	HEAD:  Atraumatic, Normocephalic  	EYES: EOMI, PERRLA, conjunctiva and sclera clear  	ENT: Moist mucous membranes + endotracheal tube   	NECK: Supple, No JVD  	CHEST/LUNG: Clear to auscultation bilaterally; No rales, rhonchi, wheezing, or rubs.  	HEART: +Bradycardic, No murmurs, rubs, or gallops  	ABDOMEN: Bowel sounds present; Soft, Nontender, Nondistended  	EXTREMITIES:  2+ Peripheral Pulses, brisk capillary refill. No clubbing, cyanosis, or edema  	NERVOUS SYSTEM:  non focal     MSK: no cyanosis                           10.9   6.94  )-----------( 114      ( 10 Sep 2019 05:47 )             33.0   09-10    137  |  103  |  39<H>  ----------------------------<  85  3.7   |  21  |  1.2    Ca    8.6      10 Sep 2019 05:47  Phos  3.3     09-10  Mg     1.8     09-09    TPro  5.2<L>  /  Alb  2.8<L>  /  TBili  2.2<H>  /  DBili  x   /  AST  14  /  ALT  <5  /  AlkPhos  124<H>  09-10              EXAM:  XR CHEST PORTABLE ROUTINE 1V            PROCEDURE DATE:  09/09/2019            INTERPRETATION:  Clinical History / Reason for exam: Ventilation    Comparison : Chest radiograph September 8, 2019.    Technique/Positioning: AP.    Findings:    Support devices: An endotracheal tube is seen with tip above the isaac.   An enteric tube is seen with tip in the stomach, and sidehole at the   expected location of the gastroesophageal junction.    Cardiac/mediastinum/hilum: Stable cardiomegaly.    Lung parenchyma/Pleura: Bilateral parenchymal opacities, and basilar   effusions, unchanged.    Skeleton/soft tissues: Stable.    Impression:      Bilateral parenchymal opacities and basilar effusions, unchanged.    ET tube, appropriate position.    Enteric tube with sidehole at the expected location of the   gastroesophageal junction. Consider mild advancement.                      KAREN HEMPHILL M.D., ATTENDING RADIOLOGIST  This document has been electronically signed. Sep  9 2019 10:25AM

## 2019-09-11 NOTE — PROGRESS NOTE ADULT - ASSESSMENT
On vent . Having secretions, But less  Getting sedation . Awake responsive.  Hr low on fentanyl  GI DVT prophylaxis,.  Hold beta on sedation. ? try wean from vent.   Prognosis guarded

## 2019-09-11 NOTE — PROGRESS NOTE ADULT - ASSESSMENT
87 year old female admitted s/p fall episode of bradycardia   was rapid response after aspirating breakfast on 9/6/19  CPR and epi given, ROSC, intubated    # Respiratory Failure - aspiration   - on cefepime  - light sedation: midazolam for sedation if needed  - on bronchoscopy (9/10/19): The Right tracheobronchial tree was inspected closely to the level of the subsegmental bronchi. there was significant mucopurulent secretion and was suctioned.  The Left tracheobronchial tree also significant mucopurulent secretion, BAL was performed LLL   -f/u culture from BAL and BA wash  - blood, urine cultures: no growth  -deep tracheal aspirate: Few Squamous epithelial cells per low power field, Few polymorphonuclear leukocytes per low power field, Few Gram variable coccobacilli per oil power field   -echo: mild concentric LVH, EF > 55%, no wall abnormalities, left atrium moderately dilated, moderate AR, moderate TR  -will remain intubated today, possible attempt at extubation tomorrow      # Cardiac / CHF  / HTN    - pulse better   - 20mg IV lasix once yesterday  - monitor weight and pulse   - d/w Dr. Scott will hold coreg for now given HR in 40s/low 50s    # Parkinson's   - continue Sinemet     # Hypothyroidism   - levothyroxine     enoxaparin dvt ppx  continue tube feeds  full code 87 year old female admitted s/p fall episode of bradycardia   was rapid response after aspirating breakfast on 9/6/19  CPR and epi given, ROSC, intubated    # Respiratory Failure - aspiration   - on cefepime  - light sedation: midazolam for sedation if needed  - on bronchoscopy (9/10/19): The Right tracheobronchial tree was inspected closely to the level of the subsegmental bronchi. there was significant mucopurulent secretion and was suctioned.  The Left tracheobronchial tree also significant mucopurulent secretion, BAL was performed LLL   -f/u culture from BAL and BA wash  - blood, urine cultures: no growth  -deep tracheal aspirate: Few Squamous epithelial cells per low power field, Few polymorphonuclear leukocytes per low power field, Few Gram variable coccobacilli per oil power field   -echo: mild concentric LVH, EF > 55%, no wall abnormalities, left atrium moderately dilated, moderate AR, moderate TR  -will remain intubated today, possible attempt at extubation tomorrow      # Cardiac / CHF  / HTN    - pulse better   - monitor weight and pulse   - d/w Dr. Scott will hold coreg for now given HR in 40s/low 50s    # Parkinson's   - continue Sinemet     # Hypothyroidism   - levothyroxine     enoxaparin dvt ppx  continue tube feeds  full code

## 2019-09-12 LAB
-  AMIKACIN: SIGNIFICANT CHANGE UP
-  AZTREONAM: SIGNIFICANT CHANGE UP
-  CEFEPIME: SIGNIFICANT CHANGE UP
-  CEFTAZIDIME: SIGNIFICANT CHANGE UP
-  CIPROFLOXACIN: SIGNIFICANT CHANGE UP
-  GENTAMICIN: SIGNIFICANT CHANGE UP
-  IMIPENEM: SIGNIFICANT CHANGE UP
-  LEVOFLOXACIN: SIGNIFICANT CHANGE UP
-  MEROPENEM: SIGNIFICANT CHANGE UP
-  PIPERACILLIN/TAZOBACTAM: SIGNIFICANT CHANGE UP
-  TOBRAMYCIN: SIGNIFICANT CHANGE UP
ALBUMIN SERPL ELPH-MCNC: 2.9 G/DL — LOW (ref 3.5–5.2)
ALP SERPL-CCNC: 152 U/L — HIGH (ref 30–115)
ALT FLD-CCNC: 9 U/L — SIGNIFICANT CHANGE UP (ref 0–41)
ANION GAP SERPL CALC-SCNC: 11 MMOL/L — SIGNIFICANT CHANGE UP (ref 7–14)
AST SERPL-CCNC: 16 U/L — SIGNIFICANT CHANGE UP (ref 0–41)
BILIRUB SERPL-MCNC: 1.1 MG/DL — SIGNIFICANT CHANGE UP (ref 0.2–1.2)
BUN SERPL-MCNC: 37 MG/DL — HIGH (ref 10–20)
CALCIUM SERPL-MCNC: 9.1 MG/DL — SIGNIFICANT CHANGE UP (ref 8.5–10.1)
CHLORIDE SERPL-SCNC: 104 MMOL/L — SIGNIFICANT CHANGE UP (ref 98–110)
CO2 SERPL-SCNC: 22 MMOL/L — SIGNIFICANT CHANGE UP (ref 17–32)
CREAT SERPL-MCNC: 1 MG/DL — SIGNIFICANT CHANGE UP (ref 0.7–1.5)
CULTURE RESULTS: SIGNIFICANT CHANGE UP
CULTURE RESULTS: SIGNIFICANT CHANGE UP
GLUCOSE SERPL-MCNC: 213 MG/DL — HIGH (ref 70–99)
HCO3 BLDA-SCNC: 24 MMOL/L — SIGNIFICANT CHANGE UP (ref 23–27)
HCT VFR BLD CALC: 35.4 % — LOW (ref 37–47)
HGB BLD-MCNC: 11.6 G/DL — LOW (ref 12–16)
HOROWITZ INDEX BLDA+IHG-RTO: 40 — SIGNIFICANT CHANGE UP
MAGNESIUM SERPL-MCNC: 2.1 MG/DL — SIGNIFICANT CHANGE UP (ref 1.8–2.4)
MCHC RBC-ENTMCNC: 28.6 PG — SIGNIFICANT CHANGE UP (ref 27–31)
MCHC RBC-ENTMCNC: 32.8 G/DL — SIGNIFICANT CHANGE UP (ref 32–37)
MCV RBC AUTO: 87.2 FL — SIGNIFICANT CHANGE UP (ref 81–99)
METHOD TYPE: SIGNIFICANT CHANGE UP
NRBC # BLD: 0 /100 WBCS — SIGNIFICANT CHANGE UP (ref 0–0)
ORGANISM # SPEC MICROSCOPIC CNT: SIGNIFICANT CHANGE UP
PCO2 BLDA: 38 MMHG — SIGNIFICANT CHANGE UP (ref 38–42)
PH BLDA: 7.41 — SIGNIFICANT CHANGE UP (ref 7.38–7.42)
PHOSPHATE SERPL-MCNC: 1.5 MG/DL — LOW (ref 2.1–4.9)
PLATELET # BLD AUTO: 198 K/UL — SIGNIFICANT CHANGE UP (ref 130–400)
PO2 BLDA: 100 MMHG — HIGH (ref 78–95)
POTASSIUM SERPL-MCNC: 3.9 MMOL/L — SIGNIFICANT CHANGE UP (ref 3.5–5)
POTASSIUM SERPL-SCNC: 3.9 MMOL/L — SIGNIFICANT CHANGE UP (ref 3.5–5)
PROT SERPL-MCNC: 5.4 G/DL — LOW (ref 6–8)
RBC # BLD: 4.06 M/UL — LOW (ref 4.2–5.4)
RBC # FLD: 17.2 % — HIGH (ref 11.5–14.5)
SAO2 % BLDA: 98 % — SIGNIFICANT CHANGE UP (ref 94–98)
SODIUM SERPL-SCNC: 137 MMOL/L — SIGNIFICANT CHANGE UP (ref 135–146)
SPECIMEN SOURCE: SIGNIFICANT CHANGE UP
SPECIMEN SOURCE: SIGNIFICANT CHANGE UP
WBC # BLD: 9.2 K/UL — SIGNIFICANT CHANGE UP (ref 4.8–10.8)
WBC # FLD AUTO: 9.2 K/UL — SIGNIFICANT CHANGE UP (ref 4.8–10.8)

## 2019-09-12 PROCEDURE — 71045 X-RAY EXAM CHEST 1 VIEW: CPT | Mod: 26

## 2019-09-12 RX ORDER — VANCOMYCIN HCL 1 G
1000 VIAL (EA) INTRAVENOUS ONCE
Refills: 0 | Status: COMPLETED | OUTPATIENT
Start: 2019-09-12 | End: 2019-09-12

## 2019-09-12 RX ORDER — VANCOMYCIN HCL 1 G
VIAL (EA) INTRAVENOUS
Refills: 0 | Status: DISCONTINUED | OUTPATIENT
Start: 2019-09-12 | End: 2019-09-13

## 2019-09-12 RX ORDER — VANCOMYCIN HCL 1 G
1000 VIAL (EA) INTRAVENOUS EVERY 12 HOURS
Refills: 0 | Status: DISCONTINUED | OUTPATIENT
Start: 2019-09-13 | End: 2019-09-13

## 2019-09-12 RX ADMIN — NYSTATIN CREAM 1 APPLICATION(S): 100000 CREAM TOPICAL at 05:01

## 2019-09-12 RX ADMIN — CHLORHEXIDINE GLUCONATE 15 MILLILITER(S): 213 SOLUTION TOPICAL at 18:07

## 2019-09-12 RX ADMIN — CHLORHEXIDINE GLUCONATE 1 APPLICATION(S): 213 SOLUTION TOPICAL at 05:01

## 2019-09-12 RX ADMIN — FENTANYL CITRATE 3.4 MICROGRAM(S)/KG/HR: 50 INJECTION INTRAVENOUS at 09:18

## 2019-09-12 RX ADMIN — FENTANYL CITRATE 3.4 MICROGRAM(S)/KG/HR: 50 INJECTION INTRAVENOUS at 20:00

## 2019-09-12 RX ADMIN — SENNA PLUS 2 TABLET(S): 8.6 TABLET ORAL at 21:42

## 2019-09-12 RX ADMIN — CHLORHEXIDINE GLUCONATE 15 MILLILITER(S): 213 SOLUTION TOPICAL at 05:00

## 2019-09-12 RX ADMIN — CEFEPIME 100 MILLIGRAM(S): 1 INJECTION, POWDER, FOR SOLUTION INTRAMUSCULAR; INTRAVENOUS at 05:00

## 2019-09-12 RX ADMIN — Medication 81 MILLIGRAM(S): at 12:56

## 2019-09-12 RX ADMIN — Medication 1 TABLET(S): at 12:57

## 2019-09-12 RX ADMIN — Medication 75 MICROGRAM(S): at 05:00

## 2019-09-12 RX ADMIN — CEFEPIME 100 MILLIGRAM(S): 1 INJECTION, POWDER, FOR SOLUTION INTRAMUSCULAR; INTRAVENOUS at 18:07

## 2019-09-12 RX ADMIN — FENTANYL CITRATE 3.4 MICROGRAM(S)/KG/HR: 50 INJECTION INTRAVENOUS at 05:31

## 2019-09-12 RX ADMIN — CARBIDOPA AND LEVODOPA 1 TABLET(S): 25; 100 TABLET ORAL at 12:56

## 2019-09-12 RX ADMIN — ENOXAPARIN SODIUM 30 MILLIGRAM(S): 100 INJECTION SUBCUTANEOUS at 12:56

## 2019-09-12 RX ADMIN — NYSTATIN CREAM 1 APPLICATION(S): 100000 CREAM TOPICAL at 18:08

## 2019-09-12 RX ADMIN — Medication 250 MILLIGRAM(S): at 18:10

## 2019-09-12 RX ADMIN — CARBIDOPA AND LEVODOPA 1 TABLET(S): 25; 100 TABLET ORAL at 21:42

## 2019-09-12 NOTE — PROGRESS NOTE ADULT - ASSESSMENT
On vent . Having secretions,  Getting sedation . Awake responsive.  Off beta. HR acceptable  GI DVT prophylaxis,.   ? try wean from vent.   Prognosis guarded

## 2019-09-12 NOTE — PROGRESS NOTE ADULT - SUBJECTIVE AND OBJECTIVE BOX
Patient is a 87y old  Female who presents with a chief complaint of Weakness and bradycardia (12 Sep 2019 07:00)      Over Night Events:  Patient seen and examined still vented off sedation weak   on weaning trial pressure support 10 pulling only 140cc       ROS:  See HPI    PHYSICAL EXAM    ICU Vital Signs Last 24 Hrs  T(C): 36.7 (12 Sep 2019 07:05), Max: 36.7 (11 Sep 2019 19:07)  T(F): 98.1 (12 Sep 2019 07:05), Max: 98.1 (11 Sep 2019 19:07)  HR: 54 (12 Sep 2019 09:26) (48 - 76)  BP: 183/92 (12 Sep 2019 07:53) (120/58 - 193/74)  BP(mean): 92 (12 Sep 2019 05:00) (82 - 107)  ABP: --  ABP(mean): --  RR: 12 (12 Sep 2019 07:53) (12 - 19)  SpO2: 99% (12 Sep 2019 09:26) (95% - 100%)      General: Awake   HEENT:              et tube   Lymph Nodes: NO cervical LN   Lungs: Bilateral BS  Cardiovascular: Regular   Abdomen: Soft, Positive BS  Extremities: No clubbing   Skin: warm   Neurological:  weakness b/l   Musculoskeletal: move all ext     I&O's Detail    11 Sep 2019 07:01  -  12 Sep 2019 07:00  --------------------------------------------------------  IN:    fentaNYL Infusion.: 154.4 mL    Free Water: 500 mL    Osmolite: 315 mL    Solution: 50 mL  Total IN: 1019.4 mL    OUT:    Indwelling Catheter - Urethral: 665 mL  Total OUT: 665 mL    Total NET: 354.4 mL      12 Sep 2019 07:01  -  12 Sep 2019 09:39  --------------------------------------------------------  IN:    fentaNYL Infusion.: 10 mL    Osmolite: 35 mL  Total IN: 45 mL    OUT:    Indwelling Catheter - Urethral: 75 mL  Total OUT: 75 mL    Total NET: -30 mL          LABS:                          11.6   9.20  )-----------( 198      ( 12 Sep 2019 05:38 )             35.4         12 Sep 2019 05:38    137    |  104    |  37     ----------------------------<  213    3.9     |  22     |  1.0      Ca    9.1        12 Sep 2019 05:38  Phos  1.5       12 Sep 2019 05:38  Mg     2.1       12 Sep 2019 05:38    TPro  5.4    /  Alb  2.9    /  TBili  1.1    /  DBili  x      /  AST  16     /  ALT  9      /  AlkPhos  152    12 Sep 2019 05:38  Amylase x     lipase x                                                                                                                                                    Culture - Bronchial (collected 10 Sep 2019 12:00)  Source: Lavage None  Gram Stain (11 Sep 2019 07:20):    Few polymorphonuclear leukocytes seen per low power field    Few Squamous epithelial cells seen per low power field    No organisms seen per oil power field  Preliminary Report (11 Sep 2019 21:55):    Normal Respiratory Shahnaz present    Culture - Bronchial (collected 10 Sep 2019 12:00)  Source: Bronch Wash Bronchoalveolar Lavage  Gram Stain (11 Sep 2019 07:21):    Few polymorphonuclear leukocytes seen per low power field    Few Squamous epithelial cells seen per low power field    No organisms seen per oil power field  Preliminary Report (11 Sep 2019 21:52):    Normal Respiratory Shahnaz present                                                   Mode: CPAP with PS  FiO2: 40  PEEP: 5  PS: 10                                      ABG - ( 12 Sep 2019 05:52 )  pH, Arterial: 7.41  pH, Blood: x     /  pCO2: 38    /  pO2: 100   / HCO3: 24    / Base Excess: x     /  SaO2: 98                  MEDICATIONS  (STANDING):  aspirin  chewable 81 milliGRAM(s) Oral daily  buDESOnide 160 MICROgram(s)/formoterol 4.5 MICROgram(s) Inhaler 2 Puff(s) Inhalation two times a day  carbidopa/levodopa  10/100 1 Tablet(s) Oral daily  carbidopa/levodopa  25/100 1 Tablet(s) Oral at bedtime  cefepime   IVPB 2000 milliGRAM(s) IV Intermittent every 12 hours  cefepime   IVPB      chlorhexidine 0.12% Liquid 15 milliLiter(s) Oral Mucosa every 12 hours  chlorhexidine 4% Liquid 1 Application(s) Topical <User Schedule>  DULoxetine 60 milliGRAM(s) Oral daily  enoxaparin Injectable 30 milliGRAM(s) SubCutaneous daily  fentaNYL   Infusion. 0.5 MICROgram(s)/kG/Hr (3.395 mL/Hr) IV Continuous <Continuous>  influenza   Vaccine 0.5 milliLiter(s) IntraMuscular once  levothyroxine 75 MICROGram(s) Oral daily  midazolam Infusion 0.02 mG/kG/Hr (1.306 mL/Hr) IV Continuous <Continuous>  midazolam Infusion 0.02 mG/kG/Hr (1.358 mL/Hr) IV Continuous <Continuous>  multivitamin  Chewable 1 Tablet(s) Oral daily  nystatin Powder 1 Application(s) Topical two times a day  pantoprazole    Tablet 40 milliGRAM(s) Oral before breakfast  senna 2 Tablet(s) Oral at bedtime    MEDICATIONS  (PRN):  acetaminophen  Suppository .. 650 milliGRAM(s) Rectal every 6 hours PRN Temp greater or equal to 38C (100.4F)          Xrays:  TLC:  OG:  ET tube:                                                                                    bibasilaer opacity    ECHO:  [facility] ICU:

## 2019-09-12 NOTE — CDI QUERY NOTE - NSCDIOTHERTXTBX_GEN_ALL_CORE_HH
Query for Type and Acuity  of CHF:  ----------------------------------------------------------------------------  Pt. w/ Parkinsons here with c/o  Weakness.   Found to have Bradycardia.   Pt. w/ history of HTN and CHF.    Pt. is on Lasix 40mg po.    EKGs state Afib, Posterior Fascicular Block.    Progress note of 9/11 states Echo with EF of >55%.    Please document type and acuity of CHF.    Examples:    -  Chronic Diastolic CHF                     -  Acute Diastolic CHF                     -  Chronic  CHF - unspecified.                     -  Other (please specify type and acuity)

## 2019-09-12 NOTE — PROGRESS NOTE ADULT - SUBJECTIVE AND OBJECTIVE BOX
Patient is a 87y old  Female who presents with a chief complaint of Weakness and bradycardia (12 Sep 2019 09:50)      PAST MEDICAL & SURGICAL HISTORY:  Parkinson disease  Hypothyroid  Rib fractures  Congestive heart failure  Hypertension  No significant past surgical history      MEDICATIONS  (STANDING):  aspirin  chewable 81 milliGRAM(s) Oral daily  buDESOnide 160 MICROgram(s)/formoterol 4.5 MICROgram(s) Inhaler 2 Puff(s) Inhalation two times a day  carbidopa/levodopa  10/100 1 Tablet(s) Oral daily  carbidopa/levodopa  25/100 1 Tablet(s) Oral at bedtime  cefepime   IVPB 2000 milliGRAM(s) IV Intermittent every 12 hours  cefepime   IVPB      chlorhexidine 0.12% Liquid 15 milliLiter(s) Oral Mucosa every 12 hours  chlorhexidine 4% Liquid 1 Application(s) Topical <User Schedule>  DULoxetine 60 milliGRAM(s) Oral daily  enoxaparin Injectable 30 milliGRAM(s) SubCutaneous daily  fentaNYL   Infusion. 0.5 MICROgram(s)/kG/Hr (3.395 mL/Hr) IV Continuous <Continuous>  influenza   Vaccine 0.5 milliLiter(s) IntraMuscular once  levothyroxine 75 MICROGram(s) Oral daily  midazolam Infusion 0.02 mG/kG/Hr (1.306 mL/Hr) IV Continuous <Continuous>  midazolam Infusion 0.02 mG/kG/Hr (1.358 mL/Hr) IV Continuous <Continuous>  multivitamin  Chewable 1 Tablet(s) Oral daily  nystatin Powder 1 Application(s) Topical two times a day  pantoprazole    Tablet 40 milliGRAM(s) Oral before breakfast  senna 2 Tablet(s) Oral at bedtime  vancomycin  IVPB 1000 milliGRAM(s) IV Intermittent once  vancomycin  IVPB        MEDICATIONS  (PRN):  acetaminophen  Suppository .. 650 milliGRAM(s) Rectal every 6 hours PRN Temp greater or equal to 38C (100.4F)      Overnight events:    Vital Signs Last 24 Hrs  T(C): 36.4 (12 Sep 2019 15:01), Max: 37.6 (12 Sep 2019 11:00)  T(F): 97.6 (12 Sep 2019 15:01), Max: 99.6 (12 Sep 2019 11:00)  HR: 61 (12 Sep 2019 15:01) (52 - 75)  BP: 193/85 (12 Sep 2019 15:01) (120/58 - 193/85)  BP(mean): 122 (12 Sep 2019 15:01) (82 - 152)  RR: 19 (12 Sep 2019 15:01) (12 - 37)  SpO2: 100% (12 Sep 2019 15:01) (96% - 100%)  CAPILLARY BLOOD GLUCOSE        I&O's Summary    11 Sep 2019 07:01  -  12 Sep 2019 07:00  --------------------------------------------------------  IN: 1019.4 mL / OUT: 665 mL / NET: 354.4 mL    12 Sep 2019 07:01  -  12 Sep 2019 16:17  --------------------------------------------------------  IN: 415 mL / OUT: 240 mL / NET: 175 mL        Physical Exam:    GEN: intubated, opening eyes  HEAD:  Atraumatic, Normocephalic  EYES: conjunctiva and sclera clear  ENT: Moist mucous membranes    NECK: Supple, No JVD  CHEST/LUNG: Clear to auscultation bilaterally; No rales, rhonchi, wheezing, or rubs.  HEART: No murmurs, rubs, or gallops, HR 52  ABDOMEN: Bowel sounds present; Soft, Nontender, Nondistended  EXTREMITIES:  No clubbing, cyanosis, or edema  MSK: moving all 4 extremities          Labs:                        11.6   9.20  )-----------( 198      ( 12 Sep 2019 05:38 )             35.4             09-12    137  |  104  |  37<H>  ----------------------------<  213<H>  3.9   |  22  |  1.0    Ca    9.1      12 Sep 2019 05:38  Phos  1.5     09-12  Mg     2.1     09-12    TPro  5.4<L>  /  Alb  2.9<L>  /  TBili  1.1  /  DBili  x   /  AST  16  /  ALT  9   /  AlkPhos  152<H>  09-12    LIVER FUNCTIONS - ( 12 Sep 2019 05:38 )  Alb: 2.9 g/dL / Pro: 5.4 g/dL / ALK PHOS: 152 U/L / ALT: 9 U/L / AST: 16 U/L / GGT: x                       ABG - ( 12 Sep 2019 15:48 )  pH, Arterial: 7.46  pH, Blood: x     /  pCO2: 34    /  pO2: 82    / HCO3: 24    / Base Excess: 0.4   /  SaO2: 98                    Culture - Bronchial (collected 10 Sep 2019 12:00)  Source: Lavage None  Gram Stain (11 Sep 2019 07:20):    Few polymorphonuclear leukocytes seen per low power field    Few Squamous epithelial cells seen per low power field    No organisms seen per oil power field  Preliminary Report (11 Sep 2019 21:55):    Normal Respiratory Shahnaz present    Culture - Bronchial (collected 10 Sep 2019 12:00)  Source: Bronch Wash Bronchoalveolar Lavage  Gram Stain (11 Sep 2019 07:21):    Few polymorphonuclear leukocytes seen per low power field    Few Squamous epithelial cells seen per low power field    No organisms seen per oil power field  Preliminary Report (11 Sep 2019 21:52):    Normal Respiratory Shahnaz present

## 2019-09-12 NOTE — PROGRESS NOTE ADULT - SUBJECTIVE AND OBJECTIVE BOX
88 yo F presents to the ED c/o bradycardia, noticed by visiting Nurse today and she called EMS. Pt also states she felt forward and flat on Sunday 9/1/19 and has been anxious to climb up or down the stairs. Pt ambulates with a walker at home. Pt denies any similar episodes, traveling, exposure to ticks, fevers, chills, headaches, changes in mentation, visual changes, CP, SOB, abdominal pain, dysuria, changes in BM, rashes, skin discolorations, new medications.      interval : vented s/p bronchosopy with was     PAST MEDICAL & SURGICAL HISTORY:  Parkinson disease  Hypothyroid  Rib fractures  Congestive heart failure  Hypertension  No significant past surgical history      MEDICATIONS  (STANDING):  aspirin  chewable 81 milliGRAM(s) Oral daily  buDESOnide 160 MICROgram(s)/formoterol 4.5 MICROgram(s) Inhaler 2 Puff(s) Inhalation two times a day  carbidopa/levodopa  10/100 1 Tablet(s) Oral daily  carbidopa/levodopa  25/100 1 Tablet(s) Oral at bedtime  carvedilol 6.25 milliGRAM(s) Oral every 12 hours  cefepime   IVPB 2000 milliGRAM(s) IV Intermittent every 12 hours  cefepime   IVPB      chlorhexidine 0.12% Liquid 15 milliLiter(s) Oral Mucosa every 12 hours  chlorhexidine 4% Liquid 1 Application(s) Topical <User Schedule>  DULoxetine 60 milliGRAM(s) Oral daily  enoxaparin Injectable 30 milliGRAM(s) SubCutaneous daily  fentaNYL   Infusion. 0.5 MICROgram(s)/kG/Hr (3.395 mL/Hr) IV Continuous <Continuous>  influenza   Vaccine 0.5 milliLiter(s) IntraMuscular once  levothyroxine 75 MICROGram(s) Oral daily  midazolam Infusion 0.02 mG/kG/Hr (1.306 mL/Hr) IV Continuous <Continuous>  multivitamin  Chewable 1 Tablet(s) Oral daily  nystatin Powder 1 Application(s) Topical two times a day  pantoprazole    Tablet 40 milliGRAM(s) Oral before breakfast  senna 2 Tablet(s) Oral at bedtime    MEDICATIONS  (PRN):  acetaminophen  Suppository .. 650 milliGRAM(s) Rectal every 6 hours PRN Temp greater or equal to 38C (100.4F)    ICU Vital Signs Last 24 Hrs  T(C): 36.7 (11 Sep 2019 23:07), Max: 37.8 (11 Sep 2019 07:58)  T(F): 98.1 (11 Sep 2019 23:07), Max: 100 (11 Sep 2019 07:58)  HR: 54 (12 Sep 2019 05:00) (48 - 76)  BP: 156/64 (12 Sep 2019 05:00) (105/51 - 193/74)  BP(mean): 92 (12 Sep 2019 05:00) (74 - 107)  ABP: --  ABP(mean): --  RR: 17 (12 Sep 2019 05:00) (12 - 25)  SpO2: 100% (12 Sep 2019 05:00) (95% - 100%)    Mode: Auto Mode: PRVC/ Volume Support  RR (machine): 12  TV (machine): 450  FiO2: 40  PEEP: 5  MAP: 9  PIP: 27        GENERAL: NAD, lying in bed  	HEAD:  Atraumatic, Normocephalic  	EYES: EOMI, PERRLA, conjunctiva and sclera clear  	ENT: Moist mucous membranes + endotracheal tube   	NECK: Supple, No JVD  	CHEST/LUNG: Clear to auscultation bilaterally; No rales, rhonchi, wheezing, or rubs.  	HEART: +Bradycardic, No murmurs, rubs, or gallops  	ABDOMEN: Bowel sounds present; Soft, Nontender, Nondistended  	EXTREMITIES:  2+ Peripheral Pulses, brisk capillary refill. No clubbing, cyanosis, or edema  	NERVOUS SYSTEM:  non focal     MSK: no cyanosis                             11.0   7.44  )-----------( 152      ( 11 Sep 2019 05:30 )             33.4     09-11    136  |  103  |  38<H>  ----------------------------<  142<H>  3.7   |  22  |  1.0    Ca    8.6      11 Sep 2019 05:30  Phos  2.4     09-11  Mg     2.0     09-11    TPro  5.1<L>  /  Alb  2.8<L>  /  TBili  1.5<H>  /  DBili  x   /  AST  14  /  ALT  <5  /  AlkPhos  138<H>  09-11            EXAM:  XR CHEST PORTABLE ROUTINE 1V            PROCEDURE DATE:  09/11/2019            INTERPRETATION:  [facility]    Comparison : Chest radiograph 9/10/2019.    Technique: XR CHEST    Findings:    Support devices: Endotracheal tube tip in lower trachea. NG tube tip   coursing below the diaphragm. Telemetry leads.    Cardiac/mediastinum/hilum: Stable cardiomegaly. Aortic calcifications.    Lung parenchyma/Pleura: Stable bilateral interstitial and airspace   opacities.No pneumothorax.    Skeleton/soft tissues: Multiple vertebral compression fractures.    Impression:      Stable bilateral interstitial and airspace opacities. No pneumothorax.    Lines and tubes as described.                  RAFFAELE STEWART M.D., ATTENDING RADIOLOGIST  This document has been electronically signed. Sep 11 2019 12:17PM

## 2019-09-12 NOTE — PROGRESS NOTE ADULT - ASSESSMENT
IMPRESSION:  Cardio respiratory arrest  aspiration of breakfast  no sign pneumonia/pneumonitis as of yet  LLL atelectasis  rosalind cardia  hypernatremia        PLAN:    CNS: sedation as needed     HEENT: jacob    PULMONARY:  cxr significant opacity still require frequent suction for secretion via et tube    will hold on extubation   CARDIOVASCULAR: echo keep Is = os   for now   GI: GI prophylaxis. NG  Feeding     RENAL: follow lytes     INFECTIOUS DISEASE: follow DTA and cx follow BAL   continue abx if spike temp add vanco     HEMATOLOGICAL:  DVT prophylaxis.    ENDOCRINE:  Follow up FS.  Insulin protocol if needed.    MUSCULOSKELETAL:        CRITICAL CARE TIME SPENT:

## 2019-09-12 NOTE — PROGRESS NOTE ADULT - ASSESSMENT
87 year old female admitted s/p fall episode of bradycardia   was rapid response after aspirating breakfast on 9/6/19  CPR and epi given, ROSC, intubated    # Respiratory Failure - aspiration   - sputum culture from 9/8/19: numerous MRSA, few pseudomonas  - started on vancomycin, continue cefepime  - repeat blood culture, previous was negative  - light sedation: midazolam for sedation if needed  - on bronchoscopy (9/10/19): The Right tracheobronchial tree was inspected closely to the level of the subsegmental bronchi. there was significant mucopurulent secretion and was suctioned.  The Left tracheobronchial tree also significant mucopurulent secretion, BAL was performed LLL   -f/u culture from BAL and BA wash  - blood, urine cultures: no growth  -deep tracheal aspirate: Few Squamous epithelial cells per low power field, Few polymorphonuclear leukocytes per low power field, Few Gram variable coccobacilli per oil power field   -echo: mild concentric LVH, EF > 55%, no wall abnormalities, left atrium moderately dilated, moderate AR, moderate TR  -will remain intubated today, possible attempt at extubation tomorrow      # Cardiac / CHF  / HTN    - pulse better   - monitor weight and pulse   - hold coreg for now given HR in 40s/low 50s    # Parkinson's   - continue Sinemet     # Hypothyroidism   - levothyroxine     enoxaparin dvt ppx  continue tube feeds  full code

## 2019-09-13 DIAGNOSIS — Z22.9 CARRIER OF INFECTIOUS DISEASE, UNSPECIFIED: ICD-10-CM

## 2019-09-13 LAB
ALBUMIN SERPL ELPH-MCNC: 2.9 G/DL — LOW (ref 3.5–5.2)
ALP SERPL-CCNC: 158 U/L — HIGH (ref 30–115)
ALT FLD-CCNC: 11 U/L — SIGNIFICANT CHANGE UP (ref 0–41)
ANION GAP SERPL CALC-SCNC: 9 MMOL/L — SIGNIFICANT CHANGE UP (ref 7–14)
AST SERPL-CCNC: 25 U/L — SIGNIFICANT CHANGE UP (ref 0–41)
BASE EXCESS BLDA CALC-SCNC: 1.4 MMOL/L — SIGNIFICANT CHANGE UP (ref -2–2)
BILIRUB SERPL-MCNC: 0.9 MG/DL — SIGNIFICANT CHANGE UP (ref 0.2–1.2)
BUN SERPL-MCNC: 31 MG/DL — HIGH (ref 10–20)
CALCIUM SERPL-MCNC: 9.2 MG/DL — SIGNIFICANT CHANGE UP (ref 8.5–10.1)
CHLORIDE SERPL-SCNC: 104 MMOL/L — SIGNIFICANT CHANGE UP (ref 98–110)
CO2 SERPL-SCNC: 26 MMOL/L — SIGNIFICANT CHANGE UP (ref 17–32)
CREAT SERPL-MCNC: 0.9 MG/DL — SIGNIFICANT CHANGE UP (ref 0.7–1.5)
GLUCOSE SERPL-MCNC: 179 MG/DL — HIGH (ref 70–99)
HCO3 BLDA-SCNC: 24 MMOL/L — SIGNIFICANT CHANGE UP (ref 23–27)
HCT VFR BLD CALC: 35.5 % — LOW (ref 37–47)
HGB BLD-MCNC: 11.5 G/DL — LOW (ref 12–16)
HOROWITZ INDEX BLDA+IHG-RTO: 40 — SIGNIFICANT CHANGE UP
MCHC RBC-ENTMCNC: 28.1 PG — SIGNIFICANT CHANGE UP (ref 27–31)
MCHC RBC-ENTMCNC: 32.4 G/DL — SIGNIFICANT CHANGE UP (ref 32–37)
MCV RBC AUTO: 86.8 FL — SIGNIFICANT CHANGE UP (ref 81–99)
NRBC # BLD: 0 /100 WBCS — SIGNIFICANT CHANGE UP (ref 0–0)
PCO2 BLDA: 33 MMHG — LOW (ref 38–42)
PH BLDA: 7.48 — HIGH (ref 7.38–7.42)
PLATELET # BLD AUTO: 231 K/UL — SIGNIFICANT CHANGE UP (ref 130–400)
PO2 BLDA: 80 MMHG — SIGNIFICANT CHANGE UP (ref 78–95)
POTASSIUM SERPL-MCNC: 4.5 MMOL/L — SIGNIFICANT CHANGE UP (ref 3.5–5)
POTASSIUM SERPL-SCNC: 4.5 MMOL/L — SIGNIFICANT CHANGE UP (ref 3.5–5)
PROT SERPL-MCNC: 5.6 G/DL — LOW (ref 6–8)
RBC # BLD: 4.09 M/UL — LOW (ref 4.2–5.4)
RBC # FLD: 17.1 % — HIGH (ref 11.5–14.5)
SAO2 % BLDA: 97 % — SIGNIFICANT CHANGE UP (ref 94–98)
SODIUM SERPL-SCNC: 139 MMOL/L — SIGNIFICANT CHANGE UP (ref 135–146)
WBC # BLD: 11.56 K/UL — HIGH (ref 4.8–10.8)
WBC # FLD AUTO: 11.56 K/UL — HIGH (ref 4.8–10.8)

## 2019-09-13 PROCEDURE — 71045 X-RAY EXAM CHEST 1 VIEW: CPT | Mod: 26

## 2019-09-13 RX ORDER — FUROSEMIDE 40 MG
40 TABLET ORAL ONCE
Refills: 0 | Status: COMPLETED | OUTPATIENT
Start: 2019-09-13 | End: 2019-09-13

## 2019-09-13 RX ORDER — AMLODIPINE BESYLATE 2.5 MG/1
5 TABLET ORAL ONCE
Refills: 0 | Status: COMPLETED | OUTPATIENT
Start: 2019-09-13 | End: 2019-09-13

## 2019-09-13 RX ADMIN — CEFEPIME 100 MILLIGRAM(S): 1 INJECTION, POWDER, FOR SOLUTION INTRAMUSCULAR; INTRAVENOUS at 06:02

## 2019-09-13 RX ADMIN — BUDESONIDE AND FORMOTEROL FUMARATE DIHYDRATE 2 PUFF(S): 160; 4.5 AEROSOL RESPIRATORY (INHALATION) at 21:04

## 2019-09-13 RX ADMIN — NYSTATIN CREAM 1 APPLICATION(S): 100000 CREAM TOPICAL at 17:39

## 2019-09-13 RX ADMIN — CHLORHEXIDINE GLUCONATE 1 APPLICATION(S): 213 SOLUTION TOPICAL at 06:03

## 2019-09-13 RX ADMIN — CHLORHEXIDINE GLUCONATE 15 MILLILITER(S): 213 SOLUTION TOPICAL at 06:03

## 2019-09-13 RX ADMIN — FENTANYL CITRATE 3.4 MICROGRAM(S)/KG/HR: 50 INJECTION INTRAVENOUS at 04:30

## 2019-09-13 RX ADMIN — Medication 250 MILLIGRAM(S): at 06:11

## 2019-09-13 RX ADMIN — Medication 40 MILLIGRAM(S): at 12:37

## 2019-09-13 RX ADMIN — NYSTATIN CREAM 1 APPLICATION(S): 100000 CREAM TOPICAL at 06:05

## 2019-09-13 RX ADMIN — CARBIDOPA AND LEVODOPA 1 TABLET(S): 25; 100 TABLET ORAL at 21:06

## 2019-09-13 RX ADMIN — CEFEPIME 100 MILLIGRAM(S): 1 INJECTION, POWDER, FOR SOLUTION INTRAMUSCULAR; INTRAVENOUS at 17:39

## 2019-09-13 RX ADMIN — SENNA PLUS 2 TABLET(S): 8.6 TABLET ORAL at 21:06

## 2019-09-13 RX ADMIN — CARBIDOPA AND LEVODOPA 1 TABLET(S): 25; 100 TABLET ORAL at 12:37

## 2019-09-13 RX ADMIN — Medication 1 TABLET(S): at 12:39

## 2019-09-13 RX ADMIN — AMLODIPINE BESYLATE 5 MILLIGRAM(S): 2.5 TABLET ORAL at 22:23

## 2019-09-13 RX ADMIN — CHLORHEXIDINE GLUCONATE 15 MILLILITER(S): 213 SOLUTION TOPICAL at 17:39

## 2019-09-13 RX ADMIN — FENTANYL CITRATE 3.4 MICROGRAM(S)/KG/HR: 50 INJECTION INTRAVENOUS at 17:47

## 2019-09-13 RX ADMIN — ENOXAPARIN SODIUM 30 MILLIGRAM(S): 100 INJECTION SUBCUTANEOUS at 12:38

## 2019-09-13 RX ADMIN — PANTOPRAZOLE SODIUM 40 MILLIGRAM(S): 20 TABLET, DELAYED RELEASE ORAL at 06:04

## 2019-09-13 RX ADMIN — Medication 75 MICROGRAM(S): at 06:04

## 2019-09-13 RX ADMIN — Medication 81 MILLIGRAM(S): at 12:37

## 2019-09-13 NOTE — PROGRESS NOTE ADULT - SUBJECTIVE AND OBJECTIVE BOX
Patient is a 87y old  Female who presents with a chief complaint of Weakness and bradycardia (13 Sep 2019 09:30)      PAST MEDICAL & SURGICAL HISTORY:  Parkinson disease  Hypothyroid  Rib fractures  Congestive heart failure  Hypertension  No significant past surgical history      MEDICATIONS  (STANDING):  aspirin  chewable 81 milliGRAM(s) Oral daily  buDESOnide 160 MICROgram(s)/formoterol 4.5 MICROgram(s) Inhaler 2 Puff(s) Inhalation two times a day  carbidopa/levodopa  10/100 1 Tablet(s) Oral daily  carbidopa/levodopa  25/100 1 Tablet(s) Oral at bedtime  cefepime   IVPB 2000 milliGRAM(s) IV Intermittent every 12 hours  cefepime   IVPB      chlorhexidine 0.12% Liquid 15 milliLiter(s) Oral Mucosa every 12 hours  chlorhexidine 4% Liquid 1 Application(s) Topical <User Schedule>  DULoxetine 60 milliGRAM(s) Oral daily  enoxaparin Injectable 30 milliGRAM(s) SubCutaneous daily  fentaNYL   Infusion. 0.5 MICROgram(s)/kG/Hr (3.395 mL/Hr) IV Continuous <Continuous>  influenza   Vaccine 0.5 milliLiter(s) IntraMuscular once  levothyroxine 75 MICROGram(s) Oral daily  midazolam Infusion 0.02 mG/kG/Hr (1.306 mL/Hr) IV Continuous <Continuous>  midazolam Infusion 0.02 mG/kG/Hr (1.358 mL/Hr) IV Continuous <Continuous>  multivitamin  Chewable 1 Tablet(s) Oral daily  nystatin Powder 1 Application(s) Topical two times a day  pantoprazole    Tablet 40 milliGRAM(s) Oral before breakfast  senna 2 Tablet(s) Oral at bedtime    MEDICATIONS  (PRN):  acetaminophen  Suppository .. 650 milliGRAM(s) Rectal every 6 hours PRN Temp greater or equal to 38C (100.4F)      Overnight events:    Vital Signs Last 24 Hrs  T(C): 36.9 (13 Sep 2019 11:00), Max: 37.4 (13 Sep 2019 03:04)  T(F): 98.4 (13 Sep 2019 11:00), Max: 99.4 (13 Sep 2019 08:00)  HR: 52 (13 Sep 2019 13:00) (51 - 68)  BP: 151/66 (13 Sep 2019 13:00) (142/63 - 196/79)  BP(mean): 95 (13 Sep 2019 13:00) (91 - 122)  RR: 15 (13 Sep 2019 13:00) (12 - 20)  SpO2: 100% (13 Sep 2019 13:00) (98% - 100%)  CAPILLARY BLOOD GLUCOSE        I&O's Summary    12 Sep 2019 07:01  -  13 Sep 2019 07:00  --------------------------------------------------------  IN: 2663.6 mL / OUT: 810 mL / NET: 1853.6 mL    13 Sep 2019 07:01  -  13 Sep 2019 14:32  --------------------------------------------------------  IN: 130.9 mL / OUT: 50 mL / NET: 80.9 mL        Physical Exam:    GEN: intubated, opening eyes  HEAD:  Atraumatic, Normocephalic  EYES: conjunctiva and sclera clear  ENT: Moist mucous membranes    NECK: Supple, No JVD  CHEST/LUNG: Clear to auscultation bilaterally; No rales, rhonchi, wheezing, or rubs.  HEART: No murmurs, rubs, or gallops  ABDOMEN: Bowel sounds present; Soft, Nontender, Nondistended  EXTREMITIES:  No clubbing, cyanosis, or edema  MSK: moving all 4 extremities      Labs:                        11.5   11.56 )-----------( 231      ( 13 Sep 2019 05:59 )             35.5             09-13    139  |  104  |  31<H>  ----------------------------<  179<H>  4.5   |  26  |  0.9    Ca    9.2      13 Sep 2019 05:59  Phos  1.5     09-12  Mg     2.1     09-12    TPro  5.6<L>  /  Alb  2.9<L>  /  TBili  0.9  /  DBili  x   /  AST  25  /  ALT  11  /  AlkPhos  158<H>  09-13    LIVER FUNCTIONS - ( 13 Sep 2019 05:59 )  Alb: 2.9 g/dL / Pro: 5.6 g/dL / ALK PHOS: 158 U/L / ALT: 11 U/L / AST: 25 U/L / GGT: x                       ABG - ( 13 Sep 2019 06:20 )  pH, Arterial: 7.48  pH, Blood: x     /  pCO2: 33    /  pO2: 80    / HCO3: 24    / Base Excess: 1.4   /  SaO2: 97 Patient is a 87y old  Female who presents with a chief complaint of Weakness and bradycardia (13 Sep 2019 09:30)      PAST MEDICAL & SURGICAL HISTORY:  Parkinson disease  Hypothyroid  Rib fractures  Congestive heart failure  Hypertension  No significant past surgical history      MEDICATIONS  (STANDING):  aspirin  chewable 81 milliGRAM(s) Oral daily  buDESOnide 160 MICROgram(s)/formoterol 4.5 MICROgram(s) Inhaler 2 Puff(s) Inhalation two times a day  carbidopa/levodopa  10/100 1 Tablet(s) Oral daily  carbidopa/levodopa  25/100 1 Tablet(s) Oral at bedtime  cefepime   IVPB 2000 milliGRAM(s) IV Intermittent every 12 hours  cefepime   IVPB      chlorhexidine 0.12% Liquid 15 milliLiter(s) Oral Mucosa every 12 hours  chlorhexidine 4% Liquid 1 Application(s) Topical <User Schedule>  DULoxetine 60 milliGRAM(s) Oral daily  enoxaparin Injectable 30 milliGRAM(s) SubCutaneous daily  fentaNYL   Infusion. 0.5 MICROgram(s)/kG/Hr (3.395 mL/Hr) IV Continuous <Continuous>  influenza   Vaccine 0.5 milliLiter(s) IntraMuscular once  levothyroxine 75 MICROGram(s) Oral daily  midazolam Infusion 0.02 mG/kG/Hr (1.306 mL/Hr) IV Continuous <Continuous>  midazolam Infusion 0.02 mG/kG/Hr (1.358 mL/Hr) IV Continuous <Continuous>  multivitamin  Chewable 1 Tablet(s) Oral daily  nystatin Powder 1 Application(s) Topical two times a day  pantoprazole    Tablet 40 milliGRAM(s) Oral before breakfast  senna 2 Tablet(s) Oral at bedtime    MEDICATIONS  (PRN):  acetaminophen  Suppository .. 650 milliGRAM(s) Rectal every 6 hours PRN Temp greater or equal to 38C (100.4F)      Overnight events:    Vital Signs Last 24 Hrs  T(C): 36.9 (13 Sep 2019 11:00), Max: 37.4 (13 Sep 2019 03:04)  T(F): 98.4 (13 Sep 2019 11:00), Max: 99.4 (13 Sep 2019 08:00)  HR: 52 (13 Sep 2019 13:00) (51 - 68)  BP: 151/66 (13 Sep 2019 13:00) (142/63 - 196/79)  BP(mean): 95 (13 Sep 2019 13:00) (91 - 122)  RR: 15 (13 Sep 2019 13:00) (12 - 20)  SpO2: 100% (13 Sep 2019 13:00) (98% - 100%)  CAPILLARY BLOOD GLUCOSE        I&O's Summary    12 Sep 2019 07:01  -  13 Sep 2019 07:00  --------------------------------------------------------  IN: 2663.6 mL / OUT: 810 mL / NET: 1853.6 mL    13 Sep 2019 07:01  -  13 Sep 2019 14:32  --------------------------------------------------------  IN: 130.9 mL / OUT: 50 mL / NET: 80.9 mL        Physical Exam:    GEN: intubated, opening eyes  HEAD:  Atraumatic, Normocephalic  EYES: conjunctiva and sclera clear  ENT: Moist mucous membranes    NECK: Supple, No JVD  CHEST/LUNG:  crackles b/l, no wheezing.  HEART: No murmurs, rubs, or gallops  ABDOMEN: Bowel sounds present; Soft, Nontender, Nondistended  EXTREMITIES:  No clubbing, cyanosis, or edema  MSK: moving all 4 extremities      Labs:                        11.5   11.56 )-----------( 231      ( 13 Sep 2019 05:59 )             35.5             09-13    139  |  104  |  31<H>  ----------------------------<  179<H>  4.5   |  26  |  0.9    Ca    9.2      13 Sep 2019 05:59  Phos  1.5     09-12  Mg     2.1     09-12    TPro  5.6<L>  /  Alb  2.9<L>  /  TBili  0.9  /  DBili  x   /  AST  25  /  ALT  11  /  AlkPhos  158<H>  09-13    LIVER FUNCTIONS - ( 13 Sep 2019 05:59 )  Alb: 2.9 g/dL / Pro: 5.6 g/dL / ALK PHOS: 158 U/L / ALT: 11 U/L / AST: 25 U/L / GGT: x                       ABG - ( 13 Sep 2019 06:20 )  pH, Arterial: 7.48  pH, Blood: x     /  pCO2: 33    /  pO2: 80    / HCO3: 24    / Base Excess: 1.4   /  SaO2: 97

## 2019-09-13 NOTE — PROGRESS NOTE ADULT - SUBJECTIVE AND OBJECTIVE BOX
Patient is a 87y old  Female who presents with a chief complaint of Weakness and bradycardia (13 Sep 2019 06:29)      T(F): 99.3 (09-13-19 @ 03:04), Max: 99.6 (09-12-19 @ 11:00)  HR: 53 (09-13-19 @ 05:00)  BP: 166/72 (09-13-19 @ 05:00)  RR: 17 (09-13-19 @ 05:04)  SpO2: 99% (09-13-19 @ 05:04) (98% - 100%)    PHYSICAL EXAM:  GENERAL: NAD, well-groomed, well-developed  HEAD:  Atraumatic, Normocephalic  EYES: EOMI, PERRLA, conjunctiva and sclera clear  ENMT: No tonsillar erythema, exudates, or enlargement; Moist mucous membranes, Good dentition, No lesions  NECK: Supple, No JVD, Normal thyroid  NERVOUS SYSTEM:  Alert & Oriented X3,  Motor Strength 5/5 B/L upper and lower extremities  CHEST/LUNG: Clear to percussion bilaterally; No rales, rhonchi, wheezing, or rubs  HEART: Regular rate and rhythm; No murmurs, rubs, or gallops  ABDOMEN: Soft, Nontender, Nondistended; Bowel sounds present  EXTREMITIES:   No clubbing, cyanosis, or edema  LYMPH: No lymphadenopathy noted  SKIN: No rashes or lesions    labs  09-12    137  |  104  |  37<H>  ----------------------------<  213<H>  3.9   |  22  |  1.0    Ca    9.1      12 Sep 2019 05:38  Phos  1.5     09-12  Mg     2.1     09-12    TPro  5.4<L>  /  Alb  2.9<L>  /  TBili  1.1  /  DBili  x   /  AST  16  /  ALT  9   /  AlkPhos  152<H>  09-12                          11.5   11.56 )-----------( 231      ( 13 Sep 2019 05:59 )             35.5       Culture - Bronchial (collected 10 Sep 2019 12:00)  Source: Lavage None  Gram Stain (11 Sep 2019 07:20):    Few polymorphonuclear leukocytes seen per low power field    Few Squamous epithelial cells seen per low power field    No organisms seen per oil power field  Final Report (12 Sep 2019 19:28):    Normal Respiratory Shahnaz present    Culture - Bronchial (collected 10 Sep 2019 12:00)  Source: Bronch Wash Bronchoalveolar Lavage  Gram Stain (11 Sep 2019 07:21):    Few polymorphonuclear leukocytes seen per low power field    Few Squamous epithelial cells seen per low power field    No organisms seen per oil power field  Final Report (12 Sep 2019 19:30):    Normal Respiratory Shahnaz present        Mode: Auto Mode: PRVC/ Volume Support  RR (machine): 12  TV (machine): 450  FiO2: 40  PEEP: 5  MAP: 13  PIP: 35        acetaminophen  Suppository .. 650 milliGRAM(s) Rectal every 6 hours PRN  aspirin  chewable 81 milliGRAM(s) Oral daily  buDESOnide 160 MICROgram(s)/formoterol 4.5 MICROgram(s) Inhaler 2 Puff(s) Inhalation two times a day  carbidopa/levodopa  10/100 1 Tablet(s) Oral daily  carbidopa/levodopa  25/100 1 Tablet(s) Oral at bedtime  cefepime   IVPB 2000 milliGRAM(s) IV Intermittent every 12 hours  cefepime   IVPB      chlorhexidine 0.12% Liquid 15 milliLiter(s) Oral Mucosa every 12 hours  chlorhexidine 4% Liquid 1 Application(s) Topical <User Schedule>  DULoxetine 60 milliGRAM(s) Oral daily  enoxaparin Injectable 30 milliGRAM(s) SubCutaneous daily  fentaNYL   Infusion. 0.5 MICROgram(s)/kG/Hr IV Continuous <Continuous>  influenza   Vaccine 0.5 milliLiter(s) IntraMuscular once  levothyroxine 75 MICROGram(s) Oral daily  midazolam Infusion 0.02 mG/kG/Hr IV Continuous <Continuous>  midazolam Infusion 0.02 mG/kG/Hr IV Continuous <Continuous>  multivitamin  Chewable 1 Tablet(s) Oral daily  nystatin Powder 1 Application(s) Topical two times a day  pantoprazole    Tablet 40 milliGRAM(s) Oral before breakfast  senna 2 Tablet(s) Oral at bedtime

## 2019-09-13 NOTE — PROGRESS NOTE ADULT - ASSESSMENT
87 year old female admitted s/p fall episode of bradycardia   was rapid response after aspirating breakfast on 9/6/19  CPR and epi given, ROSC, intubated    # Respiratory Failure - aspiration   - sputum culture from 9/8/19: numerous MRSA, few pseudomonas.  ID advised likely colonized, at this point do not need to give vancomycin  - continue cefepime  - repeat blood culture, previous was negative  - on bronchoscopy (9/10/19): The Right tracheobronchial tree was inspected closely to the level of the subsegmental bronchi. there was significant mucopurulent secretion and was suctioned.  The Left tracheobronchial tree also significant mucopurulent secretion, BAL was performed LLL   -culture from BAL and BA wash negative   - blood, urine cultures: no growth  -deep tracheal aspirate: Few Squamous epithelial cells per low power field, Few polymorphonuclear leukocytes per low power field, Few Gram variable coccobacilli per oil power field   -echo: mild concentric LVH, EF > 55%, no wall abnormalities, left atrium moderately dilated, moderate AR, moderate TR  -furosemide 40mg IV stat given   -CXR significant opacity, still requiring frequent suction for secretion via et tube.  Failed cpap titration   -will remain intubated today, possible attempt at extubation tomorrow      # Cardiac / CHF  / HTN    - pulse better   - monitor weight and pulse   - hold coreg for now given HR in 40s/low 50s  -/63 if increasing after lasix dose can consider amlodipine    # Parkinson's   - continue Sinemet     # Hypothyroidism   - levothyroxine     enoxaparin dvt ppx  continue tube feeds  full code 87 year old female admitted s/p fall episode of bradycardia   was rapid response after aspirating breakfast on 9/6/19  CPR and epi given, ROSC, intubated    # Respiratory Failure - aspiration   - sputum culture from 9/8/19: numerous MRSA, few pseudomonas.  ID advised likely colonized, at this point do not need to give vancomycin  - continue cefepime  - repeat blood culture, previous was negative  - on bronchoscopy (9/10/19): The Right tracheobronchial tree was inspected closely to the level of the subsegmental bronchi. there was significant mucopurulent secretion and was suctioned.  The Left tracheobronchial tree also significant mucopurulent secretion, BAL was performed LLL   -culture from BAL and BA wash negative   - blood, urine cultures: no growth  -deep tracheal aspirate: Few Squamous epithelial cells per low power field, Few polymorphonuclear leukocytes per low power field, Few Gram variable coccobacilli per oil power field   -echo: mild concentric LVH, EF > 55%, no wall abnormalities, left atrium moderately dilated, moderate AR, moderate TR  -furosemide 40mg IV stat given   -CXR significant opacity, still requiring frequent suction for secretion via et tube.  Failed cpap titration   -will remain intubated today, possible attempt at extubation tomorrow      # Cardiac / chronic CHF with preserved EF  / HTN    - pulse better   - monitor weight and pulse   - hold coreg for now given HR in 40s/low 50s  -/63 if increasing after lasix dose can consider amlodipine    # Parkinson's   - continue Sinemet     # Hypothyroidism   - levothyroxine     enoxaparin dvt ppx  continue tube feeds  full code

## 2019-09-13 NOTE — PROGRESS NOTE ADULT - ASSESSMENT
87 year old female admitted s/p fall episode of bradycardia ; sinemet ; rapid response respiratory failure ; episode of temp     # Respiratory Failure - aspiration  secretions  s/p bronch    - Vancomycin  - continue vent parameters per Pulm / Critical care   - on IV sedation Fentanyl /midazolam neurologic status   -on bronchoscopy: The Right tracheobronchial tree was inspected closely to the level of the subsegmental bronchi. there was significant mucopurulent secretion and was suctioned.  The Left tracheobronchial tree also significant mucopurulent secretion, BAL was performed LLL   -f/u Gram stain and cx from BAL and BA wash  - blood, urine cultures: no growth  -deep tracheal aspirate: Few Squamous epithelial cells per low power field, Few polymorphonuclear leukocytes per low power field, Few Gram variable coccobacilli per oil power field   - wean as tolerated   - SLP evaluation when extubated        # Cardiac / CHF  / HTN      - pulse better   - continue lasix   - reviewed echo  - monitor weight and pulse        # Parkinson's     - continue Sinemet     # Hypothyroidism     - levothyroxine     # GI and DVT prophylaxsis     - pantoprazxole   - enoxaparin    advance care  noted  cpr full code    dispostion -

## 2019-09-13 NOTE — PROGRESS NOTE ADULT - ASSESSMENT
IMPRESSION:  Cardio respiratory arrest  aspiration of breakfast  no sign pneumonia/pneumonitis as of yet  LLL atelectasis  rosalind cardia  hypernatremia        PLAN:    CNS: sedation as needed     HEENT: jacob    PULMONARY:  cxr significant opacity still require frequent suction for secretion via et tube    failed cpap titration   CARDIOVASCULAR: echo keep Is = os   for now   lasix 40 mg once   GI: GI prophylaxis. NG  Feeding     RENAL: follow lytes     INFECTIOUS DISEASE: follow DTA and cx follow BAL   continue abx if spike temp add vanco     HEMATOLOGICAL:  DVT prophylaxis.    ENDOCRINE:  Follow up FS.  Insulin protocol if needed.    MUSCULOSKELETAL:        CRITICAL CARE TIME SPENT: IMPRESSION:  Cardio respiratory arrest  aspiration of breakfast  no sign pneumonia/pneumonitis as of yet  LLL atelectasis  rosalind cardia  hypernatremia        PLAN:    CNS: sedation as needed     HEENT: jacob    PULMONARY:  cxr significant opacity still require frequent suction for secretion via et tube    failed cpap titration   CARDIOVASCULAR: echo keep Is = os   for now   lasix 40 mg once   GI: GI prophylaxis. NG  Feeding     RENAL: follow lytes     INFECTIOUS DISEASE: follow DTA and cx follow BAL   continue abx if spike temp add vanco     HEMATOLOGICAL:  DVT prophylaxis.    ENDOCRINE:  Follow up FS.  Insulin protocol if needed.    MUSCULOSKELETAL:  d./c laura      CRITICAL CARE TIME SPENT:

## 2019-09-13 NOTE — CHART NOTE - NSCHARTNOTEFT_GEN_A_CORE
Registered Dietitian Follow-Up     Scheduled at risk f/u. Pt was seen by Dr. Irizarry earlier this week. Recommendations noted.    RD to sign off, recall prn.

## 2019-09-13 NOTE — PROGRESS NOTE ADULT - SUBJECTIVE AND OBJECTIVE BOX
Patient is a 87y old  Female who presents with a chief complaint of Weakness and bradycardia (13 Sep 2019 07:28)      Over Night Events:  Patient seen and examined still vented failed cpap titration this morning   less secretion   spoke to daughter yesterday patient been declining and generalized weakness for couple of weeks   BAL negative   ROS:  See HPI    PHYSICAL EXAM    ICU Vital Signs Last 24 Hrs  T(C): 37.4 (13 Sep 2019 08:00), Max: 37.6 (12 Sep 2019 11:00)  T(F): 99.4 (13 Sep 2019 08:00), Max: 99.6 (12 Sep 2019 11:00)  HR: 53 (13 Sep 2019 05:00) (53 - 68)  BP: 166/72 (13 Sep 2019 05:00) (145/63 - 193/85)  BP(mean): 104 (13 Sep 2019 05:00) (91 - 122)  ABP: --  ABP(mean): --  RR: 17 (13 Sep 2019 07:05) (12 - 37)  SpO2: 99% (13 Sep 2019 05:04) (98% - 100%)      General: AOx3  HEENT:    et tube             Lymph Nodes: NO cervical LN   Lungs: Bilateral crackles   Cardiovascular: Regular   Abdomen: Soft, Positive BS  Extremities: No clubbing   Skin: warm   Neurological: weakness generalized   Musculoskeletal: move all ext     I&O's Detail    12 Sep 2019 07:01  -  13 Sep 2019 07:00  --------------------------------------------------------  IN:    Enteral Tube Flush: 110 mL    fentaNYL Infusion.: 323.6 mL    Free Water: 1000 mL    Osmolite: 630 mL    Solution: 600 mL  Total IN: 2663.6 mL    OUT:    Indwelling Catheter - Urethral: 810 mL  Total OUT: 810 mL    Total NET: 1853.6 mL      13 Sep 2019 07:01  -  13 Sep 2019 09:31  --------------------------------------------------------  IN:  Total IN: 0 mL    OUT:    Indwelling Catheter - Urethral: 30 mL  Total OUT: 30 mL    Total NET: -30 mL          LABS:                          11.5   11.56 )-----------( 231      ( 13 Sep 2019 05:59 )             35.5         13 Sep 2019 05:59    139    |  104    |  31     ----------------------------<  179    4.5     |  26     |  0.9      Ca    9.2        13 Sep 2019 05:59  Phos  1.5       12 Sep 2019 05:38  Mg     2.1       12 Sep 2019 05:38    TPro  5.6    /  Alb  2.9    /  TBili  0.9    /  DBili  x      /  AST  25     /  ALT  11     /  AlkPhos  158    13 Sep 2019 05:59  Amylase x     lipase x                                                                                                                                                    Culture - Bronchial (collected 10 Sep 2019 12:00)  Source: Lavage None  Gram Stain (11 Sep 2019 07:20):    Few polymorphonuclear leukocytes seen per low power field    Few Squamous epithelial cells seen per low power field    No organisms seen per oil power field  Final Report (12 Sep 2019 19:28):    Normal Respiratory Shahnaz present    Culture - Bronchial (collected 10 Sep 2019 12:00)  Source: Bronch Wash Bronchoalveolar Lavage  Gram Stain (11 Sep 2019 07:21):    Few polymorphonuclear leukocytes seen per low power field    Few Squamous epithelial cells seen per low power field    No organisms seen per oil power field  Final Report (12 Sep 2019 19:30):    Normal Respiratory Shahnaz present                                                   Mode: Auto Mode: PRVC/ Volume Support  RR (machine): 12  TV (machine): 450  FiO2: 40  PEEP: 5  MAP: 13  PIP: 35                                      ABG - ( 13 Sep 2019 06:20 )  pH, Arterial: 7.48  pH, Blood: x     /  pCO2: 33    /  pO2: 80    / HCO3: 24    / Base Excess: 1.4   /  SaO2: 97                  MEDICATIONS  (STANDING):  aspirin  chewable 81 milliGRAM(s) Oral daily  buDESOnide 160 MICROgram(s)/formoterol 4.5 MICROgram(s) Inhaler 2 Puff(s) Inhalation two times a day  carbidopa/levodopa  10/100 1 Tablet(s) Oral daily  carbidopa/levodopa  25/100 1 Tablet(s) Oral at bedtime  cefepime   IVPB 2000 milliGRAM(s) IV Intermittent every 12 hours  cefepime   IVPB      chlorhexidine 0.12% Liquid 15 milliLiter(s) Oral Mucosa every 12 hours  chlorhexidine 4% Liquid 1 Application(s) Topical <User Schedule>  DULoxetine 60 milliGRAM(s) Oral daily  enoxaparin Injectable 30 milliGRAM(s) SubCutaneous daily  fentaNYL   Infusion. 0.5 MICROgram(s)/kG/Hr (3.395 mL/Hr) IV Continuous <Continuous>  influenza   Vaccine 0.5 milliLiter(s) IntraMuscular once  levothyroxine 75 MICROGram(s) Oral daily  midazolam Infusion 0.02 mG/kG/Hr (1.306 mL/Hr) IV Continuous <Continuous>  midazolam Infusion 0.02 mG/kG/Hr (1.358 mL/Hr) IV Continuous <Continuous>  multivitamin  Chewable 1 Tablet(s) Oral daily  nystatin Powder 1 Application(s) Topical two times a day  pantoprazole    Tablet 40 milliGRAM(s) Oral before breakfast  senna 2 Tablet(s) Oral at bedtime    MEDICATIONS  (PRN):  acetaminophen  Suppository .. 650 milliGRAM(s) Rectal every 6 hours PRN Temp greater or equal to 38C (100.4F)          Xrays:  TLC:  OG:  ET tube:                                                                                    decrease left opacity , has right effusion opacity    ECHO:  [facility] ICU:

## 2019-09-13 NOTE — PROGRESS NOTE ADULT - SUBJECTIVE AND OBJECTIVE BOX
86 yo F presents to the ED c/o bradycardia, noticed by visiting Nurse today and she called EMS. Pt also states she felt forward and flat on Sunday 9/1/19 and has been anxious to climb up or down the stairs. Pt ambulates with a walker at home. Pt denies any similar episodes, traveling, exposure to ticks, fevers, chills, headaches, changes in mentation, visual changes, CP, SOB, abdominal pain, dysuria, changes in BM, rashes, skin discolorations, new medications.      interval :  + mrsa sputum     PAST MEDICAL & SURGICAL HISTORY:  Parkinson disease  Hypothyroid  Rib fractures  Congestive heart failure  Hypertension  No significant past surgical history    MEDICATIONS  (STANDING):  aspirin  chewable 81 milliGRAM(s) Oral daily  buDESOnide 160 MICROgram(s)/formoterol 4.5 MICROgram(s) Inhaler 2 Puff(s) Inhalation two times a day  carbidopa/levodopa  10/100 1 Tablet(s) Oral daily  carbidopa/levodopa  25/100 1 Tablet(s) Oral at bedtime  cefepime   IVPB 2000 milliGRAM(s) IV Intermittent every 12 hours  cefepime   IVPB      chlorhexidine 0.12% Liquid 15 milliLiter(s) Oral Mucosa every 12 hours  chlorhexidine 4% Liquid 1 Application(s) Topical <User Schedule>  DULoxetine 60 milliGRAM(s) Oral daily  enoxaparin Injectable 30 milliGRAM(s) SubCutaneous daily  fentaNYL   Infusion. 0.5 MICROgram(s)/kG/Hr (3.395 mL/Hr) IV Continuous <Continuous>  influenza   Vaccine 0.5 milliLiter(s) IntraMuscular once  levothyroxine 75 MICROGram(s) Oral daily  midazolam Infusion 0.02 mG/kG/Hr (1.306 mL/Hr) IV Continuous <Continuous>  midazolam Infusion 0.02 mG/kG/Hr (1.358 mL/Hr) IV Continuous <Continuous>  multivitamin  Chewable 1 Tablet(s) Oral daily  nystatin Powder 1 Application(s) Topical two times a day  pantoprazole    Tablet 40 milliGRAM(s) Oral before breakfast  senna 2 Tablet(s) Oral at bedtime  vancomycin  IVPB 1000 milliGRAM(s) IV Intermittent every 12 hours  vancomycin  IVPB        MEDICATIONS  (PRN):  acetaminophen  Suppository .. 650 milliGRAM(s) Rectal every 6 hours PRN Temp greater or equal to 38C (100.4F)        ICU Vital Signs Last 24 Hrs  T(C): 37.4 (13 Sep 2019 03:04), Max: 37.6 (12 Sep 2019 11:00)  T(F): 99.3 (13 Sep 2019 03:04), Max: 99.6 (12 Sep 2019 11:00)  HR: 58 (13 Sep 2019 03:00) (53 - 68)  BP: 163/70 (13 Sep 2019 03:00) (145/63 - 193/85)  BP(mean): 101 (13 Sep 2019 03:00) (91 - 152)  ABP: --  ABP(mean): --  RR: 17 (13 Sep 2019 05:04) (12 - 37)  SpO2: 99% (13 Sep 2019 05:04) (98% - 100%)    Mode: Auto Mode: PRVC/ Volume Support  RR (machine): 12  TV (machine): 450  FiO2: 40  PEEP: 5  MAP: 13  PIP: 35      GENERAL: NAD, lying in bed  	HEAD:  Atraumatic, Normocephalic  	EYES: EOMI, PERRLA, conjunctiva and sclera clear  	ENT: Moist mucous membranes + endotracheal tube   	NECK: Supple, No JVD  	CHEST/LUNG: Clear to auscultation bilaterally; No rales, rhonchi, wheezing, or rubs.  	HEART: +Bradycardic, No murmurs, rubs, or gallops  	ABDOMEN: Bowel sounds present; Soft, Nontender, Nondistended  	EXTREMITIES:  2+ Peripheral Pulses, brisk capillary refill. No clubbing, cyanosis, or edema  	NERVOUS SYSTEM:  non focal     MSK: no cyanosis                                       11.6   9.20  )-----------( 198      ( 12 Sep 2019 05:38 )             35.4   09-12    137  |  104  |  37<H>  ----------------------------<  213<H>  3.9   |  22  |  1.0    Ca    9.1      12 Sep 2019 05:38  Phos  1.5     09-12  Mg     2.1     09-12    TPro  5.4<L>  /  Alb  2.9<L>  /  TBili  1.1  /  DBili  x   /  AST  16  /  ALT  9   /  AlkPhos  152<H>  09-12      Culture - Bronchial (collected 10 Sep 2019 12:00)  Source: Lavage None  Gram Stain (11 Sep 2019 07:20):    Few polymorphonuclear leukocytes seen per low power field    Few Squamous epithelial cells seen per low power field    No organisms seen per oil power field  Final Report (12 Sep 2019 19:28):    Normal Respiratory Shahnaz present    Culture - Bronchial (collected 10 Sep 2019 12:00)  Source: Bronch Wash Bronchoalveolar Lavage  Gram Stain (11 Sep 2019 07:21):    Few polymorphonuclear leukocytes seen per low power field    Few Squamous epithelial cells seen per low power field    No organisms seen per oil power field  Final Report (12 Sep 2019 19:30):    Normal Respiratory Shahnaz present            EXAM:  XR CHEST PORTABLE ROUTINE 1V            PROCEDURE DATE:  09/12/2019            INTERPRETATION:  Clinical History / Reason for exam: Respiratory distress    Comparison : Chest radiograph 9/11/2019.    Technique/Positioning: Frontal.    Findings:    Support devices: ET tube mid trachea NG tube in stomach    Cardiac/mediastinum/hilum: Indeterminate    Lung parenchyma/Pleura: Bilateral pleural effusion/opacity unchanged. No   air leak.    Skeleton/soft tissues: Unremarkable.    Impression:      Bilateral pleural effusion/opacity unchanged. No air leak.                      MERLIN KAPOOR M.D., ATTENDING RADIOLOGIST  This document has been electronically signed. Sep 12 2019  2:38PM

## 2019-09-13 NOTE — PROGRESS NOTE ADULT - ASSESSMENT
On vent . Having secretions,  Getting sedation . Awake responsive.  Off beta. HR acceptable  GI DVT prophylaxis,.   ? try wean from vent. If bp high begin norvasc . Prognosis guarded

## 2019-09-14 LAB
ALBUMIN SERPL ELPH-MCNC: 3 G/DL — LOW (ref 3.5–5.2)
ALP SERPL-CCNC: 143 U/L — HIGH (ref 30–115)
ALT FLD-CCNC: 12 U/L — SIGNIFICANT CHANGE UP (ref 0–41)
ANION GAP SERPL CALC-SCNC: 12 MMOL/L — SIGNIFICANT CHANGE UP (ref 7–14)
AST SERPL-CCNC: 23 U/L — SIGNIFICANT CHANGE UP (ref 0–41)
BASE EXCESS BLDA CALC-SCNC: 3 MMOL/L — HIGH (ref -2–2)
BILIRUB SERPL-MCNC: 0.9 MG/DL — SIGNIFICANT CHANGE UP (ref 0.2–1.2)
BUN SERPL-MCNC: 30 MG/DL — HIGH (ref 10–20)
CALCIUM SERPL-MCNC: 9.2 MG/DL — SIGNIFICANT CHANGE UP (ref 8.5–10.1)
CHLORIDE SERPL-SCNC: 103 MMOL/L — SIGNIFICANT CHANGE UP (ref 98–110)
CO2 SERPL-SCNC: 25 MMOL/L — SIGNIFICANT CHANGE UP (ref 17–32)
CREAT SERPL-MCNC: 0.9 MG/DL — SIGNIFICANT CHANGE UP (ref 0.7–1.5)
CULTURE RESULTS: SIGNIFICANT CHANGE UP
GLUCOSE SERPL-MCNC: 174 MG/DL — HIGH (ref 70–99)
HCO3 BLDA-SCNC: 26 MMOL/L — SIGNIFICANT CHANGE UP (ref 23–27)
HCT VFR BLD CALC: 35.5 % — LOW (ref 37–47)
HGB BLD-MCNC: 11.7 G/DL — LOW (ref 12–16)
HOROWITZ INDEX BLDA+IHG-RTO: 40 — SIGNIFICANT CHANGE UP
MCHC RBC-ENTMCNC: 28.5 PG — SIGNIFICANT CHANGE UP (ref 27–31)
MCHC RBC-ENTMCNC: 33 G/DL — SIGNIFICANT CHANGE UP (ref 32–37)
MCV RBC AUTO: 86.4 FL — SIGNIFICANT CHANGE UP (ref 81–99)
NRBC # BLD: 0 /100 WBCS — SIGNIFICANT CHANGE UP (ref 0–0)
PCO2 BLDA: 36 MMHG — LOW (ref 38–42)
PH BLDA: 7.48 — HIGH (ref 7.38–7.42)
PLATELET # BLD AUTO: 268 K/UL — SIGNIFICANT CHANGE UP (ref 130–400)
PO2 BLDA: 103 MMHG — HIGH (ref 78–95)
POTASSIUM SERPL-MCNC: 3.8 MMOL/L — SIGNIFICANT CHANGE UP (ref 3.5–5)
POTASSIUM SERPL-SCNC: 3.8 MMOL/L — SIGNIFICANT CHANGE UP (ref 3.5–5)
PROT SERPL-MCNC: 5.6 G/DL — LOW (ref 6–8)
RBC # BLD: 4.11 M/UL — LOW (ref 4.2–5.4)
RBC # FLD: 17.2 % — HIGH (ref 11.5–14.5)
SAO2 % BLDA: 99 % — HIGH (ref 94–98)
SODIUM SERPL-SCNC: 140 MMOL/L — SIGNIFICANT CHANGE UP (ref 135–146)
SPECIMEN SOURCE: SIGNIFICANT CHANGE UP
WBC # BLD: 10.62 K/UL — SIGNIFICANT CHANGE UP (ref 4.8–10.8)
WBC # FLD AUTO: 10.62 K/UL — SIGNIFICANT CHANGE UP (ref 4.8–10.8)

## 2019-09-14 PROCEDURE — 71045 X-RAY EXAM CHEST 1 VIEW: CPT | Mod: 26

## 2019-09-14 RX ORDER — FUROSEMIDE 40 MG
40 TABLET ORAL
Refills: 0 | Status: DISCONTINUED | OUTPATIENT
Start: 2019-09-14 | End: 2019-09-17

## 2019-09-14 RX ORDER — FUROSEMIDE 40 MG
40 TABLET ORAL ONCE
Refills: 0 | Status: COMPLETED | OUTPATIENT
Start: 2019-09-14 | End: 2019-09-14

## 2019-09-14 RX ORDER — POTASSIUM CHLORIDE 20 MEQ
40 PACKET (EA) ORAL DAILY
Refills: 0 | Status: DISCONTINUED | OUTPATIENT
Start: 2019-09-14 | End: 2019-09-22

## 2019-09-14 RX ORDER — CARVEDILOL PHOSPHATE 80 MG/1
25 CAPSULE, EXTENDED RELEASE ORAL EVERY 12 HOURS
Refills: 0 | Status: DISCONTINUED | OUTPATIENT
Start: 2019-09-14 | End: 2019-10-03

## 2019-09-14 RX ORDER — AMLODIPINE BESYLATE 2.5 MG/1
5 TABLET ORAL ONCE
Refills: 0 | Status: COMPLETED | OUTPATIENT
Start: 2019-09-14 | End: 2019-09-14

## 2019-09-14 RX ORDER — LABETALOL HCL 100 MG
10 TABLET ORAL ONCE
Refills: 0 | Status: COMPLETED | OUTPATIENT
Start: 2019-09-14 | End: 2019-09-14

## 2019-09-14 RX ORDER — AMLODIPINE BESYLATE 2.5 MG/1
5 TABLET ORAL
Refills: 0 | Status: DISCONTINUED | OUTPATIENT
Start: 2019-09-14 | End: 2019-09-28

## 2019-09-14 RX ADMIN — CEFEPIME 100 MILLIGRAM(S): 1 INJECTION, POWDER, FOR SOLUTION INTRAMUSCULAR; INTRAVENOUS at 17:35

## 2019-09-14 RX ADMIN — Medication 10 MILLIGRAM(S): at 19:23

## 2019-09-14 RX ADMIN — Medication 40 MILLIGRAM(S): at 08:26

## 2019-09-14 RX ADMIN — Medication 40 MILLIEQUIVALENT(S): at 10:17

## 2019-09-14 RX ADMIN — CHLORHEXIDINE GLUCONATE 15 MILLILITER(S): 213 SOLUTION TOPICAL at 17:33

## 2019-09-14 RX ADMIN — Medication 40 MILLIGRAM(S): at 17:33

## 2019-09-14 RX ADMIN — BUDESONIDE AND FORMOTEROL FUMARATE DIHYDRATE 2 PUFF(S): 160; 4.5 AEROSOL RESPIRATORY (INHALATION) at 20:53

## 2019-09-14 RX ADMIN — Medication 75 MICROGRAM(S): at 05:43

## 2019-09-14 RX ADMIN — AMLODIPINE BESYLATE 5 MILLIGRAM(S): 2.5 TABLET ORAL at 16:36

## 2019-09-14 RX ADMIN — NYSTATIN CREAM 1 APPLICATION(S): 100000 CREAM TOPICAL at 05:42

## 2019-09-14 RX ADMIN — ENOXAPARIN SODIUM 30 MILLIGRAM(S): 100 INJECTION SUBCUTANEOUS at 11:11

## 2019-09-14 RX ADMIN — PANTOPRAZOLE SODIUM 40 MILLIGRAM(S): 20 TABLET, DELAYED RELEASE ORAL at 05:44

## 2019-09-14 RX ADMIN — CEFEPIME 100 MILLIGRAM(S): 1 INJECTION, POWDER, FOR SOLUTION INTRAMUSCULAR; INTRAVENOUS at 05:42

## 2019-09-14 RX ADMIN — SENNA PLUS 2 TABLET(S): 8.6 TABLET ORAL at 21:13

## 2019-09-14 RX ADMIN — CHLORHEXIDINE GLUCONATE 1 APPLICATION(S): 213 SOLUTION TOPICAL at 05:43

## 2019-09-14 RX ADMIN — AMLODIPINE BESYLATE 5 MILLIGRAM(S): 2.5 TABLET ORAL at 17:33

## 2019-09-14 RX ADMIN — CHLORHEXIDINE GLUCONATE 15 MILLILITER(S): 213 SOLUTION TOPICAL at 05:43

## 2019-09-14 RX ADMIN — Medication 40 MILLIGRAM(S): at 01:33

## 2019-09-14 RX ADMIN — BUDESONIDE AND FORMOTEROL FUMARATE DIHYDRATE 2 PUFF(S): 160; 4.5 AEROSOL RESPIRATORY (INHALATION) at 07:25

## 2019-09-14 RX ADMIN — NYSTATIN CREAM 1 APPLICATION(S): 100000 CREAM TOPICAL at 17:34

## 2019-09-14 RX ADMIN — CARVEDILOL PHOSPHATE 25 MILLIGRAM(S): 80 CAPSULE, EXTENDED RELEASE ORAL at 20:51

## 2019-09-14 RX ADMIN — CARBIDOPA AND LEVODOPA 1 TABLET(S): 25; 100 TABLET ORAL at 19:07

## 2019-09-14 NOTE — PROGRESS NOTE ADULT - SUBJECTIVE AND OBJECTIVE BOX
88 yo F presents to the ED c/o bradycardia, noticed by visiting Nurse today and she called EMS. Pt also states she felt forward and flat on Sunday 9/1/19 and has been anxious to climb up or down the stairs. Pt ambulates with a walker at home. Pt denies any similar episodes, traveling, exposure to ticks, fevers, chills, headaches, changes in mentation, visual changes, CP, SOB, abdominal pain, dysuria, changes in BM, rashes, skin discolorations, new medications.      interval :  + mrsa sputum     PAST MEDICAL & SURGICAL HISTORY:  Parkinson disease  Hypothyroid  Rib fractures  Congestive heart failure  Hypertension  No significant past surgical history    MEDICATIONS  (STANDING):  aspirin  chewable 81 milliGRAM(s) Oral daily  buDESOnide 160 MICROgram(s)/formoterol 4.5 MICROgram(s) Inhaler 2 Puff(s) Inhalation two times a day  carbidopa/levodopa  10/100 1 Tablet(s) Oral daily  carbidopa/levodopa  25/100 1 Tablet(s) Oral at bedtime  cefepime   IVPB 2000 milliGRAM(s) IV Intermittent every 12 hours  cefepime   IVPB      chlorhexidine 0.12% Liquid 15 milliLiter(s) Oral Mucosa every 12 hours  chlorhexidine 4% Liquid 1 Application(s) Topical <User Schedule>  DULoxetine 60 milliGRAM(s) Oral daily  enoxaparin Injectable 30 milliGRAM(s) SubCutaneous daily  fentaNYL   Infusion. 0.5 MICROgram(s)/kG/Hr (3.395 mL/Hr) IV Continuous <Continuous>  influenza   Vaccine 0.5 milliLiter(s) IntraMuscular once  levothyroxine 75 MICROGram(s) Oral daily  midazolam Infusion 0.02 mG/kG/Hr (1.306 mL/Hr) IV Continuous <Continuous>  midazolam Infusion 0.02 mG/kG/Hr (1.358 mL/Hr) IV Continuous <Continuous>  multivitamin  Chewable 1 Tablet(s) Oral daily  nystatin Powder 1 Application(s) Topical two times a day  pantoprazole    Tablet 40 milliGRAM(s) Oral before breakfast  senna 2 Tablet(s) Oral at bedtime    MEDICATIONS  (PRN):  acetaminophen  Suppository .. 650 milliGRAM(s) Rectal every 6 hours PRN Temp greater or equal to 38C (100.4F)      ICU Vital Signs Last 24 Hrs  T(C): 36.6 (14 Sep 2019 03:07), Max: 37.4 (13 Sep 2019 08:00)  T(F): 97.9 (14 Sep 2019 03:07), Max: 99.4 (13 Sep 2019 08:00)  HR: 60 (14 Sep 2019 06:01) (46 - 61)  BP: 174/69 (14 Sep 2019 06:01) (109/53 - 213/82)  BP(mean): 99 (14 Sep 2019 06:01) (76 - 118)  ABP: --  ABP(mean): --  RR: 19 (14 Sep 2019 06:01) (12 - 24)  SpO2: 100% (14 Sep 2019 06:01) (99% - 100%)        Mode: Auto Mode: PRVC/ Volume Support  RR (machine): 12  TV (machine): 420  FiO2: 40  PEEP: 5  MAP: 10  PIP: 28          GENERAL: NAD, lying in bed  	HEAD:  Atraumatic, Normocephalic  	EYES: EOMI, PERRLA, conjunctiva and sclera clear  	ENT: Moist mucous membranes + endotracheal tube   	NECK: Supple, No JVD  	CHEST/LUNG: Clear to auscultation bilaterally; No rales, rhonchi, wheezing, or rubs.  	HEART: +Bradycardic, No murmurs, rubs, or gallops  	ABDOMEN: Bowel sounds present; Soft, Nontender, Nondistended  	EXTREMITIES:  2+ Peripheral Pulses, brisk capillary refill. No clubbing, cyanosis, or edema  	NERVOUS SYSTEM:  non focal     MSK: no cyanosis                                       11.6   9.20  )-----------( 198      ( 12 Sep 2019 05:38 )             35.4   09-12    137  |  104  |  37<H>  ----------------------------<  213<H>  3.9   |  22  |  1.0    Ca    9.1      12 Sep 2019 05:38  Phos  1.5     09-12  Mg     2.1     09-12    TPro  5.4<L>  /  Alb  2.9<L>  /  TBili  1.1  /  DBili  x   /  AST  16  /  ALT  9   /  AlkPhos  152<H>  09-12      Culture - Bronchial (collected 10 Sep 2019 12:00)  Source: Lavage None  Gram Stain (11 Sep 2019 07:20):    Few polymorphonuclear leukocytes seen per low power field    Few Squamous epithelial cells seen per low power field    No organisms seen per oil power field  Final Report (12 Sep 2019 19:28):    Normal Respiratory Shahnaz present    Culture - Bronchial (collected 10 Sep 2019 12:00)  Source: Bronch Wash Bronchoalveolar Lavage  Gram Stain (11 Sep 2019 07:21):    Few polymorphonuclear leukocytes seen per low power field    Few Squamous epithelial cells seen per low power field    No organisms seen per oil power field  Final Report (12 Sep 2019 19:30):    Normal Respiratory Shahnaz present            EXAM:  XR CHEST PORTABLE ROUTINE 1V            PROCEDURE DATE:  09/12/2019            INTERPRETATION:  Clinical History / Reason for exam: Respiratory distress    Comparison : Chest radiograph 9/11/2019.    Technique/Positioning: Frontal.    Findings:    Support devices: ET tube mid trachea NG tube in stomach    Cardiac/mediastinum/hilum: Indeterminate    Lung parenchyma/Pleura: Bilateral pleural effusion/opacity unchanged. No   air leak.    Skeleton/soft tissues: Unremarkable.    Impression:      Bilateral pleural effusion/opacity unchanged. No air leak.                      MERLIN KAPOOR M.D., ATTENDING RADIOLOGIST  This document has been electronically signed. Sep 12 2019  2:38PM

## 2019-09-14 NOTE — PROGRESS NOTE ADULT - ASSESSMENT
On vent . Getting sedation . Awake responsive.  Off beta. HR acceptable  GI DVT prophylaxis,.   Put on amlodipine 5 bid. May need trach. Prognosis guarded

## 2019-09-14 NOTE — PROGRESS NOTE ADULT - SUBJECTIVE AND OBJECTIVE BOX
Patient is a 87y old  Female who presents with a chief complaint of Weakness and bradycardia (13 Sep 2019 14:32)      T(F): 97.9 (09-14-19 @ 03:07), Max: 99.4 (09-13-19 @ 08:00)  HR: 60 (09-14-19 @ 06:01)  BP: 174/69 (09-14-19 @ 06:01)  RR: 19 (09-14-19 @ 06:01)  SpO2: 100% (09-14-19 @ 06:01) (99% - 100%)    PHYSICAL EXAM:  GENERAL: NAD, well-groomed, well-developed  HEAD:  Atraumatic, Normocephalic  EYES: EOMI, PERRLA, conjunctiva and sclera clear  ENMT: No tonsillar erythema, exudates, or enlargement; Moist mucous membranes, Good dentition, No lesions  NECK: Supple, No JVD, Normal thyroid  NERVOUS SYSTEM:  Alert & Oriented X3,  Motor Strength 5/5 B/L upper and lower extremities  CHEST/LUNG: Clear to percussion bilaterally; No rales, rhonchi, wheezing, or rubs Rhonchi  HEART: Regular rate and rhythm; No murmurs, rubs, or gallops  ABDOMEN: Soft, Nontender, Nondistended; Bowel sounds present  EXTREMITIES:   No clubbing, cyanosis, or edema  LYMPH: No lymphadenopathy noted  SKIN: No rashes or lesions    labs  09-14    140  |  103  |  30<H>  ----------------------------<  174<H>  3.8   |  25  |  0.9    Ca    9.2      14 Sep 2019 05:47    TPro  5.6<L>  /  Alb  3.0<L>  /  TBili  0.9  /  DBili  x   /  AST  23  /  ALT  12  /  AlkPhos  143<H>  09-14                          11.7   10.62 )-----------( 268      ( 14 Sep 2019 05:47 )             35.5         Mode: Auto Mode: PRVC/ Volume Support  RR (machine): 12  TV (machine): 420  FiO2: 40  PEEP: 5  MAP: 10  PIP: 28        acetaminophen  Suppository .. 650 milliGRAM(s) Rectal every 6 hours PRN  aspirin  chewable 81 milliGRAM(s) Oral daily  buDESOnide 160 MICROgram(s)/formoterol 4.5 MICROgram(s) Inhaler 2 Puff(s) Inhalation two times a day  carbidopa/levodopa  10/100 1 Tablet(s) Oral daily  carbidopa/levodopa  25/100 1 Tablet(s) Oral at bedtime  cefepime   IVPB 2000 milliGRAM(s) IV Intermittent every 12 hours  cefepime   IVPB      chlorhexidine 0.12% Liquid 15 milliLiter(s) Oral Mucosa every 12 hours  chlorhexidine 4% Liquid 1 Application(s) Topical <User Schedule>  DULoxetine 60 milliGRAM(s) Oral daily  enoxaparin Injectable 30 milliGRAM(s) SubCutaneous daily  fentaNYL   Infusion. 0.5 MICROgram(s)/kG/Hr IV Continuous <Continuous>  influenza   Vaccine 0.5 milliLiter(s) IntraMuscular once  levothyroxine 75 MICROGram(s) Oral daily  midazolam Infusion 0.02 mG/kG/Hr IV Continuous <Continuous>  midazolam Infusion 0.02 mG/kG/Hr IV Continuous <Continuous>  multivitamin  Chewable 1 Tablet(s) Oral daily  nystatin Powder 1 Application(s) Topical two times a day  pantoprazole    Tablet 40 milliGRAM(s) Oral before breakfast  senna 2 Tablet(s) Oral at bedtime

## 2019-09-15 LAB
ALBUMIN SERPL ELPH-MCNC: 3.2 G/DL — LOW (ref 3.5–5.2)
ALP SERPL-CCNC: 142 U/L — HIGH (ref 30–115)
ALT FLD-CCNC: 8 U/L — SIGNIFICANT CHANGE UP (ref 0–41)
ANION GAP SERPL CALC-SCNC: 10 MMOL/L — SIGNIFICANT CHANGE UP (ref 7–14)
AST SERPL-CCNC: 21 U/L — SIGNIFICANT CHANGE UP (ref 0–41)
BILIRUB SERPL-MCNC: 0.9 MG/DL — SIGNIFICANT CHANGE UP (ref 0.2–1.2)
BUN SERPL-MCNC: 34 MG/DL — HIGH (ref 10–20)
CALCIUM SERPL-MCNC: 9.3 MG/DL — SIGNIFICANT CHANGE UP (ref 8.5–10.1)
CHLORIDE SERPL-SCNC: 100 MMOL/L — SIGNIFICANT CHANGE UP (ref 98–110)
CO2 SERPL-SCNC: 30 MMOL/L — SIGNIFICANT CHANGE UP (ref 17–32)
CREAT SERPL-MCNC: 1 MG/DL — SIGNIFICANT CHANGE UP (ref 0.7–1.5)
GLUCOSE SERPL-MCNC: 159 MG/DL — HIGH (ref 70–99)
HCT VFR BLD CALC: 34.9 % — LOW (ref 37–47)
HGB BLD-MCNC: 11.2 G/DL — LOW (ref 12–16)
MCHC RBC-ENTMCNC: 27.4 PG — SIGNIFICANT CHANGE UP (ref 27–31)
MCHC RBC-ENTMCNC: 32.1 G/DL — SIGNIFICANT CHANGE UP (ref 32–37)
MCV RBC AUTO: 85.3 FL — SIGNIFICANT CHANGE UP (ref 81–99)
NRBC # BLD: 0 /100 WBCS — SIGNIFICANT CHANGE UP (ref 0–0)
PLATELET # BLD AUTO: 319 K/UL — SIGNIFICANT CHANGE UP (ref 130–400)
POTASSIUM SERPL-MCNC: 4.1 MMOL/L — SIGNIFICANT CHANGE UP (ref 3.5–5)
POTASSIUM SERPL-SCNC: 4.1 MMOL/L — SIGNIFICANT CHANGE UP (ref 3.5–5)
PROT SERPL-MCNC: 5.9 G/DL — LOW (ref 6–8)
RBC # BLD: 4.09 M/UL — LOW (ref 4.2–5.4)
RBC # FLD: 17.1 % — HIGH (ref 11.5–14.5)
SODIUM SERPL-SCNC: 140 MMOL/L — SIGNIFICANT CHANGE UP (ref 135–146)
WBC # BLD: 11.25 K/UL — HIGH (ref 4.8–10.8)
WBC # FLD AUTO: 11.25 K/UL — HIGH (ref 4.8–10.8)

## 2019-09-15 PROCEDURE — 71045 X-RAY EXAM CHEST 1 VIEW: CPT | Mod: 26

## 2019-09-15 RX ADMIN — Medication 40 MILLIGRAM(S): at 05:04

## 2019-09-15 RX ADMIN — CEFEPIME 100 MILLIGRAM(S): 1 INJECTION, POWDER, FOR SOLUTION INTRAMUSCULAR; INTRAVENOUS at 17:18

## 2019-09-15 RX ADMIN — DULOXETINE HYDROCHLORIDE 60 MILLIGRAM(S): 30 CAPSULE, DELAYED RELEASE ORAL at 11:48

## 2019-09-15 RX ADMIN — ENOXAPARIN SODIUM 30 MILLIGRAM(S): 100 INJECTION SUBCUTANEOUS at 11:49

## 2019-09-15 RX ADMIN — Medication 1 TABLET(S): at 11:51

## 2019-09-15 RX ADMIN — CARBIDOPA AND LEVODOPA 1 TABLET(S): 25; 100 TABLET ORAL at 21:07

## 2019-09-15 RX ADMIN — AMLODIPINE BESYLATE 5 MILLIGRAM(S): 2.5 TABLET ORAL at 05:04

## 2019-09-15 RX ADMIN — CHLORHEXIDINE GLUCONATE 1 APPLICATION(S): 213 SOLUTION TOPICAL at 05:06

## 2019-09-15 RX ADMIN — Medication 81 MILLIGRAM(S): at 11:48

## 2019-09-15 RX ADMIN — BUDESONIDE AND FORMOTEROL FUMARATE DIHYDRATE 2 PUFF(S): 160; 4.5 AEROSOL RESPIRATORY (INHALATION) at 07:13

## 2019-09-15 RX ADMIN — BUDESONIDE AND FORMOTEROL FUMARATE DIHYDRATE 2 PUFF(S): 160; 4.5 AEROSOL RESPIRATORY (INHALATION) at 20:34

## 2019-09-15 RX ADMIN — CARBIDOPA AND LEVODOPA 1 TABLET(S): 25; 100 TABLET ORAL at 11:48

## 2019-09-15 RX ADMIN — NYSTATIN CREAM 1 APPLICATION(S): 100000 CREAM TOPICAL at 05:06

## 2019-09-15 RX ADMIN — SENNA PLUS 2 TABLET(S): 8.6 TABLET ORAL at 21:07

## 2019-09-15 RX ADMIN — AMLODIPINE BESYLATE 5 MILLIGRAM(S): 2.5 TABLET ORAL at 17:12

## 2019-09-15 RX ADMIN — CEFEPIME 100 MILLIGRAM(S): 1 INJECTION, POWDER, FOR SOLUTION INTRAMUSCULAR; INTRAVENOUS at 05:04

## 2019-09-15 RX ADMIN — CARVEDILOL PHOSPHATE 25 MILLIGRAM(S): 80 CAPSULE, EXTENDED RELEASE ORAL at 05:04

## 2019-09-15 RX ADMIN — CARVEDILOL PHOSPHATE 25 MILLIGRAM(S): 80 CAPSULE, EXTENDED RELEASE ORAL at 17:14

## 2019-09-15 RX ADMIN — Medication 75 MICROGRAM(S): at 05:04

## 2019-09-15 RX ADMIN — Medication 40 MILLIEQUIVALENT(S): at 11:48

## 2019-09-15 RX ADMIN — NYSTATIN CREAM 1 APPLICATION(S): 100000 CREAM TOPICAL at 17:18

## 2019-09-15 RX ADMIN — Medication 40 MILLIGRAM(S): at 17:15

## 2019-09-15 RX ADMIN — PANTOPRAZOLE SODIUM 40 MILLIGRAM(S): 20 TABLET, DELAYED RELEASE ORAL at 06:05

## 2019-09-15 NOTE — SWALLOW BEDSIDE ASSESSMENT ADULT - SPECIFY REASON(S)
Consult as patient coded during hospitalization after eating breakfast 9/6/19. Patient extubated and presents with orogastric tube.

## 2019-09-15 NOTE — PROGRESS NOTE ADULT - SUBJECTIVE AND OBJECTIVE BOX
Patient is a 87y old  Female who presents with a chief complaint of Weakness and bradycardia (14 Sep 2019 07:25)      T(F): 97.6 (09-14-19 @ 23:01), Max: 97.6 (09-14-19 @ 23:01)  HR: 53 (09-15-19 @ 06:00)  BP: 143/67 (09-15-19 @ 06:00)  RR: 30 (09-15-19 @ 06:00)  SpO2: 96% (09-15-19 @ 06:00) (96% - 100%)    PHYSICAL EXAM:  GENERAL: NAD, well-groomed, well-developed  HEAD:  Atraumatic, Normocephalic  EYES: EOMI, PERRLA, conjunctiva and sclera clear  ENMT: No tonsillar erythema, exudates, or enlargement; Moist mucous membranes, Good dentition, No lesions  NECK: Supple, No JVD, Normal thyroid  NERVOUS SYSTEM:  Alert & Oriented X3,  Motor Strength 5/5 B/L upper and lower extremities  CHEST/LUNG: Clear to percussion bilaterally; No rales, rhonchi, wheezing, or rubs  HEART: Irreg No murmurs, rubs, or gallops  ABDOMEN: Soft, Nontender, Nondistended; Bowel sounds present  EXTREMITIES:   No clubbing, cyanosis, or edema  LYMPH: No lymphadenopathy noted  SKIN: No rashes or lesions    labs  09-14    140  |  103  |  30<H>  ----------------------------<  174<H>  3.8   |  25  |  0.9    Ca    9.2      14 Sep 2019 05:47    TPro  5.6<L>  /  Alb  3.0<L>  /  TBili  0.9  /  DBili  x   /  AST  23  /  ALT  12  /  AlkPhos  143<H>  09-14                          11.2   11.25 )-----------( 319      ( 15 Sep 2019 05:53 )             34.9       Culture - Blood (collected 13 Sep 2019 05:59)  Source: .Blood None  Preliminary Report (14 Sep 2019 23:01):    No growth to date.        Mode: CPAP with PS  FiO2: 40  PEEP: 5  PS: 5        acetaminophen  Suppository .. 650 milliGRAM(s) Rectal every 6 hours PRN  amLODIPine   Tablet 5 milliGRAM(s) Oral <User Schedule>  aspirin  chewable 81 milliGRAM(s) Oral daily  buDESOnide 160 MICROgram(s)/formoterol 4.5 MICROgram(s) Inhaler 2 Puff(s) Inhalation two times a day  carbidopa/levodopa  10/100 1 Tablet(s) Oral daily  carbidopa/levodopa  25/100 1 Tablet(s) Oral at bedtime  carvedilol 25 milliGRAM(s) Oral every 12 hours  cefepime   IVPB 2000 milliGRAM(s) IV Intermittent every 12 hours  cefepime   IVPB      chlorhexidine 0.12% Liquid 15 milliLiter(s) Oral Mucosa every 12 hours  chlorhexidine 4% Liquid 1 Application(s) Topical <User Schedule>  DULoxetine 60 milliGRAM(s) Oral daily  enoxaparin Injectable 30 milliGRAM(s) SubCutaneous daily  furosemide   Injectable 40 milliGRAM(s) IV Push two times a day  influenza   Vaccine 0.5 milliLiter(s) IntraMuscular once  levothyroxine 75 MICROGram(s) Oral daily  multivitamin  Chewable 1 Tablet(s) Oral daily  nystatin Powder 1 Application(s) Topical two times a day  pantoprazole    Tablet 40 milliGRAM(s) Oral before breakfast  potassium chloride   Powder 40 milliEquivalent(s) Oral daily  senna 2 Tablet(s) Oral at bedtime

## 2019-09-15 NOTE — PROGRESS NOTE ADULT - ASSESSMENT
87 year old female admitted s/p fall episode of bradycardia ; sinemet ; rapid response respiratory failure ; episode of temp     # Respiratory Failure - aspiration  secretions  s/p bronch extubated seen by SLP    - continue vent parameters per Pulm / Critical care   - on IV sedation Fentanyl /midazolam neurologic status   -on bronchoscopy: The Right tracheobronchial tree was inspected closely to the level of the subsegmental bronchi. there was significant mucopurulent secretion and was suctioned.  The Left tracheobronchial tree also significant mucopurulent secretion, BAL was performed LLL   -f/u Gram stain and cx from BAL and BA wash  - blood, urine cultures: no growth  -deep tracheal aspirate: Few Squamous epithelial cells per low power field, Few polymorphonuclear leukocytes per low power field, Few Gram variable coccobacilli per oil power field   - wean as tolerated   - SLP evaluation  extubated CATHI       # Cardiac / CHF  / HTN      - pulse better   - continue lasix   - reviewed echo  - monitor weight and pulse        # Parkinson's     - continue Sinemet     # Hypothyroidism     - levothyroxine     # GI and DVT prophylaxsis     - pantoprazxole   - enoxaparin    advance care  noted  cpr full code    disposition -   probable STR

## 2019-09-15 NOTE — PROGRESS NOTE ADULT - SUBJECTIVE AND OBJECTIVE BOX
88 yo F presents to the ED c/o bradycardia, noticed by visiting Nurse today and she called EMS. Pt also states she felt forward and flat on Sunday 9/1/19 and has been anxious to climb up or down the stairs. Pt ambulates with a walker at home. Pt denies any similar episodes, traveling, exposure to ticks, fevers, chills, headaches, changes in mentation, visual changes, CP, SOB, abdominal pain, dysuria, changes in BM, rashes, skin discolorations, new medications.      interval :  extubated s/p speech and swallow     PAST MEDICAL & SURGICAL HISTORY:  Parkinson disease  Hypothyroid  Rib fractures  Congestive heart failure  Hypertension  No significant past surgical history    MEDICATIONS  (STANDING):  amLODIPine   Tablet 5 milliGRAM(s) Oral <User Schedule>  aspirin  chewable 81 milliGRAM(s) Oral daily  buDESOnide 160 MICROgram(s)/formoterol 4.5 MICROgram(s) Inhaler 2 Puff(s) Inhalation two times a day  carbidopa/levodopa  10/100 1 Tablet(s) Oral daily  carbidopa/levodopa  25/100 1 Tablet(s) Oral at bedtime  carvedilol 25 milliGRAM(s) Oral every 12 hours  cefepime   IVPB 2000 milliGRAM(s) IV Intermittent every 12 hours  cefepime   IVPB      chlorhexidine 0.12% Liquid 15 milliLiter(s) Oral Mucosa every 12 hours  chlorhexidine 4% Liquid 1 Application(s) Topical <User Schedule>  DULoxetine 60 milliGRAM(s) Oral daily  enoxaparin Injectable 30 milliGRAM(s) SubCutaneous daily  furosemide   Injectable 40 milliGRAM(s) IV Push two times a day  influenza   Vaccine 0.5 milliLiter(s) IntraMuscular once  levothyroxine 75 MICROGram(s) Oral daily  multivitamin  Chewable 1 Tablet(s) Oral daily  nystatin Powder 1 Application(s) Topical two times a day  pantoprazole    Tablet 40 milliGRAM(s) Oral before breakfast  potassium chloride   Powder 40 milliEquivalent(s) Oral daily  senna 2 Tablet(s) Oral at bedtime    MEDICATIONS  (PRN):  acetaminophen  Suppository .. 650 milliGRAM(s) Rectal every 6 hours PRN Temp greater or equal to 38C (100.4F)        ICU Vital Signs Last 24 Hrs  T(C): 35.7 (15 Sep 2019 11:00), Max: 36.4 (14 Sep 2019 23:01)  T(F): 96.3 (15 Sep 2019 11:00), Max: 97.6 (14 Sep 2019 23:01)  HR: 62 (15 Sep 2019 13:00) (47 - 76)  BP: 161/69 (15 Sep 2019 13:00) (136/61 - 212/91)  BP(mean): 99 (15 Sep 2019 13:00) (88 - 130)  ABP: --  ABP(mean): --  RR: 31 (15 Sep 2019 13:00) (20 - 33)  SpO2: 94% (15 Sep 2019 13:00) (94% - 100%)        GENERAL: NAD, lying in bed  HEAD:  Atraumatic, Normocephalic  EYES: EOMI, PERRLA, conjunctiva and sclera clear  ENT: Moist mucous membranes   NECK: Supple, No JVD  CHEST/LUNG: Clear to auscultation bilaterally; No rales, rhonchi, wheezing, or rubs.  HEART: +Bradycardic, No murmurs, rubs, or gallops  ABDOMEN: Bowel sounds present; Soft, Nontender, Nondistended  EXTREMITIES:  2+ Peripheral Pulses, brisk capillary refill. No clubbing, cyanosis, or edema  NERVOUS SYSTEM:  non focal   MSK: no cyanosis                                         11.2   11.25 )-----------( 319      ( 15 Sep 2019 05:53 )             34.9     09-15    140  |  100  |  34<H>  ----------------------------<  159<H>  4.1   |  30  |  1.0    Ca    9.3      15 Sep 2019 05:53    TPro  5.9<L>  /  Alb  3.2<L>  /  TBili  0.9  /  DBili  x   /  AST  21  /  ALT  8   /  AlkPhos  142<H>  09-15

## 2019-09-15 NOTE — SWALLOW BEDSIDE ASSESSMENT ADULT - ASR SWALLOW ASPIRATION MONITOR
change of breathing pattern/position upright (90Y)/cough/oral hygiene/fever/pneumonia/throat clearing/upper respiratory infection/gurgly voice

## 2019-09-15 NOTE — PROGRESS NOTE ADULT - ASSESSMENT
Off  vent .  Awake responsive.  Off beta. HR acceptable  GI DVT prophylaxis,.   Will need speech and swallow. OOB to chair, Nutritional support. Prognosis guarded

## 2019-09-15 NOTE — SWALLOW BEDSIDE ASSESSMENT ADULT - ASR SWALLOW DENTITION
No dentures present at the bedside, however family states she uses dentures at home./edentulous, does not have dentures

## 2019-09-16 LAB
ALBUMIN SERPL ELPH-MCNC: 3.6 G/DL — SIGNIFICANT CHANGE UP (ref 3.5–5.2)
ALP SERPL-CCNC: 153 U/L — HIGH (ref 30–115)
ALT FLD-CCNC: <5 U/L — SIGNIFICANT CHANGE UP (ref 0–41)
ANION GAP SERPL CALC-SCNC: 13 MMOL/L — SIGNIFICANT CHANGE UP (ref 7–14)
AST SERPL-CCNC: 24 U/L — SIGNIFICANT CHANGE UP (ref 0–41)
BILIRUB SERPL-MCNC: 0.9 MG/DL — SIGNIFICANT CHANGE UP (ref 0.2–1.2)
BUN SERPL-MCNC: 39 MG/DL — HIGH (ref 10–20)
CALCIUM SERPL-MCNC: 9.7 MG/DL — SIGNIFICANT CHANGE UP (ref 8.5–10.1)
CHLORIDE SERPL-SCNC: 98 MMOL/L — SIGNIFICANT CHANGE UP (ref 98–110)
CO2 SERPL-SCNC: 30 MMOL/L — SIGNIFICANT CHANGE UP (ref 17–32)
CREAT SERPL-MCNC: 1.1 MG/DL — SIGNIFICANT CHANGE UP (ref 0.7–1.5)
GLUCOSE SERPL-MCNC: 181 MG/DL — HIGH (ref 70–99)
HCT VFR BLD CALC: 35.4 % — LOW (ref 37–47)
HGB BLD-MCNC: 11.8 G/DL — LOW (ref 12–16)
MCHC RBC-ENTMCNC: 28.2 PG — SIGNIFICANT CHANGE UP (ref 27–31)
MCHC RBC-ENTMCNC: 33.3 G/DL — SIGNIFICANT CHANGE UP (ref 32–37)
MCV RBC AUTO: 84.7 FL — SIGNIFICANT CHANGE UP (ref 81–99)
NRBC # BLD: 0 /100 WBCS — SIGNIFICANT CHANGE UP (ref 0–0)
PLATELET # BLD AUTO: 388 K/UL — SIGNIFICANT CHANGE UP (ref 130–400)
POTASSIUM SERPL-MCNC: 4.3 MMOL/L — SIGNIFICANT CHANGE UP (ref 3.5–5)
POTASSIUM SERPL-SCNC: 4.3 MMOL/L — SIGNIFICANT CHANGE UP (ref 3.5–5)
PROT SERPL-MCNC: 6.7 G/DL — SIGNIFICANT CHANGE UP (ref 6–8)
RBC # BLD: 4.18 M/UL — LOW (ref 4.2–5.4)
RBC # FLD: 17 % — HIGH (ref 11.5–14.5)
SODIUM SERPL-SCNC: 141 MMOL/L — SIGNIFICANT CHANGE UP (ref 135–146)
WBC # BLD: 12.86 K/UL — HIGH (ref 4.8–10.8)
WBC # FLD AUTO: 12.86 K/UL — HIGH (ref 4.8–10.8)

## 2019-09-16 PROCEDURE — 71045 X-RAY EXAM CHEST 1 VIEW: CPT | Mod: 26

## 2019-09-16 RX ORDER — HYDRALAZINE HCL 50 MG
10 TABLET ORAL ONCE
Refills: 0 | Status: COMPLETED | OUTPATIENT
Start: 2019-09-16 | End: 2019-09-16

## 2019-09-16 RX ORDER — SODIUM CHLORIDE 9 MG/ML
1000 INJECTION, SOLUTION INTRAVENOUS
Refills: 0 | Status: DISCONTINUED | OUTPATIENT
Start: 2019-09-16 | End: 2019-09-18

## 2019-09-16 RX ADMIN — Medication 10 MILLIGRAM(S): at 04:11

## 2019-09-16 RX ADMIN — Medication 40 MILLIGRAM(S): at 05:00

## 2019-09-16 RX ADMIN — NYSTATIN CREAM 1 APPLICATION(S): 100000 CREAM TOPICAL at 17:14

## 2019-09-16 RX ADMIN — ENOXAPARIN SODIUM 30 MILLIGRAM(S): 100 INJECTION SUBCUTANEOUS at 11:05

## 2019-09-16 RX ADMIN — Medication 1 TABLET(S): at 13:04

## 2019-09-16 RX ADMIN — Medication 40 MILLIGRAM(S): at 17:13

## 2019-09-16 RX ADMIN — SODIUM CHLORIDE 40 MILLILITER(S): 9 INJECTION, SOLUTION INTRAVENOUS at 21:17

## 2019-09-16 RX ADMIN — CEFEPIME 100 MILLIGRAM(S): 1 INJECTION, POWDER, FOR SOLUTION INTRAMUSCULAR; INTRAVENOUS at 05:02

## 2019-09-16 RX ADMIN — PANTOPRAZOLE SODIUM 40 MILLIGRAM(S): 20 TABLET, DELAYED RELEASE ORAL at 06:01

## 2019-09-16 RX ADMIN — Medication 75 MICROGRAM(S): at 05:00

## 2019-09-16 RX ADMIN — CARVEDILOL PHOSPHATE 25 MILLIGRAM(S): 80 CAPSULE, EXTENDED RELEASE ORAL at 05:00

## 2019-09-16 RX ADMIN — CARVEDILOL PHOSPHATE 25 MILLIGRAM(S): 80 CAPSULE, EXTENDED RELEASE ORAL at 17:13

## 2019-09-16 RX ADMIN — Medication 81 MILLIGRAM(S): at 11:05

## 2019-09-16 RX ADMIN — SENNA PLUS 2 TABLET(S): 8.6 TABLET ORAL at 21:15

## 2019-09-16 RX ADMIN — AMLODIPINE BESYLATE 5 MILLIGRAM(S): 2.5 TABLET ORAL at 15:25

## 2019-09-16 RX ADMIN — BUDESONIDE AND FORMOTEROL FUMARATE DIHYDRATE 2 PUFF(S): 160; 4.5 AEROSOL RESPIRATORY (INHALATION) at 08:45

## 2019-09-16 RX ADMIN — CHLORHEXIDINE GLUCONATE 15 MILLILITER(S): 213 SOLUTION TOPICAL at 17:13

## 2019-09-16 RX ADMIN — BUDESONIDE AND FORMOTEROL FUMARATE DIHYDRATE 2 PUFF(S): 160; 4.5 AEROSOL RESPIRATORY (INHALATION) at 21:34

## 2019-09-16 RX ADMIN — CEFEPIME 100 MILLIGRAM(S): 1 INJECTION, POWDER, FOR SOLUTION INTRAMUSCULAR; INTRAVENOUS at 17:13

## 2019-09-16 RX ADMIN — NYSTATIN CREAM 1 APPLICATION(S): 100000 CREAM TOPICAL at 05:01

## 2019-09-16 RX ADMIN — CARBIDOPA AND LEVODOPA 1 TABLET(S): 25; 100 TABLET ORAL at 11:05

## 2019-09-16 RX ADMIN — CHLORHEXIDINE GLUCONATE 1 APPLICATION(S): 213 SOLUTION TOPICAL at 05:00

## 2019-09-16 RX ADMIN — AMLODIPINE BESYLATE 5 MILLIGRAM(S): 2.5 TABLET ORAL at 05:00

## 2019-09-16 RX ADMIN — Medication 40 MILLIEQUIVALENT(S): at 11:05

## 2019-09-16 RX ADMIN — CARBIDOPA AND LEVODOPA 1 TABLET(S): 25; 100 TABLET ORAL at 21:16

## 2019-09-16 RX ADMIN — DULOXETINE HYDROCHLORIDE 60 MILLIGRAM(S): 30 CAPSULE, DELAYED RELEASE ORAL at 11:05

## 2019-09-16 NOTE — PROGRESS NOTE ADULT - SUBJECTIVE AND OBJECTIVE BOX
[facility]  Last 24hr Events & Subjective:  88 y/o F PMHx CHF, HTN, Parkinson disease, Hypothyroid presented to the ED after found to be bradycardic by VNS. Her pulse improved and she was treated with lasix untils she developed respiratory failure and cardiac arrest on 19 leading to intubation. EGD 19 showed completely filled esophagus with food debris which was cleared by GI. She underwent bronchoscopy on 6/10/19 showing mucopurulent secretion b/l cleared with suction but no evidence of PNA, treated empirically with Abx. She was extubated on 9/15/19 after failing previous SBT on CPAP. Speech pathology evaluation recommended NPO with ice chips until further eval with Modified Barium Swallow.    Overnight pt desated to the 80s and was started on high flow NC with improvement.   This morning was sitting  in a chair at bedside, she has trouble speaking due to her parkinson's and the OG tube in her mouth but did not voice any complaints.   Respiratory attempted to wean pt off high flow this afternoon but pt ahd to have her o2 incereased back to 455/45LPM, speech decided to hold off on MBS today given increased O2 demand.    Past Medical Hx:  Parkinson disease  Hypothyroid  Rib fractures  Congestive heart failure  Hypertension    Past Surgical Hx:  No significant past surgical history    Allergies:  No Known Allergies    Current Meds:  Standing Meds  amLODIPine   Tablet 5 milliGRAM(s) Oral <User Schedule>  aspirin  chewable 81 milliGRAM(s) Oral daily  buDESOnide 160 MICROgram(s)/formoterol 4.5 MICROgram(s) Inhaler 2 Puff(s) Inhalation two times a day  carbidopa/levodopa  10/100 1 Tablet(s) Oral daily  carbidopa/levodopa  25/100 1 Tablet(s) Oral at bedtime  carvedilol 25 milliGRAM(s) Oral every 12 hours  cefepime   IVPB 2000 milliGRAM(s) IV Intermittent every 12 hours  cefepime   IVPB      chlorhexidine 0.12% Liquid 15 milliLiter(s) Oral Mucosa every 12 hours  chlorhexidine 4% Liquid 1 Application(s) Topical <User Schedule>  DULoxetine 60 milliGRAM(s) Oral daily  enoxaparin Injectable 30 milliGRAM(s) SubCutaneous daily  furosemide   Injectable 40 milliGRAM(s) IV Push two times a day  influenza   Vaccine 0.5 milliLiter(s) IntraMuscular once  levothyroxine 75 MICROGram(s) Oral daily  multivitamin  Chewable 1 Tablet(s) Oral daily  nystatin Powder 1 Application(s) Topical two times a day  pantoprazole    Tablet 40 milliGRAM(s) Oral before breakfast  potassium chloride   Powder 40 milliEquivalent(s) Oral daily  senna 2 Tablet(s) Oral at bedtime    PRN Meds  acetaminophen  Suppository .. 650 milliGRAM(s) Rectal every 6 hours PRN    Vital Signs  T(F): 97.9 (19 @ 15:00), Max: 98.9 (09-15-19 @ 23:01)  HR: 66 (19 @ 17:07) (54 - 72)  BP: 161/71 (19 @ 17:07) (143/63 - 204/77)  BP(mean): 102 (19 @ 17:07) (91 - 114)  ABP: --  ABP(mean): --  RR: 35 (19 @ 17:07) (20 - 49)  SpO2: 91% (19 @ 17:07) (91% - 100%)  Fluid Balance    09-15-19 @ 07:01  -  19 @ 07:00  --------------------------------------------------------  IN:    Enteral Tube Flush: 100 mL    Free Water: 100 mL    Solution: 50 mL  Total IN: 250 mL    OUT:    Incontinent per Collection Ba mL    Voided: 1800 mL  Total OUT: 2500 mL    Total NET: -2250 mL      19 @ 07:01  -  19 @ 18:46  --------------------------------------------------------  IN:    Enteral Tube Flush: 200 mL  Total IN: 200 mL    OUT:    Voided: 1200 mL  Total OUT: 1200 mL    Total NET: -1000 mL      Physical Exam:  GENERAL: NAD  HEENT: NCAT,   CHEST/LUNG: CTAB  HEART: Regular rate and rhythm; s1 s2 appreciated, No murmurs, rubs, or gallops  ABDOMEN: Soft, Nontender, Nondistended; Bowel sounds present  EXTREMITIES: No LE edema b/l  NERVOUS SYSTEM:  Alert & Oriented X3      LINES/CATHETERS: OGT     Labs:                         11.8<L>  12.86<H>   )-----------(   388      ( 16 Sep 2019 05:58 )              35.4<L>    Neutro% x    , Lympho% x    , Mono% x    , Bands x      16 Sep 2019 05:58    141    |  98     |  39     ----------------------------<  181    4.3     |  30     |  1.1      Ca    9.7        16 Sep 2019 05:58    TPro  6.7    /  Alb  3.6    /  TBili  0.9    /  DBili  x      /  AST  24     /  ALT  <5     /  AlkPhos  153    16 Sep 2019 05:58                      Imaging & EKG:      Assessment & Plan:  IMPRESSION:    PLAN  NEURO:     PULMONARY:     CARDIOVASCULAR:     GASTROENTEROLOGY:     INFECTIOUS DISEASES:     HEMATOLOGY/ONCOLOGY:     GENITOURINARY/RENAL:     ENDOCRINE:     MUSCULOSKELETAL:     DISPO:     [facility]  Last 24hr Events & Subjective:  86 y/o F PMHx CHF, HTN, Parkinson disease, Hypothyroid presented to the ED after found to be bradycardic by VNS. Her pulse improved and she was treated with lasix untils she developed respiratory failure and cardiac arrest on 19 leading to intubation. EGD 19 showed completely filled esophagus with food debris which was cleared by GI. She underwent bronchoscopy on 6/10/19 showing mucopurulent secretion b/l cleared with suction but no evidence of PNA, treated empirically with Abx. She was extubated on 9/15/19 after failing previous SBT on CPAP. Speech pathology evaluation recommended NPO with ice chips until further eval with Modified Barium Swallow.    Overnight pt desated to the 80s and was started on high flow NC with improvement.   This morning was sitting  in a chair at bedside, she has trouble speaking due to her parkinson's and the OG tube in her mouth but did not voice any complaints. Reevaluated pt this evening states doing well with no complaints.    Respiratory attempted to wean pt off high flow this afternoon but pt ahd to have her o2 incereased back to 455/45LPM, speech decided to hold off on MBS today given increased O2 demand.    Past Medical Hx:  Parkinson disease  Hypothyroid  Rib fractures  Congestive heart failure  Hypertension    Past Surgical Hx:  No significant past surgical history    Allergies:  No Known Allergies    Current Meds:  Standing Meds  amLODIPine   Tablet 5 milliGRAM(s) Oral <User Schedule>  aspirin  chewable 81 milliGRAM(s) Oral daily  buDESOnide 160 MICROgram(s)/formoterol 4.5 MICROgram(s) Inhaler 2 Puff(s) Inhalation two times a day  carbidopa/levodopa  10/100 1 Tablet(s) Oral daily  carbidopa/levodopa  25/100 1 Tablet(s) Oral at bedtime  carvedilol 25 milliGRAM(s) Oral every 12 hours  cefepime   IVPB 2000 milliGRAM(s) IV Intermittent every 12 hours  cefepime   IVPB      chlorhexidine 0.12% Liquid 15 milliLiter(s) Oral Mucosa every 12 hours  chlorhexidine 4% Liquid 1 Application(s) Topical <User Schedule>  DULoxetine 60 milliGRAM(s) Oral daily  enoxaparin Injectable 30 milliGRAM(s) SubCutaneous daily  furosemide   Injectable 40 milliGRAM(s) IV Push two times a day  influenza   Vaccine 0.5 milliLiter(s) IntraMuscular once  levothyroxine 75 MICROGram(s) Oral daily  multivitamin  Chewable 1 Tablet(s) Oral daily  nystatin Powder 1 Application(s) Topical two times a day  pantoprazole    Tablet 40 milliGRAM(s) Oral before breakfast  potassium chloride   Powder 40 milliEquivalent(s) Oral daily  senna 2 Tablet(s) Oral at bedtime    PRN Meds  acetaminophen  Suppository .. 650 milliGRAM(s) Rectal every 6 hours PRN    Vital Signs  T(F): 97.9 (19 @ 15:00), Max: 98.9 (09-15-19 @ 23:01)  HR: 66 (19 @ 17:07) (54 - 72)  BP: 161/71 (19 @ 17:07) (143/63 - 204/77)  BP(mean): 102 (19 @ 17:07) (91 - 114)  ABP: --  ABP(mean): --  RR: 35 (19 @ 17:07) (20 - 49)  SpO2: 91% (19 @ 17:07) (91% - 100%)  Fluid Balance    09-15-19 @ 07:01  -  19 @ 07:00  --------------------------------------------------------  IN:    Enteral Tube Flush: 100 mL    Free Water: 100 mL    Solution: 50 mL  Total IN: 250 mL    OUT:    Incontinent per Collection Ba mL    Voided: 1800 mL  Total OUT: 2500 mL    Total NET: -2250 mL      19 @ 07:01  -  19 @ 18:46  --------------------------------------------------------  IN:    Enteral Tube Flush: 200 mL  Total IN: 200 mL    OUT:    Voided: 1200 mL  Total OUT: 1200 mL    Total NET: -1000 mL      Physical Exam:  GENERAL: NAD  HEENT: NCAT, hard of hearing  CHEST/LUNG: CTAB  HEART: Regular rate and rhythm; s1 s2 appreciated, No murmurs, rubs, or gallops  ABDOMEN: Soft, Nontender, Nondistended; Bowel sounds present  EXTREMITIES: No LE edema b/l  NERVOUS SYSTEM:  Alert & Oriented X3  LINES/CATHETERS: OGT     Labs:                         11.8<L>  12.86<H>   )-----------(   388      ( 16 Sep 2019 05:58 )              35.4<L>    Neutro% x    , Lympho% x    , Mono% x    , Bands x      16 Sep 2019 05:58    141    |  98     |  39     ----------------------------<  181    4.3     |  30     |  1.1      Ca    9.7        16 Sep 2019 05:58    TPro  6.7    /  Alb  3.6    /  TBili  0.9    /  DBili  x      /  AST  24     /  ALT  <5     /  AlkPhos  153    16 Sep 2019 05:58    Imaging & EKG:  < from: Xray Chest 1 View- PORTABLE-Routine (19 @ 06:35) >  Impression:    Stable bilateral lung opacities, left greater than right  < end of copied text >    Assessment & Plan:  IMPRESSION:  Bradycardia- resolved  Cardiorespiratory arrest due to likely aspiration, s/p extubation, now on high flow  Pneumonia/pneumonitis  LLL atelectasis  Dysphagia  Parkinsons    PLAN  NEURO:   -avoid sedation  -c/w sinemet     PULMONARY:   -keep HOB >45 deg  -incentive spirometer  -pulmonary toilet  -taper O2 as tolerated  -c/w symbicort    CARDIOVASCULAR:   -keep I<O  -c/w carvediolol, lasix, amlodipine    GASTROENTEROLOGY:   -OGT in place, unable to convert to NGT due to high flow NC  -MBS cancelled due to increased O2 needs, speech to reevaluate tomorrow  -if unable to go for MBS tomorrow would consider restarting feeds via OGT  -discussed with pt and daughter at bedside feeding options if unable to safely swallow, pt says she "doesn't know" if she would want a PEG placed, pt and family will think about it.  -GI ppx     INFECTIOUS DISEASES:   -WBC mildly increased  -finish Abx  -monitor for fevers    HEMATOLOGY/ONCOLOGY:   -DVT ppx lovenox    GENITOURINARY/RENAL:   -monitor and correct electrolytes    ENDOCRINE:   -monitor glucose  -c/w synthroid    MUSCULOSKELETAL:   -OOBTC    DISPO:   -MICU for now    CODE STATUS:  -FULL CODE [facility]  Last 24hr Events & Subjective:  86 y/o F PMHx CHF, HTN, Parkinson disease, Hypothyroid presented to the ED after found to be bradycardic by VNS. Her pulse improved and she was treated with lasix untils she developed respiratory failure and cardiac arrest on 19 leading to intubation. EGD 19 showed completely filled esophagus with food debris which was cleared by GI. She underwent bronchoscopy on 6/10/19 showing mucopurulent secretion b/l cleared with suction but no evidence of PNA, treated empirically with Abx. She was extubated on 9/15/19 after failing previous SBT on CPAP. Speech pathology evaluation recommended NPO with ice chips until further eval with Modified Barium Swallow.    Overnight pt desated to the 80s and was started on high flow NC with improvement.   This morning was sitting  in a chair at bedside, she has trouble speaking due to her parkinson's and the OG tube in her mouth but did not voice any complaints. Reevaluated pt this evening states doing well with no complaints.    Respiratory attempted to wean pt off high flow this afternoon but pt ahd to have her o2 incereased back to 455/45LPM, speech decided to hold off on MBS today given increased O2 demand.    Past Medical Hx:  Parkinson disease  Hypothyroid  Rib fractures  Congestive heart failure  Hypertension    Past Surgical Hx:  No significant past surgical history    Allergies:  No Known Allergies    Current Meds:  Standing Meds  amLODIPine   Tablet 5 milliGRAM(s) Oral <User Schedule>  aspirin  chewable 81 milliGRAM(s) Oral daily  buDESOnide 160 MICROgram(s)/formoterol 4.5 MICROgram(s) Inhaler 2 Puff(s) Inhalation two times a day  carbidopa/levodopa  10/100 1 Tablet(s) Oral daily  carbidopa/levodopa  25/100 1 Tablet(s) Oral at bedtime  carvedilol 25 milliGRAM(s) Oral every 12 hours  cefepime   IVPB 2000 milliGRAM(s) IV Intermittent every 12 hours  cefepime   IVPB      chlorhexidine 0.12% Liquid 15 milliLiter(s) Oral Mucosa every 12 hours  chlorhexidine 4% Liquid 1 Application(s) Topical <User Schedule>  DULoxetine 60 milliGRAM(s) Oral daily  enoxaparin Injectable 30 milliGRAM(s) SubCutaneous daily  furosemide   Injectable 40 milliGRAM(s) IV Push two times a day  influenza   Vaccine 0.5 milliLiter(s) IntraMuscular once  levothyroxine 75 MICROGram(s) Oral daily  multivitamin  Chewable 1 Tablet(s) Oral daily  nystatin Powder 1 Application(s) Topical two times a day  pantoprazole    Tablet 40 milliGRAM(s) Oral before breakfast  potassium chloride   Powder 40 milliEquivalent(s) Oral daily  senna 2 Tablet(s) Oral at bedtime    PRN Meds  acetaminophen  Suppository .. 650 milliGRAM(s) Rectal every 6 hours PRN    Vital Signs  T(F): 97.9 (19 @ 15:00), Max: 98.9 (09-15-19 @ 23:01)  HR: 66 (19 @ 17:07) (54 - 72)  BP: 161/71 (19 @ 17:07) (143/63 - 204/77)  BP(mean): 102 (19 @ 17:07) (91 - 114)  ABP: --  ABP(mean): --  RR: 35 (19 @ 17:07) (20 - 49)  SpO2: 91% (19 @ 17:07) (91% - 100%)  Fluid Balance    09-15-19 @ 07:01  -  19 @ 07:00  --------------------------------------------------------  IN:    Enteral Tube Flush: 100 mL    Free Water: 100 mL    Solution: 50 mL  Total IN: 250 mL    OUT:    Incontinent per Collection Ba mL    Voided: 1800 mL  Total OUT: 2500 mL    Total NET: -2250 mL      19 @ 07:01  -  19 @ 18:46  --------------------------------------------------------  IN:    Enteral Tube Flush: 200 mL  Total IN: 200 mL    OUT:    Voided: 1200 mL  Total OUT: 1200 mL    Total NET: -1000 mL      Physical Exam:  GENERAL: NAD  HEENT: NCAT, hard of hearing  CHEST/LUNG: CTAB  HEART: Regular rate and rhythm; s1 s2 appreciated, No murmurs, rubs, or gallops  ABDOMEN: Soft, Nontender, Nondistended; Bowel sounds present  EXTREMITIES: No LE edema b/l  NERVOUS SYSTEM:  Alert & Oriented X3  LINES/CATHETERS: OGT     Labs:                         11.8<L>  12.86<H>   )-----------(   388      ( 16 Sep 2019 05:58 )              35.4<L>    Neutro% x    , Lympho% x    , Mono% x    , Bands x      16 Sep 2019 05:58    141    |  98     |  39     ----------------------------<  181    4.3     |  30     |  1.1      Ca    9.7        16 Sep 2019 05:58    TPro  6.7    /  Alb  3.6    /  TBili  0.9    /  DBili  x      /  AST  24     /  ALT  <5     /  AlkPhos  153    16 Sep 2019 05:58    Imaging & EKG:  < from: Xray Chest 1 View- PORTABLE-Routine (19 @ 06:35) >  Impression:    Stable bilateral lung opacities, left greater than right  < end of copied text >    Assessment & Plan:  IMPRESSION:  Bradycardia- resolved  Cardiorespiratory arrest due to likely aspiration, s/p extubation, now on high flow  Pneumonia/pneumonitis  LLL atelectasis  Dysphagia  Parkinsons    PLAN  NEURO:   -avoid sedation  -c/w sinemet     PULMONARY:   -keep HOB >45 deg  -incentive spirometer  -pulmonary toilet  -taper O2 as tolerated  -c/w symbicort    CARDIOVASCULAR:   -keep I<O  -c/w carvediolol, lasix, amlodipine  -feeds were held today for MBS and pt w/o IVFs, negative balance today >2000mL, d/w intensivist will start D5 1/2 NS at 40cc/hr overnight    GASTROENTEROLOGY:   -OGT in place, unable to convert to NGT due to high flow NC  -MBS cancelled due to increased O2 needs, speech to reevaluate tomorrow  -if unable to go for MBS tomorrow would consider restarting feeds via OGT  -discussed with pt and daughter at bedside feeding options if unable to safely swallow, pt says she "doesn't know" if she would want a PEG placed, pt and family will think about it.  -GI ppx     INFECTIOUS DISEASES:   -WBC mildly increased  -finish Abx  -monitor for fevers    HEMATOLOGY/ONCOLOGY:   -DVT ppx lovenox    GENITOURINARY/RENAL:   -monitor and correct electrolytes    ENDOCRINE:   -monitor glucose  -c/w synthroid    MUSCULOSKELETAL:   -OOBTC    DISPO:   -MICU for now    CODE STATUS:  -FULL CODE

## 2019-09-16 NOTE — PROGRESS NOTE ADULT - SUBJECTIVE AND OBJECTIVE BOX
88 yo F presents to the ED c/o bradycardia, noticed by visiting Nurse today and she called EMS. Pt also states she felt forward and flat on Sunday 9/1/19 and has been anxious to climb up or down the stairs. Pt ambulates with a walker at home. Pt denies any similar episodes, traveling, exposure to ticks, fevers, chills, headaches, changes in mentation, visual changes, CP, SOB, abdominal pain, dysuria, changes in BM, rashes, skin discolorations, new medications.      interval :  extubated s/p speech and swallow  ; episode of desaturation on high flow o2     PAST MEDICAL & SURGICAL HISTORY:  Parkinson disease  Hypothyroid  Rib fractures  Congestive heart failure  Hypertension  No significant past surgical history    MEDICATIONS  (STANDING):  amLODIPine   Tablet 5 milliGRAM(s) Oral <User Schedule>  aspirin  chewable 81 milliGRAM(s) Oral daily  buDESOnide 160 MICROgram(s)/formoterol 4.5 MICROgram(s) Inhaler 2 Puff(s) Inhalation two times a day  carbidopa/levodopa  10/100 1 Tablet(s) Oral daily  carbidopa/levodopa  25/100 1 Tablet(s) Oral at bedtime  carvedilol 25 milliGRAM(s) Oral every 12 hours  cefepime   IVPB 2000 milliGRAM(s) IV Intermittent every 12 hours  cefepime   IVPB      chlorhexidine 0.12% Liquid 15 milliLiter(s) Oral Mucosa every 12 hours  chlorhexidine 4% Liquid 1 Application(s) Topical <User Schedule>  DULoxetine 60 milliGRAM(s) Oral daily  enoxaparin Injectable 30 milliGRAM(s) SubCutaneous daily  furosemide   Injectable 40 milliGRAM(s) IV Push two times a day  influenza   Vaccine 0.5 milliLiter(s) IntraMuscular once  levothyroxine 75 MICROGram(s) Oral daily  multivitamin  Chewable 1 Tablet(s) Oral daily  nystatin Powder 1 Application(s) Topical two times a day  pantoprazole    Tablet 40 milliGRAM(s) Oral before breakfast  potassium chloride   Powder 40 milliEquivalent(s) Oral daily  senna 2 Tablet(s) Oral at bedtime    MEDICATIONS  (PRN):  acetaminophen  Suppository .. 650 milliGRAM(s) Rectal every 6 hours PRN Temp greater or equal to 38C (100.4F)        ICU Vital Signs Last 24 Hrs  T(C): 37.2 (15 Sep 2019 23:01), Max: 37.2 (15 Sep 2019 23:01)  T(F): 98.9 (15 Sep 2019 23:01), Max: 98.9 (15 Sep 2019 23:01)  HR: 62 (16 Sep 2019 04:30) (50 - 63)  BP: 149/66 (16 Sep 2019 04:30) (143/67 - 204/77)  BP(mean): 95 (16 Sep 2019 04:30) (95 - 120)  ABP: --  ABP(mean): --  RR: 32 (16 Sep 2019 04:30) (20 - 49)  SpO2: 98% (16 Sep 2019 04:30) (91% - 100%)        GENERAL: NAD, lying in bed  HEAD:  Atraumatic, Normocephalic  EYES: EOMI, PERRLA, conjunctiva and sclera clear  ENT: Moist mucous membranes   NECK: Supple, No JVD  CHEST/LUNG: Clear to auscultation bilaterally; No rales, rhonchi, wheezing, or rubs.  HEART: +Bradycardic, No murmurs, rubs, or gallops  ABDOMEN: Bowel sounds present; Soft, Nontender, Nondistended  EXTREMITIES:  2+ Peripheral Pulses, brisk capillary refill. No clubbing, cyanosis, or edema  NERVOUS SYSTEM:  non focal   MSK: no cyanosis                                         11.2   11.25 )-----------( 319      ( 15 Sep 2019 05:53 )             34.9     09-15    140  |  100  |  34<H>  ----------------------------<  159<H>  4.1   |  30  |  1.0    Ca    9.3      15 Sep 2019 05:53    TPro  5.9<L>  /  Alb  3.2<L>  /  TBili  0.9  /  DBili  x   /  AST  21  /  ALT  8   /  AlkPhos  142<H>  09-15

## 2019-09-16 NOTE — PROGRESS NOTE ADULT - ASSESSMENT
87 year old female admitted s/p fall episode of bradycardia ; sinemet ; rapid response respiratory failure ; episode of temp     # Respiratory Failure - aspiration  secretions  s/p bronch extubated seen by SLP    - desatuiration on high flow secretions   - continue vent parameters per Pulm / Critical care   -  SLP evaluation  extubated MBBS       # Cardiac / CHF  / HTN      - pulse better   - continue lasix   - reviewed echo  - monitor weight and pulse        # Parkinson's     - continue Sinemet     # Hypothyroidism     - levothyroxine     # GI and DVT prophylaxsis     - pantoprazxole   - enoxaparin    advance care  noted  cpr full code    disposition -   probable STR

## 2019-09-16 NOTE — PROGRESS NOTE ADULT - SUBJECTIVE AND OBJECTIVE BOX
Patient is a 87y old  Female who presents with a chief complaint of Weakness and bradycardia (16 Sep 2019 05:37)      T(F): 96.7 (09-16-19 @ 07:01), Max: 98.9 (09-15-19 @ 23:01)  HR: 56 (09-16-19 @ 07:02)  BP: 161/69 (09-16-19 @ 07:02)  RR: 29 (09-16-19 @ 07:02)  SpO2: 98% (09-16-19 @ 07:02) (91% - 100%)    PHYSICAL EXAM:  GENERAL: NAD, well-groomed, well-developed  HEAD:  Atraumatic, Normocephalic  EYES: EOMI, PERRLA, conjunctiva and sclera clear  ENMT: No tonsillar erythema, exudates, or enlargement; Moist mucous membranes, Good dentition, No lesions  NECK: Supple, No JVD, Normal thyroid  NERVOUS SYSTEM:  Alert & Oriented X3,  Motor Strength 5/5 B/L upper and lower extremities  CHEST/LUNG: Clear to percussion bilaterally; No rales, rhonchi, wheezing, or rubs  HEART: Regular rate and rhythm; No murmurs, rubs, or gallops  ABDOMEN: Soft, Nontender, Nondistended; Bowel sounds present  EXTREMITIES:   No clubbing, cyanosis, or edema  LYMPH: No lymphadenopathy noted  SKIN: No rashes or lesions    labs  09-16    141  |  98  |  39<H>  ----------------------------<  181<H>  4.3   |  30  |  1.1    Ca    9.7      16 Sep 2019 05:58    TPro  6.7  /  Alb  3.6  /  TBili  0.9  /  DBili  x   /  AST  24  /  ALT  <5  /  AlkPhos  153<H>  09-16                          11.8   12.86 )-----------( 388      ( 16 Sep 2019 05:58 )             35.4               acetaminophen  Suppository .. 650 milliGRAM(s) Rectal every 6 hours PRN  amLODIPine   Tablet 5 milliGRAM(s) Oral <User Schedule>  aspirin  chewable 81 milliGRAM(s) Oral daily  buDESOnide 160 MICROgram(s)/formoterol 4.5 MICROgram(s) Inhaler 2 Puff(s) Inhalation two times a day  carbidopa/levodopa  10/100 1 Tablet(s) Oral daily  carbidopa/levodopa  25/100 1 Tablet(s) Oral at bedtime  carvedilol 25 milliGRAM(s) Oral every 12 hours  cefepime   IVPB 2000 milliGRAM(s) IV Intermittent every 12 hours  cefepime   IVPB      chlorhexidine 0.12% Liquid 15 milliLiter(s) Oral Mucosa every 12 hours  chlorhexidine 4% Liquid 1 Application(s) Topical <User Schedule>  DULoxetine 60 milliGRAM(s) Oral daily  enoxaparin Injectable 30 milliGRAM(s) SubCutaneous daily  furosemide   Injectable 40 milliGRAM(s) IV Push two times a day  influenza   Vaccine 0.5 milliLiter(s) IntraMuscular once  levothyroxine 75 MICROGram(s) Oral daily  multivitamin  Chewable 1 Tablet(s) Oral daily  nystatin Powder 1 Application(s) Topical two times a day  pantoprazole    Tablet 40 milliGRAM(s) Oral before breakfast  potassium chloride   Powder 40 milliEquivalent(s) Oral daily  senna 2 Tablet(s) Oral at bedtime

## 2019-09-16 NOTE — PROGRESS NOTE ADULT - ASSESSMENT
Off  vent . She needs high shiva o2. Check cxr.   Awake responsive.  Off beta. HR acceptable  GI DVT prophylaxis,.  Barium swallow today . NG tube for  now.  OOB to chair, Nutritional support. Prognosis guarded

## 2019-09-16 NOTE — PROGRESS NOTE ADULT - ASSESSMENT
IMPRESSION:  Cardio respiratory arrest  aspiration of breakfast   pneumonia/pneumonitis   LLL atelectasis  rosalind cardia  hypernatremia resolved        PLAN:    CNS: avoid sedation     HEENT: jacob    PULMONARY:  pulmonary toilet  taper O2    GI: GI prophylaxis. NG  Feeding     RENAL: follow lytes     INFECTIOUS DISEASE:   finish abx     HEMATOLOGICAL:  DVT prophylaxis.    ENDOCRINE:  Follow up FS.  Insulin protocol if needed.    MUSCULOSKELETAL:  d./c laura

## 2019-09-16 NOTE — PROGRESS NOTE ADULT - SUBJECTIVE AND OBJECTIVE BOX
Patient is a 87y old  Female who presents with a chief complaint of Weakness and bradycardia (16 Sep 2019 07:45)        Interval Events: Some SOB overnight . On high flow    REVIEW OF SYSTEMS:  Unable to obtain due to patient's condition.        OBJECTIVE:  ICU Vital Signs Last 24 Hrs  T(C): 36.2 (16 Sep 2019 11:00), Max: 37.2 (15 Sep 2019 23:01)  T(F): 97.2 (16 Sep 2019 11:00), Max: 98.9 (15 Sep 2019 23:01)  HR: 66 (16 Sep 2019 13:22) (54 - 72)  BP: 166/62 (16 Sep 2019 13:22) (143/63 - 204/77)  BP(mean): 114 (16 Sep 2019 13:22) (91 - 120)  RR: 33 (16 Sep 2019 13:21) (20 - 49)  SpO2: 92% (16 Sep 2019 13:22) (91% - 100%)        09-15 @ 07:01  -  09-16 @ 07:00  --------------------------------------------------------  IN: 250 mL / OUT: 2500 mL / NET: -2250 mL    09-16 @ 07:01  -  09-16 @ 14:39  --------------------------------------------------------  IN: 150 mL / OUT: 1200 mL / NET: -1050 mL      CAPILLARY BLOOD GLUCOSE          PHYSICAL EXAM:  General: Awake, alert, oriented X 3.   HEENT: Atraumatic, normocephalic.                 Mallampatti Grade                 No nasal congestion.                No tonsillar or pharyngeal exudates.  Lymph Nodes: No palpable lymphadenopathy neck, supraclavicular region  Neck: No JVD. No carotid bruit, no thyromegally  Respiratory: Normal chest expansion                         Normal percussion                         Normal and equal air entry                         scattered rhonchi   Cardiovascular: S1 S2 normal. No murmurs, rubs or gallops.   Abdomen: Soft, non-tender, non-distended. No organomegaly.  Extremities: Warm to touch. Peripheral pulse palpable. No pedal edema.   Skin: No rashes or skin lesions, warm dry  Neurological: Motor and sensory examination equal and normal in all four extremities.  Psychiatry: Appropriate mood and affect.    HOSPITAL MEDICATIONS:  MEDICATIONS  (STANDING):  amLODIPine   Tablet 5 milliGRAM(s) Oral <User Schedule>  aspirin  chewable 81 milliGRAM(s) Oral daily  buDESOnide 160 MICROgram(s)/formoterol 4.5 MICROgram(s) Inhaler 2 Puff(s) Inhalation two times a day  carbidopa/levodopa  10/100 1 Tablet(s) Oral daily  carbidopa/levodopa  25/100 1 Tablet(s) Oral at bedtime  carvedilol 25 milliGRAM(s) Oral every 12 hours  cefepime   IVPB 2000 milliGRAM(s) IV Intermittent every 12 hours  cefepime   IVPB      chlorhexidine 0.12% Liquid 15 milliLiter(s) Oral Mucosa every 12 hours  chlorhexidine 4% Liquid 1 Application(s) Topical <User Schedule>  DULoxetine 60 milliGRAM(s) Oral daily  enoxaparin Injectable 30 milliGRAM(s) SubCutaneous daily  furosemide   Injectable 40 milliGRAM(s) IV Push two times a day  influenza   Vaccine 0.5 milliLiter(s) IntraMuscular once  levothyroxine 75 MICROGram(s) Oral daily  multivitamin  Chewable 1 Tablet(s) Oral daily  nystatin Powder 1 Application(s) Topical two times a day  pantoprazole    Tablet 40 milliGRAM(s) Oral before breakfast  potassium chloride   Powder 40 milliEquivalent(s) Oral daily  senna 2 Tablet(s) Oral at bedtime    MEDICATIONS  (PRN):  acetaminophen  Suppository .. 650 milliGRAM(s) Rectal every 6 hours PRN Temp greater or equal to 38C (100.4F)      LABS:                        11.8   12.86 )-----------( 388      ( 16 Sep 2019 05:58 )             35.4     09-16    141  |  98  |  39<H>  ----------------------------<  181<H>  4.3   |  30  |  1.1    Ca    9.7      16 Sep 2019 05:58    TPro  6.7  /  Alb  3.6  /  TBili  0.9  /  DBili  x   /  AST  24  /  ALT  <5  /  AlkPhos  153<H>  09-16                      RADIOLOGY: Radiology personally reviewed.

## 2019-09-17 LAB
ALBUMIN SERPL ELPH-MCNC: 3.7 G/DL — SIGNIFICANT CHANGE UP (ref 3.5–5.2)
ALP SERPL-CCNC: 148 U/L — HIGH (ref 30–115)
ALT FLD-CCNC: <5 U/L — SIGNIFICANT CHANGE UP (ref 0–41)
ANION GAP SERPL CALC-SCNC: 17 MMOL/L — HIGH (ref 7–14)
AST SERPL-CCNC: 36 U/L — SIGNIFICANT CHANGE UP (ref 0–41)
BASOPHILS # BLD AUTO: 0.06 K/UL — SIGNIFICANT CHANGE UP (ref 0–0.2)
BASOPHILS NFR BLD AUTO: 0.4 % — SIGNIFICANT CHANGE UP (ref 0–1)
BILIRUB SERPL-MCNC: 0.9 MG/DL — SIGNIFICANT CHANGE UP (ref 0.2–1.2)
BUN SERPL-MCNC: 45 MG/DL — HIGH (ref 10–20)
CALCIUM SERPL-MCNC: 9.6 MG/DL — SIGNIFICANT CHANGE UP (ref 8.5–10.1)
CHLORIDE SERPL-SCNC: 95 MMOL/L — LOW (ref 98–110)
CO2 SERPL-SCNC: 30 MMOL/L — SIGNIFICANT CHANGE UP (ref 17–32)
CREAT SERPL-MCNC: 1.3 MG/DL — SIGNIFICANT CHANGE UP (ref 0.7–1.5)
EOSINOPHIL # BLD AUTO: 0.06 K/UL — SIGNIFICANT CHANGE UP (ref 0–0.7)
EOSINOPHIL NFR BLD AUTO: 0.4 % — SIGNIFICANT CHANGE UP (ref 0–8)
GLUCOSE SERPL-MCNC: 185 MG/DL — HIGH (ref 70–99)
HCT VFR BLD CALC: 34.7 % — LOW (ref 37–47)
HGB BLD-MCNC: 11.5 G/DL — LOW (ref 12–16)
IMM GRANULOCYTES NFR BLD AUTO: 1 % — HIGH (ref 0.1–0.3)
LYMPHOCYTES # BLD AUTO: 1.05 K/UL — LOW (ref 1.2–3.4)
LYMPHOCYTES # BLD AUTO: 7.7 % — LOW (ref 20.5–51.1)
MCHC RBC-ENTMCNC: 27.9 PG — SIGNIFICANT CHANGE UP (ref 27–31)
MCHC RBC-ENTMCNC: 33.1 G/DL — SIGNIFICANT CHANGE UP (ref 32–37)
MCV RBC AUTO: 84.2 FL — SIGNIFICANT CHANGE UP (ref 81–99)
MONOCYTES # BLD AUTO: 0.77 K/UL — HIGH (ref 0.1–0.6)
MONOCYTES NFR BLD AUTO: 5.7 % — SIGNIFICANT CHANGE UP (ref 1.7–9.3)
NEUTROPHILS # BLD AUTO: 11.5 K/UL — HIGH (ref 1.4–6.5)
NEUTROPHILS NFR BLD AUTO: 84.8 % — HIGH (ref 42.2–75.2)
NRBC # BLD: 0 /100 WBCS — SIGNIFICANT CHANGE UP (ref 0–0)
PHOSPHATE SERPL-MCNC: 3.3 MG/DL — SIGNIFICANT CHANGE UP (ref 2.1–4.9)
PLATELET # BLD AUTO: 394 K/UL — SIGNIFICANT CHANGE UP (ref 130–400)
POTASSIUM SERPL-MCNC: 3.4 MMOL/L — LOW (ref 3.5–5)
POTASSIUM SERPL-SCNC: 3.4 MMOL/L — LOW (ref 3.5–5)
PROT SERPL-MCNC: 6.8 G/DL — SIGNIFICANT CHANGE UP (ref 6–8)
RBC # BLD: 4.12 M/UL — LOW (ref 4.2–5.4)
RBC # FLD: 16.8 % — HIGH (ref 11.5–14.5)
SODIUM SERPL-SCNC: 142 MMOL/L — SIGNIFICANT CHANGE UP (ref 135–146)
WBC # BLD: 13.58 K/UL — HIGH (ref 4.8–10.8)
WBC # FLD AUTO: 13.58 K/UL — HIGH (ref 4.8–10.8)

## 2019-09-17 PROCEDURE — 71045 X-RAY EXAM CHEST 1 VIEW: CPT | Mod: 26,77

## 2019-09-17 PROCEDURE — 71045 X-RAY EXAM CHEST 1 VIEW: CPT | Mod: 26,76

## 2019-09-17 PROCEDURE — 74230 X-RAY XM SWLNG FUNCJ C+: CPT | Mod: 26

## 2019-09-17 RX ORDER — FUROSEMIDE 40 MG
40 TABLET ORAL DAILY
Refills: 0 | Status: DISCONTINUED | OUTPATIENT
Start: 2019-09-18 | End: 2019-09-20

## 2019-09-17 RX ORDER — DOCUSATE SODIUM 100 MG
100 CAPSULE ORAL THREE TIMES A DAY
Refills: 0 | Status: DISCONTINUED | OUTPATIENT
Start: 2019-09-17 | End: 2019-10-03

## 2019-09-17 RX ORDER — LACTULOSE 10 G/15ML
10 SOLUTION ORAL ONCE
Refills: 0 | Status: COMPLETED | OUTPATIENT
Start: 2019-09-17 | End: 2019-09-17

## 2019-09-17 RX ORDER — DOCUSATE SODIUM 100 MG
100 CAPSULE ORAL THREE TIMES A DAY
Refills: 0 | Status: DISCONTINUED | OUTPATIENT
Start: 2019-09-17 | End: 2019-09-17

## 2019-09-17 RX ORDER — LOSARTAN POTASSIUM 100 MG/1
50 TABLET, FILM COATED ORAL DAILY
Refills: 0 | Status: DISCONTINUED | OUTPATIENT
Start: 2019-09-17 | End: 2019-09-28

## 2019-09-17 RX ADMIN — CHLORHEXIDINE GLUCONATE 15 MILLILITER(S): 213 SOLUTION TOPICAL at 17:34

## 2019-09-17 RX ADMIN — NYSTATIN CREAM 1 APPLICATION(S): 100000 CREAM TOPICAL at 05:05

## 2019-09-17 RX ADMIN — Medication 40 MILLIGRAM(S): at 05:02

## 2019-09-17 RX ADMIN — NYSTATIN CREAM 1 APPLICATION(S): 100000 CREAM TOPICAL at 17:35

## 2019-09-17 RX ADMIN — CHLORHEXIDINE GLUCONATE 15 MILLILITER(S): 213 SOLUTION TOPICAL at 05:00

## 2019-09-17 RX ADMIN — Medication 100 MILLIGRAM(S): at 22:50

## 2019-09-17 RX ADMIN — ENOXAPARIN SODIUM 30 MILLIGRAM(S): 100 INJECTION SUBCUTANEOUS at 11:23

## 2019-09-17 RX ADMIN — LOSARTAN POTASSIUM 50 MILLIGRAM(S): 100 TABLET, FILM COATED ORAL at 21:13

## 2019-09-17 RX ADMIN — Medication 81 MILLIGRAM(S): at 11:23

## 2019-09-17 RX ADMIN — AMLODIPINE BESYLATE 5 MILLIGRAM(S): 2.5 TABLET ORAL at 05:00

## 2019-09-17 RX ADMIN — Medication 1 TABLET(S): at 17:33

## 2019-09-17 RX ADMIN — CEFEPIME 100 MILLIGRAM(S): 1 INJECTION, POWDER, FOR SOLUTION INTRAMUSCULAR; INTRAVENOUS at 05:00

## 2019-09-17 RX ADMIN — CARVEDILOL PHOSPHATE 25 MILLIGRAM(S): 80 CAPSULE, EXTENDED RELEASE ORAL at 05:00

## 2019-09-17 RX ADMIN — CEFEPIME 100 MILLIGRAM(S): 1 INJECTION, POWDER, FOR SOLUTION INTRAMUSCULAR; INTRAVENOUS at 17:37

## 2019-09-17 RX ADMIN — Medication 100 MILLIGRAM(S): at 16:04

## 2019-09-17 RX ADMIN — CARBIDOPA AND LEVODOPA 1 TABLET(S): 25; 100 TABLET ORAL at 16:04

## 2019-09-17 RX ADMIN — PANTOPRAZOLE SODIUM 40 MILLIGRAM(S): 20 TABLET, DELAYED RELEASE ORAL at 06:21

## 2019-09-17 RX ADMIN — SENNA PLUS 2 TABLET(S): 8.6 TABLET ORAL at 21:11

## 2019-09-17 RX ADMIN — CHLORHEXIDINE GLUCONATE 1 APPLICATION(S): 213 SOLUTION TOPICAL at 05:01

## 2019-09-17 RX ADMIN — BUDESONIDE AND FORMOTEROL FUMARATE DIHYDRATE 2 PUFF(S): 160; 4.5 AEROSOL RESPIRATORY (INHALATION) at 10:54

## 2019-09-17 RX ADMIN — AMLODIPINE BESYLATE 5 MILLIGRAM(S): 2.5 TABLET ORAL at 17:32

## 2019-09-17 RX ADMIN — LACTULOSE 10 GRAM(S): 10 SOLUTION ORAL at 08:54

## 2019-09-17 RX ADMIN — SODIUM CHLORIDE 40 MILLILITER(S): 9 INJECTION, SOLUTION INTRAVENOUS at 07:19

## 2019-09-17 RX ADMIN — CARBIDOPA AND LEVODOPA 1 TABLET(S): 25; 100 TABLET ORAL at 22:50

## 2019-09-17 RX ADMIN — Medication 40 MILLIEQUIVALENT(S): at 11:22

## 2019-09-17 RX ADMIN — CARVEDILOL PHOSPHATE 25 MILLIGRAM(S): 80 CAPSULE, EXTENDED RELEASE ORAL at 17:32

## 2019-09-17 RX ADMIN — Medication 75 MICROGRAM(S): at 05:00

## 2019-09-17 NOTE — PROGRESS NOTE ADULT - ASSESSMENT
IMP:  - acute resp failure - resolved  - aspiration  - Parkinson's disease  - hypophosphatemia    Can not assess swallowing with a [place] or Moe tube in the nose or mouth. Unclear why such a tube was placed in the mouth altogether.  Suggest removing oral [place], d/w Pulmonary regarding doing MBS while pt on high flow.  Would place 8 or 10 Fr NG soft feeding tube, then feeding 240 ml Osmolite 1.5 at 7am and noon, and 360 ml at 6pm. Give 2 Beneprotein with noon feed daily.  How is pt currently getting all her po meds?  Phos not repeated and ? not treated. Suggest stat repeat and treat it. d/w resident

## 2019-09-17 NOTE — SWALLOW VFSS/MBS ASSESSMENT ADULT - DIAGNOSTIC IMPRESSIONS
severe to profound overall swallow function, discussed in conjunction with Radiologist Landauer. Patient unable to clear residue fatigue noted throughout exam. Excessive nonproductive cough noted post exam as well. Exam terminated post nectar thick liquids due to safety concerns, Dr in agreement.

## 2019-09-17 NOTE — PROGRESS NOTE ADULT - SUBJECTIVE AND OBJECTIVE BOX
86 yo female adm 9/3 with bradycardia. PMH notable for Parkinson's with progression, CHF, hypothyroidism.  pt found to be hypoxic and more confused 9/6 and was intubated.  + aspiration   EGD done 9/9  pt successfully extubated 9/15 but desaturated overnight and was put on high flow O2. Large [place] suction catheter placed orally down to stomach.  Speech notes appreciated - suggest a modified barium swallow.     T(F): 96.3 (17 Sep 2019 07:01), Max: 97.9 (16 Sep 2019 15:00)  HR: 54 (17 Sep 2019 09:00) (53 - 72)  BP: 182/78 (17 Sep 2019 09:00) (154/66 - 190/73)  BP(mean): 112 (17 Sep 2019 09:00) (95 - 119)  RR: 27 (17 Sep 2019 09:00) (20 - 35)  SpO2: 97% (17 Sep 2019 09:00) (91% - 99%)  pt alert, verbal, sitting in chair, no distress  + high flow O2  abd soft, NT, ND  no signif LE edema, skin turgor good, mouth moist  pt chewing on OG [place], finds it uncomfortabole    MEDICATIONS  (STANDING):  amLODIPine   Tablet 5 milliGRAM(s) Oral <User Schedule>  aspirin  chewable 81 milliGRAM(s) Oral daily  buDESOnide 160 MICROgram(s)/formoterol 4.5 MICROgram(s) Inhaler 2 Puff(s) Inhalation two times a day  carbidopa/levodopa  10/100 1 Tablet(s) Oral daily  carbidopa/levodopa  25/100 1 Tablet(s) Oral at bedtime  carvedilol 25 milliGRAM(s) Oral every 12 hours  cefepime   IVPB 2000 milliGRAM(s) IV Intermittent every 12 hours  chlorhexidine 0.12% Liquid 15 milliLiter(s) Oral Mucosa every 12 hours  chlorhexidine 4% Liquid 1 Application(s) Topical <User Schedule>  dextrose 5% + sodium chloride 0.45%. 1000 milliLiter(s) (40 mL/Hr) IV Continuous <Continuous>  docusate sodium 100 milliGRAM(s) Oral three times a day  DULoxetine 60 milliGRAM(s) Oral daily  enoxaparin Injectable 30 milliGRAM(s) SubCutaneous daily  furosemide   Injectable 40 milliGRAM(s) IV Push two times a day  influenza   Vaccine 0.5 milliLiter(s) IntraMuscular once  levothyroxine 75 MICROGram(s) Oral daily  multivitamin  Chewable 1 Tablet(s) Oral daily  nystatin Powder 1 Application(s) Topical two times a day  pantoprazole    Tablet 40 milliGRAM(s) Oral before breakfast  potassium chloride   Powder 40 milliEquivalent(s) Oral daily  senna 2 Tablet(s) Oral at bedtime                        11.5   13.58 )-----------( 394      ( 17 Sep 2019 05:40 )             34.7   09-17    142  |  95<L>  |  45<H>  ----------------------------<  185<H>  3.4<L>   |  30  |  1.3    Ca    9.6      17 Sep 2019 05:40  TPro  6.8  /  Alb  3.7  /  TBili  0.9  /  DBili  x   /  AST  36  /  ALT  <5  /  AlkPhos  148<H>  09-17  Phosphorus Level, Serum in AM (09.12.19 @ 05:38)    Phosphorus Level, Serum: 1.5 mg/dL

## 2019-09-17 NOTE — PROGRESS NOTE ADULT - SUBJECTIVE AND OBJECTIVE BOX
Patient is a 87y old  Female who presents with a chief complaint of Weakness and bradycardia (17 Sep 2019 05:46)      T(F): 96.4 (09-17-19 @ 03:00), Max: 97.9 (09-16-19 @ 15:00)  HR: 53 (09-17-19 @ 06:00)  BP: 154/66 (09-17-19 @ 06:00)  RR: 22 (09-17-19 @ 06:00)  SpO2: 99% (09-17-19 @ 06:00) (91% - 99%)    PHYSICAL EXAM:  GENERAL: NAD, well-groomed, well-developed  HEAD:  Atraumatic, Normocephalic  EYES: EOMI, PERRLA, conjunctiva and sclera clear  ENMT: No tonsillar erythema, exudates, or enlargement; Moist mucous membranes, Good dentition, No lesions  NECK: Supple, No JVD, Normal thyroid  NERVOUS SYSTEM:  Alert & Oriented X3,  Motor Strength 5/5 B/L upper and lower extremities  CHEST/LUNG: Clear to percussion bilaterally; No rales, wheezing, or rubs rhonchi  HEART: Regular rate and rhythm; No murmurs, rubs, or gallops  ABDOMEN: Soft, Nontender, Nondistended; Bowel sounds present  EXTREMITIES:   No clubbing, cyanosis, or edema  LYMPH: No lymphadenopathy noted  SKIN: No rashes or lesions    labs  09-16    141  |  98  |  39<H>  ----------------------------<  181<H>  4.3   |  30  |  1.1    Ca    9.7      16 Sep 2019 05:58    TPro  6.7  /  Alb  3.6  /  TBili  0.9  /  DBili  x   /  AST  24  /  ALT  <5  /  AlkPhos  153<H>  09-16                          11.5   13.58 )-----------( 394      ( 17 Sep 2019 05:40 )             34.7               acetaminophen  Suppository .. 650 milliGRAM(s) Rectal every 6 hours PRN  amLODIPine   Tablet 5 milliGRAM(s) Oral <User Schedule>  aspirin  chewable 81 milliGRAM(s) Oral daily  buDESOnide 160 MICROgram(s)/formoterol 4.5 MICROgram(s) Inhaler 2 Puff(s) Inhalation two times a day  carbidopa/levodopa  10/100 1 Tablet(s) Oral daily  carbidopa/levodopa  25/100 1 Tablet(s) Oral at bedtime  carvedilol 25 milliGRAM(s) Oral every 12 hours  cefepime   IVPB 2000 milliGRAM(s) IV Intermittent every 12 hours  cefepime   IVPB      chlorhexidine 0.12% Liquid 15 milliLiter(s) Oral Mucosa every 12 hours  chlorhexidine 4% Liquid 1 Application(s) Topical <User Schedule>  dextrose 5% + sodium chloride 0.45%. 1000 milliLiter(s) IV Continuous <Continuous>  DULoxetine 60 milliGRAM(s) Oral daily  enoxaparin Injectable 30 milliGRAM(s) SubCutaneous daily  furosemide   Injectable 40 milliGRAM(s) IV Push two times a day  influenza   Vaccine 0.5 milliLiter(s) IntraMuscular once  levothyroxine 75 MICROGram(s) Oral daily  multivitamin  Chewable 1 Tablet(s) Oral daily  nystatin Powder 1 Application(s) Topical two times a day  pantoprazole    Tablet 40 milliGRAM(s) Oral before breakfast  potassium chloride   Powder 40 milliEquivalent(s) Oral daily  senna 2 Tablet(s) Oral at bedtime

## 2019-09-17 NOTE — SWALLOW VFSS/MBS ASSESSMENT ADULT - SPECIFY REASON(S)
intubation; patient coded during meal and excessive amounts of egg were removed from esophageus with choking at meals prior to admission. h/o of PD

## 2019-09-17 NOTE — PROGRESS NOTE ADULT - ASSESSMENT
IMPRESSION:  Cardio respiratory arrest  aspiration of breakfast   pneumonia/pneumonitis   LLL atelectasis  rosalind cardia  hypernatremia resolved        PLAN:    CNS: avoid sedation     HEENT: jacob    PULMONARY:  pulmonary toilet  taper O2    GI: GI prophylaxis. NG  Feeding   barium swallow today if possible    RENAL: follow lytes   gentle diuresis    INFECTIOUS DISEASE:   finish abx     HEMATOLOGICAL:  DVT prophylaxis.    ENDOCRINE:  Follow up FS.  Insulin protocol if needed.    MUSCULOSKELETAL:  d./c laura

## 2019-09-17 NOTE — SWALLOW VFSS/MBS ASSESSMENT ADULT - SLP PERTINENT HISTORY OF CURRENT PROBLEM
s/p code with food debris in esophageus/ Patient with known dysphagia PTA and parkinsons. Patient currently on high flow with toleration of 35-45 pressure setting.  evaluation for PO diet discussed at team and agreed to complete MBS as tolerated.

## 2019-09-17 NOTE — SWALLOW VFSS/MBS ASSESSMENT ADULT - SLP GENERAL OBSERVATIONS
Patient received in radiology with RN from CCU and RRT. Set up high flow and patient was evaluated to tolerate position and settings.

## 2019-09-17 NOTE — SWALLOW VFSS/MBS ASSESSMENT ADULT - RECOMMENDED CONSISTENCY
NPO with alternate means of nutrition/hydration ice chips IF cleared by pulmonary for palliative trials with RN staff of Ice Chips. SLP will conduct trials of nectar thick liquids in conjunction with therapy Repeat MBS will be conducted in 1-2 weeks when patient is tolerating less respiratory support as well as increased oral motor movements for bolus manipulation

## 2019-09-17 NOTE — PROGRESS NOTE ADULT - SUBJECTIVE AND OBJECTIVE BOX
[facility]  Last 24hr Events & Subjective:  No overnight events, pt pulled her OGT out today, has no complaints, per RN no bowel movement in a few days, unable to wean off High flow o2, went for barium swallow this afternoon.    Past Medical Hx:  Parkinson disease  Hypothyroid  Rib fractures  Congestive heart failure  Hypertension    Past Surgical Hx:  No significant past surgical history    Allergies:  No Known Allergies    Current Meds:  Standing Meds  amLODIPine   Tablet 5 milliGRAM(s) Oral <User Schedule>  aspirin  chewable 81 milliGRAM(s) Oral daily  buDESOnide 160 MICROgram(s)/formoterol 4.5 MICROgram(s) Inhaler 2 Puff(s) Inhalation two times a day  carbidopa/levodopa  10/100 1 Tablet(s) Oral daily  carbidopa/levodopa  25/100 1 Tablet(s) Oral at bedtime  carvedilol 25 milliGRAM(s) Oral every 12 hours  cefepime   IVPB 2000 milliGRAM(s) IV Intermittent every 12 hours  cefepime   IVPB      chlorhexidine 0.12% Liquid 15 milliLiter(s) Oral Mucosa every 12 hours  chlorhexidine 4% Liquid 1 Application(s) Topical <User Schedule>  dextrose 5% + sodium chloride 0.45%. 1000 milliLiter(s) IV Continuous <Continuous>  docusate sodium Liquid 100 milliGRAM(s) Oral three times a day  DULoxetine 60 milliGRAM(s) Oral daily  enoxaparin Injectable 30 milliGRAM(s) SubCutaneous daily  influenza   Vaccine 0.5 milliLiter(s) IntraMuscular once  levothyroxine 75 MICROGram(s) Oral daily  losartan 50 milliGRAM(s) Oral daily  multivitamin  Chewable 1 Tablet(s) Oral daily  nystatin Powder 1 Application(s) Topical two times a day  pantoprazole    Tablet 40 milliGRAM(s) Oral before breakfast  potassium chloride   Powder 40 milliEquivalent(s) Oral daily  senna 2 Tablet(s) Oral at bedtime    PRN Meds  acetaminophen  Suppository .. 650 milliGRAM(s) Rectal every 6 hours PRN    Vital Signs  T(F): 96.5 (09-17-19 @ 15:00), Max: 97 (09-16-19 @ 19:00)  HR: 60 (09-17-19 @ 15:50) (53 - 66)  BP: 188/80 (09-17-19 @ 15:50) (149/67 - 190/73)  BP(mean): 115 (09-17-19 @ 15:50) (95 - 119)  ABP: --  ABP(mean): --  RR: 28 (09-17-19 @ 15:50) (20 - 35)  SpO2: 98% (09-17-19 @ 15:50) (91% - 99%)  Fluid Balance    09-16-19 @ 07:01  -  09-17-19 @ 07:00  --------------------------------------------------------  IN:    dextrose 5% + sodium chloride 0.45%.: 440 mL    Enteral Tube Flush: 300 mL    Solution: 50 mL  Total IN: 790 mL    OUT:    Voided: 2400 mL  Total OUT: 2400 mL    Total NET: -1610 mL      09-17-19 @ 07:01  -  09-17-19 @ 15:59  --------------------------------------------------------  IN:    dextrose 5% + sodium chloride 0.45%.: 40 mL    Enteral Tube Flush: 180 mL  Total IN: 220 mL    OUT:    Voided: 500 mL  Total OUT: 500 mL    Total NET: -280 mL      Physical Exam:  GENERAL: NAD, on High flow O2  HEENT: NCAT,  CHEST/LUNG: Expiratory rhonchi sounds like upper airway, poor inspiratory effort  HEART: Regular rate and rhythm; s1 s2 appreciated, No murmurs, rubs, or gallops  ABDOMEN: Soft, Nontender, Nondistended; Bowel sounds present  EXTREMITIES: No LE pitting edema b/l  NERVOUS SYSTEM:  Alert, answers all questions appropriately    LINES/CATHETERS: new NGT placed this afternoon and confirmed on CXR    Labs:                         11.5<L>  13.58<H>   )-----------(   394      ( 17 Sep 2019 05:40 )              34.7<L>    Neutro% 84.8<H>, Lympho% 7.7<L>, Mono% 5.7  , Bands 1.0<H>  17 Sep 2019 05:40    142    |  95     |  45     ----------------------------<  185    3.4     |  30     |  1.3      Ca    9.6        17 Sep 2019 05:40  Phos  3.3       17 Sep 2019 10:56    TPro  6.8    /  Alb  3.7    /  TBili  0.9    /  DBili  x      /  AST  36     /  ALT  <5     /  AlkPhos  148    17 Sep 2019 05:40        Imaging & EKG:  < from: Xray Modified Barium Swallow (09.17.19 @ 14:55) >  Procedure: Various consistencies of food were given to the patient and swallowing was observedunder fluoroscopy.  This study was performed in conjunction with the speech pathology department.     Findings/  Impression: Dysphagia.     Please refer to report from the department of speech pathology for detailed description of the findings and recommendations.   < end of copied text >    < from: Xray Chest 1 View- PORTABLE-Routine (09.17.19 @ 06:37) >  Impression:  Stable bilateral opacifications/effusions and interstitial edema.  < end of copied text >      Assessment & Plan:  IMPRESSION:  Bradycardia- improved  Cardiorespiratory arrest due to likely aspiration, s/p extubation, now on high flow  Pneumonia/pneumonitis  LLL atelectasis  Dysphagia  Parkinsons    PLAN  NEURO:   -avoid sedation  -c/w sinemet     PULMONARY:   -keep HOB >45 deg  -incentive spirometer, poor effort by pt likely of little benefit  -pulmonary toilet  -taper O2 as tolerated  -c/w symbicort    CARDIOVASCULAR:   -keep I<O  -c/w carvediolol, amlodipine  -d/w cardiology will start losartan 50mg for HTN (was previously on 100mg daily)   -decrease lasix to once daily  -monitor I/O goal for mild negative balance  -daily weights    GASTROENTEROLOGY:   -s/p barium swallow 9/17/19, failed high aspiration risk  -spoke with pt and separately with daughter via phone regarding alternate feeding modalities  -NGT placed for now  -start feeds per nutrition recs  -f/u with pt and family for further plans on nutrition    -GI ppx     INFECTIOUS DISEASES:   -WBC mildly increased  -finish Abx  -monitor for fevers    HEMATOLOGY/ONCOLOGY:   -DVT ppx lovenox    GENITOURINARY/RENAL:   -monitor and correct electrolytes  -potassium repleted    ENDOCRINE:   -monitor glucose  -c/w synthroid    MUSCULOSKELETAL:   -OOBTC    DISPO:   -MICU for now    CODE STATUS:  -FULL CODE

## 2019-09-17 NOTE — PROGRESS NOTE ADULT - ASSESSMENT
87 year old female admitted s/p fall episode of bradycardia ; sinemet ; rapid response respiratory failure ; episode of temp     # Respiratory Failure - aspiration  secretions  s/p bronch extubated seen by SLP    - desaturation on high flow secretions   - continue vent parameters per Pulm / Critical care   -  SLP evaluation  extubated MBBS pending   - at risk for aspiration        # Cardiac / CHF  / HTN      - pulse better   - continue lasix   - reviewed echo  - monitor weight and pulse        # Parkinson's / dysphagia     - continue Sinemet     # Hypothyroidism     - levothyroxine     # GI and DVT prophylaxsis     - pantoprazxole   - enoxaparin    advance care  noted  cpr full code    disposition -   probable STR

## 2019-09-17 NOTE — PROGRESS NOTE ADULT - ASSESSMENT
Off  vent . She needs high shiva o2. Check cxr.   Awake responsive.  Off beta. HR acceptable  GI DVT prophylaxis,.  Barium swallow not done. On high flow o2. NG tube for now.  OOB to chair, Nutritional support. IV lasix for now. Prognosis guarded

## 2019-09-17 NOTE — PROGRESS NOTE ADULT - SUBJECTIVE AND OBJECTIVE BOX
Patient is a 87y old  Female who presents with a chief complaint of Weakness and bradycardia (17 Sep 2019 10:32)        Interval Events: No overnight events.    REVIEW OF SYSTEMS:  Unable to obtain due to patient's condition.        OBJECTIVE:  ICU Vital Signs Last 24 Hrs  T(C): 36.1 (17 Sep 2019 11:00), Max: 36.6 (16 Sep 2019 15:00)  T(F): 97 (17 Sep 2019 11:00), Max: 97.9 (16 Sep 2019 15:00)  HR: 55 (17 Sep 2019 13:00) (53 - 68)  BP: 163/72 (17 Sep 2019 13:00) (149/67 - 190/73)  BP(mean): 103 (17 Sep 2019 13:00) (95 - 119)  RR: 27 (17 Sep 2019 13:00) (20 - 35)  SpO2: 96% (17 Sep 2019 13:00) (91% - 99%)        09-16 @ 07:01 - 09-17 @ 07:00  --------------------------------------------------------  IN: 790 mL / OUT: 2400 mL / NET: -1610 mL    09-17 @ 07:01 - 09-17 @ 14:56  --------------------------------------------------------  IN: 220 mL / OUT: 500 mL / NET: -280 mL      CAPILLARY BLOOD GLUCOSE          PHYSICAL EXAM:  General: Awake, alert,   HEENT: Atraumatic, normocephalic.                 Mallampatti Grade                 No nasal congestion.                No tonsillar or pharyngeal exudates.  Lymph Nodes: No palpable lymphadenopathy neck, supraclavicular region  Neck: No JVD. No carotid bruit, no thyromegally  Respiratory: Normal chest expansion                         Normal percussion                         Normal and equal air entry                          rales bases  Cardiovascular: S1 S2 normal. No murmurs, rubs or gallops.   Abdomen: Soft, non-tender, non-distended. No organomegaly.  Extremities: Warm to touch. Peripheral pulse palpable. No pedal edema.   Skin: No rashes or skin lesions, warm dry  Neurological: Motor and sensory examination equal and normal in all four extremities.  Psychiatry: Appropriate mood and affect.    HOSPITAL MEDICATIONS:  MEDICATIONS  (STANDING):  amLODIPine   Tablet 5 milliGRAM(s) Oral <User Schedule>  aspirin  chewable 81 milliGRAM(s) Oral daily  buDESOnide 160 MICROgram(s)/formoterol 4.5 MICROgram(s) Inhaler 2 Puff(s) Inhalation two times a day  carbidopa/levodopa  10/100 1 Tablet(s) Oral daily  carbidopa/levodopa  25/100 1 Tablet(s) Oral at bedtime  carvedilol 25 milliGRAM(s) Oral every 12 hours  cefepime   IVPB 2000 milliGRAM(s) IV Intermittent every 12 hours  cefepime   IVPB      chlorhexidine 0.12% Liquid 15 milliLiter(s) Oral Mucosa every 12 hours  chlorhexidine 4% Liquid 1 Application(s) Topical <User Schedule>  dextrose 5% + sodium chloride 0.45%. 1000 milliLiter(s) (40 mL/Hr) IV Continuous <Continuous>  docusate sodium Liquid 100 milliGRAM(s) Oral three times a day  DULoxetine 60 milliGRAM(s) Oral daily  enoxaparin Injectable 30 milliGRAM(s) SubCutaneous daily  influenza   Vaccine 0.5 milliLiter(s) IntraMuscular once  levothyroxine 75 MICROGram(s) Oral daily  multivitamin  Chewable 1 Tablet(s) Oral daily  nystatin Powder 1 Application(s) Topical two times a day  pantoprazole    Tablet 40 milliGRAM(s) Oral before breakfast  potassium chloride   Powder 40 milliEquivalent(s) Oral daily  senna 2 Tablet(s) Oral at bedtime    MEDICATIONS  (PRN):  acetaminophen  Suppository .. 650 milliGRAM(s) Rectal every 6 hours PRN Temp greater or equal to 38C (100.4F)      LABS:                        11.5   13.58 )-----------( 394      ( 17 Sep 2019 05:40 )             34.7     09-17    142  |  95<L>  |  45<H>  ----------------------------<  185<H>  3.4<L>   |  30  |  1.3    Ca    9.6      17 Sep 2019 05:40  Phos  3.3     09-17    TPro  6.8  /  Alb  3.7  /  TBili  0.9  /  DBili  x   /  AST  36  /  ALT  <5  /  AlkPhos  148<H>  09-17                      RADIOLOGY:Radiology personally reviewed.

## 2019-09-17 NOTE — SWALLOW VFSS/MBS ASSESSMENT ADULT - PHARYNGEAL PHASE COMMENTS
severe impairment unable to clear majority of limited trial, attempt at nectar thick to see impact of heavier bolus increased clearance noted but residue noted was high patient continued to cough unproductive post exam which is atypical

## 2019-09-17 NOTE — PROGRESS NOTE ADULT - SUBJECTIVE AND OBJECTIVE BOX
88 yo F presents to the ED c/o bradycardia, noticed by visiting Nurse today and she called EMS. Pt also states she felt forward and flat on Sunday 9/1/19 and has been anxious to climb up or down the stairs. Pt ambulates with a walker at home. Pt denies any similar episodes, traveling, exposure to ticks, fevers, chills, headaches, changes in mentation, visual changes, CP, SOB, abdominal pain, dysuria, changes in BM, rashes, skin discolorations, new medications.  admitted to episode of aspiration on 09/06     interval :  extubated s/p speech and swallow  ; episode of desaturation on high flow o2     PAST MEDICAL & SURGICAL HISTORY:  Parkinson disease  Hypothyroid  Rib fractures  Congestive heart failure  Hypertension  No significant past surgical history    MEDICATIONS  (STANDING):  amLODIPine   Tablet 5 milliGRAM(s) Oral <User Schedule>  aspirin  chewable 81 milliGRAM(s) Oral daily  buDESOnide 160 MICROgram(s)/formoterol 4.5 MICROgram(s) Inhaler 2 Puff(s) Inhalation two times a day  carbidopa/levodopa  10/100 1 Tablet(s) Oral daily  carbidopa/levodopa  25/100 1 Tablet(s) Oral at bedtime  carvedilol 25 milliGRAM(s) Oral every 12 hours  cefepime   IVPB 2000 milliGRAM(s) IV Intermittent every 12 hours  cefepime   IVPB      chlorhexidine 0.12% Liquid 15 milliLiter(s) Oral Mucosa every 12 hours  chlorhexidine 4% Liquid 1 Application(s) Topical <User Schedule>  dextrose 5% + sodium chloride 0.45%. 1000 milliLiter(s) (40 mL/Hr) IV Continuous <Continuous>  DULoxetine 60 milliGRAM(s) Oral daily  enoxaparin Injectable 30 milliGRAM(s) SubCutaneous daily  furosemide   Injectable 40 milliGRAM(s) IV Push two times a day  influenza   Vaccine 0.5 milliLiter(s) IntraMuscular once  levothyroxine 75 MICROGram(s) Oral daily  multivitamin  Chewable 1 Tablet(s) Oral daily  nystatin Powder 1 Application(s) Topical two times a day  pantoprazole    Tablet 40 milliGRAM(s) Oral before breakfast  potassium chloride   Powder 40 milliEquivalent(s) Oral daily  senna 2 Tablet(s) Oral at bedtime    MEDICATIONS  (PRN):  acetaminophen  Suppository .. 650 milliGRAM(s) Rectal every 6 hours PRN Temp greater or equal to 38C (100.4F)    ICU Vital Signs Last 24 Hrs  T(C): 35.8 (17 Sep 2019 03:00), Max: 36.6 (16 Sep 2019 15:00)  T(F): 96.4 (17 Sep 2019 03:00), Max: 97.9 (16 Sep 2019 15:00)  HR: 58 (17 Sep 2019 03:00) (56 - 72)  BP: 163/87 (17 Sep 2019 03:00) (148/65 - 180/79)  BP(mean): 119 (17 Sep 2019 03:00) (94 - 119)  ABP: --  ABP(mean): --  RR: 20 (17 Sep 2019 03:00) (20 - 35)  SpO2: 97% (17 Sep 2019 03:00) (91% - 99%)        GENERAL: NAD, lying in bed  HEAD:  Atraumatic, Normocephalic  EYES: EOMI, PERRLA, conjunctiva and sclera clear  ENT: Moist mucous membranes   NECK: Supple, No JVD  CHEST/LUNG: Clear to auscultation bilaterally; No rales, rhonchi, wheezing, or rubs.  HEART: +Bradycardic, No murmurs, rubs, or gallops  ABDOMEN: Bowel sounds present; Soft, Nontender, Nondistended  EXTREMITIES:  2+ Peripheral Pulses, brisk capillary refill. No clubbing, cyanosis, or edema  NERVOUS SYSTEM:  non focal   MSK: no cyanosis                           11.8   12.86 )-----------( 388      ( 16 Sep 2019 05:58 )             35.4   09-16    141  |  98  |  39<H>  ----------------------------<  181<H>  4.3   |  30  |  1.1    Ca    9.7      16 Sep 2019 05:58    TPro  6.7  /  Alb  3.6  /  TBili  0.9  /  DBili  x   /  AST  24  /  ALT  <5  /  AlkPhos  153<H>  09-16    EXAM:  XR CHEST PORTABLE ROUTINE 1V            PROCEDURE DATE:  09/16/2019            INTERPRETATION:  Clinical History / Reason for exam: Aspiration    Comparison : Chest radiograph September 15, 2019.    Technique/Positioning: Frontal, portable.    Findings:    Support devices: Telemetry leads overlie patient. Feeding tube is seen   coursing below diaphragm, tip not seen.    Cardiac/mediastinum/hilum: Stable    Lung parenchyma/Pleura: Stable bilateral lung opacities, left greater   than right    Skeleton/soft tissues: Stable    Impression:      Stable bilateral lung opacities, left greater than right                      GISEL LOZANO M.D., ATTENDING RADIOLOGIST  This document has been electronically signed. Sep 16 2019  9:01AM

## 2019-09-18 LAB
ALBUMIN SERPL ELPH-MCNC: 3.8 G/DL — SIGNIFICANT CHANGE UP (ref 3.5–5.2)
ALP SERPL-CCNC: 141 U/L — HIGH (ref 30–115)
ALT FLD-CCNC: 5 U/L — SIGNIFICANT CHANGE UP (ref 0–41)
ANION GAP SERPL CALC-SCNC: 15 MMOL/L — HIGH (ref 7–14)
AST SERPL-CCNC: 33 U/L — SIGNIFICANT CHANGE UP (ref 0–41)
BASOPHILS # BLD AUTO: 0.07 K/UL — SIGNIFICANT CHANGE UP (ref 0–0.2)
BASOPHILS NFR BLD AUTO: 0.5 % — SIGNIFICANT CHANGE UP (ref 0–1)
BILIRUB SERPL-MCNC: 1.1 MG/DL — SIGNIFICANT CHANGE UP (ref 0.2–1.2)
BUN SERPL-MCNC: 49 MG/DL — HIGH (ref 10–20)
CALCIUM SERPL-MCNC: 10 MG/DL — SIGNIFICANT CHANGE UP (ref 8.5–10.1)
CHLORIDE SERPL-SCNC: 99 MMOL/L — SIGNIFICANT CHANGE UP (ref 98–110)
CO2 SERPL-SCNC: 30 MMOL/L — SIGNIFICANT CHANGE UP (ref 17–32)
CREAT SERPL-MCNC: 1.4 MG/DL — SIGNIFICANT CHANGE UP (ref 0.7–1.5)
CULTURE RESULTS: SIGNIFICANT CHANGE UP
EOSINOPHIL # BLD AUTO: 0.12 K/UL — SIGNIFICANT CHANGE UP (ref 0–0.7)
EOSINOPHIL NFR BLD AUTO: 0.9 % — SIGNIFICANT CHANGE UP (ref 0–8)
GLUCOSE SERPL-MCNC: 196 MG/DL — HIGH (ref 70–99)
HCT VFR BLD CALC: 37.8 % — SIGNIFICANT CHANGE UP (ref 37–47)
HGB BLD-MCNC: 12.5 G/DL — SIGNIFICANT CHANGE UP (ref 12–16)
IMM GRANULOCYTES NFR BLD AUTO: 1.1 % — HIGH (ref 0.1–0.3)
LYMPHOCYTES # BLD AUTO: 1.11 K/UL — LOW (ref 1.2–3.4)
LYMPHOCYTES # BLD AUTO: 8.7 % — LOW (ref 20.5–51.1)
MAGNESIUM SERPL-MCNC: 2.4 MG/DL — SIGNIFICANT CHANGE UP (ref 1.8–2.4)
MCHC RBC-ENTMCNC: 28.3 PG — SIGNIFICANT CHANGE UP (ref 27–31)
MCHC RBC-ENTMCNC: 33.1 G/DL — SIGNIFICANT CHANGE UP (ref 32–37)
MCV RBC AUTO: 85.5 FL — SIGNIFICANT CHANGE UP (ref 81–99)
MONOCYTES # BLD AUTO: 0.99 K/UL — HIGH (ref 0.1–0.6)
MONOCYTES NFR BLD AUTO: 7.7 % — SIGNIFICANT CHANGE UP (ref 1.7–9.3)
NEUTROPHILS # BLD AUTO: 10.4 K/UL — HIGH (ref 1.4–6.5)
NEUTROPHILS NFR BLD AUTO: 81.1 % — HIGH (ref 42.2–75.2)
NRBC # BLD: 0 /100 WBCS — SIGNIFICANT CHANGE UP (ref 0–0)
PLATELET # BLD AUTO: 424 K/UL — HIGH (ref 130–400)
POTASSIUM SERPL-MCNC: 3.5 MMOL/L — SIGNIFICANT CHANGE UP (ref 3.5–5)
POTASSIUM SERPL-SCNC: 3.5 MMOL/L — SIGNIFICANT CHANGE UP (ref 3.5–5)
PROT SERPL-MCNC: 7 G/DL — SIGNIFICANT CHANGE UP (ref 6–8)
RBC # BLD: 4.42 M/UL — SIGNIFICANT CHANGE UP (ref 4.2–5.4)
RBC # FLD: 16.9 % — HIGH (ref 11.5–14.5)
SODIUM SERPL-SCNC: 144 MMOL/L — SIGNIFICANT CHANGE UP (ref 135–146)
SPECIMEN SOURCE: SIGNIFICANT CHANGE UP
WBC # BLD: 12.83 K/UL — HIGH (ref 4.8–10.8)
WBC # FLD AUTO: 12.83 K/UL — HIGH (ref 4.8–10.8)

## 2019-09-18 PROCEDURE — 71045 X-RAY EXAM CHEST 1 VIEW: CPT | Mod: 26

## 2019-09-18 RX ADMIN — Medication 100 MILLIGRAM(S): at 14:28

## 2019-09-18 RX ADMIN — NYSTATIN CREAM 1 APPLICATION(S): 100000 CREAM TOPICAL at 17:43

## 2019-09-18 RX ADMIN — Medication 75 MICROGRAM(S): at 05:06

## 2019-09-18 RX ADMIN — CARVEDILOL PHOSPHATE 25 MILLIGRAM(S): 80 CAPSULE, EXTENDED RELEASE ORAL at 05:06

## 2019-09-18 RX ADMIN — AMLODIPINE BESYLATE 5 MILLIGRAM(S): 2.5 TABLET ORAL at 17:43

## 2019-09-18 RX ADMIN — CEFEPIME 100 MILLIGRAM(S): 1 INJECTION, POWDER, FOR SOLUTION INTRAMUSCULAR; INTRAVENOUS at 05:08

## 2019-09-18 RX ADMIN — CHLORHEXIDINE GLUCONATE 15 MILLILITER(S): 213 SOLUTION TOPICAL at 05:06

## 2019-09-18 RX ADMIN — CHLORHEXIDINE GLUCONATE 1 APPLICATION(S): 213 SOLUTION TOPICAL at 05:06

## 2019-09-18 RX ADMIN — Medication 100 MILLIGRAM(S): at 05:07

## 2019-09-18 RX ADMIN — LOSARTAN POTASSIUM 50 MILLIGRAM(S): 100 TABLET, FILM COATED ORAL at 21:16

## 2019-09-18 RX ADMIN — NYSTATIN CREAM 1 APPLICATION(S): 100000 CREAM TOPICAL at 05:07

## 2019-09-18 RX ADMIN — Medication 81 MILLIGRAM(S): at 11:41

## 2019-09-18 RX ADMIN — Medication 40 MILLIEQUIVALENT(S): at 11:41

## 2019-09-18 RX ADMIN — ENOXAPARIN SODIUM 30 MILLIGRAM(S): 100 INJECTION SUBCUTANEOUS at 11:42

## 2019-09-18 RX ADMIN — CHLORHEXIDINE GLUCONATE 15 MILLILITER(S): 213 SOLUTION TOPICAL at 17:44

## 2019-09-18 RX ADMIN — Medication 100 MILLIGRAM(S): at 21:16

## 2019-09-18 RX ADMIN — CEFEPIME 100 MILLIGRAM(S): 1 INJECTION, POWDER, FOR SOLUTION INTRAMUSCULAR; INTRAVENOUS at 17:44

## 2019-09-18 RX ADMIN — Medication 1 TABLET(S): at 11:41

## 2019-09-18 RX ADMIN — CARBIDOPA AND LEVODOPA 1 TABLET(S): 25; 100 TABLET ORAL at 11:41

## 2019-09-18 RX ADMIN — AMLODIPINE BESYLATE 5 MILLIGRAM(S): 2.5 TABLET ORAL at 05:06

## 2019-09-18 RX ADMIN — Medication 40 MILLIGRAM(S): at 05:06

## 2019-09-18 RX ADMIN — CARBIDOPA AND LEVODOPA 1 TABLET(S): 25; 100 TABLET ORAL at 21:16

## 2019-09-18 RX ADMIN — DULOXETINE HYDROCHLORIDE 60 MILLIGRAM(S): 30 CAPSULE, DELAYED RELEASE ORAL at 11:41

## 2019-09-18 RX ADMIN — SENNA PLUS 2 TABLET(S): 8.6 TABLET ORAL at 21:15

## 2019-09-18 NOTE — PROGRESS NOTE ADULT - SUBJECTIVE AND OBJECTIVE BOX
88 yo F presents to the ED c/o bradycardia, noticed by visiting Nurse today and she called EMS. Pt also states she felt forward and flat on Sunday 9/1/19 and has been anxious to climb up or down the stairs. Pt ambulates with a walker at home. Pt denies any similar episodes, traveling, exposure to ticks, fevers, chills, headaches, changes in mentation, visual changes, CP, SOB, abdominal pain, dysuria, changes in BM, rashes, skin discolorations, new medications.  admitted to episode of aspiration on 09/06     interval :  extubated s/p speech and swallow  ; episode of desaturation on high flow o2 ; s/p attempt at MBBS     PAST MEDICAL & SURGICAL HISTORY:  Parkinson disease  Hypothyroid  Rib fractures  Congestive heart failure  Hypertension  No significant past surgical history    MEDICATIONS  (STANDING):  amLODIPine   Tablet 5 milliGRAM(s) Oral <User Schedule>  aspirin  chewable 81 milliGRAM(s) Oral daily  buDESOnide 160 MICROgram(s)/formoterol 4.5 MICROgram(s) Inhaler 2 Puff(s) Inhalation two times a day  carbidopa/levodopa  10/100 1 Tablet(s) Oral daily  carbidopa/levodopa  25/100 1 Tablet(s) Oral at bedtime  carvedilol 25 milliGRAM(s) Oral every 12 hours  cefepime   IVPB 2000 milliGRAM(s) IV Intermittent every 12 hours  cefepime   IVPB      chlorhexidine 0.12% Liquid 15 milliLiter(s) Oral Mucosa every 12 hours  chlorhexidine 4% Liquid 1 Application(s) Topical <User Schedule>  dextrose 5% + sodium chloride 0.45%. 1000 milliLiter(s) (40 mL/Hr) IV Continuous <Continuous>  docusate sodium Liquid 100 milliGRAM(s) Oral three times a day  DULoxetine 60 milliGRAM(s) Oral daily  enoxaparin Injectable 30 milliGRAM(s) SubCutaneous daily  furosemide   Injectable 40 milliGRAM(s) IV Push daily  influenza   Vaccine 0.5 milliLiter(s) IntraMuscular once  levothyroxine 75 MICROGram(s) Oral daily  losartan 50 milliGRAM(s) Oral daily  multivitamin  Chewable 1 Tablet(s) Oral daily  nystatin Powder 1 Application(s) Topical two times a day  pantoprazole    Tablet 40 milliGRAM(s) Oral before breakfast  potassium chloride   Powder 40 milliEquivalent(s) Oral daily  senna 2 Tablet(s) Oral at bedtime    MEDICATIONS  (PRN):  acetaminophen  Suppository .. 650 milliGRAM(s) Rectal every 6 hours PRN Temp greater or equal to 38C (100.4F)    Vital Signs Last 24 Hrs  T(C): 35.7 (18 Sep 2019 03:07), Max: 36.1 (17 Sep 2019 11:00)  T(F): 96.3 (18 Sep 2019 03:07), Max: 97 (17 Sep 2019 11:00)  HR: 59 (18 Sep 2019 04:00) (48 - 62)  BP: 166/72 (18 Sep 2019 04:00) (139/63 - 190/73)  BP(mean): 103 (18 Sep 2019 04:00) (90 - 115)  RR: 20 (18 Sep 2019 05:07) (20 - 35)  SpO2: 92% (18 Sep 2019 05:07) (92% - 100%)      GENERAL: NAD, lying in bed  HEAD:  Atraumatic, Normocephalic  EYES: EOMI, PERRLA, conjunctiva and sclera clear  ENT: Moist mucous membranes   NECK: Supple, No JVD  CHEST/LUNG: Clear to auscultation bilaterally; No rales, rhonchi, wheezing, or rubs.  HEART: +Bradycardic, No murmurs, rubs, or gallops  ABDOMEN: Bowel sounds present; Soft, Nontender, Nondistended  EXTREMITIES:  2+ Peripheral Pulses, brisk capillary refill. No clubbing, cyanosis, or edema  NERVOUS SYSTEM:  non focal   MSK: no cyanosis                                        11.5   13.58 )-----------( 394      ( 17 Sep 2019 05:40 )             34.7     09-17    142  |  95<L>  |  45<H>  ----------------------------<  185<H>  3.4<L>   |  30  |  1.3    Ca    9.6      17 Sep 2019 05:40  Phos  3.3     09-17    TPro  6.8  /  Alb  3.7  /  TBili  0.9  /  DBili  x   /  AST  36  /  ALT  <5  /  AlkPhos  148<H>  09-17          Recommendations:   · Diagnostic Impressions	severe to profound overall swallow function, discussed in conjunction with Radiologist Landauer. Patient unable to clear residue fatigue noted throughout exam. Excessive nonproductive cough noted post exam as well. Exam terminated post nectar thick liquids due to safety concerns, Dr in agreement.	  · Recommended Consistencies	NPO with alternate means of nutrition/hydration ice chips IF cleared by pulmonary for palliative trials with RN staff of [employer]. SLP will conduct trials of nectar thick liquids in conjunction with therapy Repeat MBS will be conducted in 1-2 weeks when patient is tolerating less respiratory support as well as increased oral motor movements for bolus manipulation	      EXAM:  XR CHEST PORTABLE IMMED 1V            PROCEDURE DATE:  09/17/2019            INTERPRETATION:  Clinical History / Reason for exam: Line placement    Comparison : Chest radiograph September 17, 2019.    Technique/Positioning: Single AP view ofthe chest.    Findings:    Support devices: Feeding tube in stomach    Cardiac/mediastinum/hilum: Cardiomegaly, unchanged.    Lung parenchyma/Pleura: Stable bilateral opacification/effusions and   interstitial edema.    Skeleton/soft tissues: Unchanged.    Impression:      Stable bilateral opacifications/effusions and interstitial edema.    Feeding tube in stomach.                  ELLIOT LANDAU M.D., ATTENDING RADIOLOGIST  This document has been electronically signed. Sep 17 2019  4:28PM

## 2019-09-18 NOTE — PROGRESS NOTE ADULT - ASSESSMENT
Off  vent . She needs high shiva o2. Check cxr.   Awake responsive.   HR acceptable  GI DVT prophylaxis,.  Barium swallow not done.   OOB to chair, Nutritional support. IV lasix for now. If bp high increase losartan. Will need PEG if family agrees . Prognosis guarded Off  vent . She needs high shiva o2. Check cxr.   Awake responsive.   HR acceptable  GI DVT prophylaxis,.  Barium swallow not done.   OOB to chair, Nutritional support. IV lasix for now. If bp high increase losartan. Will need PEG if family agrees . Correct k . Daily weight. Prognosis guarded

## 2019-09-18 NOTE — PROGRESS NOTE ADULT - ASSESSMENT
IMPRESSION:  Cardio respiratory arrest  aspiration of breakfast   pneumonia/pneumonitis   LLL atelectasis  rosalind cardia  hypernatremia resolved        PLAN:    CNS: avoid sedation     HEENT: jacob    PULMONARY:  pulmonary toilet  taper O2    GI: GI prophylaxis. NG  Feeding   NPO stricy    RENAL: follow lytes   gentle diuresis    INFECTIOUS DISEASE:   finish abx     HEMATOLOGICAL:  DVT prophylaxis.    ENDOCRINE:  Follow up FS.  Insulin protocol if needed.    MUSCULOSKELETAL:  d./c laura

## 2019-09-18 NOTE — PROGRESS NOTE ADULT - SUBJECTIVE AND OBJECTIVE BOX
[facility] Day:15d  Last 24hr Events & Subjective:      Past Medical Hx:  Parkinson disease  Hypothyroid  Rib fractures  Congestive heart failure  Hypertension    Past Surgical Hx:  No significant past surgical history    Allergies:  No Known Allergies    Current Meds:  Standing Meds  amLODIPine   Tablet 5 milliGRAM(s) Oral <User Schedule>  aspirin  chewable 81 milliGRAM(s) Oral daily  buDESOnide 160 MICROgram(s)/formoterol 4.5 MICROgram(s) Inhaler 2 Puff(s) Inhalation two times a day  carbidopa/levodopa  10/100 1 Tablet(s) Oral daily  carbidopa/levodopa  25/100 1 Tablet(s) Oral at bedtime  carvedilol 25 milliGRAM(s) Oral every 12 hours  cefepime   IVPB 2000 milliGRAM(s) IV Intermittent every 12 hours  cefepime   IVPB      chlorhexidine 0.12% Liquid 15 milliLiter(s) Oral Mucosa every 12 hours  chlorhexidine 4% Liquid 1 Application(s) Topical <User Schedule>  docusate sodium Liquid 100 milliGRAM(s) Oral three times a day  DULoxetine 60 milliGRAM(s) Oral daily  enoxaparin Injectable 30 milliGRAM(s) SubCutaneous daily  furosemide   Injectable 40 milliGRAM(s) IV Push daily  influenza   Vaccine 0.5 milliLiter(s) IntraMuscular once  levothyroxine 75 MICROGram(s) Oral daily  losartan 50 milliGRAM(s) Oral daily  multivitamin  Chewable 1 Tablet(s) Oral daily  nystatin Powder 1 Application(s) Topical two times a day  pantoprazole    Tablet 40 milliGRAM(s) Oral before breakfast  potassium chloride   Powder 40 milliEquivalent(s) Oral daily  senna 2 Tablet(s) Oral at bedtime    PRN Meds  acetaminophen  Suppository .. 650 milliGRAM(s) Rectal every 6 hours PRN    Vital Signs  T(F): 97.1 (09-18-19 @ 15:00), Max: 97.1 (09-18-19 @ 15:00)  HR: 59 (09-18-19 @ 15:05) (48 - 62)  BP: 140/66 (09-18-19 @ 15:05) (126/62 - 169/74)  BP(mean): 95 (09-18-19 @ 15:05) (89 - 107)  ABP: --  ABP(mean): --  RR: 26 (09-18-19 @ 15:05) (20 - 50)  SpO2: 98% (09-18-19 @ 15:05) (92% - 100%)  Fluid Balance    09-17-19 @ 07:01  -  09-18-19 @ 07:00  --------------------------------------------------------  IN:    dextrose 5% + sodium chloride 0.45%.: 40 mL    Enteral Tube Flush: 280 mL    Free Water: 100 mL    Osmolite: 480 mL    Solution: 50 mL  Total IN: 950 mL    OUT:    Voided: 1350 mL  Total OUT: 1350 mL    Total NET: -400 mL      09-18-19 @ 07:01  -  09-18-19 @ 17:15  --------------------------------------------------------  IN:    Osmolite: 360 mL  Total IN: 360 mL    OUT:    Voided: 700 mL  Total OUT: 700 mL    Total NET: -340 mL          Physical Exam:  GENERAL: NAD  HEENT: NCAT  CHEST/LUNG: CTAB  HEART: Regular rate and rhythm; s1 s2 appreciated, No murmurs, rubs, or gallops  ABDOMEN: Soft, Nontender, Nondistended; Bowel sounds present  EXTREMITIES: No LE edema b/l  NERVOUS SYSTEM:  Alert & Oriented X3      LINES/CATHETERS:    Labs:                         12.5   12.83<H>   )-----------(   424<H>    ( 18 Sep 2019 06:08 )              37.8     Neutro% 81.1<H>, Lympho% 8.7<L>, Mono% 7.7  , Bands 1.1<H>  18 Sep 2019 06:08    144    |  99     |  49     ----------------------------<  196    3.5     |  30     |  1.4      Ca    10.0       18 Sep 2019 06:08  Phos  3.3       17 Sep 2019 10:56  Mg     2.4       18 Sep 2019 06:08    TPro  7.0    /  Alb  3.8    /  TBili  1.1    /  DBili  x      /  AST  33     /  ALT  5      /  AlkPhos  141    18 Sep 2019 06:08                      Imaging & EKG:    Assessment & Plan:  IMPRESSION:    PLAN  NEURO:     PULMONARY:     CARDIOVASCULAR:     GASTROENTEROLOGY:     INFECTIOUS DISEASES:     HEMATOLOGY/ONCOLOGY:     GENITOURINARY/RENAL:     ENDOCRINE:     MUSCULOSKELETAL:     DISPO:     CODE STATUS: [facility]  Last 24hr Events & Subjective:  No acute events overnight, slowly titrating down on high flow oxygen, pt seen at bedside, no complaints, tolerating NGT feeds.     Past Medical Hx:  Parkinson disease  Hypothyroid  Rib fractures  Congestive heart failure  Hypertension    Past Surgical Hx:  No significant past surgical history    Allergies:  No Known Allergies    Current Meds:  Standing Meds  amLODIPine   Tablet 5 milliGRAM(s) Oral <User Schedule>  aspirin  chewable 81 milliGRAM(s) Oral daily  buDESOnide 160 MICROgram(s)/formoterol 4.5 MICROgram(s) Inhaler 2 Puff(s) Inhalation two times a day  carbidopa/levodopa  10/100 1 Tablet(s) Oral daily  carbidopa/levodopa  25/100 1 Tablet(s) Oral at bedtime  carvedilol 25 milliGRAM(s) Oral every 12 hours  cefepime   IVPB 2000 milliGRAM(s) IV Intermittent every 12 hours  cefepime   IVPB      chlorhexidine 0.12% Liquid 15 milliLiter(s) Oral Mucosa every 12 hours  chlorhexidine 4% Liquid 1 Application(s) Topical <User Schedule>  docusate sodium Liquid 100 milliGRAM(s) Oral three times a day  DULoxetine 60 milliGRAM(s) Oral daily  enoxaparin Injectable 30 milliGRAM(s) SubCutaneous daily  furosemide   Injectable 40 milliGRAM(s) IV Push daily  influenza   Vaccine 0.5 milliLiter(s) IntraMuscular once  levothyroxine 75 MICROGram(s) Oral daily  losartan 50 milliGRAM(s) Oral daily  multivitamin  Chewable 1 Tablet(s) Oral daily  nystatin Powder 1 Application(s) Topical two times a day  pantoprazole    Tablet 40 milliGRAM(s) Oral before breakfast  potassium chloride   Powder 40 milliEquivalent(s) Oral daily  senna 2 Tablet(s) Oral at bedtime    PRN Meds  acetaminophen  Suppository .. 650 milliGRAM(s) Rectal every 6 hours PRN    Vital Signs  T(F): 97.1 (09-18-19 @ 15:00), Max: 97.1 (09-18-19 @ 15:00)  HR: 59 (09-18-19 @ 15:05) (48 - 62)  BP: 140/66 (09-18-19 @ 15:05) (126/62 - 169/74)  BP(mean): 95 (09-18-19 @ 15:05) (89 - 107)  ABP: --  ABP(mean): --  RR: 26 (09-18-19 @ 15:05) (20 - 50)  SpO2: 98% (09-18-19 @ 15:05) (92% - 100%)  Fluid Balance    09-17-19 @ 07:01  -  09-18-19 @ 07:00  --------------------------------------------------------  IN:    dextrose 5% + sodium chloride 0.45%.: 40 mL    Enteral Tube Flush: 280 mL    Free Water: 100 mL    Osmolite: 480 mL    Solution: 50 mL  Total IN: 950 mL    OUT:    Voided: 1350 mL  Total OUT: 1350 mL    Total NET: -400 mL      09-18-19 @ 07:01  -  09-18-19 @ 17:15  --------------------------------------------------------  IN:    Osmolite: 360 mL  Total IN: 360 mL    OUT:    Voided: 700 mL  Total OUT: 700 mL    Total NET: -340 mL          Physical Exam:  GENERAL: NAD  HEENT: NCAT  CHEST/LUNG: upper respiratoy tract rhonchi with decreased air flow b/l lower lung fields, poor effort  HEART: Regular rate and rhythm; s1 s2 appreciated, No murmurs, rubs, or gallops  ABDOMEN: Soft, Nontender, Nondistended; Bowel sounds present  EXTREMITIES: No LE edema b/l  NERVOUS SYSTEM:  Alert & Oriented    LINES/CATHETERS: NGT    Labs:                         12.5   12.83<H>   )-----------(   424<H>    ( 18 Sep 2019 06:08 )              37.8     Neutro% 81.1<H>, Lympho% 8.7<L>, Mono% 7.7  , Bands 1.1<H>  18 Sep 2019 06:08    144    |  99     |  49     ----------------------------<  196    3.5     |  30     |  1.4      Ca    10.0       18 Sep 2019 06:08  Phos  3.3       17 Sep 2019 10:56  Mg     2.4       18 Sep 2019 06:08    TPro  7.0    /  Alb  3.8    /  TBili  1.1    /  DBili  x      /  AST  33     /  ALT  5      /  AlkPhos  141    18 Sep 2019 06:08      Imaging & EKG:  < from: Xray Chest 1 View- PORTABLE-Routine (09.18.19 @ 05:21) >  Lung parenchyma/Pleura: Redemonstration of  bilateral lower lobe   opacities greater on the left. No evidence of pneumothorax.  < end of copied text >    Assessment & Plan:  86 y/o F PMHx CHF, HTN, Parkinson disease, Hypothyroid presented to the ED after found to be bradycardic by VNS. Her pulse improved and she was treated with lasix untils she developed respiratory failure and cardiac arrest on 9/6/19 leading to intubation. EGD 9/6/19 showed completely filled esophagus with food debris which was cleared by GI. She underwent bronchoscopy on 6/10/19 showing mucopurulent secretion b/l cleared with suction but no evidence of PNA, treated empirically with Abx. She was extubated on 9/15/19 after failing previous SBT on CPAP. She then desated requiring high flow o2.    # Pneumonia/pneumonitis/LLL atelectasis  -c/w Abx  -wean of high flow  -aspiration precautions  -pulmonary toilet  -c/w symbicort    # Dysphagia  -failed barium swallow, f/u speech pathology for further evaluation  -NGT feeds per nutrition recs  -will likely need permanent alternate method of feeding vs pleasure feeds and hospice    # Fluid overload  -daily weight  -gentle diuresis, lasix qd, (goal negative 500 daily)    # HTN  -c/w coreg, losartan, lasix    # Parkinsons  -c/w sinemet    # Hypothyroid  -c/w levothyroxine    # DVT ppx  -lovenox    # Diet  -tube feeds only    # Activity  -OOBTC  -will need rehab    # Code Status  -FULL CODE    # Dispo  -downgraded to tele

## 2019-09-18 NOTE — PROGRESS NOTE ADULT - ASSESSMENT
87 year old female admitted s/p fall episode of bradycardia ; sinemet ; rapid response respiratory failure ; episode of temp     # Respiratory Failure - aspiration  secretions  s/p bronch extubated seen by SLP    - desaturation on high flow secretions   - continue vent parameters per Pulm / Critical care   -  SLP evaluation  extubated MBBS failed feeding   -  enteral feeding for now need to resolved decision on feeding   - at risk for aspiration        # Cardiac / CHF  / HTN      - pulse better   - continue lasix   - reviewed echo  - monitor weight and pulse        # Parkinson's / dysphagia     - continue Sinemet     # Hypothyroidism     - levothyroxine     # GI and DVT prophylaxsis     - pantoprazxole   - enoxaparin    advance care  noted  cpr full code    disposition -   probable STR

## 2019-09-18 NOTE — PROGRESS NOTE ADULT - SUBJECTIVE AND OBJECTIVE BOX
Patient is a 87y old  Female who presents with a chief complaint of Weakness and bradycardia (18 Sep 2019 05:51)      T(F): 96.3 (09-18-19 @ 03:07), Max: 97 (09-17-19 @ 11:00)  HR: 55 (09-18-19 @ 06:00)  BP: 157/67 (09-18-19 @ 06:00)  RR: 25 (09-18-19 @ 06:00)  SpO2: 96% (09-18-19 @ 06:00) (92% - 100%)    PHYSICAL EXAM:  GENERAL: NAD, well-groomed, well-developed  HEAD:  Atraumatic, Normocephalic  EYES: EOMI, PERRLA, conjunctiva and sclera clear  ENMT: No tonsillar erythema, exudates, or enlargement; Moist mucous membranes, Good dentition, No lesions  NECK: Supple, No JVD, Normal thyroid  NERVOUS SYSTEM:  Alert & Oriented X3,  Motor Strength 5/5 B/L upper and lower extremities  CHEST/LUNG: Clear to percussion bilaterally; No rales, rhonchi, wheezing, or rubs  HEART: Regular rate and rhythm; No murmurs, rubs, or gallops  ABDOMEN: Soft, Nontender, Nondistended; Bowel sounds present  EXTREMITIES:   No clubbing, cyanosis, or edema  LYMPH: No lymphadenopathy noted  SKIN: No rashes or lesions    labs  09-17    142  |  95<L>  |  45<H>  ----------------------------<  185<H>  3.4<L>   |  30  |  1.3    Ca    9.6      17 Sep 2019 05:40  Phos  3.3     09-17    TPro  6.8  /  Alb  3.7  /  TBili  0.9  /  DBili  x   /  AST  36  /  ALT  <5  /  AlkPhos  148<H>  09-17                          12.5   12.83 )-----------( 424      ( 18 Sep 2019 06:08 )             37.8               acetaminophen  Suppository .. 650 milliGRAM(s) Rectal every 6 hours PRN  amLODIPine   Tablet 5 milliGRAM(s) Oral <User Schedule>  aspirin  chewable 81 milliGRAM(s) Oral daily  buDESOnide 160 MICROgram(s)/formoterol 4.5 MICROgram(s) Inhaler 2 Puff(s) Inhalation two times a day  carbidopa/levodopa  10/100 1 Tablet(s) Oral daily  carbidopa/levodopa  25/100 1 Tablet(s) Oral at bedtime  carvedilol 25 milliGRAM(s) Oral every 12 hours  cefepime   IVPB 2000 milliGRAM(s) IV Intermittent every 12 hours  cefepime   IVPB      chlorhexidine 0.12% Liquid 15 milliLiter(s) Oral Mucosa every 12 hours  chlorhexidine 4% Liquid 1 Application(s) Topical <User Schedule>  dextrose 5% + sodium chloride 0.45%. 1000 milliLiter(s) IV Continuous <Continuous>  docusate sodium Liquid 100 milliGRAM(s) Oral three times a day  DULoxetine 60 milliGRAM(s) Oral daily  enoxaparin Injectable 30 milliGRAM(s) SubCutaneous daily  furosemide   Injectable 40 milliGRAM(s) IV Push daily  influenza   Vaccine 0.5 milliLiter(s) IntraMuscular once  levothyroxine 75 MICROGram(s) Oral daily  losartan 50 milliGRAM(s) Oral daily  multivitamin  Chewable 1 Tablet(s) Oral daily  nystatin Powder 1 Application(s) Topical two times a day  pantoprazole    Tablet 40 milliGRAM(s) Oral before breakfast  potassium chloride   Powder 40 milliEquivalent(s) Oral daily  senna 2 Tablet(s) Oral at bedtime

## 2019-09-18 NOTE — PROGRESS NOTE ADULT - SUBJECTIVE AND OBJECTIVE BOX
Patient is a 87y old  Female who presents with a chief complaint of Weakness and bradycardia (18 Sep 2019 07:28)        Interval Events: Failed Barium swallow test    REVIEW OF SYSTEMS:  Unable to obtain due to patient's condition.        OBJECTIVE:  ICU Vital Signs Last 24 Hrs  T(C): 36.2 (18 Sep 2019 15:00), Max: 36.2 (18 Sep 2019 15:00)  T(F): 97.1 (18 Sep 2019 15:00), Max: 97.1 (18 Sep 2019 15:00)  HR: 56 (18 Sep 2019 11:00) (48 - 62)  BP: 147/64 (18 Sep 2019 11:00) (126/62 - 169/74)  BP(mean): 92 (18 Sep 2019 11:00) (89 - 107)    RR: 20 (18 Sep 2019 15:00) (20 - 50)  SpO2: 98% (18 Sep 2019 11:00) (92% - 100%)        09-17 @ 07:01  -  09-18 @ 07:00  --------------------------------------------------------  IN: 950 mL / OUT: 1350 mL / NET: -400 mL    09-18 @ 07:01 - 09-18 @ 16:38  --------------------------------------------------------  IN: 360 mL / OUT: 700 mL / NET: -340 mL      CAPILLARY BLOOD GLUCOSE          PHYSICAL EXAM:  General: Awake, slow t answer  HEENT: Atraumatic, normocephalic.                 Mallampatti Grade                 No nasal congestion.                No tonsillar or pharyngeal exudates.  Lymph Nodes: No palpable lymphadenopathy neck, supraclavicular region  Neck: No JVD. No carotid bruit, no thyromegally  Respiratory: Normal chest expansion                         Normal percussion                         Normal and equal air entry                         ++r rales.  Cardiovascular: S1 S2 normal. No murmurs, rubs or gallops.   Abdomen: Soft, non-tender, non-distended. No organomegaly.  Extremities: Warm to touch. Peripheral pulse palpable. No pedal edema.   Skin: No rashes or skin lesions, warm dry  Neurological: Motor and sensory examination equal and normal in all four extremities.  Psychiatry: Appropriate mood and affect.    HOSPITAL MEDICATIONS:  MEDICATIONS  (STANDING):  amLODIPine   Tablet 5 milliGRAM(s) Oral <User Schedule>  aspirin  chewable 81 milliGRAM(s) Oral daily  buDESOnide 160 MICROgram(s)/formoterol 4.5 MICROgram(s) Inhaler 2 Puff(s) Inhalation two times a day  carbidopa/levodopa  10/100 1 Tablet(s) Oral daily  carbidopa/levodopa  25/100 1 Tablet(s) Oral at bedtime  carvedilol 25 milliGRAM(s) Oral every 12 hours  cefepime   IVPB 2000 milliGRAM(s) IV Intermittent every 12 hours  cefepime   IVPB      chlorhexidine 0.12% Liquid 15 milliLiter(s) Oral Mucosa every 12 hours  chlorhexidine 4% Liquid 1 Application(s) Topical <User Schedule>  docusate sodium Liquid 100 milliGRAM(s) Oral three times a day  DULoxetine 60 milliGRAM(s) Oral daily  enoxaparin Injectable 30 milliGRAM(s) SubCutaneous daily  furosemide   Injectable 40 milliGRAM(s) IV Push daily  influenza   Vaccine 0.5 milliLiter(s) IntraMuscular once  levothyroxine 75 MICROGram(s) Oral daily  losartan 50 milliGRAM(s) Oral daily  multivitamin  Chewable 1 Tablet(s) Oral daily  nystatin Powder 1 Application(s) Topical two times a day  pantoprazole    Tablet 40 milliGRAM(s) Oral before breakfast  potassium chloride   Powder 40 milliEquivalent(s) Oral daily  senna 2 Tablet(s) Oral at bedtime    MEDICATIONS  (PRN):  acetaminophen  Suppository .. 650 milliGRAM(s) Rectal every 6 hours PRN Temp greater or equal to 38C (100.4F)      LABS:                        12.5   12.83 )-----------( 424      ( 18 Sep 2019 06:08 )             37.8     09-18    144  |  99  |  49<H>  ----------------------------<  196<H>  3.5   |  30  |  1.4    Ca    10.0      18 Sep 2019 06:08  Phos  3.3     09-17  Mg     2.4     09-18    TPro  7.0  /  Alb  3.8  /  TBili  1.1  /  DBili  x   /  AST  33  /  ALT  5   /  AlkPhos  141<H>  09-18                      RADIOLOGY:Radiology personally reviewed.

## 2019-09-19 LAB
ANION GAP SERPL CALC-SCNC: 13 MMOL/L — SIGNIFICANT CHANGE UP (ref 7–14)
BASOPHILS # BLD AUTO: 0.1 K/UL — SIGNIFICANT CHANGE UP (ref 0–0.2)
BASOPHILS NFR BLD AUTO: 0.8 % — SIGNIFICANT CHANGE UP (ref 0–1)
BUN SERPL-MCNC: 56 MG/DL — HIGH (ref 10–20)
CALCIUM SERPL-MCNC: 10 MG/DL — SIGNIFICANT CHANGE UP (ref 8.5–10.1)
CHLORIDE SERPL-SCNC: 102 MMOL/L — SIGNIFICANT CHANGE UP (ref 98–110)
CO2 SERPL-SCNC: 33 MMOL/L — HIGH (ref 17–32)
CREAT SERPL-MCNC: 1.6 MG/DL — HIGH (ref 0.7–1.5)
EOSINOPHIL # BLD AUTO: 0.18 K/UL — SIGNIFICANT CHANGE UP (ref 0–0.7)
EOSINOPHIL NFR BLD AUTO: 1.4 % — SIGNIFICANT CHANGE UP (ref 0–8)
GLUCOSE SERPL-MCNC: 186 MG/DL — HIGH (ref 70–99)
HCT VFR BLD CALC: 37 % — SIGNIFICANT CHANGE UP (ref 37–47)
HGB BLD-MCNC: 12.1 G/DL — SIGNIFICANT CHANGE UP (ref 12–16)
IMM GRANULOCYTES NFR BLD AUTO: 1.4 % — HIGH (ref 0.1–0.3)
LYMPHOCYTES # BLD AUTO: 1.38 K/UL — SIGNIFICANT CHANGE UP (ref 1.2–3.4)
LYMPHOCYTES # BLD AUTO: 10.5 % — LOW (ref 20.5–51.1)
MCHC RBC-ENTMCNC: 27.9 PG — SIGNIFICANT CHANGE UP (ref 27–31)
MCHC RBC-ENTMCNC: 32.7 G/DL — SIGNIFICANT CHANGE UP (ref 32–37)
MCV RBC AUTO: 85.5 FL — SIGNIFICANT CHANGE UP (ref 81–99)
MONOCYTES # BLD AUTO: 1.06 K/UL — HIGH (ref 0.1–0.6)
MONOCYTES NFR BLD AUTO: 8.1 % — SIGNIFICANT CHANGE UP (ref 1.7–9.3)
NEUTROPHILS # BLD AUTO: 10.21 K/UL — HIGH (ref 1.4–6.5)
NEUTROPHILS NFR BLD AUTO: 77.8 % — HIGH (ref 42.2–75.2)
NRBC # BLD: 0 /100 WBCS — SIGNIFICANT CHANGE UP (ref 0–0)
PHOSPHATE SERPL-MCNC: 2.3 MG/DL — SIGNIFICANT CHANGE UP (ref 2.1–4.9)
PLATELET # BLD AUTO: 485 K/UL — HIGH (ref 130–400)
POTASSIUM SERPL-MCNC: 3.5 MMOL/L — SIGNIFICANT CHANGE UP (ref 3.5–5)
POTASSIUM SERPL-SCNC: 3.5 MMOL/L — SIGNIFICANT CHANGE UP (ref 3.5–5)
RBC # BLD: 4.33 M/UL — SIGNIFICANT CHANGE UP (ref 4.2–5.4)
RBC # FLD: 17 % — HIGH (ref 11.5–14.5)
SODIUM SERPL-SCNC: 148 MMOL/L — HIGH (ref 135–146)
WBC # BLD: 13.12 K/UL — HIGH (ref 4.8–10.8)
WBC # FLD AUTO: 13.12 K/UL — HIGH (ref 4.8–10.8)

## 2019-09-19 PROCEDURE — 71045 X-RAY EXAM CHEST 1 VIEW: CPT | Mod: 26

## 2019-09-19 RX ADMIN — Medication 75 MICROGRAM(S): at 05:24

## 2019-09-19 RX ADMIN — Medication 40 MILLIEQUIVALENT(S): at 11:11

## 2019-09-19 RX ADMIN — SENNA PLUS 2 TABLET(S): 8.6 TABLET ORAL at 22:36

## 2019-09-19 RX ADMIN — CARBIDOPA AND LEVODOPA 1 TABLET(S): 25; 100 TABLET ORAL at 11:08

## 2019-09-19 RX ADMIN — Medication 1 TABLET(S): at 11:10

## 2019-09-19 RX ADMIN — AMLODIPINE BESYLATE 5 MILLIGRAM(S): 2.5 TABLET ORAL at 05:24

## 2019-09-19 RX ADMIN — Medication 81 MILLIGRAM(S): at 11:10

## 2019-09-19 RX ADMIN — NYSTATIN CREAM 1 APPLICATION(S): 100000 CREAM TOPICAL at 05:25

## 2019-09-19 RX ADMIN — CARVEDILOL PHOSPHATE 25 MILLIGRAM(S): 80 CAPSULE, EXTENDED RELEASE ORAL at 17:42

## 2019-09-19 RX ADMIN — Medication 100 MILLIGRAM(S): at 22:37

## 2019-09-19 RX ADMIN — CARBIDOPA AND LEVODOPA 1 TABLET(S): 25; 100 TABLET ORAL at 22:37

## 2019-09-19 RX ADMIN — CHLORHEXIDINE GLUCONATE 1 APPLICATION(S): 213 SOLUTION TOPICAL at 05:23

## 2019-09-19 RX ADMIN — ENOXAPARIN SODIUM 30 MILLIGRAM(S): 100 INJECTION SUBCUTANEOUS at 11:12

## 2019-09-19 RX ADMIN — NYSTATIN CREAM 1 APPLICATION(S): 100000 CREAM TOPICAL at 18:09

## 2019-09-19 RX ADMIN — AMLODIPINE BESYLATE 5 MILLIGRAM(S): 2.5 TABLET ORAL at 17:42

## 2019-09-19 RX ADMIN — CEFEPIME 100 MILLIGRAM(S): 1 INJECTION, POWDER, FOR SOLUTION INTRAMUSCULAR; INTRAVENOUS at 05:23

## 2019-09-19 RX ADMIN — CARVEDILOL PHOSPHATE 25 MILLIGRAM(S): 80 CAPSULE, EXTENDED RELEASE ORAL at 05:24

## 2019-09-19 RX ADMIN — Medication 40 MILLIGRAM(S): at 05:23

## 2019-09-19 RX ADMIN — BUDESONIDE AND FORMOTEROL FUMARATE DIHYDRATE 2 PUFF(S): 160; 4.5 AEROSOL RESPIRATORY (INHALATION) at 19:56

## 2019-09-19 RX ADMIN — Medication 100 MILLIGRAM(S): at 14:27

## 2019-09-19 RX ADMIN — Medication 100 MILLIGRAM(S): at 05:23

## 2019-09-19 RX ADMIN — LOSARTAN POTASSIUM 50 MILLIGRAM(S): 100 TABLET, FILM COATED ORAL at 05:24

## 2019-09-19 RX ADMIN — DULOXETINE HYDROCHLORIDE 60 MILLIGRAM(S): 30 CAPSULE, DELAYED RELEASE ORAL at 11:09

## 2019-09-19 NOTE — PHYSICAL THERAPY INITIAL EVALUATION ADULT - PATIENT/FAMILY/SIGNIFICANT OTHER GOALS STATEMENT, PT EVAL
Patient would like to be able to get up and walk at home
Patient's family would like patient to get stronger and be able to get up and walk again

## 2019-09-19 NOTE — PHYSICAL THERAPY INITIAL EVALUATION ADULT - FOLLOWS COMMANDS/ANSWERS QUESTIONS, REHAB EVAL
100% of the time/able to follow single-step instructions
50% of the time/able to follow single-step instructions

## 2019-09-19 NOTE — PHYSICAL THERAPY INITIAL EVALUATION ADULT - PHYSICAL ASSIST/NONPHYSICAL ASSIST: SIT/STAND, REHAB EVAL
2 person assist/verbal cues/hand placement and technique for safety
verbal cues/1 person assist/hand placement and technique for safety

## 2019-09-19 NOTE — PHYSICAL THERAPY INITIAL EVALUATION ADULT - BED MOBILITY LIMITATIONS, REHAB EVAL
decreased ability to use arms for pushing/pulling/decreased ability to use legs for bridging/pushing/impaired ability to control trunk for mobility
impaired ability to control trunk for mobility/decreased ability to use arms for pushing/pulling/decreased ability to use legs for bridging/pushing

## 2019-09-19 NOTE — PROGRESS NOTE ADULT - ASSESSMENT
Still needs high shiva o2. Check cxr.   Awake responsive.   HR acceptable  GI DVT prophylaxis,.    OOB to chair, If stable.  Nutritional support. IV lasix for now. BP labile Will need PEG if family agrees .  Daily weight. Prognosis guarded

## 2019-09-19 NOTE — PHYSICAL THERAPY INITIAL EVALUATION ADULT - GENERAL OBSERVATIONS, REHAB EVAL
09:30-09:55 Chart reviewed. Pt encountered supine in bed,  may be seen by Physical Therapist as confirmed with Nurse. Patient denied pain at rest and ready to get up now; +IV RUE'; +skin abrasions RUE/(B) knees
11:15-11:40 Chart reviewed. Pt encountered semireclined in bed, may be seen by Physical Therapist as confirmed with Nurse. Patient appeared in no pain or discomfort at rest; +tele; +NGT; O2 via NC;+Primafit

## 2019-09-19 NOTE — PROGRESS NOTE ADULT - SUBJECTIVE AND OBJECTIVE BOX
Patient is a 87y old  Female who presents with a chief complaint of Weakness and bradycardia (19 Sep 2019 05:37)      T(F): 97.1 (09-18-19 @ 23:07), Max: 97.1 (09-18-19 @ 15:00)  HR: 71 (09-19-19 @ 05:28)  BP: 149/106 (09-19-19 @ 05:28)  RR: 31 (09-19-19 @ 05:28)  SpO2: 93% (09-19-19 @ 05:28) (93% - 100%)    PHYSICAL EXAM:  GENERAL: NAD, well-groomed, well-developed  HEAD:  Atraumatic, Normocephalic  EYES: EOMI, PERRLA, conjunctiva and sclera clear  ENMT: No tonsillar erythema, exudates, or enlargement; Moist mucous membranes, Good dentition, No lesions  NECK: Supple, No JVD, Normal thyroid  NERVOUS SYSTEM:  Alert & Oriented X3,  Motor Strength 5/5 B/L upper and lower extremities  CHEST/LUNG: Clear to percussion bilaterally; No rales, rhonchi, wheezing, or rubs  HEART: Regular rate and rhythm; No murmurs, rubs, or gallops  ABDOMEN: Soft, Nontender, Nondistended; Bowel sounds present  EXTREMITIES:   No clubbing, cyanosis, or edema  LYMPH: No lymphadenopathy noted  SKIN: No rashes or lesions    labs  09-19    148<H>  |  102  |  56<H>  ----------------------------<  186<H>  3.5   |  33<H>  |  1.6<H>    Ca    10.0      19 Sep 2019 05:44  Phos  2.3     09-19  Mg     2.4     09-18    TPro  7.0  /  Alb  3.8  /  TBili  1.1  /  DBili  x   /  AST  33  /  ALT  5   /  AlkPhos  141<H>  09-18                          12.1   13.12 )-----------( 485      ( 19 Sep 2019 05:44 )             37.0               acetaminophen  Suppository .. 650 milliGRAM(s) Rectal every 6 hours PRN  amLODIPine   Tablet 5 milliGRAM(s) Oral <User Schedule>  aspirin  chewable 81 milliGRAM(s) Oral daily  buDESOnide 160 MICROgram(s)/formoterol 4.5 MICROgram(s) Inhaler 2 Puff(s) Inhalation two times a day  carbidopa/levodopa  10/100 1 Tablet(s) Oral daily  carbidopa/levodopa  25/100 1 Tablet(s) Oral at bedtime  carvedilol 25 milliGRAM(s) Oral every 12 hours  cefepime   IVPB 2000 milliGRAM(s) IV Intermittent every 12 hours  cefepime   IVPB      chlorhexidine 4% Liquid 1 Application(s) Topical <User Schedule>  docusate sodium Liquid 100 milliGRAM(s) Oral three times a day  DULoxetine 60 milliGRAM(s) Oral daily  enoxaparin Injectable 30 milliGRAM(s) SubCutaneous daily  furosemide   Injectable 40 milliGRAM(s) IV Push daily  influenza   Vaccine 0.5 milliLiter(s) IntraMuscular once  levothyroxine 75 MICROGram(s) Oral daily  losartan 50 milliGRAM(s) Oral daily  multivitamin  Chewable 1 Tablet(s) Oral daily  nystatin Powder 1 Application(s) Topical two times a day  pantoprazole    Tablet 40 milliGRAM(s) Oral before breakfast  potassium chloride   Powder 40 milliEquivalent(s) Oral daily  senna 2 Tablet(s) Oral at bedtime

## 2019-09-19 NOTE — PROGRESS NOTE ADULT - SUBJECTIVE AND OBJECTIVE BOX
88 yo F presents to the ED c/o bradycardia, noticed by visiting Nurse today and she called EMS. Pt also states she felt forward and flat on Sunday 9/1/19 and has been anxious to climb up or down the stairs. Pt ambulates with a walker at home. Pt denies any similar episodes, traveling, exposure to ticks, fevers, chills, headaches, changes in mentation, visual changes, CP, SOB, abdominal pain, dysuria, changes in BM, rashes, skin discolorations, new medications.  admitted to episode of aspiration on 09/06     interval :  extubated s/p speech and swallow  ; episode of desaturation on high flow o2 ; s/p attempt at MBBS     PAST MEDICAL & SURGICAL HISTORY:  Parkinson disease  Hypothyroid  Rib fractures  Congestive heart failure  Hypertension  No significant past surgical history    MEDICATIONS  (STANDING):  amLODIPine   Tablet 5 milliGRAM(s) Oral <User Schedule>  aspirin  chewable 81 milliGRAM(s) Oral daily  buDESOnide 160 MICROgram(s)/formoterol 4.5 MICROgram(s) Inhaler 2 Puff(s) Inhalation two times a day  carbidopa/levodopa  10/100 1 Tablet(s) Oral daily  carbidopa/levodopa  25/100 1 Tablet(s) Oral at bedtime  carvedilol 25 milliGRAM(s) Oral every 12 hours  cefepime   IVPB 2000 milliGRAM(s) IV Intermittent every 12 hours  cefepime   IVPB      chlorhexidine 4% Liquid 1 Application(s) Topical <User Schedule>  docusate sodium Liquid 100 milliGRAM(s) Oral three times a day  DULoxetine 60 milliGRAM(s) Oral daily  enoxaparin Injectable 30 milliGRAM(s) SubCutaneous daily  furosemide   Injectable 40 milliGRAM(s) IV Push daily  influenza   Vaccine 0.5 milliLiter(s) IntraMuscular once  levothyroxine 75 MICROGram(s) Oral daily  losartan 50 milliGRAM(s) Oral daily  multivitamin  Chewable 1 Tablet(s) Oral daily  nystatin Powder 1 Application(s) Topical two times a day  pantoprazole    Tablet 40 milliGRAM(s) Oral before breakfast  potassium chloride   Powder 40 milliEquivalent(s) Oral daily  senna 2 Tablet(s) Oral at bedtime    MEDICATIONS  (PRN):  acetaminophen  Suppository .. 650 milliGRAM(s) Rectal every 6 hours PRN Temp greater or equal to 38C (100.4F)      ICU Vital Signs Last 24 Hrs  T(C): 36.2 (18 Sep 2019 23:07), Max: 36.2 (18 Sep 2019 15:00)  T(F): 97.1 (18 Sep 2019 23:07), Max: 97.1 (18 Sep 2019 15:00)  HR: 51 (18 Sep 2019 23:33) (49 - 62)  BP: 183/75 (18 Sep 2019 23:15) (126/62 - 183/75)  BP(mean): 108 (18 Sep 2019 23:15) (89 - 108)  ABP: --  ABP(mean): --  RR: 23 (18 Sep 2019 23:33) (20 - 50)  SpO2: 99% (18 Sep 2019 23:33) (95% - 100%)      GENERAL: NAD, lying in bed  HEAD:  Atraumatic, Normocephalic  EYES: EOMI, PERRLA, conjunctiva and sclera clear  ENT: Moist mucous membranes   NECK: Supple, No JVD  CHEST/LUNG: Clear to auscultation bilaterally; No rales, rhonchi, wheezing, or rubs.  HEART: +Bradycardic, No murmurs, rubs, or gallops  ABDOMEN: Bowel sounds present; Soft, Nontender, Nondistended  EXTREMITIES:  2+ Peripheral Pulses, brisk capillary refill. No clubbing, cyanosis, or edema  NERVOUS SYSTEM:  non focal   MSK: no cyanosis                                                   12.5   12.83 )-----------( 424      ( 18 Sep 2019 06:08 )             37.8   09-18    144  |  99  |  49<H>  ----------------------------<  196<H>  3.5   |  30  |  1.4    Ca    10.0      18 Sep 2019 06:08  Phos  3.3     09-17  Mg     2.4     09-18    TPro  7.0  /  Alb  3.8  /  TBili  1.1  /  DBili  x   /  AST  33  /  ALT  5   /  AlkPhos  141<H>  09-18          Recommendations:   · Diagnostic Impressions	severe to profound overall swallow function, discussed in conjunction with Radiologist Landauer. Patient unable to clear residue fatigue noted throughout exam. Excessive nonproductive cough noted post exam as well. Exam terminated post nectar thick liquids due to safety concerns, Dr in agreement.	  · Recommended Consistencies	NPO with alternate means of nutrition/hydration ice chips IF cleared by pulmonary for palliative trials with RN staff of [employer]. SLP will conduct trials of nectar thick liquids in conjunction with therapy Repeat MBS will be conducted in 1-2 weeks when patient is tolerating less respiratory support as well as increased oral motor movements for bolus manipulation	    EXAM:  XR CHEST PORTABLE ROUTINE 1V            PROCEDURE DATE:  09/18/2019            INTERPRETATION:  Clinical History / Reason for exam: Shortness of breath    Comparison : Chest radiograph 9/17/2019.    Technique/Positioning: Portable technique.    Interpretation:    Support devices: Enteric tube remains unchanged    Cardiac/mediastinum/hilum: Stable cardiomegaly and atherosclerotic   changes of aorta    Lung parenchyma/Pleura: Redemonstration of  bilateral lower lobe   opacities greater on the left. No evidence of pneumothorax.    Skeleton/soft tissues: Stable                    DARIAN JONES M.D., ATTENDING RADIOLOGIST  This document has been electronically signed. Sep 18 2019  9:18AM

## 2019-09-19 NOTE — PHYSICAL THERAPY INITIAL EVALUATION ADULT - TRANSFER SAFETY CONCERNS NOTED: SIT/STAND, REHAB EVAL
trunk, hips, and knees ketp flexed/decreased sequencing ability/losing balance/decreased weight-shifting ability
losing balance/decreased sequencing ability/decreased weight-shifting ability

## 2019-09-19 NOTE — PROGRESS NOTE ADULT - SUBJECTIVE AND OBJECTIVE BOX
Hospital Day:  16d    Subjective:  No acute events overnight. Pt examined at bedside. Doing well, no complaints, tolerating NGT feeds, weaned off hi-flow this morning, SpO2 stable on 3L NC.   Had discussion with family regarding dysphagia and likely need for alternate means of nutrition. No decision at present.    Past Medical Hx:   Parkinson disease  Hypothyroid  Rib fractures  Congestive heart failure  Hypertension    Past Sx:  No significant past surgical history    Allergies:  No Known Allergies    Current Meds:   Standng Meds:  amLODIPine   Tablet 5 milliGRAM(s) Oral <User Schedule>  aspirin  chewable 81 milliGRAM(s) Oral daily  buDESOnide 160 MICROgram(s)/formoterol 4.5 MICROgram(s) Inhaler 2 Puff(s) Inhalation two times a day  carbidopa/levodopa  10/100 1 Tablet(s) Oral daily  carbidopa/levodopa  25/100 1 Tablet(s) Oral at bedtime  carvedilol 25 milliGRAM(s) Oral every 12 hours  chlorhexidine 4% Liquid 1 Application(s) Topical <User Schedule>  docusate sodium Liquid 100 milliGRAM(s) Oral three times a day  DULoxetine 60 milliGRAM(s) Oral daily  enoxaparin Injectable 30 milliGRAM(s) SubCutaneous daily  furosemide   Injectable 40 milliGRAM(s) IV Push daily  influenza   Vaccine 0.5 milliLiter(s) IntraMuscular once  levothyroxine 75 MICROGram(s) Oral daily  losartan 50 milliGRAM(s) Oral daily  multivitamin  Chewable 1 Tablet(s) Oral daily  nystatin Powder 1 Application(s) Topical two times a day  pantoprazole    Tablet 40 milliGRAM(s) Oral before breakfast  potassium chloride   Powder 40 milliEquivalent(s) Oral daily  senna 2 Tablet(s) Oral at bedtime    PRN Meds:  acetaminophen  Suppository .. 650 milliGRAM(s) Rectal every 6 hours PRN Temp greater or equal to 38C (100.4F)    Vital Signs:   T(F): 97.1 (09-19-19 @ 07:19), Max: 97.1 (09-18-19 @ 15:00)  HR: 51 (09-19-19 @ 07:19) (51 - 71)  BP: 178/77 (09-19-19 @ 07:19) (140/66 - 183/75)  RR: 25 (09-19-19 @ 07:19) (20 - 31)  SpO2: 97% (09-19-19 @ 07:19) (93% - 100%)      09-18-19 @ 07:01  -  09-19-19 @ 07:00  --------------------------------------------------------  IN: 750 mL / OUT: 1250 mL / NET: -500 mL    09-19-19 @ 07:01  -  09-19-19 @ 13:40  --------------------------------------------------------  IN: 290 mL / OUT: 0 mL / NET: 290 mL    Physical Exam:   GENERAL: NAD, NC in place  HEENT: NCAT  CHEST/LUNG: clear upper lungs, mild crackles in mid lung with decreased/silent lower lungs b/l  HEART: Irregular rhythm, mildly bradycardic; s1 s2 appreciated, No murmurs, rubs, or gallops  ABDOMEN: Soft, Nontender, Nondistended; Bowel sounds present  EXTREMITIES: No LE edema b/l  NERVOUS SYSTEM:  Alert & Oriented        Labs:                         12.1   13.12 )-----------( 485      ( 19 Sep 2019 05:44 )             37.0     Neutophil% 77.8, Lymphocyte% 10.5, Monocyte% 8.1, Bands% 1.4 09-19-19 @ 05:44    19 Sep 2019 05:44    148    |  102    |  56     ----------------------------<  186    3.5     |  33     |  1.6      Ca    10.0       19 Sep 2019 05:44  Phos  2.3       19 Sep 2019 05:44  Mg     2.4       18 Sep 2019 06:08    TPro  7.0    /  Alb  3.8    /  TBili  1.1    /  DBili  x      /  AST  33     /  ALT  5      /  AlkPhos  141    18 Sep 2019 06:08    Culture - Blood (collected 09-13-19 @ 05:59)  Source: .Blood None  Final Report (09-18-19 @ 23:00):    No growth at 5 days.        Radiology:   < from: Xray Chest 1 View- PORTABLE-Routine (09.19.19 @ 06:25) >  IMPRESSION:    Stable left lower lung opacity/effusion.  Stable enteric tube.  < end of copied text >      Assessment and Plan:   88 y/o F PMHx CHF, HTN, Parkinson disease, Hypothyroid presented to the ED after found to be bradycardic by VNS. Her pulse improved and she was treated with lasix untils she developed respiratory failure and cardiac arrest on 9/6/19 leading to intubation. EGD 9/6/19 showed completely filled esophagus with food debris which was cleared by GI. She underwent bronchoscopy on 6/10/19 showing mucopurulent secretion b/l cleared with suction but no evidence of PNA, treated empirically with Abx. She was extubated on 9/15/19 after failing previous SBT on CPAP. She then desated requiring high flow o2.    # Pneumonia/pneumonitis/LLL atelectasis  -completed Abx course (10 days), d/c cefepime  -aspiration precautions  -pulmonary toilet  -c/w symbicort    # Dysphagia  -failed barium swallow, d/w speech therapist today- no progress thus far c/w NPO  -NGT feeds per nutrition recs  -will likely need permanent alternate method of feeding vs pleasure feeds and hospice, d/we family, pending decision    # Fluid overload  -daily weight  -gentle diuresis, lasix qd, (goal negative 500 daily)    # HTN  -c/w coreg, losartan, lasix    # Parkinson  -c/w sinemet    # Hypothyroid  -c/w levothyroxine    # DVT ppx  -lovenox    # Diet  -tube feeds only    # Activity  -OOBTC  -PT eval    # Code Status  -FULL CODE    # Dispo  -c/w tele for now per cardio

## 2019-09-19 NOTE — PHYSICAL THERAPY INITIAL EVALUATION ADULT - IMPAIRMENTS CONTRIBUTING IMPAIRED BED MOBILITY, REHAB EVAL
impaired postural control/impaired balance/decreased endurance/decreased strength
impaired postural control/impaired balance/decreased endurance/decreased strength

## 2019-09-19 NOTE — PROGRESS NOTE ADULT - SUBJECTIVE AND OBJECTIVE BOX
Patient is a 87y old  Female who presents with a chief complaint of Weakness and bradycardia (19 Sep 2019 07:12)        Interval Events: No overnight events.    REVIEW OF SYSTEMS:  Unable to obtain due to patient's condition.        OBJECTIVE:  ICU Vital Signs Last 24 Hrs  T(C): 36.2 (19 Sep 2019 07:19), Max: 36.2 (18 Sep 2019 15:00)  T(F): 97.1 (19 Sep 2019 07:19), Max: 97.1 (18 Sep 2019 15:00)  HR: 51 (19 Sep 2019 07:19) (51 - 71)  BP: 178/77 (19 Sep 2019 07:19) (140/66 - 183/75)  BP(mean): 120 (19 Sep 2019 05:28) (95 - 120)  RR: 25 (19 Sep 2019 07:19) (20 - 31)  SpO2: 97% (19 Sep 2019 07:19) (93% - 100%)        09-18 @ 07:01 - 09-19 @ 07:00  --------------------------------------------------------  IN: 750 mL / OUT: 1250 mL / NET: -500 mL    09-19 @ 07:01 - 09-19 @ 11:37  --------------------------------------------------------  IN: 290 mL / OUT: 0 mL / NET: 290 mL      CAPILLARY BLOOD GLUCOSE          PHYSICAL EXAM:  General: Awake,   HEENT: Atraumatic, normocephalic.                 Mallampatti Grade                 No nasal congestion.                No tonsillar or pharyngeal exudates.  Lymph Nodes: No palpable lymphadenopathy neck, supraclavicular region  Neck: No JVD. No carotid bruit, no thyromegally  Respiratory: Normal chest expansion                         Normal percussion                         Normal and equal air entry                         ++ rales.  Cardiovascular: S1 S2 normal. No murmurs, rubs or gallops.   Abdomen: Soft, non-tender, non-distended. No organomegaly.  Extremities: Warm to touch. Peripheral pulse palpable. No pedal edema.   Skin: No rashes or skin lesions, warm dry  Neurological: Motor and sensory examination equal and normal in all four extremities.  Psychiatry: Appropriate mood and affect.    HOSPITAL MEDICATIONS:  MEDICATIONS  (STANDING):  amLODIPine   Tablet 5 milliGRAM(s) Oral <User Schedule>  aspirin  chewable 81 milliGRAM(s) Oral daily  buDESOnide 160 MICROgram(s)/formoterol 4.5 MICROgram(s) Inhaler 2 Puff(s) Inhalation two times a day  carbidopa/levodopa  10/100 1 Tablet(s) Oral daily  carbidopa/levodopa  25/100 1 Tablet(s) Oral at bedtime  carvedilol 25 milliGRAM(s) Oral every 12 hours  cefepime   IVPB 2000 milliGRAM(s) IV Intermittent every 12 hours  cefepime   IVPB      chlorhexidine 4% Liquid 1 Application(s) Topical <User Schedule>  docusate sodium Liquid 100 milliGRAM(s) Oral three times a day  DULoxetine 60 milliGRAM(s) Oral daily  enoxaparin Injectable 30 milliGRAM(s) SubCutaneous daily  furosemide   Injectable 40 milliGRAM(s) IV Push daily  influenza   Vaccine 0.5 milliLiter(s) IntraMuscular once  levothyroxine 75 MICROGram(s) Oral daily  losartan 50 milliGRAM(s) Oral daily  multivitamin  Chewable 1 Tablet(s) Oral daily  nystatin Powder 1 Application(s) Topical two times a day  pantoprazole    Tablet 40 milliGRAM(s) Oral before breakfast  potassium chloride   Powder 40 milliEquivalent(s) Oral daily  senna 2 Tablet(s) Oral at bedtime    MEDICATIONS  (PRN):  acetaminophen  Suppository .. 650 milliGRAM(s) Rectal every 6 hours PRN Temp greater or equal to 38C (100.4F)      LABS:                        12.1   13.12 )-----------( 485      ( 19 Sep 2019 05:44 )             37.0     09-19    148<H>  |  102  |  56<H>  ----------------------------<  186<H>  3.5   |  33<H>  |  1.6<H>    Ca    10.0      19 Sep 2019 05:44  Phos  2.3     09-19  Mg     2.4     09-18    TPro  7.0  /  Alb  3.8  /  TBili  1.1  /  DBili  x   /  AST  33  /  ALT  5   /  AlkPhos  141<H>  09-18                      RADIOLOGY:Radiology personally reviewed. mild worsening R infiltrates

## 2019-09-19 NOTE — PHYSICAL THERAPY INITIAL EVALUATION ADULT - PERTINENT HX OF CURRENT PROBLEM, REHAB EVAL
Admitted for Bradycardia, Weakness
Patient initially seen by PT on 9/4/19 on this admission, discharged from PT on 9/10/19 secondary to patient was intubated/sedated, extubated on 9/14/19 and referred back to PT at this time

## 2019-09-19 NOTE — PHYSICAL THERAPY INITIAL EVALUATION ADULT - LEVEL OF INDEPENDENCE: GAIT, REHAB EVAL
secondary to patient unable to assume, maintain, nor balance an attempted standign with rolling walker/unable to perform
minimum assist (75% patients effort)

## 2019-09-19 NOTE — PHYSICAL THERAPY INITIAL EVALUATION ADULT - ACTIVE RANGE OF MOTION EXAMINATION, REHAB EVAL
Both upper extremities/Both lower extremities joints within functional limits and painfree AAROM/AROM
Both upper extremities/Both lower extremities joints within functional limits and painfree AAROM

## 2019-09-19 NOTE — PHYSICAL THERAPY INITIAL EVALUATION ADULT - PHYSICAL ASSIST/NONPHYSICAL ASSIST: STAND/SIT, REHAB EVAL
2 person assist/hand placement and technique for safety/verbal cues
verbal cues/1 person assist/hand placement and technique for safety

## 2019-09-19 NOTE — PHYSICAL THERAPY INITIAL EVALUATION ADULT - MANUAL MUSCLE TESTING RESULTS, REHAB EVAL
Both upper extremites/Both lower extremities muscle strength grossly graded 3- to 3/5
Both upper extremites/Both lower extremities muscle strength grossly graded 1+ to 2-/5

## 2019-09-19 NOTE — PHYSICAL THERAPY INITIAL EVALUATION ADULT - IMPAIRED TRANSFERS: SIT/STAND, REHAB EVAL
narrow base of support/impaired postural control/impaired balance/decreased endurance/decreased strength
impaired postural control/decreased strength/impaired balance/decreased endurance

## 2019-09-19 NOTE — PHYSICAL THERAPY INITIAL EVALUATION ADULT - BALANCE DISTURBANCE, IDENTIFIED IMPAIRMENT CONTRIBUTE, REHAB EVAL
decreased endurance/decreased strength/impaired postural control
decreased strength/decreased endurance/impaired postural control

## 2019-09-19 NOTE — PROGRESS NOTE ADULT - ASSESSMENT
87 year old female admitted s/p fall episode of bradycardia ; sinemet ; rapid response respiratory failure ; episode of temp     # Respiratory Failure - aspiration  secretions  s/p bronch extubated seen by SLP    - desaturation on high flow secretions   - continue vent parameters per Pulm / Critical care   -  SLP evaluation  extubated MBBS failed feeding   -  enteral feeding for now need to resolved decision on feeding ; PEG vs Palliative feeds   - at risk for aspiration        # Cardiac / CHF  / HTN      - pulse better   - continue lasix   - reviewed echo  - monitor weight and pulse        # Parkinson's / dysphagia     - continue Sinemet     # Hypothyroidism     - levothyroxine     # GI and DVT prophylaxsis     - pantoprazxole   - enoxaparin    advance care  noted  cpr full code    disposition -   probable STR

## 2019-09-19 NOTE — PHYSICAL THERAPY INITIAL EVALUATION ADULT - PHYSICAL ASSIST/NONPHYSICAL ASSIST: SUPINE/SIT, REHAB EVAL
1 person assist/hand placement and technique for safety/verbal cues
verbal cues/1 person assist/hand placement and technique for safety

## 2019-09-19 NOTE — PHYSICAL THERAPY INITIAL EVALUATION ADULT - IMPAIRMENTS FOUND, PT EVAL
muscle strength/aerobic capacity/endurance/gait, locomotion, and balance
aerobic capacity/endurance/muscle strength/gait, locomotion, and balance

## 2019-09-20 LAB
ANION GAP SERPL CALC-SCNC: 13 MMOL/L — SIGNIFICANT CHANGE UP (ref 7–14)
BASOPHILS # BLD AUTO: 0.07 K/UL — SIGNIFICANT CHANGE UP (ref 0–0.2)
BASOPHILS NFR BLD AUTO: 0.5 % — SIGNIFICANT CHANGE UP (ref 0–1)
BUN SERPL-MCNC: 59 MG/DL — HIGH (ref 10–20)
CALCIUM SERPL-MCNC: 10 MG/DL — SIGNIFICANT CHANGE UP (ref 8.5–10.1)
CHLORIDE SERPL-SCNC: 102 MMOL/L — SIGNIFICANT CHANGE UP (ref 98–110)
CO2 SERPL-SCNC: 33 MMOL/L — HIGH (ref 17–32)
CREAT SERPL-MCNC: 1.7 MG/DL — HIGH (ref 0.7–1.5)
EOSINOPHIL # BLD AUTO: 0.17 K/UL — SIGNIFICANT CHANGE UP (ref 0–0.7)
EOSINOPHIL NFR BLD AUTO: 1.2 % — SIGNIFICANT CHANGE UP (ref 0–8)
GLUCOSE SERPL-MCNC: 274 MG/DL — HIGH (ref 70–99)
HCT VFR BLD CALC: 38.8 % — SIGNIFICANT CHANGE UP (ref 37–47)
HGB BLD-MCNC: 12.6 G/DL — SIGNIFICANT CHANGE UP (ref 12–16)
IMM GRANULOCYTES NFR BLD AUTO: 1.3 % — HIGH (ref 0.1–0.3)
LYMPHOCYTES # BLD AUTO: 1.11 K/UL — LOW (ref 1.2–3.4)
LYMPHOCYTES # BLD AUTO: 8.1 % — LOW (ref 20.5–51.1)
MCHC RBC-ENTMCNC: 28.2 PG — SIGNIFICANT CHANGE UP (ref 27–31)
MCHC RBC-ENTMCNC: 32.5 G/DL — SIGNIFICANT CHANGE UP (ref 32–37)
MCV RBC AUTO: 86.8 FL — SIGNIFICANT CHANGE UP (ref 81–99)
MONOCYTES # BLD AUTO: 0.78 K/UL — HIGH (ref 0.1–0.6)
MONOCYTES NFR BLD AUTO: 5.7 % — SIGNIFICANT CHANGE UP (ref 1.7–9.3)
NEUTROPHILS # BLD AUTO: 11.41 K/UL — HIGH (ref 1.4–6.5)
NEUTROPHILS NFR BLD AUTO: 83.2 % — HIGH (ref 42.2–75.2)
NRBC # BLD: 0 /100 WBCS — SIGNIFICANT CHANGE UP (ref 0–0)
PLATELET # BLD AUTO: 494 K/UL — HIGH (ref 130–400)
POTASSIUM SERPL-MCNC: 3.7 MMOL/L — SIGNIFICANT CHANGE UP (ref 3.5–5)
POTASSIUM SERPL-SCNC: 3.7 MMOL/L — SIGNIFICANT CHANGE UP (ref 3.5–5)
RBC # BLD: 4.47 M/UL — SIGNIFICANT CHANGE UP (ref 4.2–5.4)
RBC # FLD: 17.2 % — HIGH (ref 11.5–14.5)
SODIUM SERPL-SCNC: 148 MMOL/L — HIGH (ref 135–146)
WBC # BLD: 13.72 K/UL — HIGH (ref 4.8–10.8)
WBC # FLD AUTO: 13.72 K/UL — HIGH (ref 4.8–10.8)

## 2019-09-20 PROCEDURE — 71045 X-RAY EXAM CHEST 1 VIEW: CPT | Mod: 26

## 2019-09-20 RX ADMIN — Medication 81 MILLIGRAM(S): at 12:18

## 2019-09-20 RX ADMIN — Medication 1 TABLET(S): at 12:20

## 2019-09-20 RX ADMIN — Medication 100 MILLIGRAM(S): at 14:17

## 2019-09-20 RX ADMIN — Medication 40 MILLIEQUIVALENT(S): at 12:18

## 2019-09-20 RX ADMIN — AMLODIPINE BESYLATE 5 MILLIGRAM(S): 2.5 TABLET ORAL at 17:11

## 2019-09-20 RX ADMIN — BUDESONIDE AND FORMOTEROL FUMARATE DIHYDRATE 2 PUFF(S): 160; 4.5 AEROSOL RESPIRATORY (INHALATION) at 20:11

## 2019-09-20 RX ADMIN — NYSTATIN CREAM 1 APPLICATION(S): 100000 CREAM TOPICAL at 17:11

## 2019-09-20 RX ADMIN — Medication 100 MILLIGRAM(S): at 22:38

## 2019-09-20 RX ADMIN — ENOXAPARIN SODIUM 30 MILLIGRAM(S): 100 INJECTION SUBCUTANEOUS at 12:18

## 2019-09-20 RX ADMIN — AMLODIPINE BESYLATE 5 MILLIGRAM(S): 2.5 TABLET ORAL at 05:48

## 2019-09-20 RX ADMIN — CARVEDILOL PHOSPHATE 25 MILLIGRAM(S): 80 CAPSULE, EXTENDED RELEASE ORAL at 05:46

## 2019-09-20 RX ADMIN — PANTOPRAZOLE SODIUM 40 MILLIGRAM(S): 20 TABLET, DELAYED RELEASE ORAL at 05:46

## 2019-09-20 RX ADMIN — Medication 75 MICROGRAM(S): at 05:47

## 2019-09-20 RX ADMIN — Medication 40 MILLIGRAM(S): at 05:48

## 2019-09-20 RX ADMIN — NYSTATIN CREAM 1 APPLICATION(S): 100000 CREAM TOPICAL at 05:48

## 2019-09-20 RX ADMIN — CARVEDILOL PHOSPHATE 25 MILLIGRAM(S): 80 CAPSULE, EXTENDED RELEASE ORAL at 17:10

## 2019-09-20 RX ADMIN — CHLORHEXIDINE GLUCONATE 1 APPLICATION(S): 213 SOLUTION TOPICAL at 08:27

## 2019-09-20 RX ADMIN — DULOXETINE HYDROCHLORIDE 60 MILLIGRAM(S): 30 CAPSULE, DELAYED RELEASE ORAL at 12:18

## 2019-09-20 RX ADMIN — SENNA PLUS 2 TABLET(S): 8.6 TABLET ORAL at 22:37

## 2019-09-20 RX ADMIN — CARBIDOPA AND LEVODOPA 1 TABLET(S): 25; 100 TABLET ORAL at 12:18

## 2019-09-20 RX ADMIN — Medication 100 MILLIGRAM(S): at 05:48

## 2019-09-20 RX ADMIN — CARBIDOPA AND LEVODOPA 1 TABLET(S): 25; 100 TABLET ORAL at 22:39

## 2019-09-20 RX ADMIN — LOSARTAN POTASSIUM 50 MILLIGRAM(S): 100 TABLET, FILM COATED ORAL at 05:46

## 2019-09-20 NOTE — PROGRESS NOTE ADULT - SUBJECTIVE AND OBJECTIVE BOX
Patient is a 87y old  Female who presents with a chief complaint of Weakness and bradycardia (20 Sep 2019 05:59)      T(F): 97.7 (09-20-19 @ 07:01), Max: 97.9 (09-19-19 @ 23:10)  HR: 55 (09-19-19 @ 21:42)  BP: 193/85 (09-19-19 @ 18:00)  RR: 20 (09-20-19 @ 07:01)  SpO2: 100% (09-20-19 @ 01:10) (98% - 100%)    PHYSICAL EXAM:  GENERAL: NAD, well-groomed, well-developed  HEAD:  Atraumatic, Normocephalic  EYES: EOMI, PERRLA, conjunctiva and sclera clear  ENMT: No tonsillar erythema, exudates, or enlargement; Moist mucous membranes, Good dentition, No lesions  NECK: Supple, No JVD, Normal thyroid  NERVOUS SYSTEM:  Alert & Oriented X3,  Motor Strength 5/5 B/L upper and lower extremities  CHEST/LUNG: Clear to percussion bilaterally; No rales, rhonchi, wheezing, or rubs  HEART: Regular rate and rhythm; No murmurs, rubs, or gallops  ABDOMEN: Soft, Nontender, Nondistended; Bowel sounds present  EXTREMITIES:   No clubbing, cyanosis, or edema  LYMPH: No lymphadenopathy noted  SKIN: No rashes or lesions    labs  09-20    148<H>  |  102  |  59<H>  ----------------------------<  274<H>  3.7   |  33<H>  |  1.7<H>    Ca    10.0      20 Sep 2019 05:30  Phos  2.3     09-19                            12.6   13.72 )-----------( 494      ( 20 Sep 2019 05:30 )             38.8               acetaminophen  Suppository .. 650 milliGRAM(s) Rectal every 6 hours PRN  amLODIPine   Tablet 5 milliGRAM(s) Oral <User Schedule>  aspirin  chewable 81 milliGRAM(s) Oral daily  buDESOnide 160 MICROgram(s)/formoterol 4.5 MICROgram(s) Inhaler 2 Puff(s) Inhalation two times a day  carbidopa/levodopa  10/100 1 Tablet(s) Oral daily  carbidopa/levodopa  25/100 1 Tablet(s) Oral at bedtime  carvedilol 25 milliGRAM(s) Oral every 12 hours  chlorhexidine 4% Liquid 1 Application(s) Topical <User Schedule>  docusate sodium Liquid 100 milliGRAM(s) Oral three times a day  DULoxetine 60 milliGRAM(s) Oral daily  enoxaparin Injectable 30 milliGRAM(s) SubCutaneous daily  furosemide   Injectable 40 milliGRAM(s) IV Push daily  influenza   Vaccine 0.5 milliLiter(s) IntraMuscular once  levothyroxine 75 MICROGram(s) Oral daily  losartan 50 milliGRAM(s) Oral daily  multivitamin  Chewable 1 Tablet(s) Oral daily  nystatin Powder 1 Application(s) Topical two times a day  pantoprazole    Tablet 40 milliGRAM(s) Oral before breakfast  potassium chloride   Powder 40 milliEquivalent(s) Oral daily  senna 2 Tablet(s) Oral at bedtime

## 2019-09-20 NOTE — PROGRESS NOTE ADULT - SUBJECTIVE AND OBJECTIVE BOX
86 yo F presents to the ED c/o bradycardia, noticed by visiting Nurse today and she called EMS. Pt also states she felt forward and flat on Sunday 9/1/19 and has been anxious to climb up or down the stairs. Pt ambulates with a walker at home. Pt denies any similar episodes, traveling, exposure to ticks, fevers, chills, headaches, changes in mentation, visual changes, CP, SOB, abdominal pain, dysuria, changes in BM, rashes, skin discolorations, new medications.  admitted to episode of aspiration on 09/06     interval :  extubated s/p speech and swallow  ; episode of desaturation on high flow o2 ; s/p attempt at MBBS     PAST MEDICAL & SURGICAL HISTORY:  Parkinson disease  Hypothyroid  Rib fractures  Congestive heart failure  Hypertension  No significant past surgical history      MEDICATIONS  (STANDING):  amLODIPine   Tablet 5 milliGRAM(s) Oral <User Schedule>  aspirin  chewable 81 milliGRAM(s) Oral daily  buDESOnide 160 MICROgram(s)/formoterol 4.5 MICROgram(s) Inhaler 2 Puff(s) Inhalation two times a day  carbidopa/levodopa  10/100 1 Tablet(s) Oral daily  carbidopa/levodopa  25/100 1 Tablet(s) Oral at bedtime  carvedilol 25 milliGRAM(s) Oral every 12 hours  chlorhexidine 4% Liquid 1 Application(s) Topical <User Schedule>  docusate sodium Liquid 100 milliGRAM(s) Oral three times a day  DULoxetine 60 milliGRAM(s) Oral daily  enoxaparin Injectable 30 milliGRAM(s) SubCutaneous daily  furosemide   Injectable 40 milliGRAM(s) IV Push daily  influenza   Vaccine 0.5 milliLiter(s) IntraMuscular once  levothyroxine 75 MICROGram(s) Oral daily  losartan 50 milliGRAM(s) Oral daily  multivitamin  Chewable 1 Tablet(s) Oral daily  nystatin Powder 1 Application(s) Topical two times a day  pantoprazole    Tablet 40 milliGRAM(s) Oral before breakfast  potassium chloride   Powder 40 milliEquivalent(s) Oral daily  senna 2 Tablet(s) Oral at bedtime    MEDICATIONS  (PRN):  acetaminophen  Suppository .. 650 milliGRAM(s) Rectal every 6 hours PRN Temp greater or equal to 38C (100.4F)      ICU Vital Signs Last 24 Hrs  T(C): 36.6 (19 Sep 2019 23:10), Max: 36.6 (19 Sep 2019 23:10)  T(F): 97.9 (19 Sep 2019 23:10), Max: 97.9 (19 Sep 2019 23:10)  HR: 55 (19 Sep 2019 21:42) (51 - 61)  BP: 193/85 (19 Sep 2019 18:00) (174/79 - 213/78)  BP(mean): 112 (19 Sep 2019 15:01) (112 - 112)  ABP: --  ABP(mean): --  RR: 21 (20 Sep 2019 01:10) (20 - 36)  SpO2: 100% (20 Sep 2019 01:10) (97% - 100%)        GENERAL: NAD, lying in bed  HEAD:  Atraumatic, Normocephalic  EYES: EOMI, PERRLA, conjunctiva and sclera clear  ENT: Moist mucous membranes   NECK: Supple, No JVD  CHEST/LUNG: Clear to auscultation bilaterally; No rales, rhonchi, wheezing, or rubs.  HEART: +Bradycardic, No murmurs, rubs, or gallops  ABDOMEN: Bowel sounds present; Soft, Nontender, Nondistended  EXTREMITIES:  2+ Peripheral Pulses, brisk capillary refill. No clubbing, cyanosis, or edema  NERVOUS SYSTEM:  non focal   MSK: no cyanosis                                        12.1   13.12 )-----------( 485      ( 19 Sep 2019 05:44 )             37.0   09-19    148<H>  |  102  |  56<H>  ----------------------------<  186<H>  3.5   |  33<H>  |  1.6<H>    Ca    10.0      19 Sep 2019 05:44  Phos  2.3     09-19  Mg     2.4     09-18    TPro  7.0  /  Alb  3.8  /  TBili  1.1  /  DBili  x   /  AST  33  /  ALT  5   /  AlkPhos  141<H>  09-18          Recommendations:   · Diagnostic Impressions	severe to profound overall swallow function, discussed in conjunction with Radiologist Landauer. Patient unable to clear residue fatigue noted throughout exam. Excessive nonproductive cough noted post exam as well. Exam terminated post nectar thick liquids due to safety concerns, Dr in agreement.	  · Recommended Consistencies	NPO with alternate means of nutrition/hydration ice chips IF cleared by pulmonary for palliative trials with RN staff of [employer]. SLP will conduct trials of nectar thick liquids in conjunction with therapy Repeat MBS will be conducted in 1-2 weeks when patient is tolerating less respiratory support as well as increased oral motor movements for bolus manipulation	    EXAM:  XR CHEST PORTABLE ROUTINE 1V            PROCEDURE DATE:  09/18/2019            INTERPRETATION:  Clinical History / Reason for exam: Shortness of breath    Comparison : Chest radiograph 9/17/2019.    Technique/Positioning: Portable technique.    Interpretation:    Support devices: Enteric tube remains unchanged    Cardiac/mediastinum/hilum: Stable cardiomegaly and atherosclerotic   changes of aorta    Lung parenchyma/Pleura: Redemonstration of  bilateral lower lobe   opacities greater on the left. No evidence of pneumothorax.    Skeleton/soft tissues: Stable                    DARIAN JONES M.D., ATTENDING RADIOLOGIST  This document has been electronically signed. Sep 18 2019  9:18AM

## 2019-09-20 NOTE — PROGRESS NOTE ADULT - ASSESSMENT
IMPRESSION:  Cardio respiratory arrest  aspiration of breakfast   pneumonia/pneumonitis   LLL atelectasis  rosalind cardia  hypernatremia worsening again        PLAN:    CNS: avoid sedation     HEENT: jacob    PULMONARY:  pulmonary toilet  taper O2  holds lasix    GI: GI prophylaxis. NG  Feeding   NPO stricy    RENAL: follow lytes   gentle diuresis    INFECTIOUS DISEASE:   finish abx     HEMATOLOGICAL:  DVT prophylaxis.    ENDOCRINE:  Follow up FS.  Insulin protocol if needed.    MUSCULOSKELETAL:  d./c sanchez    can go to F

## 2019-09-20 NOTE — PROGRESS NOTE ADULT - ASSESSMENT
Off high flow o2.  Check cxr.   Awake responsive.   HR acceptable  GI DVT prophylaxis,.    OOB to chair, If stable.  Nutritional support. IV lasix for now. BP labile . May need to increase losartan. Will need PEG if family agrees . Will consider transfer if stablee  Daily weight. Prognosis guarded

## 2019-09-20 NOTE — PROGRESS NOTE ADULT - SUBJECTIVE AND OBJECTIVE BOX
Hospital Day:  17d    Subjective:  No acute events overnight. Pt examined at bedside. no complaints    Past Medical Hx:   Parkinson disease  Hypothyroid  Rib fractures  Congestive heart failure  Hypertension    Past Sx:  No significant past surgical history    Allergies:  No Known Allergies    Current Meds:   Standng Meds:  amLODIPine   Tablet 5 milliGRAM(s) Oral <User Schedule>  aspirin  chewable 81 milliGRAM(s) Oral daily  buDESOnide 160 MICROgram(s)/formoterol 4.5 MICROgram(s) Inhaler 2 Puff(s) Inhalation two times a day  carbidopa/levodopa  10/100 1 Tablet(s) Oral daily  carbidopa/levodopa  25/100 1 Tablet(s) Oral at bedtime  carvedilol 25 milliGRAM(s) Oral every 12 hours  chlorhexidine 4% Liquid 1 Application(s) Topical <User Schedule>  docusate sodium Liquid 100 milliGRAM(s) Oral three times a day  DULoxetine 60 milliGRAM(s) Oral daily  enoxaparin Injectable 30 milliGRAM(s) SubCutaneous daily  furosemide   Injectable 40 milliGRAM(s) IV Push daily  influenza   Vaccine 0.5 milliLiter(s) IntraMuscular once  levothyroxine 75 MICROGram(s) Oral daily  losartan 50 milliGRAM(s) Oral daily  multivitamin  Chewable 1 Tablet(s) Oral daily  nystatin Powder 1 Application(s) Topical two times a day  pantoprazole    Tablet 40 milliGRAM(s) Oral before breakfast  potassium chloride   Powder 40 milliEquivalent(s) Oral daily  senna 2 Tablet(s) Oral at bedtime    PRN Meds:  acetaminophen  Suppository .. 650 milliGRAM(s) Rectal every 6 hours PRN Temp greater or equal to 38C (100.4F)    Vital Signs:   T(F): 97.7 (09-20-19 @ 07:01), Max: 97.9 (09-19-19 @ 23:10)  HR: 59 (09-20-19 @ 07:05) (55 - 61)  BP: 165/77 (09-20-19 @ 07:05) (165/77 - 213/78)  RR: 25 (09-20-19 @ 07:05) (20 - 36)  SpO2: 99% (09-20-19 @ 07:05) (98% - 100%)      09-19-19 @ 07:01  -  09-20-19 @ 07:00  --------------------------------------------------------  IN: 990 mL / OUT: 640 mL / NET: 350 mL    09-20-19 @ 07:01  -  09-20-19 @ 13:14  --------------------------------------------------------  IN: 0 mL / OUT: 800 mL / NET: -800 mL    Physical Exam:   GENERAL: NAD, NGT in place, NC in place  HEENT: NCAT  CHEST/LUNG: upper rhonchi, mid crackles and decreased bases  HEART: Regular rhythm, bradycardic; s1 s2 appreciated, No murmurs, rubs, or gallops  ABDOMEN: Soft, Nontender, Nondistended; Bowel sounds present  EXTREMITIES: No LE edema b/l  NERVOUS SYSTEM:  Alert & Oriented to situation, answers all questions     Labs:                         12.6   13.72 )-----------( 494      ( 20 Sep 2019 05:30 )             38.8     Neutophil% 83.2, Lymphocyte% 8.1, Monocyte% 5.7, Bands% 1.3 09-20-19 @ 05:30    20 Sep 2019 05:30    148    |  102    |  59     ----------------------------<  274    3.7     |  33     |  1.7      Ca    10.0       20 Sep 2019 05:30  Phos  2.3       19 Sep 2019 05:44      Radiology:   < from: Xray Chest 1 View- PORTABLE-Routine (09.20.19 @ 06:03) >  Impression:    Stable cardiomegaly. Bilateral opacities/left pleural effusion  < end of copied text >      Assessment and Plan:   88 y/o F PMHx CHF, HTN, Parkinson disease, Hypothyroid presented to the ED after found to be bradycardic by VNS. Her pulse improved and she was treated with lasix untils she developed respiratory failure and cardiac arrest on 9/6/19 leading to intubation. EGD 9/6/19 showed completely filled esophagus with food debris which was cleared by GI. She underwent bronchoscopy on 6/10/19 showing mucopurulent secretion b/l cleared with suction but no evidence of PNA, treated empirically with Abx. She was extubated on 9/15/19 after failing previous SBT on CPAP. She then desated requiring high flow o2. Now weaned of High flow.    # Pneumonia/pneumonitis/LLL atelectasis  -completed Abx course (10 days)  -aspiration precautions  -pulmonary toilet  -c/w symbicort    # Dysphagia  -failed barium swallow  -strict NPO  -NGT feeds per nutrition recs  -will likely need permanent alternate method of feeding vs pleasure feeds and hospice,  -f/u with family for decision     # Hypernatremia  -hold lasix  -supplement 150cc free water BID today    # Fluid overload- improved  -daily weight  -will hold lasix today given increasing BUN/Cr and sodium    # Bradycardia / QTc prolongation  -stable / asymptomatic  -continue with current rx    # HTN  -c/w coreg, losartan    # Parkinson  -c/w sinemet    # Hypothyroid  -c/w levothyroxine    # DVT ppx  -lovenox    # Diet  -tube feeds only    # Activity  -OOBTC  -PT eval    # Code Status  -FULL CODE    # Dispo  -downgrade to general floor Hospital Day:  17d    Subjective:  No acute events overnight. Pt examined at bedside. no complaints    Past Medical Hx:   Parkinson disease  Hypothyroid  Rib fractures  Congestive heart failure  Hypertension    Past Sx:  No significant past surgical history    Allergies:  No Known Allergies    Current Meds:   Standng Meds:  amLODIPine   Tablet 5 milliGRAM(s) Oral <User Schedule>  aspirin  chewable 81 milliGRAM(s) Oral daily  buDESOnide 160 MICROgram(s)/formoterol 4.5 MICROgram(s) Inhaler 2 Puff(s) Inhalation two times a day  carbidopa/levodopa  10/100 1 Tablet(s) Oral daily  carbidopa/levodopa  25/100 1 Tablet(s) Oral at bedtime  carvedilol 25 milliGRAM(s) Oral every 12 hours  chlorhexidine 4% Liquid 1 Application(s) Topical <User Schedule>  docusate sodium Liquid 100 milliGRAM(s) Oral three times a day  DULoxetine 60 milliGRAM(s) Oral daily  enoxaparin Injectable 30 milliGRAM(s) SubCutaneous daily  furosemide   Injectable 40 milliGRAM(s) IV Push daily  influenza   Vaccine 0.5 milliLiter(s) IntraMuscular once  levothyroxine 75 MICROGram(s) Oral daily  losartan 50 milliGRAM(s) Oral daily  multivitamin  Chewable 1 Tablet(s) Oral daily  nystatin Powder 1 Application(s) Topical two times a day  pantoprazole    Tablet 40 milliGRAM(s) Oral before breakfast  potassium chloride   Powder 40 milliEquivalent(s) Oral daily  senna 2 Tablet(s) Oral at bedtime    PRN Meds:  acetaminophen  Suppository .. 650 milliGRAM(s) Rectal every 6 hours PRN Temp greater or equal to 38C (100.4F)    Vital Signs:   T(F): 97.7 (09-20-19 @ 07:01), Max: 97.9 (09-19-19 @ 23:10)  HR: 59 (09-20-19 @ 07:05) (55 - 61)  BP: 165/77 (09-20-19 @ 07:05) (165/77 - 213/78)  RR: 25 (09-20-19 @ 07:05) (20 - 36)  SpO2: 99% (09-20-19 @ 07:05) (98% - 100%)      09-19-19 @ 07:01  -  09-20-19 @ 07:00  --------------------------------------------------------  IN: 990 mL / OUT: 640 mL / NET: 350 mL    09-20-19 @ 07:01  -  09-20-19 @ 13:14  --------------------------------------------------------  IN: 0 mL / OUT: 800 mL / NET: -800 mL    Physical Exam:   GENERAL: NAD, NGT in place, NC in place  HEENT: NCAT  CHEST/LUNG: upper rhonchi, mid crackles and decreased bases  HEART: Regular rhythm, bradycardic; s1 s2 appreciated, No murmurs, rubs, or gallops  ABDOMEN: Soft, Nontender, Nondistended; Bowel sounds present  EXTREMITIES: No LE edema b/l  NERVOUS SYSTEM:  Alert & Oriented to situation, answers all questions     Labs:                         12.6   13.72 )-----------( 494      ( 20 Sep 2019 05:30 )             38.8     Neutophil% 83.2, Lymphocyte% 8.1, Monocyte% 5.7, Bands% 1.3 09-20-19 @ 05:30    20 Sep 2019 05:30    148    |  102    |  59     ----------------------------<  274    3.7     |  33     |  1.7      Ca    10.0       20 Sep 2019 05:30  Phos  2.3       19 Sep 2019 05:44      Radiology:   < from: Xray Chest 1 View- PORTABLE-Routine (09.20.19 @ 06:03) >  Impression:    Stable cardiomegaly. Bilateral opacities/left pleural effusion  < end of copied text >      Assessment and Plan:   86 y/o F PMHx CHF, HTN, Parkinson disease, Hypothyroid presented to the ED after found to be bradycardic by VNS. Her pulse improved and she was treated with lasix untils she developed respiratory failure and cardiac arrest on 9/6/19 leading to intubation. EGD 9/6/19 showed completely filled esophagus with food debris which was cleared by GI. She underwent bronchoscopy on 6/10/19 showing mucopurulent secretion b/l cleared with suction but no evidence of PNA, treated empirically with Abx. She was extubated on 9/15/19 after failing previous SBT on CPAP. She then desated requiring high flow o2. Now weaned of High flow.    # Pneumonia/pneumonitis/LLL atelectasis  -completed Abx course (10 days)  -aspiration precautions  -pulmonary toilet  -c/w symbicort    # Dysphagia  -failed barium swallow  -strict NPO  -NGT feeds per nutrition recs  -will likely need permanent alternate method of feeding vs pleasure feeds and hospice,  -f/u with family for decision     # Hypernatremia  -hold lasix  -supplement 150cc free water BID today    # Fluid overload- improved  -daily weight  -will hold lasix today given increasing BUN/Cr and sodium    # Bradycardia / QTc prolongation  -stable / asymptomatic  -continue with current rx    # HTN  -c/w coreg, losartan (can increase losartan if needed, was previously on 100mg)     # Parkinson  -c/w sinemet    # Hypothyroid  -c/w levothyroxine    # DVT ppx  -lovenox    # Diet  -tube feeds only    # Activity  -OOBTC  -PT eval    # Code Status  -FULL CODE    # Dispo  -downgrade to general floor

## 2019-09-20 NOTE — PROGRESS NOTE ADULT - SUBJECTIVE AND OBJECTIVE BOX
Patient is a 87y old  Female who presents with a chief complaint of Weakness and bradycardia (20 Sep 2019 07:30)        Interval Events: No overnight events. Tapering O2.    REVIEW OF SYSTEMS:  Unable to obtain due to patient's condition.        OBJECTIVE:  ICU Vital Signs Last 24 Hrs  T(C): 36.5 (20 Sep 2019 07:01), Max: 36.6 (19 Sep 2019 23:10)  T(F): 97.7 (20 Sep 2019 07:01), Max: 97.9 (19 Sep 2019 23:10)  HR: 59 (20 Sep 2019 07:05) (55 - 61)  BP: 165/77 (20 Sep 2019 07:05) (165/77 - 213/78)  BP(mean): 111 (20 Sep 2019 07:05) (111 - 112)    RR: 25 (20 Sep 2019 07:05) (20 - 36)  SpO2: 99% (20 Sep 2019 07:05) (98% - 100%)        09-19 @ 07:01  -  09-20 @ 07:00  --------------------------------------------------------  IN: 990 mL / OUT: 640 mL / NET: 350 mL    09-20 @ 07:01  -  09-20 @ 13:09  --------------------------------------------------------  IN: 0 mL / OUT: 800 mL / NET: -800 mL      CAPILLARY BLOOD GLUCOSE          PHYSICAL EXAM:  General: Awake, alert,   HEENT: Atraumatic, normocephalic.                 Mallampatti Grade                 No nasal congestion.                No tonsillar or pharyngeal exudates.  Lymph Nodes: No palpable lymphadenopathy neck, supraclavicular region  Neck: No JVD. No carotid bruit, no thyromegally  Respiratory: Normal chest expansion                         Normal percussion                         Normal and equal air entry                         ++rales.  Cardiovascular: S1 S2 normal. No murmurs, rubs or gallops.   Abdomen: Soft, non-tender, non-distended. No organomegaly.  Extremities: Warm to touch. Peripheral pulse palpable. No pedal edema.   Skin: No rashes or skin lesions, warm dry  Neurological: Motor and sensory examination equal and normal in all four extremities.  Psychiatry: Appropriate mood and affect.    HOSPITAL MEDICATIONS:  MEDICATIONS  (STANDING):  amLODIPine   Tablet 5 milliGRAM(s) Oral <User Schedule>  aspirin  chewable 81 milliGRAM(s) Oral daily  buDESOnide 160 MICROgram(s)/formoterol 4.5 MICROgram(s) Inhaler 2 Puff(s) Inhalation two times a day  carbidopa/levodopa  10/100 1 Tablet(s) Oral daily  carbidopa/levodopa  25/100 1 Tablet(s) Oral at bedtime  carvedilol 25 milliGRAM(s) Oral every 12 hours  chlorhexidine 4% Liquid 1 Application(s) Topical <User Schedule>  docusate sodium Liquid 100 milliGRAM(s) Oral three times a day  DULoxetine 60 milliGRAM(s) Oral daily  enoxaparin Injectable 30 milliGRAM(s) SubCutaneous daily  furosemide   Injectable 40 milliGRAM(s) IV Push daily  influenza   Vaccine 0.5 milliLiter(s) IntraMuscular once  levothyroxine 75 MICROGram(s) Oral daily  losartan 50 milliGRAM(s) Oral daily  multivitamin  Chewable 1 Tablet(s) Oral daily  nystatin Powder 1 Application(s) Topical two times a day  pantoprazole    Tablet 40 milliGRAM(s) Oral before breakfast  potassium chloride   Powder 40 milliEquivalent(s) Oral daily  senna 2 Tablet(s) Oral at bedtime    MEDICATIONS  (PRN):  acetaminophen  Suppository .. 650 milliGRAM(s) Rectal every 6 hours PRN Temp greater or equal to 38C (100.4F)      LABS:                        12.6   13.72 )-----------( 494      ( 20 Sep 2019 05:30 )             38.8     09-20    148<H>  |  102  |  59<H>  ----------------------------<  274<H>  3.7   |  33<H>  |  1.7<H>    Ca    10.0      20 Sep 2019 05:30  Phos  2.3     09-19                        RADIOLOGY:Radiology personally reviewed.

## 2019-09-21 LAB
ANION GAP SERPL CALC-SCNC: 11 MMOL/L — SIGNIFICANT CHANGE UP (ref 7–14)
BASOPHILS # BLD AUTO: 0.12 K/UL — SIGNIFICANT CHANGE UP (ref 0–0.2)
BASOPHILS NFR BLD AUTO: 1 % — SIGNIFICANT CHANGE UP (ref 0–1)
BUN SERPL-MCNC: 73 MG/DL — CRITICAL HIGH (ref 10–20)
CALCIUM SERPL-MCNC: 9.8 MG/DL — SIGNIFICANT CHANGE UP (ref 8.5–10.1)
CHLORIDE SERPL-SCNC: 104 MMOL/L — SIGNIFICANT CHANGE UP (ref 98–110)
CO2 SERPL-SCNC: 36 MMOL/L — HIGH (ref 17–32)
CREAT SERPL-MCNC: 2 MG/DL — HIGH (ref 0.7–1.5)
EOSINOPHIL # BLD AUTO: 0.25 K/UL — SIGNIFICANT CHANGE UP (ref 0–0.7)
EOSINOPHIL NFR BLD AUTO: 2.1 % — SIGNIFICANT CHANGE UP (ref 0–8)
GLUCOSE SERPL-MCNC: 170 MG/DL — HIGH (ref 70–99)
HCT VFR BLD CALC: 39.1 % — SIGNIFICANT CHANGE UP (ref 37–47)
HGB BLD-MCNC: 12.3 G/DL — SIGNIFICANT CHANGE UP (ref 12–16)
IMM GRANULOCYTES NFR BLD AUTO: 1.3 % — HIGH (ref 0.1–0.3)
LYMPHOCYTES # BLD AUTO: 1.27 K/UL — SIGNIFICANT CHANGE UP (ref 1.2–3.4)
LYMPHOCYTES # BLD AUTO: 10.5 % — LOW (ref 20.5–51.1)
MAGNESIUM SERPL-MCNC: 3.1 MG/DL — CRITICAL HIGH (ref 1.8–2.4)
MCHC RBC-ENTMCNC: 28.2 PG — SIGNIFICANT CHANGE UP (ref 27–31)
MCHC RBC-ENTMCNC: 31.5 G/DL — LOW (ref 32–37)
MCV RBC AUTO: 89.7 FL — SIGNIFICANT CHANGE UP (ref 81–99)
MONOCYTES # BLD AUTO: 0.95 K/UL — HIGH (ref 0.1–0.6)
MONOCYTES NFR BLD AUTO: 7.9 % — SIGNIFICANT CHANGE UP (ref 1.7–9.3)
NEUTROPHILS # BLD AUTO: 9.35 K/UL — HIGH (ref 1.4–6.5)
NEUTROPHILS NFR BLD AUTO: 77.2 % — HIGH (ref 42.2–75.2)
NRBC # BLD: 0 /100 WBCS — SIGNIFICANT CHANGE UP (ref 0–0)
PLATELET # BLD AUTO: 478 K/UL — HIGH (ref 130–400)
POTASSIUM SERPL-MCNC: 4.5 MMOL/L — SIGNIFICANT CHANGE UP (ref 3.5–5)
POTASSIUM SERPL-SCNC: 4.5 MMOL/L — SIGNIFICANT CHANGE UP (ref 3.5–5)
RBC # BLD: 4.36 M/UL — SIGNIFICANT CHANGE UP (ref 4.2–5.4)
RBC # FLD: 17.6 % — HIGH (ref 11.5–14.5)
SODIUM SERPL-SCNC: 151 MMOL/L — HIGH (ref 135–146)
WBC # BLD: 12.1 K/UL — HIGH (ref 4.8–10.8)
WBC # FLD AUTO: 12.1 K/UL — HIGH (ref 4.8–10.8)

## 2019-09-21 PROCEDURE — 71045 X-RAY EXAM CHEST 1 VIEW: CPT | Mod: 26

## 2019-09-21 RX ORDER — ACETAMINOPHEN 500 MG
650 TABLET ORAL EVERY 6 HOURS
Refills: 0 | Status: DISCONTINUED | OUTPATIENT
Start: 2019-09-21 | End: 2019-10-03

## 2019-09-21 RX ADMIN — PANTOPRAZOLE SODIUM 40 MILLIGRAM(S): 20 TABLET, DELAYED RELEASE ORAL at 05:45

## 2019-09-21 RX ADMIN — Medication 100 MILLIGRAM(S): at 05:45

## 2019-09-21 RX ADMIN — SENNA PLUS 2 TABLET(S): 8.6 TABLET ORAL at 21:14

## 2019-09-21 RX ADMIN — Medication 650 MILLIGRAM(S): at 21:32

## 2019-09-21 RX ADMIN — Medication 81 MILLIGRAM(S): at 12:09

## 2019-09-21 RX ADMIN — Medication 650 MILLIGRAM(S): at 17:00

## 2019-09-21 RX ADMIN — Medication 75 MICROGRAM(S): at 05:45

## 2019-09-21 RX ADMIN — CARBIDOPA AND LEVODOPA 1 TABLET(S): 25; 100 TABLET ORAL at 14:36

## 2019-09-21 RX ADMIN — AMLODIPINE BESYLATE 5 MILLIGRAM(S): 2.5 TABLET ORAL at 05:45

## 2019-09-21 RX ADMIN — AMLODIPINE BESYLATE 5 MILLIGRAM(S): 2.5 TABLET ORAL at 14:37

## 2019-09-21 RX ADMIN — Medication 650 MILLIGRAM(S): at 15:56

## 2019-09-21 RX ADMIN — LOSARTAN POTASSIUM 50 MILLIGRAM(S): 100 TABLET, FILM COATED ORAL at 05:45

## 2019-09-21 RX ADMIN — Medication 1 TABLET(S): at 12:10

## 2019-09-21 RX ADMIN — ENOXAPARIN SODIUM 30 MILLIGRAM(S): 100 INJECTION SUBCUTANEOUS at 12:12

## 2019-09-21 RX ADMIN — CARBIDOPA AND LEVODOPA 1 TABLET(S): 25; 100 TABLET ORAL at 21:14

## 2019-09-21 RX ADMIN — DULOXETINE HYDROCHLORIDE 60 MILLIGRAM(S): 30 CAPSULE, DELAYED RELEASE ORAL at 12:11

## 2019-09-21 RX ADMIN — NYSTATIN CREAM 1 APPLICATION(S): 100000 CREAM TOPICAL at 05:46

## 2019-09-21 RX ADMIN — Medication 40 MILLIEQUIVALENT(S): at 12:10

## 2019-09-21 RX ADMIN — CARVEDILOL PHOSPHATE 25 MILLIGRAM(S): 80 CAPSULE, EXTENDED RELEASE ORAL at 05:45

## 2019-09-21 RX ADMIN — CARVEDILOL PHOSPHATE 25 MILLIGRAM(S): 80 CAPSULE, EXTENDED RELEASE ORAL at 17:54

## 2019-09-21 RX ADMIN — Medication 100 MILLIGRAM(S): at 21:14

## 2019-09-21 RX ADMIN — NYSTATIN CREAM 1 APPLICATION(S): 100000 CREAM TOPICAL at 17:53

## 2019-09-21 RX ADMIN — Medication 100 MILLIGRAM(S): at 14:37

## 2019-09-21 RX ADMIN — Medication 650 MILLIGRAM(S): at 21:55

## 2019-09-21 NOTE — PROGRESS NOTE ADULT - ASSESSMENT
87 year old female admitted s/p fall episode at home with generalized weakness and severe bradycardia ;sp rapid response respiratory failure, sp intubation, aspiration, fever PNA/pneumonitis    # Respiratory Failure - aspiration  secretions  s/p bronch extubated seen by SLP    - desaturation on high flow )2,  secretions   - continue vent parameters per Pulm / Critical care   -  SLP evaluation  extubated MBBS failed feeding   -  enteral feeding for now need to resolved decision on feeding ; PEG vs Palliative feeds   - at risk for aspiration        # Cardiac / CHF  / HTN    -ff by cardio  - pulse better 60s   - continue lasix for volume overload and CHF  - reviewed echo  - monitor weight and pulse        # Parkinson's / dysphagia   - will probably need PEG if family amenable  - continue Sinemet     # Hypothyroidism     - levothyroxine     # GI and DVT prophylaxis     - pantoprazole   - enoxaparin    advance care  noted  CPR,  full code    disposition -   probable STR

## 2019-09-21 NOTE — PROGRESS NOTE ADULT - SUBJECTIVE AND OBJECTIVE BOX
88 yo F presents to the ED after VN found her very weak, bradycardic and in respiratory distress. Pt  was brought in by EMS. Pt also gave Hx of having fallen on  Sunday 9/1/19.  Pt has mobility dysfunction and ambulates with walker with difficulty navigating steps. Pt ambulates with a walker at home.  Pt was noted to be bradycardic 20-30s. Pt had ARF, cardiac arrest and was intubated. Pt was tx for aspiration PNA, pneumoniits.    interval :  extubated s/p speech and swallow/ failed due to severe swallowing dysfunction, pt remains on O2 via NC and is being fed via feeding tube    PAST MEDICAL & SURGICAL HISTORY:  HTN, ASHD, CAD, CHF  CKD  Parkinson disease  Hypothyroid  Mobility dysfunction, freq falls, sp Rib fractures  OA, DDD, DJD, kyphosis   GERD, HH, diverticulosis, chronic constipation    No significant past surgical history      MEDICATIONS  (STANDING):  amLODIPine   Tablet 5 milliGRAM(s) Oral <User Schedule>  aspirin  chewable 81 milliGRAM(s) Oral daily  buDESOnide 160 MICROgram(s)/formoterol 4.5 MICROgram(s) Inhaler 2 Puff(s) Inhalation two times a day  carbidopa/levodopa  10/100 1 Tablet(s) Oral daily  carbidopa/levodopa  25/100 1 Tablet(s) Oral at bedtime  carvedilol 25 milliGRAM(s) Oral every 12 hours  chlorhexidine 4% Liquid 1 Application(s) Topical <User Schedule>  docusate sodium Liquid 100 milliGRAM(s) Oral three times a day  DULoxetine 60 milliGRAM(s) Oral daily  enoxaparin Injectable 30 milliGRAM(s) SubCutaneous daily  furosemide   Injectable 40 milliGRAM(s) IV Push daily  influenza   Vaccine 0.5 milliLiter(s) IntraMuscular once  levothyroxine 75 MICROGram(s) Oral daily  losartan 50 milliGRAM(s) Oral daily  multivitamin  Chewable 1 Tablet(s) Oral daily  nystatin Powder 1 Application(s) Topical two times a day  pantoprazole    Tablet 40 milliGRAM(s) Oral before breakfast  potassium chloride   Powder 40 milliEquivalent(s) Oral daily  senna 2 Tablet(s) Oral at bedtime    MEDICATIONS  (PRN):  acetaminophen  Suppository .. 650 milliGRAM(s) Rectal every 6 hours PRN Temp greater or equal to 38C (100.4F)      ICU Vital Signs Last 24 Hrs    T(F): 100.4  HR: 63  BP: 142/67  RR: 16  SpO2:  100%    GENERAL: elderly, chronically ill looking, resting in bed, NAD + O2 NC, + feeding tube  HEAD:  Atraumatic, Normocephalic  EYES: EOMI, PERRLA, conjunctiva and sclera clear  ENT: Moist mucous membranes   NECK: Supple, No JVD  CHEST/LUNG: Shallow respirations, scattered rhonchi  HEART: S1S2 reg  ABDOMEN: Globular, soft and benign  EXTREMITIES:  advanced arthritic changes, dec motor function, poor mm mass and tone  NERVOUS SYSTEM:  non focal                                        12  12 )-----------( 478             39    151  |  104  |  73  ----------------------------<  170  4.5   |  36  |  2.0  GFR 22  Ca    10.0       Phos  2.3       Mg     2.4, 3.1    TPro  7.0  /  Alb  3.8  /  TBili  1.1  /  DBili  x   /  AST  33  /  ALT  5   /  AlkPhos  141<H>  09-18          Recommendations:   · Diagnostic Impressions	severe to profound overall swallow function, discussed in conjunction with Radiologist Landauer. Patient unable to clear residue fatigue noted throughout exam. Excessive nonproductive cough noted post exam as well. Exam terminated post nectar thick liquids due to safety concerns, Dr in agreement.	  · Recommended Consistencies	NPO with alternate means of nutrition/hydration ice chips IF cleared by pulmonary for palliative trials with RN staff of [employer]. SLP will conduct trials of nectar thick liquids in conjunction with therapy Repeat MBS will be conducted in 1-2 weeks when patient is tolerating less respiratory support as well as increased oral motor movements for bolus manipulation	    EXAM:  XR CHEST PORTABLE ROUTINE 1V            PROCEDURE DATE:  09/18/2019            INTERPRETATION:  Clinical History / Reason for exam: Shortness of breath    Comparison : Chest radiograph 9/17/2019.    Technique/Positioning: Portable technique.    Interpretation:    Support devices: Enteric tube remains unchanged    Cardiac/mediastinum/hilum: Stable cardiomegaly and atherosclerotic   changes of aorta    Lung parenchyma/Pleura: Redemonstration of  bilateral lower lobe   opacities greater on the left. No evidence of pneumothorax.    Skeleton/soft tissues: Stable                    DARIAN JONES M.D., ATTENDING RADIOLOGIST  This document has been electronically signed. Sep 18 2019  9:18AM

## 2019-09-22 LAB
ALBUMIN SERPL ELPH-MCNC: 3.4 G/DL — LOW (ref 3.5–5.2)
ALP SERPL-CCNC: 127 U/L — HIGH (ref 30–115)
ALT FLD-CCNC: 9 U/L — SIGNIFICANT CHANGE UP (ref 0–41)
ANION GAP SERPL CALC-SCNC: 12 MMOL/L — SIGNIFICANT CHANGE UP (ref 7–14)
AST SERPL-CCNC: 26 U/L — SIGNIFICANT CHANGE UP (ref 0–41)
BASOPHILS # BLD AUTO: 0.13 K/UL — SIGNIFICANT CHANGE UP (ref 0–0.2)
BASOPHILS NFR BLD AUTO: 1.2 % — HIGH (ref 0–1)
BILIRUB SERPL-MCNC: 0.6 MG/DL — SIGNIFICANT CHANGE UP (ref 0.2–1.2)
BUN SERPL-MCNC: 78 MG/DL — CRITICAL HIGH (ref 10–20)
CALCIUM SERPL-MCNC: 10 MG/DL — SIGNIFICANT CHANGE UP (ref 8.5–10.1)
CHLORIDE SERPL-SCNC: 109 MMOL/L — SIGNIFICANT CHANGE UP (ref 98–110)
CO2 SERPL-SCNC: 33 MMOL/L — HIGH (ref 17–32)
CORTIS AM PEAK SERPL-MCNC: 31.7 UG/DL — HIGH (ref 6–18.4)
CREAT SERPL-MCNC: 2.2 MG/DL — HIGH (ref 0.7–1.5)
EOSINOPHIL # BLD AUTO: 0.23 K/UL — SIGNIFICANT CHANGE UP (ref 0–0.7)
EOSINOPHIL NFR BLD AUTO: 2.1 % — SIGNIFICANT CHANGE UP (ref 0–8)
GLUCOSE SERPL-MCNC: 330 MG/DL — HIGH (ref 70–99)
HCT VFR BLD CALC: 39.6 % — SIGNIFICANT CHANGE UP (ref 37–47)
HGB BLD-MCNC: 12.2 G/DL — SIGNIFICANT CHANGE UP (ref 12–16)
IMM GRANULOCYTES NFR BLD AUTO: 1.4 % — HIGH (ref 0.1–0.3)
LYMPHOCYTES # BLD AUTO: 1.33 K/UL — SIGNIFICANT CHANGE UP (ref 1.2–3.4)
LYMPHOCYTES # BLD AUTO: 11.9 % — LOW (ref 20.5–51.1)
MAGNESIUM SERPL-MCNC: 3.2 MG/DL — CRITICAL HIGH (ref 1.8–2.4)
MCHC RBC-ENTMCNC: 27.7 PG — SIGNIFICANT CHANGE UP (ref 27–31)
MCHC RBC-ENTMCNC: 30.8 G/DL — LOW (ref 32–37)
MCV RBC AUTO: 89.8 FL — SIGNIFICANT CHANGE UP (ref 81–99)
MONOCYTES # BLD AUTO: 0.75 K/UL — HIGH (ref 0.1–0.6)
MONOCYTES NFR BLD AUTO: 6.7 % — SIGNIFICANT CHANGE UP (ref 1.7–9.3)
NEUTROPHILS # BLD AUTO: 8.58 K/UL — HIGH (ref 1.4–6.5)
NEUTROPHILS NFR BLD AUTO: 76.7 % — HIGH (ref 42.2–75.2)
NRBC # BLD: 0 /100 WBCS — SIGNIFICANT CHANGE UP (ref 0–0)
PLATELET # BLD AUTO: 442 K/UL — HIGH (ref 130–400)
POTASSIUM SERPL-MCNC: 5.2 MMOL/L — HIGH (ref 3.5–5)
POTASSIUM SERPL-SCNC: 5.2 MMOL/L — HIGH (ref 3.5–5)
PROT SERPL-MCNC: 6.7 G/DL — SIGNIFICANT CHANGE UP (ref 6–8)
RBC # BLD: 4.41 M/UL — SIGNIFICANT CHANGE UP (ref 4.2–5.4)
RBC # FLD: 17.8 % — HIGH (ref 11.5–14.5)
SODIUM SERPL-SCNC: 154 MMOL/L — HIGH (ref 135–146)
WBC # BLD: 11.18 K/UL — HIGH (ref 4.8–10.8)
WBC # FLD AUTO: 11.18 K/UL — HIGH (ref 4.8–10.8)

## 2019-09-22 RX ORDER — SODIUM CHLORIDE 9 MG/ML
1000 INJECTION, SOLUTION INTRAVENOUS
Refills: 0 | Status: COMPLETED | OUTPATIENT
Start: 2019-09-22 | End: 2019-09-22

## 2019-09-22 RX ORDER — FUROSEMIDE 40 MG
40 TABLET ORAL ONCE
Refills: 0 | Status: COMPLETED | OUTPATIENT
Start: 2019-09-22 | End: 2019-09-22

## 2019-09-22 RX ORDER — SODIUM CHLORIDE 9 MG/ML
1000 INJECTION INTRAMUSCULAR; INTRAVENOUS; SUBCUTANEOUS
Refills: 0 | Status: DISCONTINUED | OUTPATIENT
Start: 2019-09-22 | End: 2019-09-22

## 2019-09-22 RX ORDER — SODIUM CHLORIDE 9 MG/ML
1000 INJECTION, SOLUTION INTRAVENOUS
Refills: 0 | Status: DISCONTINUED | OUTPATIENT
Start: 2019-09-22 | End: 2019-09-22

## 2019-09-22 RX ADMIN — CARVEDILOL PHOSPHATE 25 MILLIGRAM(S): 80 CAPSULE, EXTENDED RELEASE ORAL at 18:01

## 2019-09-22 RX ADMIN — CHLORHEXIDINE GLUCONATE 1 APPLICATION(S): 213 SOLUTION TOPICAL at 08:19

## 2019-09-22 RX ADMIN — Medication 650 MILLIGRAM(S): at 06:15

## 2019-09-22 RX ADMIN — LOSARTAN POTASSIUM 50 MILLIGRAM(S): 100 TABLET, FILM COATED ORAL at 05:42

## 2019-09-22 RX ADMIN — Medication 81 MILLIGRAM(S): at 11:23

## 2019-09-22 RX ADMIN — SODIUM CHLORIDE 75 MILLILITER(S): 9 INJECTION, SOLUTION INTRAVENOUS at 10:33

## 2019-09-22 RX ADMIN — AMLODIPINE BESYLATE 5 MILLIGRAM(S): 2.5 TABLET ORAL at 05:42

## 2019-09-22 RX ADMIN — BUDESONIDE AND FORMOTEROL FUMARATE DIHYDRATE 2 PUFF(S): 160; 4.5 AEROSOL RESPIRATORY (INHALATION) at 07:23

## 2019-09-22 RX ADMIN — Medication 100 MILLIGRAM(S): at 05:42

## 2019-09-22 RX ADMIN — PANTOPRAZOLE SODIUM 40 MILLIGRAM(S): 20 TABLET, DELAYED RELEASE ORAL at 06:15

## 2019-09-22 RX ADMIN — Medication 1 TABLET(S): at 14:51

## 2019-09-22 RX ADMIN — Medication 650 MILLIGRAM(S): at 06:18

## 2019-09-22 RX ADMIN — CARVEDILOL PHOSPHATE 25 MILLIGRAM(S): 80 CAPSULE, EXTENDED RELEASE ORAL at 05:42

## 2019-09-22 RX ADMIN — CARBIDOPA AND LEVODOPA 1 TABLET(S): 25; 100 TABLET ORAL at 11:23

## 2019-09-22 RX ADMIN — Medication 100 MILLIGRAM(S): at 21:26

## 2019-09-22 RX ADMIN — AMLODIPINE BESYLATE 5 MILLIGRAM(S): 2.5 TABLET ORAL at 18:01

## 2019-09-22 RX ADMIN — Medication 75 MICROGRAM(S): at 05:42

## 2019-09-22 RX ADMIN — DULOXETINE HYDROCHLORIDE 60 MILLIGRAM(S): 30 CAPSULE, DELAYED RELEASE ORAL at 11:23

## 2019-09-22 RX ADMIN — Medication 40 MILLIGRAM(S): at 10:31

## 2019-09-22 RX ADMIN — CARBIDOPA AND LEVODOPA 1 TABLET(S): 25; 100 TABLET ORAL at 21:26

## 2019-09-22 RX ADMIN — NYSTATIN CREAM 1 APPLICATION(S): 100000 CREAM TOPICAL at 05:43

## 2019-09-22 RX ADMIN — ENOXAPARIN SODIUM 30 MILLIGRAM(S): 100 INJECTION SUBCUTANEOUS at 11:23

## 2019-09-22 RX ADMIN — NYSTATIN CREAM 1 APPLICATION(S): 100000 CREAM TOPICAL at 18:01

## 2019-09-22 RX ADMIN — BUDESONIDE AND FORMOTEROL FUMARATE DIHYDRATE 2 PUFF(S): 160; 4.5 AEROSOL RESPIRATORY (INHALATION) at 21:26

## 2019-09-22 RX ADMIN — SENNA PLUS 2 TABLET(S): 8.6 TABLET ORAL at 21:26

## 2019-09-22 NOTE — SWALLOW BEDSIDE ASSESSMENT ADULT - ASR SWALLOW RECOMMEND DIAG
Will determine candidacy now that patient's respiratory status has improved./VFSS/MBS
VFSS/MBS/Recommend to further evaluate swallow. Family at bedside states patient presented with coughing/choking at home.

## 2019-09-22 NOTE — PROGRESS NOTE ADULT - SUBJECTIVE AND OBJECTIVE BOX
86 yo F presents to the ED after VN found her very weak, bradycardic and in respiratory distress. Pt  was brought to ER  in by EMS. Pt also gave Hx of having fallen on  Sunday 9/1/19.  Pt has mobility dysfunction and ambulates with walker with difficulty navigating steps with Hx of progressive Parkinsons. . Pt ambulates with a walker few steps.  Pt was noted to be bradycardic 20-30s. Pt had ARF, cardiac arrest and was intubated. Pt was tx for aspiration PNA, pneumoniits and sucessfully extubated but remains on high flow oxygen.  Pt has continued difficulty with dysphagia ans clearing secretions.    interval :  extubated s/p speech and swallow/ failed due to severe swallowing dysfunction, pt remains on O2 via NC and is being fed via feeding tube, issue with cont hypermagnesemia and worsening renal parameters, Mg today 3.2, hold K supplement, start low fluids and add furosemide, trend Mg/electrolytes    PAST MEDICAL & SURGICAL HISTORY:  HTN, ASHD, CAD, CHF  CKD  Parkinson disease  Hypothyroid  Mobility dysfunction, freq falls, sp Rib fractures  OA, DDD, DJD, kyphosis   GERD, HH, diverticulosis, chronic constipation    No significant past surgical history      MEDICATIONS  (STANDING):  amLODIPine   Tablet 5 milliGRAM(s) Oral <User Schedule>  aspirin  chewable 81 milliGRAM(s) Oral daily  buDESOnide 160 MICROgram(s)/formoterol 4.5 MICROgram(s) Inhaler 2 Puff(s) Inhalation two times a day  carbidopa/levodopa  10/100 1 Tablet(s) Oral daily  carbidopa/levodopa  25/100 1 Tablet(s) Oral at bedtime  carvedilol 25 milliGRAM(s) Oral every 12 hours  chlorhexidine 4% Liquid 1 Application(s) Topical <User Schedule>  docusate sodium Liquid 100 milliGRAM(s) Oral three times a day  DULoxetine 60 milliGRAM(s) Oral daily  enoxaparin Injectable 30 milliGRAM(s) SubCutaneous daily  furosemide   Injectable 40 milliGRAM(s) IV Push daily  influenza   Vaccine 0.5 milliLiter(s) IntraMuscular once  levothyroxine 75 MICROGram(s) Oral daily  losartan 50 milliGRAM(s) Oral daily  multivitamin  Chewable 1 Tablet(s) Oral daily  nystatin Powder 1 Application(s) Topical two times a day  pantoprazole    Tablet 40 milliGRAM(s) Oral before breakfast  potassium chloride   Powder 40 milliEquivalent(s) Oral daily  senna 2 Tablet(s) Oral at bedtime    MEDICATIONS  (PRN):  acetaminophen  Suppository .. 650 milliGRAM(s) Rectal every 6 hours PRN Temp greater or equal to 38C (100.4F)      ICU Vital Signs Last 24 Hrs    T(F): 97.9  HR: 63  BP: 164/78  RR: 18  SpO2:  99%    GENERAL: elderly, fragile and  chronically ill looking, resting in bed, NAD + O2 NC, + feeding tube  HEAD:  Atraumatic, Normocephalic  EYES: EOMI, PERRLA, conjunctiva and sclera clear  ENT: Moist mucous membranes   NECK: Supple, No JVD  CHEST/LUNG: Shallow respirations, scattered rhonchi  HEART: S1S2 irreg  ABDOMEN: Globular, soft and benign  EXTREMITIES:  advanced arthritic changes, dec motor function, poor mm mass and tone  NERVOUS SYSTEM:  non focal                                        12  11 )-----------( 442             39.6    154  |  109  |  78  ----------------------------<  330  5.2   |  33  |  2.2  GFR 22  Ca    10.0       Phos  2.3       Mg     2.4, 3.1, 3.2    TPro  7.0  /  Alb  3.8  /  TBili  1.1  /  DBili  x   /  AST  33  /  ALT  5   /  AlkPhos  141<H>  09-18          Recommendations:   · Diagnostic Impressions	severe to profound overall swallow function, discussed in conjunction with Radiologist Landauer. Patient unable to clear residue fatigue noted throughout exam. Excessive nonproductive cough noted post exam as well. Exam terminated post nectar thick liquids due to safety concerns, Dr in agreement.	  · Recommended Consistencies	NPO with alternate means of nutrition/hydration ice chips IF cleared by pulmonary for palliative trials with RN staff of [employer]. SLP will conduct trials of nectar thick liquids in conjunction with therapy Repeat MBS will be conducted in 1-2 weeks when patient is tolerating less respiratory support as well as increased oral motor movements for bolus manipulation	    EXAM:  XR CHEST PORTABLE ROUTINE 1V          EKG 9/22:  afib, PVCs PRWP    PROCEDURE DATE:  09/18/2019            INTERPRETATION:  Clinical History / Reason for exam: Shortness of breath    Comparison : Chest radiograph 9/17/2019.    Technique/Positioning: Portable technique.    Interpretation:    Support devices: Enteric tube remains unchanged    Cardiac/mediastinum/hilum: Stable cardiomegaly and atherosclerotic   changes of aorta    Lung parenchyma/Pleura: Redemonstration of  bilateral lower lobe   opacities greater on the left. No evidence of pneumothorax.    Skeleton/soft tissues: Stable                    DARIAN JONES M.D., ATTENDING RADIOLOGIST  This document has been electronically signed. Sep 18 2019  9:18AM

## 2019-09-22 NOTE — SWALLOW BEDSIDE ASSESSMENT ADULT - SWALLOW EVAL: RECOMMENDED FEEDING/EATING TECHNIQUES
position upright (90 degrees)/Right now PO trials with SLP only during therapy session./maintain upright posture during/after eating for 30 mins/no straws/allow for swallow between intakes/oral hygiene/small sips/bites
maintain upright posture during/after eating for 30 mins/oral hygiene/position upright (90 degrees)

## 2019-09-22 NOTE — SWALLOW BEDSIDE ASSESSMENT ADULT - SPECIFY REASON(S)
Intubation; pt. coded during meal and excessive amount of egg was removed from esophagus with choking at meal prior to admission. Patient's PMHx significant for PD.

## 2019-09-22 NOTE — SWALLOW BEDSIDE ASSESSMENT ADULT - ORAL PHASE
Decreased anterior-posterior movement of the bolus/Delayed oral transit time
Delayed oral transit time/Decreased anterior-posterior movement of the bolus

## 2019-09-22 NOTE — PROGRESS NOTE ADULT - ASSESSMENT
87 year old female admitted s/p fall episode at home with generalized weakness and severe bradycardia ;sp rapid response respiratory failure, sp intubation, aspiration, fever PNA/pneumonitis    # Respiratory Failure - aspiration  secretions  s/p intubation and  bronch extubated seen by SLP    - desaturation on high flow O2,  secretions   -  SLP evaluation  extubated MBBS failed feeding   -  enteral feeding for now need to resolved decision on feeding ; PEG vs Palliative feeds   - at risk for aspiration        # Cardiac / CHF  / HTN    - ff by cardio  - pulse better 60s   - continue lasix for volume overload and CHF  - reviewed echo  - monitor weight and pulse        # Parkinson's / dysphagia   - will probably need PEG if family amenable  - continue Sinemet  -repeat S&S today, + cont dysphagia    #Hypermagnesemia, worsening renal parameters, Hx of CKD  - repeat Mg today still  high at 3.2  - Hold K supplement  - low dose fluids with furosemide  -monitor electrolytes and renal parameters    # Hypothyroidism     - levothyroxine     # GI and DVT prophylaxis     - pantoprazole   - enoxaparin    advance care  noted  CPR,  full code, but overall guarded state    disposition -   probable STR

## 2019-09-22 NOTE — CHART NOTE - NSCHARTNOTEFT_GEN_A_CORE
Called in by the lab with Mg result of 3.2.   Saw patients K was elevated and BUN/Cr has also increased with eGFR trending down.   Discussed the case with the attending.     Plan:   - D/C Potassium Chloride powder   - For hypermagnesemia with moderate kidney impairment we will start 1 dose of IV NS @75 (due to CHF) and we will start IV Furosemide for better excretion of Mg and K. Called in by the lab with Mg result of 3.2.   Saw patients K was elevated and BUN/Cr has also increased with eGFR trending down.   Discussed the case with the attending.     Plan:   - D/C Potassium Chloride powder   - For hypermagnesemia with moderate kidney impairment we will start 1 dose of IV Fluids @75 (due to CHF) and we will start IV Furosemide for better excretion of Mg and K.

## 2019-09-22 NOTE — SWALLOW BEDSIDE ASSESSMENT ADULT - PHARYNGEAL PHASE
+ toleration observed without overt symptoms of penetration/aspiration
Patient with delayed cough post oral intake x2 during trials./Delayed cough post oral intake/Multiple swallows

## 2019-09-22 NOTE — SWALLOW BEDSIDE ASSESSMENT ADULT - ASR SWALLOW ASPIRATION MONITOR
throat clearing/upper respiratory infection/cough/change of breathing pattern/gurgly voice/pneumonia/oral hygiene/position upright (90Y)/fever

## 2019-09-23 LAB
ALBUMIN SERPL ELPH-MCNC: 3.3 G/DL — LOW (ref 3.5–5.2)
ALP SERPL-CCNC: 116 U/L — HIGH (ref 30–115)
ALT FLD-CCNC: 12 U/L — SIGNIFICANT CHANGE UP (ref 0–41)
ANION GAP SERPL CALC-SCNC: 10 MMOL/L — SIGNIFICANT CHANGE UP (ref 7–14)
AST SERPL-CCNC: 19 U/L — SIGNIFICANT CHANGE UP (ref 0–41)
BASOPHILS # BLD AUTO: 0.09 K/UL — SIGNIFICANT CHANGE UP (ref 0–0.2)
BASOPHILS NFR BLD AUTO: 0.9 % — SIGNIFICANT CHANGE UP (ref 0–1)
BILIRUB SERPL-MCNC: 0.6 MG/DL — SIGNIFICANT CHANGE UP (ref 0.2–1.2)
BUN SERPL-MCNC: 78 MG/DL — CRITICAL HIGH (ref 10–20)
CALCIUM SERPL-MCNC: 9.2 MG/DL — SIGNIFICANT CHANGE UP (ref 8.5–10.1)
CHLORIDE SERPL-SCNC: 104 MMOL/L — SIGNIFICANT CHANGE UP (ref 98–110)
CO2 SERPL-SCNC: 35 MMOL/L — HIGH (ref 17–32)
CREAT SERPL-MCNC: 2 MG/DL — HIGH (ref 0.7–1.5)
EOSINOPHIL # BLD AUTO: 0.22 K/UL — SIGNIFICANT CHANGE UP (ref 0–0.7)
EOSINOPHIL NFR BLD AUTO: 2.2 % — SIGNIFICANT CHANGE UP (ref 0–8)
GLUCOSE BLDC GLUCOMTR-MCNC: 250 MG/DL — HIGH (ref 70–99)
GLUCOSE BLDC GLUCOMTR-MCNC: 252 MG/DL — HIGH (ref 70–99)
GLUCOSE BLDC GLUCOMTR-MCNC: 362 MG/DL — HIGH (ref 70–99)
GLUCOSE SERPL-MCNC: 459 MG/DL — CRITICAL HIGH (ref 70–99)
HCT VFR BLD CALC: 37.4 % — SIGNIFICANT CHANGE UP (ref 37–47)
HGB BLD-MCNC: 11.5 G/DL — LOW (ref 12–16)
IMM GRANULOCYTES NFR BLD AUTO: 0.8 % — HIGH (ref 0.1–0.3)
LYMPHOCYTES # BLD AUTO: 1.01 K/UL — LOW (ref 1.2–3.4)
LYMPHOCYTES # BLD AUTO: 10.2 % — LOW (ref 20.5–51.1)
MAGNESIUM SERPL-MCNC: 2.7 MG/DL — HIGH (ref 1.8–2.4)
MCHC RBC-ENTMCNC: 27.7 PG — SIGNIFICANT CHANGE UP (ref 27–31)
MCHC RBC-ENTMCNC: 30.7 G/DL — LOW (ref 32–37)
MCV RBC AUTO: 90.1 FL — SIGNIFICANT CHANGE UP (ref 81–99)
MONOCYTES # BLD AUTO: 0.5 K/UL — SIGNIFICANT CHANGE UP (ref 0.1–0.6)
MONOCYTES NFR BLD AUTO: 5.1 % — SIGNIFICANT CHANGE UP (ref 1.7–9.3)
NEUTROPHILS # BLD AUTO: 7.96 K/UL — HIGH (ref 1.4–6.5)
NEUTROPHILS NFR BLD AUTO: 80.8 % — HIGH (ref 42.2–75.2)
NRBC # BLD: 0 /100 WBCS — SIGNIFICANT CHANGE UP (ref 0–0)
PLATELET # BLD AUTO: 359 K/UL — SIGNIFICANT CHANGE UP (ref 130–400)
POTASSIUM SERPL-MCNC: 4.2 MMOL/L — SIGNIFICANT CHANGE UP (ref 3.5–5)
POTASSIUM SERPL-SCNC: 4.2 MMOL/L — SIGNIFICANT CHANGE UP (ref 3.5–5)
PROT SERPL-MCNC: 6.2 G/DL — SIGNIFICANT CHANGE UP (ref 6–8)
RBC # BLD: 4.15 M/UL — LOW (ref 4.2–5.4)
RBC # FLD: 17.4 % — HIGH (ref 11.5–14.5)
SODIUM SERPL-SCNC: 149 MMOL/L — HIGH (ref 135–146)
WBC # BLD: 9.86 K/UL — SIGNIFICANT CHANGE UP (ref 4.8–10.8)
WBC # FLD AUTO: 9.86 K/UL — SIGNIFICANT CHANGE UP (ref 4.8–10.8)

## 2019-09-23 PROCEDURE — 99232 SBSQ HOSP IP/OBS MODERATE 35: CPT

## 2019-09-23 RX ORDER — SODIUM CHLORIDE 9 MG/ML
1000 INJECTION INTRAMUSCULAR; INTRAVENOUS; SUBCUTANEOUS
Refills: 0 | Status: DISCONTINUED | OUTPATIENT
Start: 2019-09-23 | End: 2019-09-24

## 2019-09-23 RX ADMIN — CHLORHEXIDINE GLUCONATE 1 APPLICATION(S): 213 SOLUTION TOPICAL at 08:37

## 2019-09-23 RX ADMIN — PANTOPRAZOLE SODIUM 40 MILLIGRAM(S): 20 TABLET, DELAYED RELEASE ORAL at 06:38

## 2019-09-23 RX ADMIN — Medication 81 MILLIGRAM(S): at 11:48

## 2019-09-23 RX ADMIN — NYSTATIN CREAM 1 APPLICATION(S): 100000 CREAM TOPICAL at 05:57

## 2019-09-23 RX ADMIN — BUDESONIDE AND FORMOTEROL FUMARATE DIHYDRATE 2 PUFF(S): 160; 4.5 AEROSOL RESPIRATORY (INHALATION) at 21:56

## 2019-09-23 RX ADMIN — Medication 100 MILLIGRAM(S): at 05:56

## 2019-09-23 RX ADMIN — CARVEDILOL PHOSPHATE 25 MILLIGRAM(S): 80 CAPSULE, EXTENDED RELEASE ORAL at 18:48

## 2019-09-23 RX ADMIN — AMLODIPINE BESYLATE 5 MILLIGRAM(S): 2.5 TABLET ORAL at 18:48

## 2019-09-23 RX ADMIN — CARBIDOPA AND LEVODOPA 1 TABLET(S): 25; 100 TABLET ORAL at 21:54

## 2019-09-23 RX ADMIN — SENNA PLUS 2 TABLET(S): 8.6 TABLET ORAL at 21:54

## 2019-09-23 RX ADMIN — NYSTATIN CREAM 1 APPLICATION(S): 100000 CREAM TOPICAL at 18:48

## 2019-09-23 RX ADMIN — AMLODIPINE BESYLATE 5 MILLIGRAM(S): 2.5 TABLET ORAL at 05:56

## 2019-09-23 RX ADMIN — Medication 1 TABLET(S): at 18:48

## 2019-09-23 RX ADMIN — ENOXAPARIN SODIUM 30 MILLIGRAM(S): 100 INJECTION SUBCUTANEOUS at 11:48

## 2019-09-23 RX ADMIN — Medication 75 MICROGRAM(S): at 05:56

## 2019-09-23 RX ADMIN — CARVEDILOL PHOSPHATE 25 MILLIGRAM(S): 80 CAPSULE, EXTENDED RELEASE ORAL at 05:56

## 2019-09-23 RX ADMIN — Medication 100 MILLIGRAM(S): at 21:54

## 2019-09-23 RX ADMIN — DULOXETINE HYDROCHLORIDE 60 MILLIGRAM(S): 30 CAPSULE, DELAYED RELEASE ORAL at 11:48

## 2019-09-23 RX ADMIN — CARBIDOPA AND LEVODOPA 1 TABLET(S): 25; 100 TABLET ORAL at 11:48

## 2019-09-23 RX ADMIN — LOSARTAN POTASSIUM 50 MILLIGRAM(S): 100 TABLET, FILM COATED ORAL at 05:56

## 2019-09-23 RX ADMIN — SODIUM CHLORIDE 50 MILLILITER(S): 9 INJECTION INTRAMUSCULAR; INTRAVENOUS; SUBCUTANEOUS at 11:50

## 2019-09-23 RX ADMIN — Medication 100 MILLIGRAM(S): at 13:20

## 2019-09-23 NOTE — PROGRESS NOTE ADULT - SUBJECTIVE AND OBJECTIVE BOX
87yFemale  Reconsulted for PEG tube placement  Interval history: Patient has NG tube in place, no hematemesis, melena, blood in stool reported. GI reconsulted today for potential PEG tube placement.      PAST MEDICAL & SURGICAL HISTORY:  Parkinson disease  Hypothyroid  Rib fractures  Congestive heart failure  Hypertension  No significant past surgical history          Social History: No smoking. No alcohol. No illegal drug use.          MEDICATIONS:  MEDICATIONS  (STANDING):  amLODIPine   Tablet 5 milliGRAM(s) Oral <User Schedule>  aspirin  chewable 81 milliGRAM(s) Oral daily  buDESOnide 160 MICROgram(s)/formoterol 4.5 MICROgram(s) Inhaler 2 Puff(s) Inhalation two times a day  carbidopa/levodopa  10/100 1 Tablet(s) Oral daily  carbidopa/levodopa  25/100 1 Tablet(s) Oral at bedtime  carvedilol 25 milliGRAM(s) Oral every 12 hours  chlorhexidine 4% Liquid 1 Application(s) Topical <User Schedule>  docusate sodium Liquid 100 milliGRAM(s) Oral three times a day  DULoxetine 60 milliGRAM(s) Oral daily  enoxaparin Injectable 30 milliGRAM(s) SubCutaneous daily  influenza   Vaccine 0.5 milliLiter(s) IntraMuscular once  levothyroxine 75 MICROGram(s) Oral daily  losartan 50 milliGRAM(s) Oral daily  multivitamin  Chewable 1 Tablet(s) Oral daily  nystatin Powder 1 Application(s) Topical two times a day  pantoprazole    Tablet 40 milliGRAM(s) Oral before breakfast  senna 2 Tablet(s) Oral at bedtime  sodium chloride 0.9%. 1000 milliLiter(s) (50 mL/Hr) IV Continuous <Continuous>    MEDICATIONS  (PRN):  acetaminophen   Tablet .. 650 milliGRAM(s) Oral every 6 hours PRN Temp greater or equal to 38C (100.4F)  acetaminophen  Suppository .. 650 milliGRAM(s) Rectal every 6 hours PRN Temp greater or equal to 38C (100.4F)      Allergies:     No Known Allergies          REVIEW OF SYSTEMS:  unobtainable       VITAL SIGNS:   T(F): 96.2 (09-23-19 @ 05:28), Max: 99.1 (09-22-19 @ 15:44)  HR: 61 (09-23-19 @ 11:53) (61 - 70)  BP: 153/69 (09-23-19 @ 11:53) (153/69 - 201/91)  RR: 16 (09-23-19 @ 05:28) (16 - 16)  SpO2: --    PHYSICAL EXAM:  GENERAL: Not responding appropriately to verbal cues, no acute distress. +NG tube placement  HEAD:  Atraumatic, Normocephalic  EYES: conjunctiva and sclera clear  NECK: Supple, no JVD or thyromegaly  CHEST/LUNG: Clear to auscultation bilaterally; No wheeze, rhonchi, or rales  HEART: Regular rate and rhythm; normal S1, S2, No murmurs.  ABDOMEN: Soft, nontender, nondistended; Bowel sounds present, no abdominal bruit, masses, or hepatosplenomegaly  NEUROLOGY: No asterixis or tremor.   SKIN: Intact, no jaundice            LABS:                        11.5   9.86  )-----------( 359      ( 23 Sep 2019 08:18 )             37.4     09-23    149<H>  |  104  |  78<HH>  ----------------------------<  459<HH>  4.2   |  35<H>  |  2.0<H>    Ca    9.2      23 Sep 2019 08:18  Mg     2.7     09-23    TPro  6.2  /  Alb  3.3<L>  /  TBili  0.6  /  DBili  x   /  AST  19  /  ALT  12  /  AlkPhos  116<H>  09-23    LIVER FUNCTIONS - ( 23 Sep 2019 08:18 )  Alb: 3.3 g/dL / Pro: 6.2 g/dL / ALK PHOS: 116 U/L / ALT: 12 U/L / AST: 19 U/L / GGT: x               IMAGING:    < from: Xray Chest 1 View- PORTABLE-Routine (09.21.19 @ 09:50) >  EXAM:  XR CHEST PORTABLE ROUTINE 1V            PROCEDURE DATE:  09/21/2019            INTERPRETATION:  Clinical History / Reason for exam: Opacities    Comparison : Chest radiograph September 20, 2019.    Technique/Positioning: AP.    Findings:    Support devices: An enteric tube is seen projecting under the diaphragm.    Cardiac/mediastinum/hilum: Stable cardiomegaly.    Lung parenchyma/Pleura: Left basilar opacity/pleural effusion, stable.   Right basilar atelectasis, stable.    Skeleton/softtissues: Stable.    Impression:      Stable left basilar opacity/pleural effusion and right basilar   atelectasis.                      KAREN HEMPHILL M.D., ATTENDING RADIOLOGIST  This document has been electronically signed. Sep 21 2019  1:43PM              < end of copied text > 87yFemale  Reconsulted for PEG tube placement  Interval history: Patient has NG tube in place, no hematemesis, melena, blood in stool reported. GI reconsulted today for potential PEG tube placement.      PAST MEDICAL & SURGICAL HISTORY:  Parkinson disease  Hypothyroid  Rib fractures  Congestive heart failure  Hypertension  No significant past surgical history          Social History: No smoking. No alcohol. No illegal drug use.          MEDICATIONS:  MEDICATIONS  (STANDING):  amLODIPine   Tablet 5 milliGRAM(s) Oral <User Schedule>  aspirin  chewable 81 milliGRAM(s) Oral daily  buDESOnide 160 MICROgram(s)/formoterol 4.5 MICROgram(s) Inhaler 2 Puff(s) Inhalation two times a day  carbidopa/levodopa  10/100 1 Tablet(s) Oral daily  carbidopa/levodopa  25/100 1 Tablet(s) Oral at bedtime  carvedilol 25 milliGRAM(s) Oral every 12 hours  chlorhexidine 4% Liquid 1 Application(s) Topical <User Schedule>  docusate sodium Liquid 100 milliGRAM(s) Oral three times a day  DULoxetine 60 milliGRAM(s) Oral daily  enoxaparin Injectable 30 milliGRAM(s) SubCutaneous daily  influenza   Vaccine 0.5 milliLiter(s) IntraMuscular once  levothyroxine 75 MICROGram(s) Oral daily  losartan 50 milliGRAM(s) Oral daily  multivitamin  Chewable 1 Tablet(s) Oral daily  nystatin Powder 1 Application(s) Topical two times a day  pantoprazole    Tablet 40 milliGRAM(s) Oral before breakfast  senna 2 Tablet(s) Oral at bedtime  sodium chloride 0.9%. 1000 milliLiter(s) (50 mL/Hr) IV Continuous <Continuous>    MEDICATIONS  (PRN):  acetaminophen   Tablet .. 650 milliGRAM(s) Oral every 6 hours PRN Temp greater or equal to 38C (100.4F)  acetaminophen  Suppository .. 650 milliGRAM(s) Rectal every 6 hours PRN Temp greater or equal to 38C (100.4F)      Allergies:     No Known Allergies          REVIEW OF SYSTEMS:  unobtainable       VITAL SIGNS:   T(F): 96.2 (09-23-19 @ 05:28), Max: 99.1 (09-22-19 @ 15:44)  HR: 61 (09-23-19 @ 11:53) (61 - 70)  BP: 153/69 (09-23-19 @ 11:53) (153/69 - 201/91)  RR: 16 (09-23-19 @ 05:28) (16 - 16)  SpO2: --    PHYSICAL EXAM:  GENERAL: Responds with one word answers occasionally, no acute distress. +NG tube   HEAD:  Atraumatic, Normocephalic  EYES: conjunctiva and sclera clear  NECK: Supple, no JVD or thyromegaly  CHEST/LUNG: Clear to auscultation bilaterally; No wheeze, rhonchi, or rales  HEART: Regular rate and rhythm; normal S1, S2, No murmurs.  ABDOMEN: Soft, nontender, nondistended; Bowel sounds present, no abdominal bruit, masses, or hepatosplenomegaly  NEUROLOGY: No asterixis or tremor.   SKIN: Intact, no jaundice            LABS:                        11.5   9.86  )-----------( 359      ( 23 Sep 2019 08:18 )             37.4     09-23    149<H>  |  104  |  78<HH>  ----------------------------<  459<HH>  4.2   |  35<H>  |  2.0<H>    Ca    9.2      23 Sep 2019 08:18  Mg     2.7     09-23    TPro  6.2  /  Alb  3.3<L>  /  TBili  0.6  /  DBili  x   /  AST  19  /  ALT  12  /  AlkPhos  116<H>  09-23    LIVER FUNCTIONS - ( 23 Sep 2019 08:18 )  Alb: 3.3 g/dL / Pro: 6.2 g/dL / ALK PHOS: 116 U/L / ALT: 12 U/L / AST: 19 U/L / GGT: x               IMAGING:    < from: Xray Chest 1 View- PORTABLE-Routine (09.21.19 @ 09:50) >  EXAM:  XR CHEST PORTABLE ROUTINE 1V            PROCEDURE DATE:  09/21/2019            INTERPRETATION:  Clinical History / Reason for exam: Opacities    Comparison : Chest radiograph September 20, 2019.    Technique/Positioning: AP.    Findings:    Support devices: An enteric tube is seen projecting under the diaphragm.    Cardiac/mediastinum/hilum: Stable cardiomegaly.    Lung parenchyma/Pleura: Left basilar opacity/pleural effusion, stable.   Right basilar atelectasis, stable.    Skeleton/softtissues: Stable.    Impression:      Stable left basilar opacity/pleural effusion and right basilar   atelectasis.                      KAREN HEMPHILL M.D., ATTENDING RADIOLOGIST  This document has been electronically signed. Sep 21 2019  1:43PM              < end of copied text >

## 2019-09-23 NOTE — PROGRESS NOTE ADULT - ASSESSMENT
86 yo female pmh bradycardia s/p rapid response 9/6/19 GI intimally consulted for endoscopic NG tube placement as NG tube attempts unsuccessful.     EGD performed: see note large food impaction entire length of esophagus  Food removed and NGT endoscopically placed 9/6/19    GI reconsulted for PEG tube placement  Rec  -Case extensively discussed with family at bedside and patient's HCP, they would like patient to have another speech and swallow evaluation and need to think about PEG tube placement  -Cardio consult for risk stratification of PEG tube placement if family agreeable 88 yo female pmh bradycardia s/p rapid response 9/6/19 GI intimally consulted for endoscopic NG tube placement as NG tube attempts unsuccessful.     EGD performed: see note large food impaction entire length of esophagus  Food removed and NGT endoscopically placed 9/6/19    GI reconsulted for PEG tube placement  Rec  -Case extensively discussed with family at bedside and patient's HCP, they would like patient to have another speech and swallow evaluation and need to think about PEG tube placement  -Cardio consult for risk stratification of PEG tube placement if family agreeable    The benefits of endoscopy as well as the risks, such as GI bleeding, aspiration pneumonia, perforation, damage or loss of teeth, reaction to medication, or death  were reviewed with the patient's daughter and family    Clinical lab tests were reviewed    Radiology tests were  reviewed:        Old tests or records were reviewed and or summarized: previous egd reviewed    History was obtained from someone other than the patient: history also obtained from the patient's daughter and family    The case was discussed with another health car provider: 86 yo female pmh bradycardia s/p rapid response 9/6/19 GI initially consulted for endoscopic NG tube placement as NG tube attempts unsuccessful.     EGD performed: see note large food impaction entire length of esophagus  Food removed and NGT endoscopically placed 9/6/19    GI reconsulted for PEG tube placement  Rec  -Case extensively discussed with family at bedside and patient's HCP, they would like patient to have another speech and swallow evaluation and need to think about PEG tube placement  -Cardio consult for risk stratification of PEG tube placement if family agreeable    The benefits of endoscopy as well as the risks, such as GI bleeding, aspiration pneumonia, perforation, damage or loss of teeth, reaction to medication, or death  were reviewed with the patient's daughter and family    Clinical lab tests were reviewed    Radiology tests were  reviewed:        Old tests or records were reviewed and or summarized: previous egd reviewed    History was obtained from someone other than the patient: history also obtained from the patient's daughter and family    The case was discussed with another health car provider:

## 2019-09-23 NOTE — CHART NOTE - NSCHARTNOTEFT_GEN_A_CORE
Patient seen and examined throughout the course of the day.    Results of Labs/Imaging discussed as well as patient's plan.    All patient's/family questions answered.  Patient and family encouraged to contact PA with any further issues.

## 2019-09-23 NOTE — PROGRESS NOTE ADULT - SUBJECTIVE AND OBJECTIVE BOX
88 yo F presents to the ED after VN found her very weak, bradycardic and in respiratory distress. Pt  was brought to ER  in by EMS. Pt also gave Hx of having fallen on  Sunday 9/1/19.  Pt has mobility dysfunction and ambulates with walker with difficulty navigating steps with Hx of progressive Parkinsons. . Pt ambulates with a walker few steps.  Pt was noted to be bradycardic 20-30s. Pt had ARF, cardiac arrest and was intubated. Pt was tx for aspiration PNA, pneumoniits and sucessfully extubated but remains on high flow oxygen.  Pt has continued difficulty with dysphagia ans clearing secretions.    interval :  extubated s/p speech and swallow/ failed due to severe swallowing dysfunction, pt remains on O2 via NC and is being fed via feeding tube, issue with cont hypermagnesemia and worsening renal parameters, Mg today 3.2, hold K supplement, start low fluids and add furosemide, trend Mg/electrolytes    PAST MEDICAL & SURGICAL HISTORY:  HTN, ASHD, CAD, CHF  CKD  Parkinson disease  Hypothyroid  Mobility dysfunction, freq falls, sp Rib fractures  OA, DDD, DJD, kyphosis   GERD, HH, diverticulosis, chronic constipation    No significant past surgical history      MEDICATIONS  (STANDING):  amLODIPine   Tablet 5 milliGRAM(s) Oral <User Schedule>  aspirin  chewable 81 milliGRAM(s) Oral daily  buDESOnide 160 MICROgram(s)/formoterol 4.5 MICROgram(s) Inhaler 2 Puff(s) Inhalation two times a day  carbidopa/levodopa  10/100 1 Tablet(s) Oral daily  carbidopa/levodopa  25/100 1 Tablet(s) Oral at bedtime  carvedilol 25 milliGRAM(s) Oral every 12 hours  chlorhexidine 4% Liquid 1 Application(s) Topical <User Schedule>  docusate sodium Liquid 100 milliGRAM(s) Oral three times a day  DULoxetine 60 milliGRAM(s) Oral daily  enoxaparin Injectable 30 milliGRAM(s) SubCutaneous daily  influenza   Vaccine 0.5 milliLiter(s) IntraMuscular once  levothyroxine 75 MICROGram(s) Oral daily  losartan 50 milliGRAM(s) Oral daily  multivitamin  Chewable 1 Tablet(s) Oral daily  nystatin Powder 1 Application(s) Topical two times a day  pantoprazole    Tablet 40 milliGRAM(s) Oral before breakfast  senna 2 Tablet(s) Oral at bedtime    MEDICATIONS  (PRN):  acetaminophen   Tablet .. 650 milliGRAM(s) Oral every 6 hours PRN Temp greater or equal to 38C (100.4F)  acetaminophen  Suppository .. 650 milliGRAM(s) Rectal every 6 hours PRN Temp greater or equal to 38C (100.4F)      Vital Signs Last 24 Hrs  T(C): 35.7 (23 Sep 2019 05:28), Max: 37.8 (22 Sep 2019 09:00)  T(F): 96.2 (23 Sep 2019 05:28), Max: 100 (22 Sep 2019 09:00)  HR: 70 (23 Sep 2019 05:28) (63 - 78)  BP: 201/91 (23 Sep 2019 05:28) (164/78 - 201/91)  BP(mean): --  RR: 16 (23 Sep 2019 05:28) (16 - 18)  SpO2: 99% (22 Sep 2019 10:56) (99% - 99%)    GENERAL: elderly, fragile and  chronically ill looking, resting in bed, NAD + O2 NC, + feeding tube  HEAD:  Atraumatic, Normocephalic  EYES: EOMI, PERRLA, conjunctiva and sclera clear  ENT: Moist mucous membranes   NECK: Supple, No JVD  CHEST/LUNG: Shallow respirations, scattered rhonchi  HEART: S1S2 irreg  ABDOMEN: Globular, soft and benign  EXTREMITIES:  advanced arthritic changes, dec motor function, poor mm mass and tone  NERVOUS SYSTEM:  non focal                                        12  11 )-----------( 442             39.6    154  |  109  |  78  ----------------------------<  330  5.2   |  33  |  2.2  GFR 22  Ca    10.0       Phos  2.3       Mg     2.4, 3.1, 3.2    TPro  7.0  /  Alb  3.8  /  TBili  1.1  /  DBili  x   /  AST  33  /  ALT  5   /  AlkPhos  141<H>  09-18          Recommendations:   · Diagnostic Impressions	severe to profound overall swallow function, discussed in conjunction with Radiologist Landauer. Patient unable to clear residue fatigue noted throughout exam. Excessive nonproductive cough noted post exam as well. Exam terminated post nectar thick liquids due to safety concerns, Dr in agreement.	  · Recommended Consistencies	NPO with alternate means of nutrition/hydration ice chips IF cleared by pulmonary for palliative trials with RN staff of Ice [employer]. SLP will conduct trials of nectar thick liquids in conjunction with therapy Repeat MBS will be conducted in 1-2 weeks when patient is tolerating less respiratory support as well as increased oral motor movements for bolus manipulation	    EXAM:  XR CHEST PORTABLE ROUTINE 1V          EKG 9/22:  afib, PVCs PRWP    PROCEDURE DATE:  09/18/2019            INTERPRETATION:  Clinical History / Reason for exam: Shortness of breath    Comparison : Chest radiograph 9/17/2019.    Technique/Positioning: Portable technique.    Interpretation:    Support devices: Enteric tube remains unchanged    Cardiac/mediastinum/hilum: Stable cardiomegaly and atherosclerotic   changes of aorta    Lung parenchyma/Pleura: Redemonstration of  bilateral lower lobe   opacities greater on the left. No evidence of pneumothorax.    Skeleton/soft tissues: Stable                    DARIAN JONES M.D., ATTENDING RADIOLOGIST  This document has been electronically signed. Sep 18 2019  9:18AM

## 2019-09-23 NOTE — PROGRESS NOTE ADULT - ASSESSMENT
87 year old female admitted s/p fall episode at home with generalized weakness and severe bradycardia ;sp rapid response respiratory failure, sp intubation, aspiration, fever PNA/pneumonitis    # Respiratory Failure - aspiration  secretions  s/p intubation and  bronch extubated seen by SLP    - desaturation on high flow O2,  secretions   -  SLP evaluation  extubated MBBS failed feeding   -  enteral feeding for now need to resolved decision on feeding ; PEG family ok with peg   - at risk for aspiration        # Cardiac / CHF  / HTN    - ff by cardio  - pulse better 60s   - continue lasix for volume overload and CHF  - reviewed echo  - monitor weight and pulse        # Parkinson's / dysphagia   - will probably need PEG if family amenable spoke with daughter ammenable   - continue Sinemet  -repeat  + cont dysphagia    #Hypermagnesemia, worsening renal parameters, Hx of CKD  - repeat Mg today still  high at 3.2  - Hold K supplement  - low dose fluids with furosemide  -monitor electrolytes and renal parameters    # Hypothyroidism     - levothyroxine     # GI and DVT prophylaxis     - pantoprazole   - enoxaparin    advance care  noted  CPR,  full code, but overall guarded state    disposition -   probable STR

## 2019-09-24 LAB
ANION GAP SERPL CALC-SCNC: 8 MMOL/L — SIGNIFICANT CHANGE UP (ref 7–14)
BUN SERPL-MCNC: 71 MG/DL — CRITICAL HIGH (ref 10–20)
CALCIUM SERPL-MCNC: 9.3 MG/DL — SIGNIFICANT CHANGE UP (ref 8.5–10.1)
CHLORIDE SERPL-SCNC: 108 MMOL/L — SIGNIFICANT CHANGE UP (ref 98–110)
CO2 SERPL-SCNC: 36 MMOL/L — HIGH (ref 17–32)
CREAT SERPL-MCNC: 1.6 MG/DL — HIGH (ref 0.7–1.5)
GLUCOSE SERPL-MCNC: 259 MG/DL — HIGH (ref 70–99)
HCT VFR BLD CALC: 38.6 % — SIGNIFICANT CHANGE UP (ref 37–47)
HGB BLD-MCNC: 12.1 G/DL — SIGNIFICANT CHANGE UP (ref 12–16)
MCHC RBC-ENTMCNC: 28 PG — SIGNIFICANT CHANGE UP (ref 27–31)
MCHC RBC-ENTMCNC: 31.3 G/DL — LOW (ref 32–37)
MCV RBC AUTO: 89.4 FL — SIGNIFICANT CHANGE UP (ref 81–99)
NRBC # BLD: 0 /100 WBCS — SIGNIFICANT CHANGE UP (ref 0–0)
PLATELET # BLD AUTO: 326 K/UL — SIGNIFICANT CHANGE UP (ref 130–400)
POTASSIUM SERPL-MCNC: 4.4 MMOL/L — SIGNIFICANT CHANGE UP (ref 3.5–5)
POTASSIUM SERPL-SCNC: 4.4 MMOL/L — SIGNIFICANT CHANGE UP (ref 3.5–5)
RBC # BLD: 4.32 M/UL — SIGNIFICANT CHANGE UP (ref 4.2–5.4)
RBC # FLD: 17.2 % — HIGH (ref 11.5–14.5)
SODIUM SERPL-SCNC: 152 MMOL/L — HIGH (ref 135–146)
WBC # BLD: 9.03 K/UL — SIGNIFICANT CHANGE UP (ref 4.8–10.8)
WBC # FLD AUTO: 9.03 K/UL — SIGNIFICANT CHANGE UP (ref 4.8–10.8)

## 2019-09-24 PROCEDURE — 99232 SBSQ HOSP IP/OBS MODERATE 35: CPT

## 2019-09-24 RX ORDER — SODIUM CHLORIDE 9 MG/ML
1000 INJECTION, SOLUTION INTRAVENOUS
Refills: 0 | Status: DISCONTINUED | OUTPATIENT
Start: 2019-09-24 | End: 2019-09-25

## 2019-09-24 RX ADMIN — Medication 1 TABLET(S): at 11:53

## 2019-09-24 RX ADMIN — BUDESONIDE AND FORMOTEROL FUMARATE DIHYDRATE 2 PUFF(S): 160; 4.5 AEROSOL RESPIRATORY (INHALATION) at 22:26

## 2019-09-24 RX ADMIN — LOSARTAN POTASSIUM 50 MILLIGRAM(S): 100 TABLET, FILM COATED ORAL at 05:33

## 2019-09-24 RX ADMIN — CARBIDOPA AND LEVODOPA 1 TABLET(S): 25; 100 TABLET ORAL at 11:52

## 2019-09-24 RX ADMIN — CARVEDILOL PHOSPHATE 25 MILLIGRAM(S): 80 CAPSULE, EXTENDED RELEASE ORAL at 05:33

## 2019-09-24 RX ADMIN — Medication 100 MILLIGRAM(S): at 05:33

## 2019-09-24 RX ADMIN — NYSTATIN CREAM 1 APPLICATION(S): 100000 CREAM TOPICAL at 05:35

## 2019-09-24 RX ADMIN — AMLODIPINE BESYLATE 5 MILLIGRAM(S): 2.5 TABLET ORAL at 16:53

## 2019-09-24 RX ADMIN — PANTOPRAZOLE SODIUM 40 MILLIGRAM(S): 20 TABLET, DELAYED RELEASE ORAL at 05:33

## 2019-09-24 RX ADMIN — SODIUM CHLORIDE 50 MILLILITER(S): 9 INJECTION INTRAMUSCULAR; INTRAVENOUS; SUBCUTANEOUS at 05:55

## 2019-09-24 RX ADMIN — Medication 75 MICROGRAM(S): at 05:33

## 2019-09-24 RX ADMIN — ENOXAPARIN SODIUM 30 MILLIGRAM(S): 100 INJECTION SUBCUTANEOUS at 11:52

## 2019-09-24 RX ADMIN — SODIUM CHLORIDE 50 MILLILITER(S): 9 INJECTION INTRAMUSCULAR; INTRAVENOUS; SUBCUTANEOUS at 11:51

## 2019-09-24 RX ADMIN — CARVEDILOL PHOSPHATE 25 MILLIGRAM(S): 80 CAPSULE, EXTENDED RELEASE ORAL at 16:53

## 2019-09-24 RX ADMIN — Medication 81 MILLIGRAM(S): at 11:52

## 2019-09-24 RX ADMIN — AMLODIPINE BESYLATE 5 MILLIGRAM(S): 2.5 TABLET ORAL at 05:33

## 2019-09-24 RX ADMIN — CARBIDOPA AND LEVODOPA 1 TABLET(S): 25; 100 TABLET ORAL at 22:27

## 2019-09-24 RX ADMIN — SENNA PLUS 2 TABLET(S): 8.6 TABLET ORAL at 22:27

## 2019-09-24 RX ADMIN — SODIUM CHLORIDE 50 MILLILITER(S): 9 INJECTION, SOLUTION INTRAVENOUS at 13:18

## 2019-09-24 RX ADMIN — NYSTATIN CREAM 1 APPLICATION(S): 100000 CREAM TOPICAL at 16:54

## 2019-09-24 RX ADMIN — DULOXETINE HYDROCHLORIDE 60 MILLIGRAM(S): 30 CAPSULE, DELAYED RELEASE ORAL at 11:53

## 2019-09-24 RX ADMIN — Medication 100 MILLIGRAM(S): at 22:27

## 2019-09-24 NOTE — SWALLOW BEDSIDE ASSESSMENT ADULT - NS SPL SWALLOW CLINIC TRIAL FT
no trials administered at this time. reviewed MBS with family and patient. reviewed risks and benefits of ice chips, mbs, and long term oral feeding prognosis based on history.
No further PO trials initiated secondary to Hx of aspiration and overt s/s of aspiration in the past. Will assess pharyngeal swallow function during MBSS to determine patients swallow function, risk for aspiration, and least restrictive diet.
Patient participating in therapy session today. SLP will continue to f/u with trials of nectar thickened liquids, only conducted by SLP now in therapy session. Will determine candidacy of repeat MBSS.

## 2019-09-24 NOTE — SWALLOW BEDSIDE ASSESSMENT ADULT - SLP GENERAL OBSERVATIONS
in bed o2 via N/C awake and responded with gestures
Patient is awake/alert received in chair with O2 via nasal cannula. Patient able to tell me her name, respond to yes/no questions, and follow commands.
Patient overall more awake/alert than last seen for f/u on 9/19/19. Patient is more attentive, communicating via single words, and able to follow simple commands. Nursing staff states that the patient is asking for water. Patient received in bed receiving O2 via nasal cannula on 2.5L and NGT in place.

## 2019-09-24 NOTE — PROGRESS NOTE ADULT - SUBJECTIVE AND OBJECTIVE BOX
88 yo F presents to the ED after VN found her very weak, bradycardic and in respiratory distress. Pt  was brought to ER  in by EMS. Pt also gave Hx of having fallen on  Sunday 9/1/19.  Pt has mobility dysfunction and ambulates with walker with difficulty navigating steps with Hx of progressive Parkinsons. . Pt ambulates with a walker few steps.  Pt was noted to be bradycardic 20-30s. Pt had ARF, cardiac arrest and was intubated. Pt was tx for aspiration PNA, pneumoniits and sucessfully extubated but remains on high flow oxygen.  Pt has continued difficulty with dysphagia ans clearing secretions.    interval :  extubated s/p speech and swallow/ failed due to severe swallowing dysfunction, pt remains on O2 via NC and is being fed via feeding tube, issue with cont hypermagnesemia and worsening renal parameters, Mg today 3.2, hold K supplement, start low fluids and add furosemide, trend Mg/electrolytes    PAST MEDICAL & SURGICAL HISTORY:  HTN, ASHD, CAD, CHF  CKD  Parkinson disease  Hypothyroid  Mobility dysfunction, freq falls, sp Rib fractures  OA, DDD, DJD, kyphosis   GERD, HH, diverticulosis, chronic constipation    No significant past surgical history      MEDICATIONS  (STANDING):  amLODIPine   Tablet 5 milliGRAM(s) Oral <User Schedule>  aspirin  chewable 81 milliGRAM(s) Oral daily  buDESOnide 160 MICROgram(s)/formoterol 4.5 MICROgram(s) Inhaler 2 Puff(s) Inhalation two times a day  carbidopa/levodopa  10/100 1 Tablet(s) Oral daily  carbidopa/levodopa  25/100 1 Tablet(s) Oral at bedtime  carvedilol 25 milliGRAM(s) Oral every 12 hours  chlorhexidine 4% Liquid 1 Application(s) Topical <User Schedule>  docusate sodium Liquid 100 milliGRAM(s) Oral three times a day  DULoxetine 60 milliGRAM(s) Oral daily  enoxaparin Injectable 30 milliGRAM(s) SubCutaneous daily  influenza   Vaccine 0.5 milliLiter(s) IntraMuscular once  levothyroxine 75 MICROGram(s) Oral daily  losartan 50 milliGRAM(s) Oral daily  multivitamin  Chewable 1 Tablet(s) Oral daily  nystatin Powder 1 Application(s) Topical two times a day  pantoprazole    Tablet 40 milliGRAM(s) Oral before breakfast  senna 2 Tablet(s) Oral at bedtime    MEDICATIONS  (PRN):  acetaminophen   Tablet .. 650 milliGRAM(s) Oral every 6 hours PRN Temp greater or equal to 38C (100.4F)  acetaminophen  Suppository .. 650 milliGRAM(s) Rectal every 6 hours PRN Temp greater or equal to 38C (100.4F)      Vital Signs Last 24 Hrs  T(C): 35.3 (24 Sep 2019 14:07), Max: 37.1 (24 Sep 2019 05:00)  T(F): 95.5 (24 Sep 2019 14:07), Max: 98.8 (24 Sep 2019 05:00)  HR: 68 (24 Sep 2019 18:44) (63 - 75)  BP: 186/79 (24 Sep 2019 18:44) (166/67 - 214/90)  BP(mean): --  RR: 16 (24 Sep 2019 16:05) (16 - 16)  SpO2: 99% (24 Sep 2019 18:44) (99% - 99%)      GENERAL: elderly, fragile and  chronically ill looking, resting in bed, NAD + O2 NC, + feeding tube  HEAD:  Atraumatic, Normocephalic  EYES: EOMI, PERRLA, conjunctiva and sclera clear  ENT: Moist mucous membranes   NECK: Supple, No JVD  CHEST/LUNG: Shallow respirations, scattered rhonchi  HEART: S1S2 irreg  ABDOMEN: Globular, soft and benign  EXTREMITIES:  advanced arthritic changes, dec motor function, poor mm mass and tone  NERVOUS SYSTEM:  non focal                          12.1   9.03  )-----------( 326      ( 24 Sep 2019 07:14 )             38.6     09-24    152<H>  |  108  |  71<HH>  ----------------------------<  259<H>  4.4   |  36<H>  |  1.6<H>    Ca    9.3      24 Sep 2019 07:14  Mg     2.7     09-23    TPro  6.2  /  Alb  3.3<L>  /  TBili  0.6  /  DBili  x   /  AST  19  /  ALT  12  /  AlkPhos  116<H>  09-23

## 2019-09-24 NOTE — SWALLOW BEDSIDE ASSESSMENT ADULT - SWALLOW EVAL: ORAL MUSCULATURE
anomalies present
generally intact
unable to assess due to poor participation/comprehension/Patient will limited assessment due to limited ROM secondary to orogastric tube in place.

## 2019-09-24 NOTE — SWALLOW BEDSIDE ASSESSMENT ADULT - SWALLOW EVAL: RECOMMENDED DIET
NPO with alternate means of nutrition/hydration oral care and ice chips for comfort.
NPO with alternate means of nutrition/hydration.
NPO with ice chips PRN.

## 2019-09-24 NOTE — SWALLOW BEDSIDE ASSESSMENT ADULT - NS ASR SWALLOW FINDINGS DISCUS
granddaughter/Physician/Patient/Family/Nursing
Discussed case in length. Team in agreement with MBSS to further evaluate swallow and determine least restrictive diet./Physician/Nursing/Patient/Family
Nursing/Patient

## 2019-09-24 NOTE — PROGRESS NOTE ADULT - ASSESSMENT
86 yo female pmh bradycardia s/p rapid response 9/6/19 GI initially consulted for endoscopic NG tube placement as NG tube attempts were unsuccessful.     EGD performed: see note large food impaction entire length of esophagus  Food removed and NGT endoscopically placed 9/6/19    GI reconsulted for PEG tube placement  Rec  -Case extensively discussed with family at bedside and patient's HCP, patient unable to swallow again after speech evaluation, PEG to be scheduled s/p electrolyte optimization  -Cardio consult for risk stratification of PEG tube placement if family agreeable 88 yo female pmh bradycardia s/p rapid response 9/6/19 GI initially consulted for endoscopic NG tube placement as NG tube attempts were unsuccessful.     EGD performed: see note large food impaction entire length of esophagus  Food removed and NGT endoscopically placed 9/6/19    GI reconsulted for PEG tube placement  Rec  -Case extensively discussed with family at bedside and patient's HCP, patient unable to swallow again after speech evaluation, PEG to be scheduled s/p electrolyte optimization  -Cardio consult appreciated patient intermediate risk for PEG plaement 88 yo female pmh bradycardia s/p rapid response 9/6/19 GI initially consulted for endoscopic NG tube placement as NG tube attempts were unsuccessful.     EGD performed: see note large food impaction entire length of esophagus  Food removed and NGT endoscopically placed 9/6/19    GI reconsulted for PEG tube placement  Rec  -Case extensively discussed with family at bedside and patient's HCP, patient unable to swallow again after speech evaluation, PEG to be scheduled s/p electrolyte optimization  -Cardio consult appreciated patient intermediate risk for PEG placement

## 2019-09-24 NOTE — PROGRESS NOTE ADULT - ASSESSMENT
ASSESSMENT/PLAN  - acute resp failure - resolved  - aspiration  - Parkinson's disease    Continue  with tube feed Osmotlite 1.5 at 240ml at 7am and noon and 360 ml at 6pm with 2 beneprotein packets daily  check bmp/phos/mg and correct lytes

## 2019-09-24 NOTE — SWALLOW BEDSIDE ASSESSMENT ADULT - SWALLOW EVAL: CURRENT DIET
NPO with NGT in place as primary means of nutrition/hydration.
NPO with alternate means of nutrition/hydration
NPO with orogastric tube in place

## 2019-09-24 NOTE — SWALLOW BEDSIDE ASSESSMENT ADULT - ASR SWALLOW LABIAL MOBILITY
impaired pursing/impaired coordination/dry cracked/impaired retraction/impaired seal
impaired pursing/impaired coordination/impaired seal

## 2019-09-24 NOTE — SWALLOW BEDSIDE ASSESSMENT ADULT - SWALLOW EVAL: DIAGNOSIS
Mild-moderate oral impairment with suspected s/s of pharyngeal impairment noted during clinical bedside swallow assessment.
palliative diet candidate, repeat MBS post PEG. MARCE Judd and MARCE Nunn agree with ice chips for comfort.
Mild oral impairment, with no s/s of pharyngeal impairment noted at the bedside.

## 2019-09-24 NOTE — SWALLOW BEDSIDE ASSESSMENT ADULT - ASR SWALLOW LINGUAL MOBILITY
impaired protrusion/impaired anterior elevation/impaired right lateral movement/impaired left lateral movement
impaired anterior elevation/impaired left lateral movement/impaired right lateral movement

## 2019-09-24 NOTE — PROGRESS NOTE ADULT - SUBJECTIVE AND OBJECTIVE BOX
Patient is a 87y old  Female who presents with a chief complaint of Weakness and bradycardia (23 Sep 2019 14:04)      T(F): 98.8 (09-24-19 @ 05:00), Max: 98.8 (09-24-19 @ 05:00)  HR: 71 (09-24-19 @ 05:00)  BP: 183/74 (09-24-19 @ 05:00)  RR: 16 (09-24-19 @ 05:00)  SpO2: --    PHYSICAL EXAM:  GENERAL: NAD, well-groomed, well-developed  HEAD:  Atraumatic, Normocephalic  EYES: EOMI, PERRLA, conjunctiva and sclera clear  ENMT: No tonsillar erythema, exudates, or enlargement; Moist mucous membranes, Good dentition, No lesions  NECK: Supple, No JVD, Normal thyroid  NERVOUS SYSTEM:  Alert & Oriented X3,  Motor Strength 5/5 B/L upper and lower extremities  CHEST/LUNG: Clear to percussion bilaterally; No rales, rhonchi, wheezing, or rubs  HEART: Regular rate and rhythm; No murmurs, rubs, or gallops  ABDOMEN: Soft, Nontender, Nondistended; Bowel sounds present  EXTREMITIES:   No clubbing, cyanosis, or edema  LYMPH: No lymphadenopathy noted  SKIN: No rashes or lesions    labs  09-24    152<H>  |  108  |  71<HH>  ----------------------------<  259<H>  4.4   |  36<H>  |  1.6<H>    Ca    9.3      24 Sep 2019 07:14  Mg     2.7     09-23    TPro  6.2  /  Alb  3.3<L>  /  TBili  0.6  /  DBili  x   /  AST  19  /  ALT  12  /  AlkPhos  116<H>  09-23                          12.1   9.03  )-----------( 326      ( 24 Sep 2019 07:14 )             38.6               acetaminophen   Tablet .. 650 milliGRAM(s) Oral every 6 hours PRN  acetaminophen  Suppository .. 650 milliGRAM(s) Rectal every 6 hours PRN  amLODIPine   Tablet 5 milliGRAM(s) Oral <User Schedule>  aspirin  chewable 81 milliGRAM(s) Oral daily  buDESOnide 160 MICROgram(s)/formoterol 4.5 MICROgram(s) Inhaler 2 Puff(s) Inhalation two times a day  carbidopa/levodopa  10/100 1 Tablet(s) Oral daily  carbidopa/levodopa  25/100 1 Tablet(s) Oral at bedtime  carvedilol 25 milliGRAM(s) Oral every 12 hours  chlorhexidine 4% Liquid 1 Application(s) Topical <User Schedule>  docusate sodium Liquid 100 milliGRAM(s) Oral three times a day  DULoxetine 60 milliGRAM(s) Oral daily  enoxaparin Injectable 30 milliGRAM(s) SubCutaneous daily  influenza   Vaccine 0.5 milliLiter(s) IntraMuscular once  levothyroxine 75 MICROGram(s) Oral daily  losartan 50 milliGRAM(s) Oral daily  multivitamin  Chewable 1 Tablet(s) Oral daily  nystatin Powder 1 Application(s) Topical two times a day  pantoprazole    Tablet 40 milliGRAM(s) Oral before breakfast  senna 2 Tablet(s) Oral at bedtime  sodium chloride 0.9%. 1000 milliLiter(s) IV Continuous <Continuous>

## 2019-09-24 NOTE — PROGRESS NOTE ADULT - SUBJECTIVE AND OBJECTIVE BOX
87yFemale  Being followed for PEG tube placement  Interval history: Patient denies nausea, vomiting, hematemesis, melena, blood in stool, diarrhea, constipation, abdominal pain. Patient nods head and gives occasional one word answers.      PAST MEDICAL & SURGICAL HISTORY:  Parkinson disease  Hypothyroid  Rib fractures  Congestive heart failure  Hypertension  No significant past surgical history          Social History: No smoking. No alcohol. No illegal drug use.          MEDICATIONS:  MEDICATIONS  (STANDING):  amLODIPine   Tablet 5 milliGRAM(s) Oral <User Schedule>  aspirin  chewable 81 milliGRAM(s) Oral daily  buDESOnide 160 MICROgram(s)/formoterol 4.5 MICROgram(s) Inhaler 2 Puff(s) Inhalation two times a day  carbidopa/levodopa  10/100 1 Tablet(s) Oral daily  carbidopa/levodopa  25/100 1 Tablet(s) Oral at bedtime  carvedilol 25 milliGRAM(s) Oral every 12 hours  chlorhexidine 4% Liquid 1 Application(s) Topical <User Schedule>  docusate sodium Liquid 100 milliGRAM(s) Oral three times a day  DULoxetine 60 milliGRAM(s) Oral daily  enoxaparin Injectable 30 milliGRAM(s) SubCutaneous daily  influenza   Vaccine 0.5 milliLiter(s) IntraMuscular once  levothyroxine 75 MICROGram(s) Oral daily  losartan 50 milliGRAM(s) Oral daily  multivitamin  Chewable 1 Tablet(s) Oral daily  nystatin Powder 1 Application(s) Topical two times a day  pantoprazole    Tablet 40 milliGRAM(s) Oral before breakfast  senna 2 Tablet(s) Oral at bedtime  sodium chloride 0.9%. 1000 milliLiter(s) (50 mL/Hr) IV Continuous <Continuous>    MEDICATIONS  (PRN):  acetaminophen   Tablet .. 650 milliGRAM(s) Oral every 6 hours PRN Temp greater or equal to 38C (100.4F)  acetaminophen  Suppository .. 650 milliGRAM(s) Rectal every 6 hours PRN Temp greater or equal to 38C (100.4F)      Allergies:    No Known Allergies    Intolerances          REVIEW OF SYSTEMS:  Full ROS unobtainable patient able to say no to nausea, vomiting, hematemesis, melena, blood in stool, diarrhea, constipation, abdominal pain        VITAL SIGNS:   T(F): 98.8 (09-24-19 @ 05:00), Max: 98.8 (09-24-19 @ 05:00)  HR: 71 (09-24-19 @ 05:00) (61 - 71)  BP: 183/74 (09-24-19 @ 05:00) (153/69 - 183/74)  RR: 16 (09-24-19 @ 05:00) (16 - 16)  SpO2: --    PHYSICAL EXAM:  GENERAL: +NG tube, no acute distress.  HEAD:  Atraumatic, Normocephalic  EYES: conjunctiva and sclera clear  NECK: Supple, no JVD or thyromegaly  CHEST/LUNG: Clear to auscultation bilaterally; No wheeze, rhonchi, or rales  HEART: Regular rate and rhythm; normal S1, S2, No murmurs.  ABDOMEN: Soft, nontender, nondistended; Bowel sounds present, no abdominal bruit, masses, or hepatosplenomegaly  NEUROLOGY: No asterixis or tremor.   SKIN: Intact, no jaundice            LABS:                        12.1   9.03  )-----------( 326      ( 24 Sep 2019 07:14 )             38.6     09-24    152<H>  |  108  |  71<HH>  ----------------------------<  259<H>  4.4   |  36<H>  |  1.6<H>    Ca    9.3      24 Sep 2019 07:14  Mg     2.7     09-23    TPro  6.2  /  Alb  3.3<L>  /  TBili  0.6  /  DBili  x   /  AST  19  /  ALT  12  /  AlkPhos  116<H>  09-23    LIVER FUNCTIONS - ( 23 Sep 2019 08:18 )  Alb: 3.3 g/dL / Pro: 6.2 g/dL / ALK PHOS: 116 U/L / ALT: 12 U/L / AST: 19 U/L / GGT: x               IMAGING:    < from: Xray Chest 1 View- PORTABLE-Routine (09.21.19 @ 09:50) >  EXAM:  XR CHEST PORTABLE ROUTINE 1V            PROCEDURE DATE:  09/21/2019            INTERPRETATION:  Clinical History / Reason for exam: Opacities    Comparison : Chest radiograph September 20, 2019.    Technique/Positioning: AP.    Findings:    Support devices: An enteric tube is seen projecting under the diaphragm.    Cardiac/mediastinum/hilum: Stable cardiomegaly.    Lung parenchyma/Pleura: Left basilar opacity/pleural effusion, stable.   Right basilar atelectasis, stable.    Skeleton/softtissues: Stable.    Impression:      Stable left basilar opacity/pleural effusion and right basilar   atelectasis.                      KAREN HEMPHILL M.D., ATTENDING RADIOLOGIST  This document has been electronically signed. Sep 21 2019  1:43PM              < end of copied text >

## 2019-09-24 NOTE — PROGRESS NOTE ADULT - ASSESSMENT
Patient with no chest pain. No SOB.   HR acceptable  GI DVT prophylaxis,.    OOB to chair, If stable.  Nutritional support. BP labile . Will need PEG  . Risk procedure intermediate. Watch for aspiration.  Prognosis guarded

## 2019-09-24 NOTE — PROGRESS NOTE ADULT - ASSESSMENT
87 year old female admitted s/p fall episode at home with generalized weakness and severe bradycardia ;sp rapid response respiratory failure, sp intubation, aspiration, fever PNA/pneumonitis    # Respiratory Failure - aspiration  secretions  s/p intubation and  bronch extubated seen by SLP    - desaturation on high flow O2,  secretions   -  SLP evaluation  extubated MBBS failed feeding   -  enteral feeding for now need to resolved decision on feeding ; PEG family ok with peg   - at risk for aspiration        # Cardiac / CHF  / HTN    - ff by cardio  - pulse better 60s   - continue lasix for volume overload and CHF  - reviewed echo  - monitor weight and pulse        # Parkinson's / dysphagia   - will probably need PEG if family amenable spoke with daughter ammenable   - continue Sinemet  -repeat  + cont dysphagia    #Hypermagnesemia, worsening renal parameters, Hx of CKD  - repeat Mg improving  - Hold K supplement  - lfree wATER   -monitor electrolytes and renal parameters    # Hypothyroidism     - levothyroxine     # GI and DVT prophylaxis     - pantoprazole   - enoxaparin    advance care  noted  CPR,  full code, but overall guarded state    disposition -   probable STR

## 2019-09-24 NOTE — PROGRESS NOTE ADULT - ATTENDING COMMENTS
Attending Statement: I have personally performed a face to face diagnostic evaluation on this patient. I have written the above note pertaining to the history, exam and plan of care.
I personally examined the patient. Agree with decision to place PEg after optimization of patient.

## 2019-09-24 NOTE — SWALLOW BEDSIDE ASSESSMENT ADULT - COMMENTS
Patient with minimal improvement in overall coordination and tone of oral musculature. Discussed risk benefit of MBS repetition prior to PEG, granddaughter agreed. Patient has verbalized to family she wants a feeding tube. Patient would be candidate after procedure for evaluation for pleasure feedings. Patient gestured agreement.

## 2019-09-24 NOTE — SWALLOW BEDSIDE ASSESSMENT ADULT - SLP PERTINENT HISTORY OF CURRENT PROBLEM
history of dysphagia for more than 1 year; cardiac arrest in hospital at breakfast with intubation, required EGD at bedside to remove food from esophageus. Patient with significant oral impairment that has slowly improved. However significant pharyngeal impairment was noted on MBS with high risk of aspiration. Patient not likely improved enough to meet nutritional needs safely by mouth/ see below for PMHx
GI performed EGD 9/9/19 that shows food in stomach that was cleared. Family at bedside reports coughing/choking episodes at home prior to hospitalization.
MBSS completed 9/17/19 with findings of severe to profound overall swallow function. Patient is no longer on high flow since MBSS and is now receiving O2 via nasal cannula on 2.5L.

## 2019-09-24 NOTE — PROGRESS NOTE ADULT - SUBJECTIVE AND OBJECTIVE BOX
Patient is a 87y old  Female who presents with a chief complaint of Weakness and bradycardia (24 Sep 2019 11:48)  pt seen and evaluated awake and alert  on ngt feed    ICU Vital Signs Last 24 Hrs  T(C): 37.1 (24 Sep 2019 05:00), Max: 37.1 (24 Sep 2019 05:00)  T(F): 98.8 (24 Sep 2019 05:00), Max: 98.8 (24 Sep 2019 05:00)  HR: 71 (24 Sep 2019 05:00) (69 - 71)  BP: 183/74 (24 Sep 2019 05:00) (166/67 - 183/74)  RR: 16 (24 Sep 2019 05:00) (16 - 16)  Drug Dosing Weight  Height (cm): 152.4 (20 Sep 2019 16:38)  Weight (kg): 64.2 (21 Sep 2019 08:30)  BMI (kg/m2): 27.6 (21 Sep 2019 08:30)  BSA (m2): 1.61 (21 Sep 2019 08:30)    I&O's Detail    23 Sep 2019 07:01  -  24 Sep 2019 07:00  --------------------------------------------------------  IN:    Enteral Tube Flush: 200 mL    Osmolite: 600 mL  Total IN: 800 mL    OUT:    Voided: 700 mL  Total OUT: 700 mL    Total NET: 100 mL       PHYSICAL EXAM:  Constitutional: alert and awake   NGT in place    MEDICATIONS  (STANDING):  amLODIPine   Tablet 5 milliGRAM(s) Oral <User Schedule>  aspirin  chewable 81 milliGRAM(s) Oral daily  buDESOnide 160 MICROgram(s)/formoterol 4.5 MICROgram(s) Inhaler 2 Puff(s) Inhalation two times a day  carbidopa/levodopa  10/100 1 Tablet(s) Oral daily  carbidopa/levodopa  25/100 1 Tablet(s) Oral at bedtime  carvedilol 25 milliGRAM(s) Oral every 12 hours  chlorhexidine 4% Liquid 1 Application(s) Topical <User Schedule>  docusate sodium Liquid 100 milliGRAM(s) Oral three times a day  DULoxetine 60 milliGRAM(s) Oral daily  enoxaparin Injectable 30 milliGRAM(s) SubCutaneous daily  influenza   Vaccine 0.5 milliLiter(s) IntraMuscular once  levothyroxine 75 MICROGram(s) Oral daily  losartan 50 milliGRAM(s) Oral daily  multivitamin  Chewable 1 Tablet(s) Oral daily  nystatin Powder 1 Application(s) Topical two times a day  pantoprazole    Tablet 40 milliGRAM(s) Oral before breakfast  senna 2 Tablet(s) Oral at bedtime  sodium chloride 0.9%. 1000 milliLiter(s) (50 mL/Hr) IV Continuous <Continuous>      Diet, NPO with Tube Feed:   Tube Feeding Modality: Nasogastric  Osmolite 1.5 Taiwo  Total Volume for 24 Hours (mL): 720  Bolus   Total Volume of Bolus (mL):  240  Bolus Feed Rate (mL per Hour): 480   Bolus Feed Duration (in Hours): 0.5  Tube Feed Frequency: Every 8 hours   Tube Feed Start Time: 18:00  Beneprotein ([facility] Only)     Qty per Day:  2 (09-17-19 @ 15:51)      LABS  09-24    152<H>  |  108  |  71<HH>  ----------------------------<  259<H>  4.4   |  36<H>  |  1.6<H>    Ca    9.3      24 Sep 2019 07:14  Mg     2.7     09-23    TPro  6.2  /  Alb  3.3<L>  /  TBili  0.6  /  DBili  x   /  AST  19  /  ALT  12  /  AlkPhos  116<H>  09-23                        12.1   9.03  )-----------( 326      ( 24 Sep 2019 07:14 )             38.6     CAPILLARY BLOOD GLUCOSE  POCT Blood Glucose.: 362 mg/dL (23 Sep 2019 20:51)  POCT Blood Glucose.: 250 mg/dL (23 Sep 2019 16:33)   RADIOLOGY STUDIES  < from: Xray Chest 1 View- PORTABLE-Routine (09.21.19 @ 09:50) >  Impression:    Stable left basilar opacity/pleural effusion and right basilar   atelectasis.

## 2019-09-24 NOTE — SWALLOW BEDSIDE ASSESSMENT ADULT - H & P REVIEW
yes
87 year old female presents to ED c/o bradycardia, noticed by visiting nurse today and called EMS. Patient also states she felt forward and flat on 9/1/19. Chest X-Ray on 9/14/19 remarkable for right opacity/pleural effusion, decreased and left opacity, increased; stable cardiomegaly. PMHx significant for Parkinson Disease./yes
yes

## 2019-09-25 DIAGNOSIS — N17.9 ACUTE KIDNEY FAILURE, UNSPECIFIED: ICD-10-CM

## 2019-09-25 DIAGNOSIS — E87.0 HYPEROSMOLALITY AND HYPERNATREMIA: ICD-10-CM

## 2019-09-25 DIAGNOSIS — I10 ESSENTIAL (PRIMARY) HYPERTENSION: ICD-10-CM

## 2019-09-25 LAB
ALBUMIN SERPL ELPH-MCNC: 3.4 G/DL — LOW (ref 3.5–5.2)
ALP SERPL-CCNC: 110 U/L — SIGNIFICANT CHANGE UP (ref 30–115)
ALT FLD-CCNC: 6 U/L — SIGNIFICANT CHANGE UP (ref 0–41)
ANION GAP SERPL CALC-SCNC: 11 MMOL/L — SIGNIFICANT CHANGE UP (ref 7–14)
ANION GAP SERPL CALC-SCNC: 11 MMOL/L — SIGNIFICANT CHANGE UP (ref 7–14)
AST SERPL-CCNC: 21 U/L — SIGNIFICANT CHANGE UP (ref 0–41)
BILIRUB SERPL-MCNC: 0.7 MG/DL — SIGNIFICANT CHANGE UP (ref 0.2–1.2)
BUN SERPL-MCNC: 58 MG/DL — HIGH (ref 10–20)
BUN SERPL-MCNC: 62 MG/DL — CRITICAL HIGH (ref 10–20)
CALCIUM SERPL-MCNC: 9.3 MG/DL — SIGNIFICANT CHANGE UP (ref 8.5–10.1)
CALCIUM SERPL-MCNC: 9.5 MG/DL — SIGNIFICANT CHANGE UP (ref 8.5–10.1)
CHLORIDE SERPL-SCNC: 107 MMOL/L — SIGNIFICANT CHANGE UP (ref 98–110)
CHLORIDE SERPL-SCNC: 109 MMOL/L — SIGNIFICANT CHANGE UP (ref 98–110)
CO2 SERPL-SCNC: 31 MMOL/L — SIGNIFICANT CHANGE UP (ref 17–32)
CO2 SERPL-SCNC: 33 MMOL/L — HIGH (ref 17–32)
CREAT SERPL-MCNC: 1.4 MG/DL — SIGNIFICANT CHANGE UP (ref 0.7–1.5)
CREAT SERPL-MCNC: 1.4 MG/DL — SIGNIFICANT CHANGE UP (ref 0.7–1.5)
GLUCOSE BLDC GLUCOMTR-MCNC: 193 MG/DL — HIGH (ref 70–99)
GLUCOSE BLDC GLUCOMTR-MCNC: 277 MG/DL — HIGH (ref 70–99)
GLUCOSE BLDC GLUCOMTR-MCNC: 325 MG/DL — HIGH (ref 70–99)
GLUCOSE SERPL-MCNC: 266 MG/DL — HIGH (ref 70–99)
GLUCOSE SERPL-MCNC: 315 MG/DL — HIGH (ref 70–99)
HCT VFR BLD CALC: 38.7 % — SIGNIFICANT CHANGE UP (ref 37–47)
HGB BLD-MCNC: 12.1 G/DL — SIGNIFICANT CHANGE UP (ref 12–16)
MAGNESIUM SERPL-MCNC: 2.6 MG/DL — HIGH (ref 1.8–2.4)
MCHC RBC-ENTMCNC: 27.8 PG — SIGNIFICANT CHANGE UP (ref 27–31)
MCHC RBC-ENTMCNC: 31.3 G/DL — LOW (ref 32–37)
MCV RBC AUTO: 89 FL — SIGNIFICANT CHANGE UP (ref 81–99)
NRBC # BLD: 0 /100 WBCS — SIGNIFICANT CHANGE UP (ref 0–0)
PHOSPHATE SERPL-MCNC: 3.1 MG/DL — SIGNIFICANT CHANGE UP (ref 2.1–4.9)
PLATELET # BLD AUTO: 275 K/UL — SIGNIFICANT CHANGE UP (ref 130–400)
POTASSIUM SERPL-MCNC: 3.6 MMOL/L — SIGNIFICANT CHANGE UP (ref 3.5–5)
POTASSIUM SERPL-MCNC: 4.1 MMOL/L — SIGNIFICANT CHANGE UP (ref 3.5–5)
POTASSIUM SERPL-SCNC: 3.6 MMOL/L — SIGNIFICANT CHANGE UP (ref 3.5–5)
POTASSIUM SERPL-SCNC: 4.1 MMOL/L — SIGNIFICANT CHANGE UP (ref 3.5–5)
PROT SERPL-MCNC: 6.4 G/DL — SIGNIFICANT CHANGE UP (ref 6–8)
RBC # BLD: 4.35 M/UL — SIGNIFICANT CHANGE UP (ref 4.2–5.4)
RBC # FLD: 16.9 % — HIGH (ref 11.5–14.5)
SODIUM SERPL-SCNC: 149 MMOL/L — HIGH (ref 135–146)
SODIUM SERPL-SCNC: 153 MMOL/L — HIGH (ref 135–146)
WBC # BLD: 7.58 K/UL — SIGNIFICANT CHANGE UP (ref 4.8–10.8)
WBC # FLD AUTO: 7.58 K/UL — SIGNIFICANT CHANGE UP (ref 4.8–10.8)

## 2019-09-25 RX ORDER — HYDROCHLOROTHIAZIDE 25 MG
25 TABLET ORAL AT BEDTIME
Refills: 0 | Status: DISCONTINUED | OUTPATIENT
Start: 2019-09-25 | End: 2019-10-03

## 2019-09-25 RX ORDER — SODIUM CHLORIDE 9 MG/ML
1000 INJECTION, SOLUTION INTRAVENOUS
Refills: 0 | Status: DISCONTINUED | OUTPATIENT
Start: 2019-09-25 | End: 2019-09-29

## 2019-09-25 RX ADMIN — Medication 75 MICROGRAM(S): at 05:48

## 2019-09-25 RX ADMIN — Medication 100 MILLIGRAM(S): at 21:13

## 2019-09-25 RX ADMIN — CARVEDILOL PHOSPHATE 25 MILLIGRAM(S): 80 CAPSULE, EXTENDED RELEASE ORAL at 05:48

## 2019-09-25 RX ADMIN — Medication 100 MILLIGRAM(S): at 05:48

## 2019-09-25 RX ADMIN — AMLODIPINE BESYLATE 5 MILLIGRAM(S): 2.5 TABLET ORAL at 17:55

## 2019-09-25 RX ADMIN — PANTOPRAZOLE SODIUM 40 MILLIGRAM(S): 20 TABLET, DELAYED RELEASE ORAL at 06:13

## 2019-09-25 RX ADMIN — NYSTATIN CREAM 1 APPLICATION(S): 100000 CREAM TOPICAL at 17:56

## 2019-09-25 RX ADMIN — CHLORHEXIDINE GLUCONATE 1 APPLICATION(S): 213 SOLUTION TOPICAL at 08:00

## 2019-09-25 RX ADMIN — NYSTATIN CREAM 1 APPLICATION(S): 100000 CREAM TOPICAL at 05:48

## 2019-09-25 RX ADMIN — AMLODIPINE BESYLATE 5 MILLIGRAM(S): 2.5 TABLET ORAL at 05:48

## 2019-09-25 RX ADMIN — LOSARTAN POTASSIUM 50 MILLIGRAM(S): 100 TABLET, FILM COATED ORAL at 05:48

## 2019-09-25 RX ADMIN — SENNA PLUS 2 TABLET(S): 8.6 TABLET ORAL at 21:13

## 2019-09-25 RX ADMIN — Medication 25 MILLIGRAM(S): at 21:13

## 2019-09-25 RX ADMIN — CARBIDOPA AND LEVODOPA 1 TABLET(S): 25; 100 TABLET ORAL at 11:17

## 2019-09-25 RX ADMIN — CARBIDOPA AND LEVODOPA 1 TABLET(S): 25; 100 TABLET ORAL at 21:16

## 2019-09-25 RX ADMIN — Medication 81 MILLIGRAM(S): at 11:16

## 2019-09-25 RX ADMIN — CARVEDILOL PHOSPHATE 25 MILLIGRAM(S): 80 CAPSULE, EXTENDED RELEASE ORAL at 17:56

## 2019-09-25 RX ADMIN — Medication 100 MILLIGRAM(S): at 14:01

## 2019-09-25 RX ADMIN — BUDESONIDE AND FORMOTEROL FUMARATE DIHYDRATE 2 PUFF(S): 160; 4.5 AEROSOL RESPIRATORY (INHALATION) at 19:41

## 2019-09-25 RX ADMIN — Medication 1 TABLET(S): at 14:01

## 2019-09-25 RX ADMIN — ENOXAPARIN SODIUM 30 MILLIGRAM(S): 100 INJECTION SUBCUTANEOUS at 11:17

## 2019-09-25 RX ADMIN — DULOXETINE HYDROCHLORIDE 60 MILLIGRAM(S): 30 CAPSULE, DELAYED RELEASE ORAL at 11:16

## 2019-09-25 RX ADMIN — BUDESONIDE AND FORMOTEROL FUMARATE DIHYDRATE 2 PUFF(S): 160; 4.5 AEROSOL RESPIRATORY (INHALATION) at 08:02

## 2019-09-25 NOTE — PROGRESS NOTE ADULT - SUBJECTIVE AND OBJECTIVE BOX
86 yo F presents to the ED after VN found her very weak, bradycardic and in respiratory distress. Pt  was brought to ER  in by EMS. Pt also gave Hx of having fallen on  Sunday 9/1/19.  Pt has mobility dysfunction and ambulates with walker with difficulty navigating steps with Hx of progressive Parkinsons. . Pt ambulates with a walker few steps.  Pt was noted to be bradycardic 20-30s. Pt had ARF, cardiac arrest and was intubated. Pt was tx for aspiration PNA, pneumoniits and sucessfully extubated but remains on high flow oxygen.  Pt has continued difficulty with dysphagia ans clearing secretions.    interval :  extubated s/p speech and swallow/ failed due to severe swallowing dysfunction, pt remains on O2 via NC and is being fed via feeding tube, issue with cont hypermagnesemia and worsening renal parameters, Mg today 3.2, hold K supplement, start low fluids and add furosemide, trend Mg/electrolytes    PAST MEDICAL & SURGICAL HISTORY:  HTN, ASHD, CAD, CHF  CKD  Parkinson disease  Hypothyroid  Mobility dysfunction, freq falls, sp Rib fractures  OA, DDD, DJD, kyphosis   GERD, HH, diverticulosis, chronic constipation    No significant past surgical history      MEDICATIONS  (STANDING):  amLODIPine   Tablet 5 milliGRAM(s) Oral <User Schedule>  aspirin  chewable 81 milliGRAM(s) Oral daily  buDESOnide 160 MICROgram(s)/formoterol 4.5 MICROgram(s) Inhaler 2 Puff(s) Inhalation two times a day  carbidopa/levodopa  10/100 1 Tablet(s) Oral daily  carbidopa/levodopa  25/100 1 Tablet(s) Oral at bedtime  carvedilol 25 milliGRAM(s) Oral every 12 hours  chlorhexidine 4% Liquid 1 Application(s) Topical <User Schedule>  docusate sodium Liquid 100 milliGRAM(s) Oral three times a day  DULoxetine 60 milliGRAM(s) Oral daily  enoxaparin Injectable 30 milliGRAM(s) SubCutaneous daily  influenza   Vaccine 0.5 milliLiter(s) IntraMuscular once  levothyroxine 75 MICROGram(s) Oral daily  losartan 50 milliGRAM(s) Oral daily  multivitamin  Chewable 1 Tablet(s) Oral daily  nystatin Powder 1 Application(s) Topical two times a day  pantoprazole    Tablet 40 milliGRAM(s) Oral before breakfast  senna 2 Tablet(s) Oral at bedtime    MEDICATIONS  (PRN):  acetaminophen   Tablet .. 650 milliGRAM(s) Oral every 6 hours PRN Temp greater or equal to 38C (100.4F)  acetaminophen  Suppository .. 650 milliGRAM(s) Rectal every 6 hours PRN Temp greater or equal to 38C (100.4F)    Vital Signs Last 24 Hrs  T(C): 36.8 (25 Sep 2019 06:16), Max: 37.1 (24 Sep 2019 21:03)  T(F): 98.3 (25 Sep 2019 06:16), Max: 98.7 (24 Sep 2019 21:03)  HR: 63 (25 Sep 2019 06:16) (63 - 75)  BP: 168/70 (25 Sep 2019 06:16) (168/70 - 214/90)  BP(mean): --  RR: 16 (25 Sep 2019 06:16) (16 - 16)  SpO2: 99% (24 Sep 2019 18:44) (99% - 99%)    GENERAL: elderly, fragile and  chronically ill looking, resting in bed, NAD + O2 NC, + feeding tube  HEAD:  Atraumatic, Normocephalic  EYES: EOMI, PERRLA, conjunctiva and sclera clear  ENT: Moist mucous membranes   NECK: Supple, No JVD  CHEST/LUNG: Shallow respirations, scattered rhonchi  HEART: S1S2 irreg  ABDOMEN: Globular, soft and benign  EXTREMITIES:  advanced arthritic changes, dec motor function, poor mm mass and tone  NERVOUS SYSTEM:  non focal                          12.1   9.03  )-----------( 326      ( 24 Sep 2019 07:14 )             38.6     09-24    152<H>  |  108  |  71<HH>  ----------------------------<  259<H>  4.4   |  36<H>  |  1.6<H>    Ca    9.3      24 Sep 2019 07:14  Mg     2.7     09-23    TPro  6.2  /  Alb  3.3<L>  /  TBili  0.6  /  DBili  x   /  AST  19  /  ALT  12  /  AlkPhos  116<H>  09-23

## 2019-09-25 NOTE — CONSULT NOTE ADULT - PROBLEM SELECTOR RECOMMENDATION 9
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  recall if needed
no evidence CHB/ no b blockers/ cardio f/u/ echo/ low risk tele/ hemodynamically stable
Can continue 1/2 NS IV until oral feeds maximized, then d/c to avoid CHF  No change in free water as serum Na+ decreasing.  Increase volume if serum Na+ starts going up again

## 2019-09-25 NOTE — CONSULT NOTE ADULT - ASSESSMENT
1)  Mild hypernatremia, overall improving, likely due to poor oral intake secondary to severe dysphagia, diuresis w/ Lasix    2)  GHASSAN, overall improved, but still w/ some residual dysfunction.  ? if occurred due to cardiac arrest, diuresis    3)  Severely-uncontrolled HTN

## 2019-09-25 NOTE — CONSULT NOTE ADULT - CONSULT REASON
Endoscopic NG tube Placement
R/o pneumonia
bradycardia
bradycardia
hypernatremia
resp arrest
weakness
enteral feeding

## 2019-09-25 NOTE — CONSULT NOTE ADULT - CONSULT REQUESTED DATE/TIME
03-Sep-2019 19:39
04-Sep-2019 10:29
06-Sep-2019 13:37
06-Sep-2019 15:33
13-Sep-2019 06:29
25-Sep-2019 17:30
04-Sep-2019 08:16
10-Sep-2019 10:47

## 2019-09-25 NOTE — CONSULT NOTE ADULT - SUBJECTIVE AND OBJECTIVE BOX
86 yo F presents to the ED c/o bradycardia, noticed by visiting Nurse today and she called EMS. Pt also states she felt forward and flat on Sunday 9/1/19 and has been anxious to climb up or down the stairs. Pt ambulates with a walker at home. Pt denies any similar episodes, traveling, exposure to ticks, fevers, chills, headaches, changes in mentation, visual changes, CP, SOB, abdominal pain, dysuria, changes in BM, rashes, skin discolorations, new medications. Admits to nausea but without vomiting.     PAST MEDICAL HISTORY:  Congestive heart failure   Hypertension   Hypothyroid   Parkinson disease   Rib fractures     On 9/6 the patient was found with blue lips and altered mental status. During code and intubation the patient was found to have aspirated. pt remains intubate, is not on pressors.    T(F): 98 (10 Sep 2019 09:00), Max: 99.4 (10 Sep 2019 01:00)  HR: 49 (10 Sep 2019 10:47) (45 - 54)  BP: 138/63 (10 Sep 2019 07:00) (122/56 - 147/62)  BP(mean): 90 (10 Sep 2019 07:00) (81 - 111)  RR: 12 (10 Sep 2019 09:00) (12 - 12)  SpO2: 99% (10 Sep 2019 10:47) (98% - 100%)  Drug Dosing Weight  Height (cm): 152.4 (03 Sep 2019 14:48)  Weight (kg): 67.9 (09 Sep 2019 04:00)  BMI (kg/m2): 29.2 (09 Sep 2019 04:00)  BSA (m2): 1.65 (09 Sep 2019 04:00)  intubated, sedated, undergoing bronchoscopy  abd soft, ND, NT, + BM  + mild temporal wasting mostly c/w age  no c/c/e  peripheral iv access    MEDICATIONS  (STANDING):  aspirin  chewable 81 milliGRAM(s) Oral daily  buDESOnide 160 MICROgram(s)/formoterol 4.5 MICROgram(s) Inhaler 2 Puff(s) Inhalation two times a day  carbidopa/levodopa  10/100 1 Tablet(s) Oral daily  carbidopa/levodopa  25/100 1 Tablet(s) Oral at bedtime  carvedilol 6.25 milliGRAM(s) Oral every 12 hours  cefepime   IVPB 2000 milliGRAM(s) IV Intermittent every 12 hours  chlorhexidine 0.12% Liquid 15 milliLiter(s) Oral Mucosa every 12 hours  chlorhexidine 4% Liquid 1 Application(s) Topical <User Schedule>  DULoxetine 60 milliGRAM(s) Oral daily  enoxaparin Injectable 30 milliGRAM(s) SubCutaneous daily  fentaNYL   Infusion. 0.5 MICROgram(s)/kG/Hr (3.395 mL/Hr) IV Continuous <Continuous>  influenza   Vaccine 0.5 milliLiter(s) IntraMuscular once  levothyroxine 75 MICROGram(s) Oral daily  midazolam Infusion 0.02 mG/kG/Hr (1.306 mL/Hr) IV Continuous <Continuous>  multivitamin/minerals 1 Tablet(s) Oral daily  nystatin Powder 1 Application(s) Topical two times a day  pantoprazole    Tablet 40 milliGRAM(s) Oral before breakfast  pregabalin 100 milliGRAM(s) Oral two times a day  senna 2 Tablet(s) Oral at bedtime                        10.9   6.94  )-----------( 114      ( 10 Sep 2019 05:47 )             33.0   09-10    137  |  103  |  39<H>  ----------------------------<  85  3.7   |  21  |  1.2    Ca    8.6      10 Sep 2019 05:47  Mg     1.8     09-09    TPro  5.2<L>  /  Alb  2.8<L>  /  TBili  2.2<H>  /  DBili  x   /  AST  14  /  ALT  <5  /  AlkPhos  124<H>  09-10  no phos level this admission
BOB ENGLE        Patient is a 87y old  Female who presents with a chief complaint of Weakness and bradycardia (06 Sep 2019 07:29)      HPI:  88 yo F presents to the ED c/o bradycardia, noticed by visiting Nurse today and she called EMS. Pt also states she felt forward and flat on 19 and has been anxious to climb up or down the stairs. Pt ambulates with a walker at home. Pt denies any similar episodes, traveling, exposure to ticks, fevers, chills, headaches, changes in mentation, visual changes, CP, SOB, abdominal pain, dysuria, changes in BM, rashes, skin discolorations, new medications. Admits to nausea but without vomiting. (03 Sep 2019 17:57).    Earlier today the patient was found with blue lips and altered mental status. During code and intubation the patient was found to have aspirated.      Allergies    No Known Allergies    Intolerances        Daily     Daily Weight in k.1 (06 Sep 2019 09:32)    I&O's Summary    05 Sep 2019 07:01  -  06 Sep 2019 07:00  --------------------------------------------------------  IN: 600 mL / OUT: 0 mL / NET: 600 mL    06 Sep 2019 07:01  -  06 Sep 2019 13:38  --------------------------------------------------------  IN: 0 mL / OUT: 1375 mL / NET: -1375 mL        FAMILY HISTORY:  No pertinent family history in first degree relatives      SOCIAL:  Unable to obtain due to patient's condition.        HEALTH ISSUES - PROBLEM Dx:  Congestive heart failure: Congestive heart failure  Hypothyroid: Hypothyroid  Essential hypertension: Essential hypertension  Weakness: Weakness  Bradycardia: Bradycardia          Vital Signs Last 24 Hrs  T(C): 37.2 (06 Sep 2019 11:00), Max: 37.4 (05 Sep 2019 21:00)  T(F): 99 (06 Sep 2019 11:00), Max: 99.3 (05 Sep 2019 21:00)  HR: 81 (06 Sep 2019 09:45) (54 - 81)  BP: 147/87 (06 Sep 2019 09:32) (147/87 - 154/69)  BP(mean): --  RR: 18 (06 Sep 2019 11:00) (16 - 18)  SpO2: 100% (06 Sep 2019 09:45) (92% - 100%)                                11.4   7.83  )-----------( 93       ( 05 Sep 2019 11:39 )             36.2           143  |  106  |  31<H>  ----------------------------<  158<H>  3.9   |  23  |  1.2    Ca    8.5      05 Sep 2019 11:39                      CAPILLARY BLOOD GLUCOSE      POCT Blood Glucose.: 200 mg/dL (06 Sep 2019 09:26)          ABG - ( 06 Sep 2019 10:28 )  pH, Arterial: 7.44  pH, Blood: x     /  pCO2: 39    /  pO2: 315   / HCO3: 26    / Base Excess: 1.8   /  SaO2: 100                 Mode: Auto Mode: PRVC/ Volume Support  RR (machine): 15  TV (machine): 450  FiO2: 50  ITime: 1  MAP: 11  PIP: 31      Radiology: Radiology personally reviewed.    MEDICATIONS  (STANDING):  aspirin  chewable 81 milliGRAM(s) Oral daily  buDESOnide 160 MICROgram(s)/formoterol 4.5 MICROgram(s) Inhaler 2 Puff(s) Inhalation two times a day  carbidopa/levodopa  10/100 1 Tablet(s) Oral daily  carbidopa/levodopa  25/100 1 Tablet(s) Oral at bedtime  carvedilol 12.5 milliGRAM(s) Oral every 12 hours  chlorhexidine 0.12% Liquid 15 milliLiter(s) Oral Mucosa every 12 hours  chlorhexidine 4% Liquid 1 Application(s) Topical <User Schedule>  docusate sodium 100 milliGRAM(s) Oral two times a day  DULoxetine 60 milliGRAM(s) Oral daily  enoxaparin Injectable 30 milliGRAM(s) SubCutaneous daily  fentaNYL   Infusion. 0.5 MICROgram(s)/kG/Hr (3.265 mL/Hr) IV Continuous <Continuous>  furosemide    Tablet 40 milliGRAM(s) Oral daily  influenza   Vaccine 0.5 milliLiter(s) IntraMuscular once  levothyroxine 75 MICROGram(s) Oral daily  nystatin Powder 1 Application(s) Topical two times a day  pantoprazole    Tablet 40 milliGRAM(s) Oral before breakfast  potassium chloride   Powder 20 milliEquivalent(s) Oral daily  pregabalin 100 milliGRAM(s) Oral two times a day  senna 2 Tablet(s) Oral at bedtime    MEDICATIONS  (PRN):  temazepam 15 milliGRAM(s) Oral at bedtime PRN Insomnia      Prescriptions:  Pacerone 200 mg oral tablet: 1 tab(s) orally once a day (03 Sep 2019 18:34)  Symbicort 160 mcg-4.5 mcg/inh inhalation aerosol: 1  inhaled 2 times a day  (03 Sep 2019 18:34)        REVIEW OF SYSTEMS:   Unable to obtain due to patient's condition.    PHYSICAL EXAM:   · CONSTITUTIONAL: Well appearing, well nourished, NAD  · ENMT: Airway patent, Nasal mucosa clear. Mouth with normal mucosa. Throat has no vesicles, no oropharyngeal exudates and uvula is midline.  · EYES: Clear bilaterally, pupils equal, round and reactive to light.  · CARDIAC: Normal rate, regular rhythm.  Heart sounds S1, S2.  No murmurs, rubs or gallops.  · RESPIRATORY: scattered ronchi  · GASTROINTESTINAL: Abdomen soft, non-tender, no guarding.  · MUSCULOSKELETAL: Spine appears normal, range of motion is not limited, no muscle or joint tenderness  · NEUROLOGICAL: sedated  · SKIN: Skin normal color for race, warm, dry and intact. No evidence of rash.  · HEME LYMPH: No adenopathy or splenomegaly. No cervical or inguinal lymphadenopathy.
BOB ENGLE  87y, Female  Allergy: No Known Allergies      CHIEF COMPLAINT: Weakness and bradycardia (13 Sep 2019 06:07)      HPI:  88 yo F presents to the ED c/o bradycardia, noticed by visiting Nurse today and she called EMS. Pt also states she felt forward and flat on Sunday 9/1/19 and has been anxious to climb up or down the stairs. Pt ambulates with a walker at home. Pt denies any similar episodes, traveling, exposure to ticks, fevers, chills, headaches, changes in mentation, visual changes, CP, SOB, abdominal pain, dysuria, changes in BM, rashes, skin discolorations, new medications. Admits to nausea but without vomiting. (03 Sep 2019 17:57)    FAMILY HISTORY:  No pertinent family history in first degree relatives    PAST MEDICAL & SURGICAL HISTORY:  Parkinson disease  Hypothyroid  Rib fractures  Congestive heart failure  Hypertension  No significant past surgical history     FSH - not relevant   Substance Use (  ) never used  (  ) IVDU (  ) Other:  Tobacco Usage:  (   ) never smoked   (   ) former smoker   (   ) current smoker   Alcohol Usage: (   ) social  (   ) daily use (   ) denies  Sexual History:       ROS  UTO     VITALS:  T(F): 99.3, Max: 99.6 (09-12-19 @ 11:00)  HR: 53  BP: 166/72  RR: 17Vital Signs Last 24 Hrs  T(C): 37.4 (13 Sep 2019 03:04), Max: 37.6 (12 Sep 2019 11:00)  T(F): 99.3 (13 Sep 2019 03:04), Max: 99.6 (12 Sep 2019 11:00)  HR: 53 (13 Sep 2019 05:00) (53 - 68)  BP: 166/72 (13 Sep 2019 05:00) (145/63 - 193/85)  BP(mean): 104 (13 Sep 2019 05:00) (91 - 152)  RR: 17 (13 Sep 2019 05:04) (12 - 37)  SpO2: 99% (13 Sep 2019 05:04) (98% - 100%)    PHYSICAL EXAM:  Gen: NAD, vented & awake   HEENT: Normocephalic, atraumatic  Neck: supple, no lymphadenopathy  CV: s1 s 2+  Lungs: rhonchi   Abdomen: Soft, BS present  Ext: Warm, well perfused  Neuro: non focal, awake  Skin: no rash, no erythema    TESTS & MEASUREMENTS:                        11.6   9.20  )-----------( 198      ( 12 Sep 2019 05:38 )             35.4     09-12    137  |  104  |  37<H>  ----------------------------<  213<H>  3.9   |  22  |  1.0    Ca    9.1      12 Sep 2019 05:38  Phos  1.5     09-12  Mg     2.1     09-12    TPro  5.4<L>  /  Alb  2.9<L>  /  TBili  1.1  /  DBili  x   /  AST  16  /  ALT  9   /  AlkPhos  152<H>  09-12      LIVER FUNCTIONS - ( 12 Sep 2019 05:38 )  Alb: 2.9 g/dL / Pro: 5.4 g/dL / ALK PHOS: 152 U/L / ALT: 9 U/L / AST: 16 U/L / GGT: x               Culture - Bronchial (collected 09-10-19 @ 12:00)  Source: Lavage None  Gram Stain (09-11-19 @ 07:20):    Few polymorphonuclear leukocytes seen per low power field    Few Squamous epithelial cells seen per low power field    No organisms seen per oil power field  Final Report (09-12-19 @ 19:28):    Normal Respiratory Shahnaz present    Culture - Bronchial (collected 09-10-19 @ 12:00)  Source: Bronch [place] Bronchoalveolar Lavage  Gram Stain (09-11-19 @ 07:21):    Few polymorphonuclear leukocytes seen per low power field    Few Squamous epithelial cells seen per low power field    No organisms seen per oil power field  Final Report (09-12-19 @ 19:30):    Normal Respiratory Shhanaz present    Culture - Sputum (collected 09-08-19 @ 22:00)  Source: .Sputum Sputum  Gram Stain (09-10-19 @ 07:12):    Few Squamous epithelial cells per low power field    Few polymorphonuclear leukocytes per low power field    Few Gram variable coccobacilli per oil power field  Final Report (09-12-19 @ 17:55):    Numerous Methicillin resistant Staphylococcus aureus    Rare Pseudomonas aeruginosa    Normal Respiratory Shahnaz present  Organism: Methicillin resistant Staphylococcus aureus  Pseudomonas aeruginosa (09-12-19 @ 17:55)  Organism: Pseudomonas aeruginosa (09-12-19 @ 17:55)      -  Amikacin: S <=16      -  Aztreonam: S <=4      -  Cefepime: S <=2      -  Ceftazidime: S 4      -  Ciprofloxacin: S <=1      -  Gentamicin: S <=2      -  Imipenem: S <=1      -  Levofloxacin: S <=2      -  Meropenem: S <=1      -  Piperacillin/Tazobactam: S <=8      -  Tobramycin: S <=2      Method Type: ANGELA  Organism: Methicillin resistant Staphylococcus aureus (09-12-19 @ 17:55)      -  Ampicillin/Sulbactam: R <=8/4      -  Cefazolin: R 16      -  Clindamycin: R 0.5 This isolate is presumed to be clindamycin resistant based on detection of inducible resistance. Clindamycin may still be effective in some patients.      -  Erythromycin: R >4      -  Gentamicin: S <=1 Should not be used as monotherapy      -  Linezolid: S 4      -  Oxacillin: R >2      -  Penicillin: R 4      -  RIF- Rifampin: S <=1 Should not be used as monotherapy      -  Tetra/Doxy: S <=1      -  Trimethoprim/Sulfamethoxazole: S <=0.5/9.5      -  Vancomycin: S 2      Method Type: ANGELA    Culture - Blood (collected 09-08-19 @ 18:44)  Source: .Blood None  Preliminary Report (09-10-19 @ 01:01):    No growth to date.    Culture - Urine (collected 09-08-19 @ 18:15)  Source: .Urine Catheterized  Final Report (09-09-19 @ 18:03):    No growth            INFECTIOUS DISEASES TESTING      RADIOLOGY & ADDITIONAL TESTS:  I have personally reviewed the last Chest xray  CXR - bilat opacities    CARDIOLOGY TESTING  12 Lead ECG:   Ventricular Rate 52 BPM    Atrial Rate 52 BPM    QRS Duration 104 ms    Q-T Interval 672 ms    QTC Calculation(Bezet) 624 ms    R Axis 94 degrees    T Axis 49 degrees    Diagnosis Line Atrial fibrillation  Rightward axis  Possible Anterior infarct , age undetermined    Confirmed by GRACY MARINELLI MD ([id number]) on 9/12/2019 11:19:57 AM (09-12-19 @ 07:37)  12 Lead ECG:   Ventricular Rate 53 BPM    Atrial Rate 45 BPM    QRS Duration 104 ms    Q-T Interval 502 ms    QTC Calculation(Bezet) 471 ms    R Axis 85 degrees    T Axis 72 degrees    Diagnosis Line Atrial fibrillation with slow ventricular response  Anterior infarct , age undetermined  ST & T wave abnormality, consider lateral ischemia  Abnormal ECG    Confirmed by GRACY MARINELLI MD ([id number]) on 9/11/2019 11:48:16 AM (09-11-19 @ 06:46)      MEDICATIONS  aspirin  chewable 81  buDESOnide 160 MICROgram(s)/formoterol 4.5 MICROgram(s) Inhaler 2  carbidopa/levodopa  10/100 1  carbidopa/levodopa  25/100 1  cefepime   IVPB 2000  cefepime   IVPB   chlorhexidine 0.12% Liquid 15  chlorhexidine 4% Liquid 1  DULoxetine 60  enoxaparin Injectable 30  fentaNYL   Infusion. 0.5  influenza   Vaccine 0.5  levothyroxine 75  midazolam Infusion 0.02  midazolam Infusion 0.02  multivitamin  Chewable 1  nystatin Powder 1  pantoprazole    Tablet 40  senna 2  vancomycin  IVPB 1000  vancomycin  IVPB       ANTIBIOTICS:  cefepime   IVPB 2000 milliGRAM(s) IV Intermittent every 12 hours  cefepime   IVPB      vancomycin  IVPB 1000 milliGRAM(s) IV Intermittent every 12 hours  vancomycin  IVPB
CARDIOLOGY CONSULT NOTE     CHIEF COMPLAINT/REASON FOR CONSULT:    HPI:  86 yo F presents to the ED c/o bradycardia, noticed by visiting Nurse today and she called EMS. Pt also states she fell forward and flat on Sunday 9/1/19 and has been anxious to climb up or down the stairs. Pt ambulates with a walker at home. Pt denies any similar episodes, traveling, exposure to ticks, fevers, chills, headaches, changes in mentation, visual changes, CP, SOB, abdominal pain, dysuria, changes in BM, rashes, skin discolorations, new medications. Admits to nausea but without vomiting. (03 Sep 2019 17:57)      PAST MEDICAL & SURGICAL HISTORY:  Parkinson disease  Hypothyroid  Rib fractures  Congestive heart failure  Hypertension  No significant past surgical history      Cardiac Risks:   [ x]HTN, [ ] DM, [ ] Smoking, [ ] FH,  [ ] Lipids        MEDICATIONS:  MEDICATIONS  (STANDING):  aspirin  chewable 81 milliGRAM(s) Oral daily  buDESOnide 160 MICROgram(s)/formoterol 4.5 MICROgram(s) Inhaler 2 Puff(s) Inhalation two times a day  carbidopa/levodopa  10/100 1 Tablet(s) Oral daily  carbidopa/levodopa  25/100 1 Tablet(s) Oral daily  chlorhexidine 4% Liquid 1 Application(s) Topical <User Schedule>  docusate sodium 100 milliGRAM(s) Oral two times a day  DULoxetine 60 milliGRAM(s) Oral daily  enoxaparin Injectable 30 milliGRAM(s) SubCutaneous daily  furosemide    Tablet 40 milliGRAM(s) Oral daily  influenza   Vaccine 0.5 milliLiter(s) IntraMuscular once  levothyroxine 75 MICROGram(s) Oral daily  nystatin Powder 1 Application(s) Topical two times a day  pantoprazole    Tablet 40 milliGRAM(s) Oral before breakfast  potassium chloride   Powder 20 milliEquivalent(s) Oral daily  pregabalin 100 milliGRAM(s) Oral two times a day  senna 2 Tablet(s) Oral at bedtime  sodium chloride 0.9%. 1000 milliLiter(s) (75 mL/Hr) IV Continuous <Continuous>      FAMILY HISTORY:  No pertinent family history in first degree relatives      SOCIAL HISTORY:      [ ] Marital status    Allergies    No Known Allergies        	    REVIEW OF SYSTEMS:  CONSTITUTIONAL: No fever, weight loss, or fatigue  EYES: No eye pain, visual disturbances, or discharge  ENMT:  No difficulty hearing, tinnitus, vertigo; No sinus or throat pain  NECK: No pain or stiffness  RESPIRATORY: No cough, wheezing, chills or hemoptysis; No Shortness of Breath  CARDIOVASCULAR: No chest pain, palpitations, passing out, dizziness, or leg swelling  GASTROINTESTINAL: No abdominal or epigastric pain. No nausea, vomiting, or hematemesis; No diarrhea or constipation. No melena or hematochezia.  GENITOURINARY: No dysuria, frequency, hematuria, or incontinence  NEUROLOGICAL: No headaches, memory loss, loss of strength, numbness, or tremors  SKIN: No itching, burning, rashes, or lesions   	      PHYSICAL EXAM:  T(C): 35.8 (09-04-19 @ 05:28), Max: 37.6 (09-03-19 @ 14:48)  HR: 58 (09-04-19 @ 05:28) (40 - 72)  BP: 169/70 (09-04-19 @ 05:28) (150/55 - 173/73)  RR: 16 (09-04-19 @ 05:28) (16 - 20)  SpO2: 97% (09-03-19 @ 21:10) (96% - 99%)  Wt(kg): --  I&O's Summary    03 Sep 2019 07:01  -  04 Sep 2019 07:00  --------------------------------------------------------  IN: 0 mL / OUT: 400 mL / NET: -400 mL        Appearance: Normal	  Psychiatry: A & O x 3, Mood & affect appropriate  HEENT:   Normal oral mucosa, PERRL, EOMI	  Lymphatic: No lymphadenopathy  Cardiovascular: Irreg , No JVD, No murmurs  Respiratory: Lungs clear to auscultation	  Gastrointestinal:  Soft, Non-tender, + BS	  Skin: No rashes, No ecchymoses, No cyanosis	  Neurologic: Non-focal  Extremities: Normal range of motion, No clubbing, cyanosis or edema  Vascular: Peripheral pulses palpable 2+ bilaterally      ECG:  	not available    	  LABS:	 	    CARDIAC MARKERS:                                    11.0   4.96  )-----------( 89       ( 04 Sep 2019 07:00 )             34.2     09-04    145  |  108  |  38<H>  ----------------------------<  101<H>  3.5   |  25  |  1.1    Ca    8.4<L>      04 Sep 2019 07:00  Mg     2.0     09-04    TPro  5.8<L>  /  Alb  3.8  /  TBili  0.8  /  DBili  x   /  AST  18  /  ALT  9   /  AlkPhos  98  09-04
Chief complaint/Reason for consult: Endoscopic NG tube placement    HPI:  86 yo F presents to the ED c/o bradycardia, noticed by visiting Nurse today and she called EMS. Pt also states she felt forward and flat on Sunday 9/1/19 and has been anxious to climb up or down the stairs. Pt ambulates with a walker at home. Pt denies any similar episodes, traveling, exposure to ticks, fevers, chills, headaches, changes in mentation, visual changes, CP, SOB, abdominal pain, dysuria, changes in BM, rashes, skin discolorations, new medications. Admits to nausea but without vomiting. (03 Sep 2019 17:57)    GI updates- 86 yo female pmh bradycardia s/p rapid response today, intubated GI consulted for endoscopic NG tube placement as NG tube attempts unsuccessful.     PAST MEDICAL & SURGICAL HISTORY:  Parkinson disease  Hypothyroid  Rib fractures  Congestive heart failure  Hypertension  No significant past surgical history        Family history:  FAMILY HISTORY:  No pertinent family history in first degree relatives    No GI cancers in first or second degree relatives    Social History: No smoking. No alcohol. No illegal drug use.    Allergies:     No Known Allergies        MEDICATIONS: Home Medications:  aspirin 81 mg oral tablet: 1 tab(s) orally once a day (03 Sep 2019 18:34)  bisacodyl 10 mg rectal suppository: 1 suppository(ies) rectal once a day, As needed, Constipation (03 Sep 2019 18:34)  carbidopa-levodopa 10 mg-100 mg oral tablet: 1 tab(s) orally 2 times a day (03 Sep 2019 18:34)  carbidopa-levodopa 25 mg-100 mg oral tablet: 1 tab(s) orally once a day (03 Sep 2019 18:34)  carvedilol 25 mg oral tablet: 1 tab(s) orally 2 times a day (03 Sep 2019 18:34)  Cymbalta 60 mg oral delayed release capsule: 1 cap(s) orally 2 times a day (03 Sep 2019 18:34)  docusate sodium 100 mg oral capsule: 1 cap(s) orally 2 times a day (03 Sep 2019 18:34)  isosorbide mononitrate 30 mg oral tablet, extended release: 1 tab(s) orally once a day (in the morning) (03 Sep 2019 18:34)  Klor-Con 20 mEq oral powder for reconstitution: 1 each orally once a day (at bedtime) (03 Sep 2019 18:34)  Lasix 40 mg oral tablet: 1 tab(s) orally once a day (03 Sep 2019 18:34)  levothyroxine 75 mcg (0.075 mg) oral tablet: 1 tab(s) orally once a day (03 Sep 2019 18:34)  losartan 100 mg oral tablet: 1 tab(s) orally once a day (03 Sep 2019 18:34)  Lyrica 100 mg oral capsule: 1 cap(s) orally 2 times a day (03 Sep 2019 18:34)  pantoprazole 40 mg oral delayed release tablet: 1 tab(s) orally once a day (before a meal) (03 Sep 2019 18:34)  senna oral tablet: 2 tab(s) orally once a day (at bedtime) (03 Sep 2019 18:34)  temazepam 30 mg oral capsule: 1 cap(s) orally once a day (at bedtime) (03 Sep 2019 18:34)        MEDICATIONS  (STANDING):  aspirin  chewable 81 milliGRAM(s) Oral daily  buDESOnide 160 MICROgram(s)/formoterol 4.5 MICROgram(s) Inhaler 2 Puff(s) Inhalation two times a day  carbidopa/levodopa  10/100 1 Tablet(s) Oral daily  carbidopa/levodopa  25/100 1 Tablet(s) Oral at bedtime  carvedilol 12.5 milliGRAM(s) Oral every 12 hours  chlorhexidine 0.12% Liquid 15 milliLiter(s) Oral Mucosa every 12 hours  chlorhexidine 4% Liquid 1 Application(s) Topical <User Schedule>  docusate sodium 100 milliGRAM(s) Oral two times a day  DULoxetine 60 milliGRAM(s) Oral daily  enoxaparin Injectable 30 milliGRAM(s) SubCutaneous daily  fentaNYL   Infusion. 0.5 MICROgram(s)/kG/Hr (3.265 mL/Hr) IV Continuous <Continuous>  furosemide    Tablet 40 milliGRAM(s) Oral daily  influenza   Vaccine 0.5 milliLiter(s) IntraMuscular once  levothyroxine 75 MICROGram(s) Oral daily  midazolam Injectable 2 milliGRAM(s) IV Push once  nystatin Powder 1 Application(s) Topical two times a day  pantoprazole    Tablet 40 milliGRAM(s) Oral before breakfast  potassium chloride   Powder 20 milliEquivalent(s) Oral daily  pregabalin 100 milliGRAM(s) Oral two times a day  senna 2 Tablet(s) Oral at bedtime    MEDICATIONS  (PRN):  temazepam 15 milliGRAM(s) Oral at bedtime PRN Insomnia        REVIEW OF SYSTEMS  Unobtainable       VITALS:   T(F): 100 (09-06-19 @ 15:06), Max: 100 (09-06-19 @ 15:06)  HR: 81 (09-06-19 @ 09:45) (54 - 81)  BP: 147/87 (09-06-19 @ 09:32) (147/87 - 154/69)  RR: 16 (09-06-19 @ 15:06) (16 - 18)  SpO2: 100% (09-06-19 @ 15:06) (92% - 100%)    PHYSICAL EXAM:  GENERAL: +intubated, opens eyes to verbal stimuli  HEAD:  Atraumatic, Normocephalic  EYES: conjunctiva and sclera clear  NECK: Supple, No thyromegaly   CHEST/LUNG: Clear to auscultation bilaterally; No wheeze, rhonchi, or rales  HEART: Regular rate and rhythm; normal S1, S2, No murmurs.  ABDOMEN: Soft, no grimacing on palpation, nondistended; Bowel sounds present, no abdominal bruit, masses, or hepatosplenomegaly   NEUROLOGY: No asterixis or tremor  SKIN: Intact, no jaundice          LABS:  09-05    143  |  106  |  31<H>  ----------------------------<  158<H>  3.9   |  23  |  1.2    Ca    8.5      05 Sep 2019 11:39                            11.4   7.83  )-----------( 93       ( 05 Sep 2019 11:39 )             36.2           IMAGING:  < from: Xray Chest 1 View- PORTABLE-Urgent (09.06.19 @ 09:51) >  EXAM:  XR CHEST PORTABLE URGENT 1V            PROCEDURE DATE:  09/06/2019            INTERPRETATION:  Clinical History / Reason for exam: Respiratory distress    Comparison : Chest radiograph 9/3/2019.    Technique/Positioning: Frontal.    Findings:    Support devices: ET tube lower trachea NG tube in mid thorax presumably   in mid esophagus.    Cardiac/mediastinum/hilum: Cardiomegaly unchanged.    Lung parenchyma/Pleura: Within normal limits.    Skeleton/soft tissues: Unremarkable.    Impression:    NG tube in mid thorax presumably in mid esophagus  No radiographic evidence of acute cardiopulmonary disease. A call back   request was submitted                  MERLIN KAPOOR M.D., ATTENDING RADIOLOGIST  This document has been electronically signed. Sep  6 2019 11:59AM              < end of copied text >
Patient is a 87y old  Female who presents with a chief complaint of Weakness and bradycardia (03 Sep 2019 17:57)      T(F): 97.3 (09-03-19 @ 19:35), Max: 99.7 (09-03-19 @ 14:48)  HR: 40 (09-03-19 @ 19:35)  BP: 173/73 (09-03-19 @ 19:35)  RR: 18 (09-03-19 @ 19:35)  SpO2: 99% (09-03-19 @ 17:54) (96% - 99%)    PHYSICAL EXAM:  GENERAL: NAD, well-groomed, well-developed  HEAD:  Atraumatic, Normocephalic  EYES: EOMI, PERRLA, conjunctiva and sclera clear  ENMT: No tonsillar erythema, exudates, or enlargement; Moist mucous membranes, Good dentition, No lesions  NECK: Supple, No JVD, Normal thyroid  NERVOUS SYSTEM:  Alert & Oriented X3, Good concentration; Motor Strength 5/5 B/L upper and lower extremities; DTRs 2+ intact and symmetric  CHEST/LUNG: Clear to percussion bilaterally; No rales, rhonchi, wheezing, or rubs  HEART: Regular rate and rhythm; No murmurs, rubs, or gallops  ABDOMEN: Soft, Nontender, Nondistended; Bowel sounds present  EXTREMITIES:  2+ Peripheral Pulses, No clubbing, cyanosis, or edema  LYMPH: No lymphadenopathy noted  SKIN: No rashes or lesions    labs  09-03    144  |  104  |  40<H>  ----------------------------<  133<H>  3.8   |  30  |  1.2    Ca    9.3      03 Sep 2019 15:00  Mg     2.1     09-03    TPro  6.4  /  Alb  3.9  /  TBili  0.7  /  DBili  x   /  AST  20  /  ALT  5   /  AlkPhos  110  09-03                          10.9   4.94  )-----------( 112      ( 03 Sep 2019 15:00 )             34.6               radiology    aspirin  chewable 81 milliGRAM(s) Oral daily  buDESOnide 160 MICROgram(s)/formoterol 4.5 MICROgram(s) Inhaler 2 Puff(s) Inhalation two times a day  carbidopa/levodopa  10/100 1 Tablet(s) Oral daily  carbidopa/levodopa  25/100 1 Tablet(s) Oral daily  chlorhexidine 4% Liquid 1 Application(s) Topical <User Schedule>  docusate sodium 100 milliGRAM(s) Oral two times a day  DULoxetine 60 milliGRAM(s) Oral daily  enoxaparin Injectable 30 milliGRAM(s) SubCutaneous daily  furosemide    Tablet 40 milliGRAM(s) Oral daily  levothyroxine 75 MICROGram(s) Oral daily  pantoprazole    Tablet 40 milliGRAM(s) Oral before breakfast  potassium chloride   Powder 20 milliEquivalent(s) Oral daily  pregabalin 100 milliGRAM(s) Oral two times a day  senna 2 Tablet(s) Oral at bedtime  sodium chloride 0.9%. 1000 milliLiter(s) IV Continuous <Continuous>
[facility]  INITIAL CONSULT NOTE  --------------------------------------------------------------------------------  HPI:  86 yo white female h/o Parkinson's Disease, HTN, CHF, hypothyroidism.  Admitted 9/3 w/ generalized weakness.  Course complicated by respiratory/cardiac arrest, intubated and now extubated; dysphagia now on tube feeds; deconditioning.  Earlier in hospitalization pt had GHASSAN w/ creat up to 2.2.  Overall improvement in renal function.  Pt also developed hypernatremia w/ highest SNa+ 153, coming down.  Currently on 1/2 NS IV at 50cc/hr, and free water only 150cc q12hr.  Also w/ severely-uncontrolled HTN, on Carvedilol, Norvasc, and Losartan.        PAST HISTORY  --------------------------------------------------------------------------------  PAST MEDICAL & SURGICAL HISTORY:  Parkinson disease  Hypothyroid  Rib fractures  Congestive heart failure  Hypertension  No significant past surgical history    FAMILY HISTORY:  No pertinent family history in first degree relatives    PAST SOCIAL HISTORY:    ALLERGIES & MEDICATIONS  --------------------------------------------------------------------------------  Allergies    No Known Allergies    Intolerances      Standing Inpatient Medications  amLODIPine   Tablet 5 milliGRAM(s) Oral <User Schedule>  aspirin  chewable 81 milliGRAM(s) Oral daily  buDESOnide 160 MICROgram(s)/formoterol 4.5 MICROgram(s) Inhaler 2 Puff(s) Inhalation two times a day  carbidopa/levodopa  10/100 1 Tablet(s) Oral daily  carbidopa/levodopa  25/100 1 Tablet(s) Oral at bedtime  carvedilol 25 milliGRAM(s) Oral every 12 hours  chlorhexidine 4% Liquid 1 Application(s) Topical <User Schedule>  docusate sodium Liquid 100 milliGRAM(s) Oral three times a day  DULoxetine 60 milliGRAM(s) Oral daily  enoxaparin Injectable 30 milliGRAM(s) SubCutaneous daily  influenza   Vaccine 0.5 milliLiter(s) IntraMuscular once  levothyroxine 75 MICROGram(s) Oral daily  losartan 50 milliGRAM(s) Oral daily  multivitamin  Chewable 1 Tablet(s) Oral daily  nystatin Powder 1 Application(s) Topical two times a day  pantoprazole    Tablet 40 milliGRAM(s) Oral before breakfast  senna 2 Tablet(s) Oral at bedtime  sodium chloride 0.45%. 1000 milliLiter(s) IV Continuous <Continuous>    PRN Inpatient Medications  acetaminophen   Tablet .. 650 milliGRAM(s) Oral every 6 hours PRN  acetaminophen  Suppository .. 650 milliGRAM(s) Rectal every 6 hours PRN      REVIEW OF SYSTEMS  --------------------------------------------------------------------------------  Gen: No weight changes, fatigue, fevers/chills, weakness  Skin: No rashes  Head/Eyes/Ears/Mouth: No headache; Normal hearing; Normal vision w/o blurriness; No sinus pain/discomfort, sore throat  Respiratory: No dyspnea, cough, wheezing, hemoptysis  CV: No chest pain, PND, orthopnea  GI: No abdominal pain, diarrhea, constipation, nausea, vomiting, melena, hematochezia  : No increased frequency, dysuria, hematuria, nocturia  MSK: No joint pain/swelling; no back pain; no edema  Neuro: No dizziness/lightheadedness, weakness, seizures, numbness, tingling  Heme: No easy bruising or bleeding  Endo: No heat/cold intolerance  Psych: No significant nervousness, anxiety, stress, depression    All other systems were reviewed and are negative, except as noted.    VITALS/PHYSICAL EXAM  --------------------------------------------------------------------------------  T(C): 35.6 (09-25-19 @ 14:38), Max: 37.1 (09-24-19 @ 21:03)  HR: 76 (09-25-19 @ 14:38) (63 - 76)  BP: 189/82 (09-25-19 @ 14:38) (168/70 - 189/82)  RR: 16 (09-25-19 @ 14:38) (16 - 16)  SpO2: 99% (09-24-19 @ 18:44) (99% - 99%)  Wt(kg): --        09-24-19 @ 07:01  -  09-25-19 @ 07:00  --------------------------------------------------------  IN: 0 mL / OUT: 1950 mL / NET: -1950 mL      Physical Exam:  	Gen: NAD, well-appearing, on nasal O2  	HEENT: PERRL, supple neck, clear oropharynx  	Pulm: CTA B/L  	CV: RRR, S1S2; no rub  	Back: No spinal or CVA tenderness; no sacral edema  	Abd: +BS, soft, nontender/nondistended  	: No suprapubic tenderness  	UE: Warm, FROM, no clubbing, intact strength; no edema  	LE: Warm, FROM, no clubbing, intact strength; no edema  	Neuro: No focal deficits, reduce motor strength all extremities  	Psych: Normal affect and mood  	Skin: Warm, without rashes  	Vascular access:    LABS/STUDIES  --------------------------------------------------------------------------------              12.1   7.58  >-----------<  275      [09-25-19 @ 06:33]              38.7     149  |  107  |  58  ----------------------------<  315      [09-25-19 @ 15:39]  3.6   |  31  |  1.4        Ca     9.3     [09-25-19 @ 15:39]      Mg     2.6     [09-25-19 @ 06:33]      Phos  3.1     [09-25-19 @ 06:33]    TPro  6.4  /  Alb  3.4  /  TBili  0.7  /  DBili  x   /  AST  21  /  ALT  6   /  AlkPhos  110  [09-25-19 @ 06:33]          Creatinine Trend:  SCr 1.4 [09-25 @ 15:39]  SCr 1.4 [09-25 @ 06:33]  SCr 1.6 [09-24 @ 07:14]  SCr 2.0 [09-23 @ 08:18]  SCr 2.2 [09-22 @ 06:45]    Urinalysis - [09-08-19 @ 18:15]      Color Nunu / Appearance Slightly Cloudy / SG 1.015 / pH 5.5      Gluc Negative / Ketone Negative  / Bili Moderate / Urobili 4.0       Blood Large / Protein 30 / Leuk Est Negative / Nitrite Negative      RBC >50 / WBC 3-5 / Hyaline  / Gran  / Sq Epi  / Non Sq Epi Few / Bacteria Occasional
HPI:  86 yo F presents to the ED c/o bradycardia, noticed by visiting Nurse today and she called EMS. Pt also states she felt forward and flat on Sunday 9/1/19 and has been anxious to climb up or down the stairs. Pt ambulates with a walker at home. Pt denies any similar episodes, traveling, exposure to ticks, fevers, chills, headaches, changes in mentation, visual changes, CP, SOB, abdominal pain, dysuria, changes in BM, rashes, skin discolorations, new medications. Admits to nausea but without vomiting.   PTN  REFERRED TO ACUTE  REHAB  FOR  EVAL AND  TX   PAST MEDICAL & SURGICAL HISTORY:  Parkinson disease  Hypothyroid  Rib fractures  Congestive heart failure  Hypertension  No significant past surgical history      Hospital Course:    TODAY'S SUBJECTIVE & REVIEW OF SYMPTOMS:     Constitutional WNL   Cardio WNL   Resp WNL   GI WNL  Heme WNL  Endo WNL  Skin WNL  MSK WNL  Neuro WNL  Cognitive WNL  Psych WNL      MEDICATIONS  (STANDING):  aspirin  chewable 81 milliGRAM(s) Oral daily  buDESOnide 160 MICROgram(s)/formoterol 4.5 MICROgram(s) Inhaler 2 Puff(s) Inhalation two times a day  carbidopa/levodopa  10/100 1 Tablet(s) Oral daily  carbidopa/levodopa  25/100 1 Tablet(s) Oral daily  chlorhexidine 4% Liquid 1 Application(s) Topical <User Schedule>  docusate sodium 100 milliGRAM(s) Oral two times a day  DULoxetine 60 milliGRAM(s) Oral daily  enoxaparin Injectable 30 milliGRAM(s) SubCutaneous daily  furosemide    Tablet 40 milliGRAM(s) Oral daily  influenza   Vaccine 0.5 milliLiter(s) IntraMuscular once  levothyroxine 75 MICROGram(s) Oral daily  nystatin Powder 1 Application(s) Topical two times a day  pantoprazole    Tablet 40 milliGRAM(s) Oral before breakfast  potassium chloride   Powder 20 milliEquivalent(s) Oral daily  pregabalin 100 milliGRAM(s) Oral two times a day  senna 2 Tablet(s) Oral at bedtime  sodium chloride 0.9%. 1000 milliLiter(s) (75 mL/Hr) IV Continuous <Continuous>    MEDICATIONS  (PRN):      FAMILY HISTORY:  No pertinent family history in first degree relatives      Allergies    No Known Allergies    Intolerances        SOCIAL HISTORY:    [  ] Etoh  [  ] Smoking  [  ] Substance abuse     Home Environment:  [  ] Home Alone  [xx  ] Lives with Family DTR   [  ] Home Health Aid    Dwelling:  [  ] Apartment  [ x ] Private House  [  ] Adult Home  [  ] Skilled Nursing Facility      [  ] Short Term  [  ] Long Term  [ x ] Stairs       Elevator [  ]    FUNCTIONAL STATUS PTA: (Check all that apply)  Ambulation: [  x ]Independent    [  ] Dependent     [  ] Non-Ambulatory  Assistive Device: [  ] SA Cane  [  ]  Q Cane  [  x] Walker  [  ]  Wheelchair  ADL : [  x] Independent  [ x ]  Dependent       Vital Signs Last 24 Hrs  T(C): 35.8 (04 Sep 2019 05:28), Max: 37.6 (03 Sep 2019 14:48)  T(F): 96.5 (04 Sep 2019 05:28), Max: 99.7 (03 Sep 2019 14:48)  HR: 58 (04 Sep 2019 05:28) (40 - 72)  BP: 169/70 (04 Sep 2019 05:28) (150/55 - 173/73)  BP(mean): --  RR: 16 (04 Sep 2019 05:28) (16 - 20)  SpO2: 97% (03 Sep 2019 21:10) (96% - 99%)      PHYSICAL EXAM: Alert & Oriented X 2  GENERAL: NAD, well-groomed, well-developed  HEAD:  Atraumatic, Normocephalic  EYES: EOMI, PERRLA, conjunctiva and sclera clear  NECK: Supple, No JVD, Normal thyroid  CHEST/LUNG: Clear to percussion bilaterally; No rales, rhonchi, wheezing, or rubs  HEART: Regular rate and rhythm; No murmurs, rubs, or gallops  ABDOMEN: Soft, Nontender, Nondistended; Bowel sounds present  EXTREMITIES:  2+ Peripheral Pulses, No clubbing, cyanosis, or edema    NERVOUS SYSTEM:  Cranial Nerves 2-12 intact [ x ] Abnormal  [  ]  ROM: WFL all extremities [  ]  Abnormal [ x ]  Motor Strength: WFL all extremities  [  ]  Abnormal [ x ]  Sensation: intact to light touch [  ] Abnormal [x  ]  Reflexes: Symmetric [  ]  Abnormal [x  ]    FUNCTIONAL STATUS:  Bed Mobility: Independent [  ]  Supervision [  ]  Needs Assistance [ x ]  N/A [  ]  Transfers: Independent [  ]  Supervision [  ]  Needs Assistance [ x ]  N/A [  ]   Ambulation: Independent [  ]  Supervision [  ]  Needs Assistance [ x ]  N/A [  ]  ADL: Independent [  ] Requires Assistance [  ] N/A [ x ]  SEE PT/OT IE NOTES    LABS:                        10.9   4.94  )-----------( 112      ( 03 Sep 2019 15:00 )             34.6     09-03    144  |  104  |  40<H>  ----------------------------<  133<H>  3.8   |  30  |  1.2    Ca    9.3      03 Sep 2019 15:00  Mg     2.1     09-03    TPro  6.4  /  Alb  3.9  /  TBili  0.7  /  DBili  x   /  AST  20  /  ALT  5   /  AlkPhos  110  09-03          RADIOLOGY & ADDITIONAL STUDIES:    Assesment:
congestion/runny nose

## 2019-09-25 NOTE — CHART NOTE - NSCHARTNOTEFT_GEN_A_CORE
Pt extubated and downgraded 9/24. +Dyspahgia...family to decide with PEG placement vs palliative management. Pt is hypernatremic, Na 153 today, slight increase from 152. If PEG is to be placed, hypernatremia needs to be resolved prior to procedure. Follow up labs in am. GI follow up.

## 2019-09-25 NOTE — CONSULT NOTE ADULT - PROVIDER SPECIALTY LIST ADULT
Cardiology
Critical Care
Gastroenterology
Infectious Disease
Nephrology
Pulmonology
Physiatry
Nutrition Support

## 2019-09-25 NOTE — CONSULT NOTE ADULT - PROBLEM SELECTOR RECOMMENDATION 3
Will start HCTZ 25mg QD, as this will help HTN and reduce serum Na+ by preventing free water clearance

## 2019-09-25 NOTE — CONSULT NOTE ADULT - REASON FOR ADMISSION
Weakness and bradycardia

## 2019-09-26 LAB
ANION GAP SERPL CALC-SCNC: 11 MMOL/L — SIGNIFICANT CHANGE UP (ref 7–14)
BLD GP AB SCN SERPL QL: SIGNIFICANT CHANGE UP
BUN SERPL-MCNC: 48 MG/DL — HIGH (ref 10–20)
CALCIUM SERPL-MCNC: 9.1 MG/DL — SIGNIFICANT CHANGE UP (ref 8.5–10.1)
CHLORIDE SERPL-SCNC: 102 MMOL/L — SIGNIFICANT CHANGE UP (ref 98–110)
CO2 SERPL-SCNC: 32 MMOL/L — SIGNIFICANT CHANGE UP (ref 17–32)
CREAT SERPL-MCNC: 1.2 MG/DL — SIGNIFICANT CHANGE UP (ref 0.7–1.5)
GLUCOSE BLDC GLUCOMTR-MCNC: 205 MG/DL — HIGH (ref 70–99)
GLUCOSE BLDC GLUCOMTR-MCNC: 269 MG/DL — HIGH (ref 70–99)
GLUCOSE SERPL-MCNC: 244 MG/DL — HIGH (ref 70–99)
HCT VFR BLD CALC: 35.4 % — LOW (ref 37–47)
HGB BLD-MCNC: 11.3 G/DL — LOW (ref 12–16)
MAGNESIUM SERPL-MCNC: 2.2 MG/DL — SIGNIFICANT CHANGE UP (ref 1.8–2.4)
MCHC RBC-ENTMCNC: 28.2 PG — SIGNIFICANT CHANGE UP (ref 27–31)
MCHC RBC-ENTMCNC: 31.9 G/DL — LOW (ref 32–37)
MCV RBC AUTO: 88.3 FL — SIGNIFICANT CHANGE UP (ref 81–99)
NRBC # BLD: 0 /100 WBCS — SIGNIFICANT CHANGE UP (ref 0–0)
PLATELET # BLD AUTO: 229 K/UL — SIGNIFICANT CHANGE UP (ref 130–400)
POTASSIUM SERPL-MCNC: 3.5 MMOL/L — SIGNIFICANT CHANGE UP (ref 3.5–5)
POTASSIUM SERPL-SCNC: 3.5 MMOL/L — SIGNIFICANT CHANGE UP (ref 3.5–5)
RBC # BLD: 4.01 M/UL — LOW (ref 4.2–5.4)
RBC # FLD: 16.8 % — HIGH (ref 11.5–14.5)
SODIUM SERPL-SCNC: 145 MMOL/L — SIGNIFICANT CHANGE UP (ref 135–146)
WBC # BLD: 6.84 K/UL — SIGNIFICANT CHANGE UP (ref 4.8–10.8)
WBC # FLD AUTO: 6.84 K/UL — SIGNIFICANT CHANGE UP (ref 4.8–10.8)

## 2019-09-26 PROCEDURE — 99232 SBSQ HOSP IP/OBS MODERATE 35: CPT

## 2019-09-26 RX ORDER — CEFAZOLIN SODIUM 1 G
1000 VIAL (EA) INJECTION ONCE
Refills: 0 | Status: COMPLETED | OUTPATIENT
Start: 2019-09-27 | End: 2019-09-27

## 2019-09-26 RX ADMIN — Medication 100 MILLIGRAM(S): at 22:07

## 2019-09-26 RX ADMIN — CARVEDILOL PHOSPHATE 25 MILLIGRAM(S): 80 CAPSULE, EXTENDED RELEASE ORAL at 05:11

## 2019-09-26 RX ADMIN — SENNA PLUS 2 TABLET(S): 8.6 TABLET ORAL at 22:07

## 2019-09-26 RX ADMIN — Medication 75 MICROGRAM(S): at 05:11

## 2019-09-26 RX ADMIN — Medication 1 TABLET(S): at 12:37

## 2019-09-26 RX ADMIN — ENOXAPARIN SODIUM 30 MILLIGRAM(S): 100 INJECTION SUBCUTANEOUS at 14:03

## 2019-09-26 RX ADMIN — NYSTATIN CREAM 1 APPLICATION(S): 100000 CREAM TOPICAL at 17:43

## 2019-09-26 RX ADMIN — BUDESONIDE AND FORMOTEROL FUMARATE DIHYDRATE 2 PUFF(S): 160; 4.5 AEROSOL RESPIRATORY (INHALATION) at 08:05

## 2019-09-26 RX ADMIN — AMLODIPINE BESYLATE 5 MILLIGRAM(S): 2.5 TABLET ORAL at 17:45

## 2019-09-26 RX ADMIN — SODIUM CHLORIDE 75 MILLILITER(S): 9 INJECTION, SOLUTION INTRAVENOUS at 06:08

## 2019-09-26 RX ADMIN — CARBIDOPA AND LEVODOPA 1 TABLET(S): 25; 100 TABLET ORAL at 12:32

## 2019-09-26 RX ADMIN — LOSARTAN POTASSIUM 50 MILLIGRAM(S): 100 TABLET, FILM COATED ORAL at 05:11

## 2019-09-26 RX ADMIN — Medication 25 MILLIGRAM(S): at 22:07

## 2019-09-26 RX ADMIN — NYSTATIN CREAM 1 APPLICATION(S): 100000 CREAM TOPICAL at 05:11

## 2019-09-26 RX ADMIN — CARVEDILOL PHOSPHATE 25 MILLIGRAM(S): 80 CAPSULE, EXTENDED RELEASE ORAL at 17:46

## 2019-09-26 RX ADMIN — Medication 81 MILLIGRAM(S): at 12:31

## 2019-09-26 RX ADMIN — PANTOPRAZOLE SODIUM 40 MILLIGRAM(S): 20 TABLET, DELAYED RELEASE ORAL at 06:01

## 2019-09-26 RX ADMIN — AMLODIPINE BESYLATE 5 MILLIGRAM(S): 2.5 TABLET ORAL at 05:11

## 2019-09-26 RX ADMIN — CARBIDOPA AND LEVODOPA 1 TABLET(S): 25; 100 TABLET ORAL at 22:07

## 2019-09-26 RX ADMIN — DULOXETINE HYDROCHLORIDE 60 MILLIGRAM(S): 30 CAPSULE, DELAYED RELEASE ORAL at 12:31

## 2019-09-26 RX ADMIN — Medication 100 MILLIGRAM(S): at 14:04

## 2019-09-26 RX ADMIN — Medication 100 MILLIGRAM(S): at 05:11

## 2019-09-26 NOTE — PROGRESS NOTE ADULT - ASSESSMENT
Pt with severe HTN, CKD 3.    HTN - started on HCTZ in addition to Amlodipine  - STOP hypotonic NS  - no need for IVF on NGT feeds  - increase Amlodipine to 10 mg qd  - supplement K - KCL 40 po x1  - add Hydralazine 25 mg q8hrs    Will follow

## 2019-09-26 NOTE — CHART NOTE - NSCHARTNOTEFT_GEN_A_CORE
Patient seen at bedside - resting comfortably.    Notified by GI team that PEG tube placement scheduled for tomorrow. Will continue to monitor BP.     Family at bedside - explained about PEG placement scheduled for tomorrow - family agreeable.    Patient without questions or concerns at this time.     Patient encouraged to contact PA with any further questions or concerns.

## 2019-09-26 NOTE — PROGRESS NOTE ADULT - SUBJECTIVE AND OBJECTIVE BOX
88 yo F presents to the ED after VN found her very weak, bradycardic and in respiratory distress. Pt  was brought to ER  in by EMS. Pt also gave Hx of having fallen on  Sunday 9/1/19.  Pt has mobility dysfunction and ambulates with walker with difficulty navigating steps with Hx of progressive Parkinsons. . Pt ambulates with a walker few steps.  Pt was noted to be bradycardic 20-30s. Pt had ARF, cardiac arrest and was intubated. Pt was tx for aspiration PNA, pneumoniits and sucessfully extubated but remains on high flow oxygen.  Pt has continued difficulty with dysphagia ans clearing secretions.    interval :  extubated s/p speech and swallow/ failed due to severe swallowing dysfunction, pt remains on O2 via NC and is being fed via feeding tube, issue with cont hypermagnesemia and worsening renal parameters, Mg today 3.2, hold K supplement, start low fluids and add furosemide, trend Mg/electrolytes    PAST MEDICAL & SURGICAL HISTORY:  HTN, ASHD, CAD, CHF  CKD  Parkinson disease  Hypothyroid  Mobility dysfunction, freq falls, sp Rib fractures  OA, DDD, DJD, kyphosis   GERD, HH, diverticulosis, chronic constipation    No significant past surgical history    MEDICATIONS  (STANDING):  amLODIPine   Tablet 5 milliGRAM(s) Oral <User Schedule>  aspirin  chewable 81 milliGRAM(s) Oral daily  buDESOnide 160 MICROgram(s)/formoterol 4.5 MICROgram(s) Inhaler 2 Puff(s) Inhalation two times a day  carbidopa/levodopa  10/100 1 Tablet(s) Oral daily  carbidopa/levodopa  25/100 1 Tablet(s) Oral at bedtime  carvedilol 25 milliGRAM(s) Oral every 12 hours  chlorhexidine 4% Liquid 1 Application(s) Topical <User Schedule>  docusate sodium Liquid 100 milliGRAM(s) Oral three times a day  DULoxetine 60 milliGRAM(s) Oral daily  enoxaparin Injectable 30 milliGRAM(s) SubCutaneous daily  hydrochlorothiazide 25 milliGRAM(s) Oral at bedtime  influenza   Vaccine 0.5 milliLiter(s) IntraMuscular once  levothyroxine 75 MICROGram(s) Oral daily  losartan 50 milliGRAM(s) Oral daily  multivitamin  Chewable 1 Tablet(s) Oral daily  nystatin Powder 1 Application(s) Topical two times a day  pantoprazole    Tablet 40 milliGRAM(s) Oral before breakfast  senna 2 Tablet(s) Oral at bedtime  sodium chloride 0.45%. 1000 milliLiter(s) (75 mL/Hr) IV Continuous <Continuous>    MEDICATIONS  (PRN):  acetaminophen   Tablet .. 650 milliGRAM(s) Oral every 6 hours PRN Temp greater or equal to 38C (100.4F)  acetaminophen  Suppository .. 650 milliGRAM(s) Rectal every 6 hours PRN Temp greater or equal to 38C (100.4F)      Vital Signs Last 24 Hrs  T(C): 36.2 (25 Sep 2019 22:00), Max: 36.8 (25 Sep 2019 06:16)  T(F): 97.2 (25 Sep 2019 22:00), Max: 98.3 (25 Sep 2019 06:16)  HR: 64 (25 Sep 2019 22:00) (63 - 76)  BP: 177/79 (25 Sep 2019 22:00) (168/70 - 189/82)  BP(mean): --  RR: 16 (25 Sep 2019 22:00) (16 - 16)  SpO2: --    GENERAL: elderly, fragile and  chronically ill looking, resting in bed, NAD + O2 NC, + feeding tube  HEAD:  Atraumatic, Normocephalic  EYES: EOMI, PERRLA, conjunctiva and sclera clear  ENT: Moist mucous membranes   NECK: Supple, No JVD  CHEST/LUNG: Shallow respirations, scattered rhonchi  HEART: S1S2 irreg  ABDOMEN: Globular, soft and benign  EXTREMITIES:  advanced arthritic changes, dec motor function, poor mm mass and tone  NERVOUS SYSTEM:  non focal                                       12.1   7.58  )-----------( 275      ( 25 Sep 2019 06:33 )             38.7     09-25    149<H>  |  107  |  58<H>  ----------------------------<  315<H>  3.6   |  31  |  1.4    Ca    9.3      25 Sep 2019 15:39  Phos  3.1     09-25  Mg     2.6     09-25    TPro  6.4  /  Alb  3.4<L>  /  TBili  0.7  /  DBili  x   /  AST  21  /  ALT  6   /  AlkPhos  110  09-25

## 2019-09-26 NOTE — PROGRESS NOTE ADULT - SUBJECTIVE AND OBJECTIVE BOX
Nephrology progress note    Patient is seen and examined, events over the last 24 h noted .  On NGT feeds and 1/2 NS at 50 cc/hr  Allergies:  No Known Allergies    Hospital Medications:   MEDICATIONS  (STANDING):  amLODIPine   Tablet 5 milliGRAM(s) Oral <User Schedule>  aspirin  chewable 81 milliGRAM(s) Oral daily  buDESOnide 160 MICROgram(s)/formoterol 4.5 MICROgram(s) Inhaler 2 Puff(s) Inhalation two times a day  carbidopa/levodopa  10/100 1 Tablet(s) Oral daily  carbidopa/levodopa  25/100 1 Tablet(s) Oral at bedtime  carvedilol 25 milliGRAM(s) Oral every 12 hours  chlorhexidine 4% Liquid 1 Application(s) Topical <User Schedule>  docusate sodium Liquid 100 milliGRAM(s) Oral three times a day  DULoxetine 60 milliGRAM(s) Oral daily  enoxaparin Injectable 30 milliGRAM(s) SubCutaneous daily  hydrochlorothiazide 25 milliGRAM(s) Oral at bedtime  influenza   Vaccine 0.5 milliLiter(s) IntraMuscular once  levothyroxine 75 MICROGram(s) Oral daily  losartan 50 milliGRAM(s) Oral daily  multivitamin  Chewable 1 Tablet(s) Oral daily  nystatin Powder 1 Application(s) Topical two times a day  pantoprazole    Tablet 40 milliGRAM(s) Oral before breakfast  senna 2 Tablet(s) Oral at bedtime  sodium chloride 0.45%. 1000 milliLiter(s) (75 mL/Hr) IV Continuous <Continuous>        VITALS:  T(F): 97.8 (09-26-19 @ 05:14), Max: 97.8 (09-26-19 @ 05:14)  HR: 55 (09-26-19 @ 08:22)  BP: 172/70 (09-26-19 @ 08:22)  RR: 16 (09-26-19 @ 05:14)  SpO2: --  Wt(kg): --    09-24 @ 07:01  -  09-25 @ 07:00  --------------------------------------------------------  IN: 0 mL / OUT: 1950 mL / NET: -1950 mL    09-25 @ 07:01  -  09-26 @ 07:00  --------------------------------------------------------  IN: 240 mL / OUT: 2000 mL / NET: -1760 mL        Weight (kg): 64.5 (09-26 @ 05:14)    PHYSICAL EXAM:  Constitutional: NAD  HEENT: anicteric sclera, oropharynx clear, MMM  Neck: No JVD  Respiratory: CTAB  Cardiovascular: S1, S2, RRR  Gastrointestinal: BS+, soft, NT/ND  Extremities: No peripheral edema  Neurological: Awake alert  : No CVA tenderness. No sanchez.   Skin: No rashes  Vascular Access:    LABS:  09-26    145  |  102  |  48<H>  ----------------------------<  244<H>  3.5   |  32  |  1.2  Creatinine Trend: 1.2<--, 1.4<--, 1.4<--, 1.6<--, 2.0<--, 2.2<--  Ca    9.1      26 Sep 2019 06:32  Phos  3.1     09-25  Mg     2.2     09-26    TPro  6.4  /  Alb  3.4<L>  /  TBili  0.7  /  DBili      /  AST  21  /  ALT  6   /  AlkPhos  110  09-25                          11.3   6.84  )-----------( 229      ( 26 Sep 2019 06:32 )             35.4       Urine Studies:      RADIOLOGY & ADDITIONAL STUDIES:  < from: Xray Chest 1 View- PORTABLE-Routine (09.21.19 @ 09:50) >  Stable left basilar opacity/pleural effusion and right basilar   atelectasis.    < end of copied text >

## 2019-09-26 NOTE — PROVIDER CONTACT NOTE (OTHER) - ACTION/TREATMENT ORDERED:
Warming blanket applied
no further interventions
Monitor patient for any significant changes as per YAIMA ANNE

## 2019-09-26 NOTE — PROGRESS NOTE ADULT - SUBJECTIVE AND OBJECTIVE BOX
87yFemale  Being followed for PEG tube placement  Interval history: Electrolytes optimized, No hematemesis, melena, blood in stool reported. Patient on intermittent NG feeds      PAST MEDICAL & SURGICAL HISTORY:   Parkinson disease  Hypothyroid  Rib fractures  Congestive heart failure  Hypertension  No significant past surgical history          Social History: No smoking. No alcohol. No illegal drug use.            MEDICATIONS  (STANDING):  amLODIPine   Tablet 5 milliGRAM(s) Oral <User Schedule>  aspirin  chewable 81 milliGRAM(s) Oral daily  buDESOnide 160 MICROgram(s)/formoterol 4.5 MICROgram(s) Inhaler 2 Puff(s) Inhalation two times a day  carbidopa/levodopa  10/100 1 Tablet(s) Oral daily  carbidopa/levodopa  25/100 1 Tablet(s) Oral at bedtime  carvedilol 25 milliGRAM(s) Oral every 12 hours  chlorhexidine 4% Liquid 1 Application(s) Topical <User Schedule>  docusate sodium Liquid 100 milliGRAM(s) Oral three times a day  DULoxetine 60 milliGRAM(s) Oral daily  enoxaparin Injectable 30 milliGRAM(s) SubCutaneous daily  hydrochlorothiazide 25 milliGRAM(s) Oral at bedtime  influenza   Vaccine 0.5 milliLiter(s) IntraMuscular once  levothyroxine 75 MICROGram(s) Oral daily  losartan 50 milliGRAM(s) Oral daily  multivitamin  Chewable 1 Tablet(s) Oral daily  nystatin Powder 1 Application(s) Topical two times a day  pantoprazole    Tablet 40 milliGRAM(s) Oral before breakfast  senna 2 Tablet(s) Oral at bedtime  sodium chloride 0.45%. 1000 milliLiter(s) (75 mL/Hr) IV Continuous <Continuous>    MEDICATIONS  (PRN):  acetaminophen   Tablet .. 650 milliGRAM(s) Oral every 6 hours PRN Temp greater or equal to 38C (100.4F)  acetaminophen  Suppository .. 650 milliGRAM(s) Rectal every 6 hours PRN Temp greater or equal to 38C (100.4F)      Allergies:     No Known Allergies    Intolerances          REVIEW OF SYSTEMS:  Unobtainable         VITAL SIGNS:   T(F): 97.8 (09-26-19 @ 05:14), Max: 97.8 (09-26-19 @ 05:14)  HR: 55 (09-26-19 @ 08:22) (55 - 76)  BP: 172/70 (09-26-19 @ 08:22) (172/70 - 202/90)  RR: 16 (09-26-19 @ 05:14) (16 - 16)  SpO2: --    PHYSICAL EXAM:  GENERAL: no acute distress. +NG tube  HEAD:  Atraumatic, Normocephalic  EYES: conjunctiva and sclera clear  NECK: Supple, no JVD or thyromegaly  CHEST/LUNG: Clear to auscultation bilaterally; No wheeze, rhonchi, or rales  HEART: Regular rate and rhythm; normal S1, S2, No murmurs.  ABDOMEN: Soft, nontender, nondistended; Bowel sounds present, no abdominal bruit, masses, or hepatosplenomegaly  NEUROLOGY: No asterixis or tremor.   SKIN: Intact, no jaundice            LABS:                        11.3   6.84  )-----------( 229      ( 26 Sep 2019 06:32 )             35.4     09-26    145  |  102  |  48<H>  ----------------------------<  244<H>  3.5   |  32  |  1.2    Ca    9.1      26 Sep 2019 06:32  Phos  3.1     09-25  Mg     2.2     09-26    TPro  6.4  /  Alb  3.4<L>  /  TBili  0.7  /  DBili  x   /  AST  21  /  ALT  6   /  AlkPhos  110  09-25    LIVER FUNCTIONS - ( 25 Sep 2019 06:33 )  Alb: 3.4 g/dL / Pro: 6.4 g/dL / ALK PHOS: 110 U/L / ALT: 6 U/L / AST: 21 U/L / GGT: x               IMAGING:    < from: Xray Chest 1 View- PORTABLE-Routine (09.21.19 @ 09:50) >  EXAM:  XR CHEST PORTABLE ROUTINE 1V            PROCEDURE DATE:  09/21/2019            INTERPRETATION:  Clinical History / Reason for exam: Opacities    Comparison : Chest radiograph September 20, 2019.    Technique/Positioning: AP.    Findings:    Support devices: An enteric tube is seen projecting under the diaphragm.    Cardiac/mediastinum/hilum: Stable cardiomegaly.    Lung parenchyma/Pleura: Left basilar opacity/pleural effusion, stable.   Right basilar atelectasis, stable.    Skeleton/softtissues: Stable.    Impression:      Stable left basilar opacity/pleural effusion and right basilar   atelectasis.                      KAREN HEMPHILL M.D., ATTENDING RADIOLOGIST  This document has been electronically signed. Sep 21 2019  1:43PM              < end of copied text >

## 2019-09-26 NOTE — PROGRESS NOTE ADULT - ASSESSMENT
86 yo female pmh bradycardia s/p rapid response 9/6/19 GI initially consulted for endoscopic NG tube placement as NG tube attempts were unsuccessful.     EGD performed: see note large food impaction entire length of esophagusFood removed and NGT endoscopically placed 9/6/19    GI reconsulted for PEG tube placement  Rec  -Case extensively discussed with family at bedside and patient's HCP, patient unable to swallow again after speech evaluation  -Cardio consult appreciated patient intermediate risk for PEG placement  -Will contact next of kin /Proxy for consent  -IV antibiotic prophylaxis the day of the Peg, IV Cefazolin 1g on hold, send patient down with Cefazolin to be given 30 minutes prior to procedure  -Please hold anticoagulation/ heparin/Lovenox prior to procedure  -NPO after midnight the night prior to Peg  -Optimize electrolytes   -AM CBC, AM BMP, AM PT,PTT, INR  -Active type and screen     PEG scheduled for: 9/27/19  -Consent to be obtained from: Roxanna Knight  -Phone number of Proxy/ relative: [phone] 88 yo female pmh bradycardia s/p rapid response 9/6/19 GI initially consulted for endoscopic NG tube placement as NG tube attempts were unsuccessful.     EGD performed: see note large food impaction entire length of esophagusFood removed and NGT endoscopically placed 9/6/19    GI reconsulted for PEG tube placement  Rec  -Case extensively discussed with family at bedside and patient's HCP, patient unable to swallow again after speech evaluation  -Cardio consult appreciated patient intermediate risk for PEG placement  -Will contact next of kin /Proxy for consent  -IV antibiotic prophylaxis the day of the Peg, IV Cefazolin 1g on hold, send patient down with Cefazolin to be given 30 minutes prior to procedure  -Please hold anticoagulation/ heparin/Lovenox prior to procedure  -NPO after midnight the night prior to Peg  -Optimize electrolytes   -AM CBC, AM BMP, AM PT,PTT, INR  -Active type and screen     PEG scheduled for: 9/27/19  -Consent to obtained from: Roxanna Knight  -Phone number of Proxy/ relative: [phone]

## 2019-09-27 LAB
ANION GAP SERPL CALC-SCNC: 9 MMOL/L — SIGNIFICANT CHANGE UP (ref 7–14)
APTT BLD: 28 SEC — SIGNIFICANT CHANGE UP (ref 27–39.2)
BUN SERPL-MCNC: 47 MG/DL — HIGH (ref 10–20)
CALCIUM SERPL-MCNC: 9 MG/DL — SIGNIFICANT CHANGE UP (ref 8.5–10.1)
CHLORIDE SERPL-SCNC: 99 MMOL/L — SIGNIFICANT CHANGE UP (ref 98–110)
CO2 SERPL-SCNC: 33 MMOL/L — HIGH (ref 17–32)
CREAT SERPL-MCNC: 1.1 MG/DL — SIGNIFICANT CHANGE UP (ref 0.7–1.5)
GLUCOSE BLDC GLUCOMTR-MCNC: 134 MG/DL — HIGH (ref 70–99)
GLUCOSE BLDC GLUCOMTR-MCNC: 158 MG/DL — HIGH (ref 70–99)
GLUCOSE BLDC GLUCOMTR-MCNC: 191 MG/DL — HIGH (ref 70–99)
GLUCOSE SERPL-MCNC: 216 MG/DL — HIGH (ref 70–99)
HCT VFR BLD CALC: 36.4 % — LOW (ref 37–47)
HGB BLD-MCNC: 11.9 G/DL — LOW (ref 12–16)
INR BLD: 1.05 RATIO — SIGNIFICANT CHANGE UP (ref 0.65–1.3)
MCHC RBC-ENTMCNC: 28.3 PG — SIGNIFICANT CHANGE UP (ref 27–31)
MCHC RBC-ENTMCNC: 32.7 G/DL — SIGNIFICANT CHANGE UP (ref 32–37)
MCV RBC AUTO: 86.5 FL — SIGNIFICANT CHANGE UP (ref 81–99)
NRBC # BLD: 0 /100 WBCS — SIGNIFICANT CHANGE UP (ref 0–0)
PLATELET # BLD AUTO: 228 K/UL — SIGNIFICANT CHANGE UP (ref 130–400)
POTASSIUM SERPL-MCNC: 3.4 MMOL/L — LOW (ref 3.5–5)
POTASSIUM SERPL-SCNC: 3.4 MMOL/L — LOW (ref 3.5–5)
PROTHROM AB SERPL-ACNC: 12.1 SEC — SIGNIFICANT CHANGE UP (ref 9.95–12.87)
RBC # BLD: 4.21 M/UL — SIGNIFICANT CHANGE UP (ref 4.2–5.4)
RBC # FLD: 16.4 % — HIGH (ref 11.5–14.5)
SODIUM SERPL-SCNC: 141 MMOL/L — SIGNIFICANT CHANGE UP (ref 135–146)
WBC # BLD: 8.42 K/UL — SIGNIFICANT CHANGE UP (ref 4.8–10.8)
WBC # FLD AUTO: 8.42 K/UL — SIGNIFICANT CHANGE UP (ref 4.8–10.8)

## 2019-09-27 PROCEDURE — 99231 SBSQ HOSP IP/OBS SF/LOW 25: CPT

## 2019-09-27 RX ORDER — LOSARTAN POTASSIUM 100 MG/1
50 TABLET, FILM COATED ORAL ONCE
Refills: 0 | Status: COMPLETED | OUTPATIENT
Start: 2019-09-27 | End: 2019-09-27

## 2019-09-27 RX ORDER — CEFAZOLIN SODIUM 1 G
1000 VIAL (EA) INJECTION ONCE
Refills: 0 | Status: COMPLETED | OUTPATIENT
Start: 2019-09-30 | End: 2019-09-30

## 2019-09-27 RX ORDER — TEMAZEPAM 15 MG/1
15 CAPSULE ORAL ONCE
Refills: 0 | Status: DISCONTINUED | OUTPATIENT
Start: 2019-09-27 | End: 2019-09-30

## 2019-09-27 RX ORDER — SALIVA SUBSTITUTE COMB NO.11 351 MG
30 POWDER IN PACKET (EA) MUCOUS MEMBRANE
Refills: 0 | Status: DISCONTINUED | OUTPATIENT
Start: 2019-09-27 | End: 2019-09-27

## 2019-09-27 RX ADMIN — CARVEDILOL PHOSPHATE 25 MILLIGRAM(S): 80 CAPSULE, EXTENDED RELEASE ORAL at 17:10

## 2019-09-27 RX ADMIN — AMLODIPINE BESYLATE 5 MILLIGRAM(S): 2.5 TABLET ORAL at 17:10

## 2019-09-27 RX ADMIN — CARVEDILOL PHOSPHATE 25 MILLIGRAM(S): 80 CAPSULE, EXTENDED RELEASE ORAL at 05:42

## 2019-09-27 RX ADMIN — NYSTATIN CREAM 1 APPLICATION(S): 100000 CREAM TOPICAL at 20:55

## 2019-09-27 RX ADMIN — ENOXAPARIN SODIUM 30 MILLIGRAM(S): 100 INJECTION SUBCUTANEOUS at 14:41

## 2019-09-27 RX ADMIN — SENNA PLUS 2 TABLET(S): 8.6 TABLET ORAL at 21:21

## 2019-09-27 RX ADMIN — Medication 100 MILLIGRAM(S): at 21:21

## 2019-09-27 RX ADMIN — CARBIDOPA AND LEVODOPA 1 TABLET(S): 25; 100 TABLET ORAL at 14:40

## 2019-09-27 RX ADMIN — Medication 100 MILLIGRAM(S): at 09:57

## 2019-09-27 RX ADMIN — AMLODIPINE BESYLATE 5 MILLIGRAM(S): 2.5 TABLET ORAL at 05:42

## 2019-09-27 RX ADMIN — CARBIDOPA AND LEVODOPA 1 TABLET(S): 25; 100 TABLET ORAL at 21:21

## 2019-09-27 RX ADMIN — Medication 25 MILLIGRAM(S): at 21:21

## 2019-09-27 RX ADMIN — Medication 81 MILLIGRAM(S): at 14:40

## 2019-09-27 RX ADMIN — BUDESONIDE AND FORMOTEROL FUMARATE DIHYDRATE 2 PUFF(S): 160; 4.5 AEROSOL RESPIRATORY (INHALATION) at 08:53

## 2019-09-27 RX ADMIN — LOSARTAN POTASSIUM 50 MILLIGRAM(S): 100 TABLET, FILM COATED ORAL at 17:10

## 2019-09-27 RX ADMIN — Medication 1 TABLET(S): at 14:42

## 2019-09-27 RX ADMIN — Medication 100 MILLIGRAM(S): at 14:41

## 2019-09-27 RX ADMIN — BUDESONIDE AND FORMOTEROL FUMARATE DIHYDRATE 2 PUFF(S): 160; 4.5 AEROSOL RESPIRATORY (INHALATION) at 05:41

## 2019-09-27 NOTE — PROGRESS NOTE ADULT - ASSESSMENT
Pt seen for HTN which ahas improved  - rx -increase Amlodipine to 10mg qd  cont HCTZ 25 mg qd    GHASSAN - improving    Awaiting PEG today    will sign off  call prn

## 2019-09-27 NOTE — PROGRESS NOTE ADULT - ASSESSMENT
86 yo female pmh bradycardia s/p rapid response 9/6/19 GI initially consulted for endoscopic NG tube placement as NG tube attempts were unsuccessful.     EGD performed: see note large food impaction entire length of esophagus Food removed and NGT endoscopically placed 9/6/19    GI reconsulted for PEG tube placement  Rec  -PEG procedure cancelled today due to 4 consecutive hypertensive up to BP readings 220/100mmHG  -PEG to be rescheduled when BP under control 86 yo female pmh bradycardia s/p rapid response 9/6/19 GI initially consulted for endoscopic NG tube placement as NG tube attempts were unsuccessful.   EGD performed: see note large food impaction entire length of esophagus Food removed and NGT endoscopically placed 9/6/19    GI reconsulted for PEG tube placement  Rec  -PEG procedure cancelled today due to 4 consecutive hypertensive up to BP readings 220/100mmHG manual and electronic readings  -PEG to be rescheduled Monday insofar as BP under control, will schedule Monday if BP under control Monday  -Case extensively discussed with family at bedside and patient's HCP, patient unable to swallow again after speech evaluation  -Cardio consult appreciated patient intermediate risk for PEG placement  -IV antibiotic prophylaxis the day of the Peg, IV Cefazolin 1g on hold, send patient down with Cefazolin to be given 30 minutes prior to procedure  -Please hold anticoagulation/ heparin/Lovenox prior to procedure  -NPO after midnight the night prior to Peg  -Optimize electrolytes, Optimize Blood Pressure   -AM CBC, AM BMP, AM PT,PTT, INR  -Active type and screen     PEG scheduled for: 9/30/19  -Consent to obtained from: Roxanna Knight  -Phone number of Proxy/ relative: [phone]

## 2019-09-27 NOTE — CHART NOTE - NSCHARTNOTEFT_GEN_A_CORE
Patient seen at bedside - content, resting comfortably.    Family at bedside. Explained at length why PEG procedure was cancelled (2/2 HTN). Cardiology c/s called to help regulate BP.     Spoke to GI team - they are planning to reschedule PEG placement for Monday 9/30.    As per RN: BP still elevated to 188/78 - will prescribe one time dose of cozaar 50mg, which is one of pts daily meds that was held this AM.     Patient without further questions or concerns at this time.     Patient encouraged to contact PA with any further questions or concerns.

## 2019-09-27 NOTE — PROGRESS NOTE ADULT - ASSESSMENT
87 year old female admitted s/p fall episode at home with generalized weakness and severe bradycardia ;sp rapid response respiratory failure, sp intubation, aspiration, fever PNA/pneumonitis    # Respiratory Failure - aspiration  secretions  s/p intubation and  bronch extubated seen by SLP    - desaturation on high flow O2,  secretions   -  SLP evaluation  extubated MBBS failed feeding   -  enteral feeding for now need to resolved decision on feeding ; PEG family ok with peg   - at risk for aspiration        # Cardiac / CHF  / HTN    - ff by cardio  - pulse better 60s   - continue lasix for volume overload and CHF  - reviewed echo  - monitor weight and pulse        # Parkinson's / dysphagia   - will probably need PEG if family amenable spoke with daughter ammenable   - continue Sinemet  -repeat  + cont dysphagia  - repeat Mg improving  - Hold K supplement  - lfree wATER   -monitor electrolytes and renal parameters    # Hypothyroidism     - levothyroxine     # GI and DVT prophylaxis     - pantoprazole   - enoxaparin    advance care  noted  CPR,  full code, but overall guarded state    disposition -   probable STR

## 2019-09-27 NOTE — PROGRESS NOTE ADULT - SUBJECTIVE AND OBJECTIVE BOX
Nephrology progress note    Patient is seen and examined, events over the last 24 h noted .  NPO for PEG placement, on 1/2 NS 50 cc/hr  Allergies:  No Known Allergies    Hospital Medications:   MEDICATIONS  (STANDING):  amLODIPine   Tablet 5 milliGRAM(s) Oral <User Schedule>  aspirin  chewable 81 milliGRAM(s) Oral daily  buDESOnide 160 MICROgram(s)/formoterol 4.5 MICROgram(s) Inhaler 2 Puff(s) Inhalation two times a day  carbidopa/levodopa  10/100 1 Tablet(s) Oral daily  carbidopa/levodopa  25/100 1 Tablet(s) Oral at bedtime  carvedilol 25 milliGRAM(s) Oral every 12 hours  ceFAZolin   IVPB 1000 milliGRAM(s) IV Intermittent once  chlorhexidine 4% Liquid 1 Application(s) Topical <User Schedule>  docusate sodium Liquid 100 milliGRAM(s) Oral three times a day  DULoxetine 60 milliGRAM(s) Oral daily  enoxaparin Injectable 30 milliGRAM(s) SubCutaneous daily  hydrochlorothiazide 25 milliGRAM(s) Oral at bedtime  influenza   Vaccine 0.5 milliLiter(s) IntraMuscular once  levothyroxine 75 MICROGram(s) Oral daily  losartan 50 milliGRAM(s) Oral daily  multivitamin  Chewable 1 Tablet(s) Oral daily  nystatin Powder 1 Application(s) Topical two times a day  pantoprazole    Tablet 40 milliGRAM(s) Oral before breakfast  senna 2 Tablet(s) Oral at bedtime  sodium chloride 0.45%. 1000 milliLiter(s) (75 mL/Hr) IV Continuous <Continuous>        VITALS:  T(F): 96.2 (09-27-19 @ 05:58), Max: 98 (09-26-19 @ 23:57)  HR: 53 (09-27-19 @ 06:48)  BP: 169/74 (09-27-19 @ 06:48)  RR: 16 (09-27-19 @ 05:58)  SpO2: --  Wt(kg): --    09-25 @ 07:01  -  09-26 @ 07:00  --------------------------------------------------------  IN: 240 mL / OUT: 2000 mL / NET: -1760 mL    09-26 @ 07:01  -  09-27 @ 07:00  --------------------------------------------------------  IN: 900 mL / OUT: 1000 mL / NET: -100 mL        Weight (kg): 68.2 (09-27 @ 05:58)    PHYSICAL EXAM:  Constitutional: NAD, NGT in place  HEENT: anicteric sclera, oropharynx clear, MMM  Neck: No JVD  Respiratory: CTA  Cardiovascular: S1, S2, RRR  Gastrointestinal: BS+, soft,   Extremities:  No peripheral edema  Neurological: Awake alert  : No CVA tenderness. Primafit  Skin: No rashes  Vascular Access:    LABS:  09-27    141  |  99  |  47<H>  ----------------------------<  216<H>  3.4<L>   |  33<H>  |  1.1  Creatinine Trend: 1.1<--, 1.2<--, 1.4<--, 1.4<--, 1.6<--, 2.0<--    Ca    9.0      27 Sep 2019 07:32  Mg     2.2     09-26                            11.9   8.42  )-----------( 228      ( 27 Sep 2019 07:32 )             36.4       Urine Studies:      RADIOLOGY & ADDITIONAL STUDIES:

## 2019-09-27 NOTE — PROGRESS NOTE ADULT - SUBJECTIVE AND OBJECTIVE BOX
86 yo F presents to the ED after VN found her very weak, bradycardic and in respiratory distress. Pt  was brought to ER  in by EMS. Pt also gave Hx of having fallen on  Sunday 9/1/19.  Pt has mobility dysfunction and ambulates with walker with difficulty navigating steps with Hx of progressive Parkinsons. . Pt ambulates with a walker few steps.  Pt was noted to be bradycardic 20-30s. Pt had ARF, cardiac arrest and was intubated. Pt was tx for aspiration PNA, pneumoniits and sucessfully extubated but remains on high flow oxygen.  Pt has continued difficulty with dysphagia ans clearing secretions.    interval :  extubated s/p speech and swallow/ failed due to severe swallowing dysfunction, pt remains on O2 via NC and is being fed via feeding tube, issue with cont hypermagnesemia.     PAST MEDICAL & SURGICAL HISTORY:  Parkinson disease  Hypothyroid  Rib fractures  Congestive heart failure  Hypertension  No significant past surgical history    MEDICATIONS  (STANDING):  amLODIPine   Tablet 5 milliGRAM(s) Oral <User Schedule>  aspirin  chewable 81 milliGRAM(s) Oral daily  buDESOnide 160 MICROgram(s)/formoterol 4.5 MICROgram(s) Inhaler 2 Puff(s) Inhalation two times a day  carbidopa/levodopa  10/100 1 Tablet(s) Oral daily  carbidopa/levodopa  25/100 1 Tablet(s) Oral at bedtime  carvedilol 25 milliGRAM(s) Oral every 12 hours  chlorhexidine 4% Liquid 1 Application(s) Topical <User Schedule>  docusate sodium Liquid 100 milliGRAM(s) Oral three times a day  DULoxetine 60 milliGRAM(s) Oral daily  enoxaparin Injectable 30 milliGRAM(s) SubCutaneous daily  hydrochlorothiazide 25 milliGRAM(s) Oral at bedtime  influenza   Vaccine 0.5 milliLiter(s) IntraMuscular once  levothyroxine 75 MICROGram(s) Oral daily  losartan 50 milliGRAM(s) Oral daily  multivitamin  Chewable 1 Tablet(s) Oral daily  nystatin Powder 1 Application(s) Topical two times a day  pantoprazole    Tablet 40 milliGRAM(s) Oral before breakfast  senna 2 Tablet(s) Oral at bedtime  sodium chloride 0.45%. 1000 milliLiter(s) (75 mL/Hr) IV Continuous <Continuous>    MEDICATIONS  (PRN):  acetaminophen   Tablet .. 650 milliGRAM(s) Oral every 6 hours PRN Temp greater or equal to 38C (100.4F)  acetaminophen  Suppository .. 650 milliGRAM(s) Rectal every 6 hours PRN Temp greater or equal to 38C (100.4F)  Mouth kote 1 Spray(s) 1 Spray(s) Oral every 2 hours PRN dry mouth  temazepam 15 milliGRAM(s) Oral once PRN Insomnia    Vital Signs Last 24 Hrs  T(C): 35.8 (27 Sep 2019 21:00), Max: 36.7 (26 Sep 2019 23:57)  T(F): 96.4 (27 Sep 2019 21:00), Max: 98 (26 Sep 2019 23:57)  HR: 53 (27 Sep 2019 21:00) (53 - 59)  BP: 171/71 (27 Sep 2019 21:00) (158/62 - 213/83)  BP(mean): --  RR: 16 (27 Sep 2019 21:00) (16 - 18)  SpO2: 100% (27 Sep 2019 10:35) (100% - 100%)    GENERAL: elderly, fragile and  chronically ill looking, resting in bed, NAD + O2 NC, + feeding tube  HEAD:  Atraumatic, Normocephalic  EYES: EOMI, PERRLA, conjunctiva and sclera clear  ENT: Moist mucous membranes   NECK: Supple, No JVD  CHEST/LUNG: Shallow respirations, scattered rhonchi  HEART: S1S2 irreg  ABDOMEN: Globular, soft and benign  EXTREMITIES:  advanced arthritic changes, dec motor function, poor mm mass and tone  NERVOUS SYSTEM:  non focal                          11.9   8.42  )-----------( 228      ( 27 Sep 2019 07:32 )             36.4     09-27    141  |  99  |  47<H>  ----------------------------<  216<H>  3.4<L>   |  33<H>  |  1.1    Ca    9.0      27 Sep 2019 07:32  Mg     2.2     09-26

## 2019-09-27 NOTE — PROGRESS NOTE ADULT - SUBJECTIVE AND OBJECTIVE BOX
87yFemale  Being followed for PEG tube placement  Interval history: Patient scheduled for PEG tube placement today, but patient hypertensive on consecutive BP readings. No hematemesis, melena, blood in stool reported.      PAST MEDICAL & SURGICAL HISTORY:   Parkinson disease  Hypothyroid  Rib fractures  Congestive heart failure  Hypertension  No significant past surgical history          Social History: No smoking. No alcohol. No illegal drug use.          MEDICATIONS:  MEDICATIONS  (STANDING):  amLODIPine   Tablet 5 milliGRAM(s) Oral <User Schedule>  aspirin  chewable 81 milliGRAM(s) Oral daily  buDESOnide 160 MICROgram(s)/formoterol 4.5 MICROgram(s) Inhaler 2 Puff(s) Inhalation two times a day  carbidopa/levodopa  10/100 1 Tablet(s) Oral daily  carbidopa/levodopa  25/100 1 Tablet(s) Oral at bedtime  carvedilol 25 milliGRAM(s) Oral every 12 hours  chlorhexidine 4% Liquid 1 Application(s) Topical <User Schedule>  docusate sodium Liquid 100 milliGRAM(s) Oral three times a day  DULoxetine 60 milliGRAM(s) Oral daily  enoxaparin Injectable 30 milliGRAM(s) SubCutaneous daily  hydrochlorothiazide 25 milliGRAM(s) Oral at bedtime  influenza   Vaccine 0.5 milliLiter(s) IntraMuscular once  levothyroxine 75 MICROGram(s) Oral daily  losartan 50 milliGRAM(s) Oral daily  multivitamin  Chewable 1 Tablet(s) Oral daily  nystatin Powder 1 Application(s) Topical two times a day  pantoprazole    Tablet 40 milliGRAM(s) Oral before breakfast  senna 2 Tablet(s) Oral at bedtime  sodium chloride 0.45%. 1000 milliLiter(s) (75 mL/Hr) IV Continuous <Continuous>    MEDICATIONS  (PRN):  acetaminophen   Tablet .. 650 milliGRAM(s) Oral every 6 hours PRN Temp greater or equal to 38C (100.4F)  acetaminophen  Suppository .. 650 milliGRAM(s) Rectal every 6 hours PRN Temp greater or equal to 38C (100.4F)      Allergies:     No Known Allergies    Intolerances          REVIEW OF SYSTEMS:  Unobtainable      VITAL SIGNS:   T(F): 97.7 (09-27-19 @ 10:00), Max: 98 (09-26-19 @ 23:57)  HR: 53 (09-27-19 @ 10:35) (53 - 56)  BP: 213/83 (09-27-19 @ 10:35) (169/74 - 213/83)  RR: 18 (09-27-19 @ 10:35) (16 - 18)  SpO2: 100% (09-27-19 @ 10:35) (100% - 100%)    PHYSICAL EXAM:  GENERAL: no acute distress +NG tube  HEAD:  Atraumatic, Normocephalic  EYES: conjunctiva and sclera clear  NECK: Supple, no JVD or thyromegaly  CHEST/LUNG: Clear to auscultation bilaterally; No wheeze, rhonchi, or rales  HEART: Regular rate and rhythm; normal S1, S2, No murmurs.  ABDOMEN: Soft, nontender, nondistended; Bowel sounds present, no abdominal bruit, masses, or hepatosplenomegaly  NEUROLOGY: No asterixis or tremor.   SKIN: Intact, no jaundice            LABS:                        11.9   8.42  )-----------( 228      ( 27 Sep 2019 07:32 )             36.4     09-27    141  |  99  |  47<H>  ----------------------------<  216<H>  3.4<L>   |  33<H>  |  1.1    Ca    9.0      27 Sep 2019 07:32  Mg     2.2     09-26        PT/INR - ( 27 Sep 2019 07:32 )   PT: 12.10 sec;   INR: 1.05 ratio         PTT - ( 27 Sep 2019 07:32 )  PTT:28.0 sec    IMAGING:    < from: Xray Chest 1 View- PORTABLE-Routine (09.21.19 @ 09:50) >  EXAM:  XR CHEST PORTABLE ROUTINE 1V            PROCEDURE DATE:  09/21/2019            INTERPRETATION:  Clinical History / Reason for exam: Opacities    Comparison : Chest radiograph September 20, 2019.    Technique/Positioning: AP.    Findings:    Support devices: An enteric tube is seen projecting under the diaphragm.    Cardiac/mediastinum/hilum: Stable cardiomegaly.    Lung parenchyma/Pleura: Left basilar opacity/pleural effusion, stable.   Right basilar atelectasis, stable.    Skeleton/softtissues: Stable.    Impression:      Stable left basilar opacity/pleural effusion and right basilar   atelectasis.                      KAREN HEMPHILL M.D., ATTENDING RADIOLOGIST  This document has been electronically signed. Sep 21 2019  1:43PM              < end of copied text >

## 2019-09-28 LAB
ALBUMIN SERPL ELPH-MCNC: 3.5 G/DL — SIGNIFICANT CHANGE UP (ref 3.5–5.2)
ALP SERPL-CCNC: 111 U/L — SIGNIFICANT CHANGE UP (ref 30–115)
ALT FLD-CCNC: 11 U/L — SIGNIFICANT CHANGE UP (ref 0–41)
ANION GAP SERPL CALC-SCNC: 11 MMOL/L — SIGNIFICANT CHANGE UP (ref 7–14)
AST SERPL-CCNC: 30 U/L — SIGNIFICANT CHANGE UP (ref 0–41)
BILIRUB SERPL-MCNC: 0.8 MG/DL — SIGNIFICANT CHANGE UP (ref 0.2–1.2)
BUN SERPL-MCNC: 38 MG/DL — HIGH (ref 10–20)
CALCIUM SERPL-MCNC: 8.9 MG/DL — SIGNIFICANT CHANGE UP (ref 8.5–10.1)
CHLORIDE SERPL-SCNC: 96 MMOL/L — LOW (ref 98–110)
CO2 SERPL-SCNC: 31 MMOL/L — SIGNIFICANT CHANGE UP (ref 17–32)
CREAT SERPL-MCNC: 1 MG/DL — SIGNIFICANT CHANGE UP (ref 0.7–1.5)
GLUCOSE BLDC GLUCOMTR-MCNC: 187 MG/DL — HIGH (ref 70–99)
GLUCOSE BLDC GLUCOMTR-MCNC: 202 MG/DL — HIGH (ref 70–99)
GLUCOSE BLDC GLUCOMTR-MCNC: 248 MG/DL — HIGH (ref 70–99)
GLUCOSE SERPL-MCNC: 177 MG/DL — HIGH (ref 70–99)
HCT VFR BLD CALC: 35.9 % — LOW (ref 37–47)
HGB BLD-MCNC: 12 G/DL — SIGNIFICANT CHANGE UP (ref 12–16)
MCHC RBC-ENTMCNC: 28.4 PG — SIGNIFICANT CHANGE UP (ref 27–31)
MCHC RBC-ENTMCNC: 33.4 G/DL — SIGNIFICANT CHANGE UP (ref 32–37)
MCV RBC AUTO: 85.1 FL — SIGNIFICANT CHANGE UP (ref 81–99)
NRBC # BLD: 0 /100 WBCS — SIGNIFICANT CHANGE UP (ref 0–0)
PLATELET # BLD AUTO: 200 K/UL — SIGNIFICANT CHANGE UP (ref 130–400)
POTASSIUM SERPL-MCNC: 3.5 MMOL/L — SIGNIFICANT CHANGE UP (ref 3.5–5)
POTASSIUM SERPL-SCNC: 3.5 MMOL/L — SIGNIFICANT CHANGE UP (ref 3.5–5)
PROT SERPL-MCNC: 6.3 G/DL — SIGNIFICANT CHANGE UP (ref 6–8)
RBC # BLD: 4.22 M/UL — SIGNIFICANT CHANGE UP (ref 4.2–5.4)
RBC # FLD: 16.1 % — HIGH (ref 11.5–14.5)
SODIUM SERPL-SCNC: 138 MMOL/L — SIGNIFICANT CHANGE UP (ref 135–146)
WBC # BLD: 9.37 K/UL — SIGNIFICANT CHANGE UP (ref 4.8–10.8)
WBC # FLD AUTO: 9.37 K/UL — SIGNIFICANT CHANGE UP (ref 4.8–10.8)

## 2019-09-28 RX ORDER — AMLODIPINE BESYLATE 2.5 MG/1
10 TABLET ORAL DAILY
Refills: 0 | Status: DISCONTINUED | OUTPATIENT
Start: 2019-09-28 | End: 2019-10-03

## 2019-09-28 RX ORDER — LOSARTAN POTASSIUM 100 MG/1
100 TABLET, FILM COATED ORAL DAILY
Refills: 0 | Status: DISCONTINUED | OUTPATIENT
Start: 2019-09-28 | End: 2019-10-03

## 2019-09-28 RX ADMIN — CARBIDOPA AND LEVODOPA 1 TABLET(S): 25; 100 TABLET ORAL at 21:33

## 2019-09-28 RX ADMIN — Medication 100 MILLIGRAM(S): at 21:33

## 2019-09-28 RX ADMIN — AMLODIPINE BESYLATE 5 MILLIGRAM(S): 2.5 TABLET ORAL at 06:10

## 2019-09-28 RX ADMIN — SODIUM CHLORIDE 75 MILLILITER(S): 9 INJECTION, SOLUTION INTRAVENOUS at 06:09

## 2019-09-28 RX ADMIN — Medication 100 MILLIGRAM(S): at 06:11

## 2019-09-28 RX ADMIN — PANTOPRAZOLE SODIUM 40 MILLIGRAM(S): 20 TABLET, DELAYED RELEASE ORAL at 06:10

## 2019-09-28 RX ADMIN — NYSTATIN CREAM 1 APPLICATION(S): 100000 CREAM TOPICAL at 06:11

## 2019-09-28 RX ADMIN — NYSTATIN CREAM 1 APPLICATION(S): 100000 CREAM TOPICAL at 17:35

## 2019-09-28 RX ADMIN — CARVEDILOL PHOSPHATE 25 MILLIGRAM(S): 80 CAPSULE, EXTENDED RELEASE ORAL at 17:34

## 2019-09-28 RX ADMIN — CHLORHEXIDINE GLUCONATE 1 APPLICATION(S): 213 SOLUTION TOPICAL at 06:11

## 2019-09-28 RX ADMIN — Medication 100 MILLIGRAM(S): at 14:24

## 2019-09-28 RX ADMIN — CARBIDOPA AND LEVODOPA 1 TABLET(S): 25; 100 TABLET ORAL at 12:04

## 2019-09-28 RX ADMIN — Medication 1 TABLET(S): at 12:05

## 2019-09-28 RX ADMIN — LOSARTAN POTASSIUM 50 MILLIGRAM(S): 100 TABLET, FILM COATED ORAL at 06:10

## 2019-09-28 RX ADMIN — DULOXETINE HYDROCHLORIDE 60 MILLIGRAM(S): 30 CAPSULE, DELAYED RELEASE ORAL at 12:04

## 2019-09-28 RX ADMIN — ENOXAPARIN SODIUM 30 MILLIGRAM(S): 100 INJECTION SUBCUTANEOUS at 12:04

## 2019-09-28 RX ADMIN — Medication 75 MICROGRAM(S): at 06:10

## 2019-09-28 RX ADMIN — BUDESONIDE AND FORMOTEROL FUMARATE DIHYDRATE 2 PUFF(S): 160; 4.5 AEROSOL RESPIRATORY (INHALATION) at 21:35

## 2019-09-28 RX ADMIN — SODIUM CHLORIDE 75 MILLILITER(S): 9 INJECTION, SOLUTION INTRAVENOUS at 21:33

## 2019-09-28 RX ADMIN — CARVEDILOL PHOSPHATE 25 MILLIGRAM(S): 80 CAPSULE, EXTENDED RELEASE ORAL at 06:10

## 2019-09-28 RX ADMIN — LOSARTAN POTASSIUM 100 MILLIGRAM(S): 100 TABLET, FILM COATED ORAL at 12:03

## 2019-09-28 RX ADMIN — Medication 81 MILLIGRAM(S): at 12:04

## 2019-09-28 RX ADMIN — BUDESONIDE AND FORMOTEROL FUMARATE DIHYDRATE 2 PUFF(S): 160; 4.5 AEROSOL RESPIRATORY (INHALATION) at 12:02

## 2019-09-28 RX ADMIN — SENNA PLUS 2 TABLET(S): 8.6 TABLET ORAL at 21:33

## 2019-09-28 RX ADMIN — Medication 25 MILLIGRAM(S): at 21:33

## 2019-09-28 RX ADMIN — AMLODIPINE BESYLATE 10 MILLIGRAM(S): 2.5 TABLET ORAL at 12:03

## 2019-09-28 NOTE — PROGRESS NOTE ADULT - SUBJECTIVE AND OBJECTIVE BOX
Patient is a 87y old  Female who presents with a chief complaint of Weakness and bradycardia (27 Sep 2019 23:13)      T(F): 96.3 (09-28-19 @ 05:04), Max: 97.7 (09-27-19 @ 10:00)  HR: 59 (09-28-19 @ 05:04)  BP: 180/76 (09-28-19 @ 05:04)  RR: 16 (09-28-19 @ 05:04)  SpO2: 100% (09-27-19 @ 10:35) (100% - 100%)    PHYSICAL EXAM:  GENERAL: NAD, well-groomed, well-developed  HEAD:  Atraumatic, Normocephalic  EYES: EOMI, PERRLA, conjunctiva and sclera clear  ENMT: No tonsillar erythema, exudates, or enlargement; Moist mucous membranes, Good dentition, No lesions  NECK: Supple, No JVD, Normal thyroid  NERVOUS SYSTEM:  Alert & Oriented X3,  Motor Strength 5/5 B/L upper and lower extremities  CHEST/LUNG: Clear to percussion bilaterally; No rales, rhonchi, wheezing, or rubs  HEART: Regular rate and rhythm; No murmurs, rubs, or gallops  ABDOMEN: Soft, Nontender, Nondistended; Bowel sounds present  EXTREMITIES:   No clubbing, cyanosis, or edema  LYMPH: No lymphadenopathy noted  SKIN: No rashes or lesions    labs  09-27    141  |  99  |  47<H>  ----------------------------<  216<H>  3.4<L>   |  33<H>  |  1.1    Ca    9.0      27 Sep 2019 07:32                            11.9   8.42  )-----------( 228      ( 27 Sep 2019 07:32 )             36.4       PT/INR - ( 27 Sep 2019 07:32 )   PT: 12.10 sec;   INR: 1.05 ratio         PTT - ( 27 Sep 2019 07:32 )  PTT:28.0 sec        acetaminophen   Tablet .. 650 milliGRAM(s) Oral every 6 hours PRN  acetaminophen  Suppository .. 650 milliGRAM(s) Rectal every 6 hours PRN  amLODIPine   Tablet 5 milliGRAM(s) Oral <User Schedule>  aspirin  chewable 81 milliGRAM(s) Oral daily  buDESOnide 160 MICROgram(s)/formoterol 4.5 MICROgram(s) Inhaler 2 Puff(s) Inhalation two times a day  carbidopa/levodopa  10/100 1 Tablet(s) Oral daily  carbidopa/levodopa  25/100 1 Tablet(s) Oral at bedtime  carvedilol 25 milliGRAM(s) Oral every 12 hours  chlorhexidine 4% Liquid 1 Application(s) Topical <User Schedule>  docusate sodium Liquid 100 milliGRAM(s) Oral three times a day  DULoxetine 60 milliGRAM(s) Oral daily  enoxaparin Injectable 30 milliGRAM(s) SubCutaneous daily  hydrochlorothiazide 25 milliGRAM(s) Oral at bedtime  influenza   Vaccine 0.5 milliLiter(s) IntraMuscular once  levothyroxine 75 MICROGram(s) Oral daily  losartan 50 milliGRAM(s) Oral daily  Mouth kote 1 Spray(s) 1 Spray(s) Oral every 2 hours PRN  multivitamin  Chewable 1 Tablet(s) Oral daily  nystatin Powder 1 Application(s) Topical two times a day  pantoprazole    Tablet 40 milliGRAM(s) Oral before breakfast  senna 2 Tablet(s) Oral at bedtime  sodium chloride 0.45%. 1000 milliLiter(s) IV Continuous <Continuous>  temazepam 15 milliGRAM(s) Oral once PRN

## 2019-09-28 NOTE — PROGRESS NOTE ADULT - SUBJECTIVE AND OBJECTIVE BOX
86 yo F presents to the ED after VN found her very weak, bradycardic and in respiratory distress. Pt  was brought to ER  in by EMS. Pt also gave Hx of having fallen on  Sunday 9/1/19.  Pt has mobility dysfunction and ambulates with walker with difficulty navigating steps with Hx of progressive Parkinsons. . Pt ambulates with a walker few steps.  Pt was noted to be bradycardic 20-30s. Pt had ARF, cardiac arrest and was intubated. Pt was tx for aspiration PNA, pneumoniits and sucessfully extubated but remains on high flow oxygen.  Pt has continued difficulty with dysphagia ans clearing secretions.    interval :  extubated s/p speech and swallow/ failed due to severe swallowing dysfunction, pt remains on O2 via NC and is being fed via feeding tube; peg held due to elevated bp seen by cardiology and tavia ; grand daughter Lalo at bedside - wants ice chips for patient     PAST MEDICAL & SURGICAL HISTORY:  Parkinson disease  Hypothyroid  Rib fractures  Congestive heart failure  Hypertension  No significant past surgical history    MEDICATIONS  (STANDING):  amLODIPine   Tablet 10 milliGRAM(s) Oral daily  aspirin  chewable 81 milliGRAM(s) Oral daily  buDESOnide 160 MICROgram(s)/formoterol 4.5 MICROgram(s) Inhaler 2 Puff(s) Inhalation two times a day  carbidopa/levodopa  10/100 1 Tablet(s) Oral daily  carbidopa/levodopa  25/100 1 Tablet(s) Oral at bedtime  carvedilol 25 milliGRAM(s) Oral every 12 hours  chlorhexidine 4% Liquid 1 Application(s) Topical <User Schedule>  docusate sodium Liquid 100 milliGRAM(s) Oral three times a day  DULoxetine 60 milliGRAM(s) Oral daily  enoxaparin Injectable 30 milliGRAM(s) SubCutaneous daily  hydrochlorothiazide 25 milliGRAM(s) Oral at bedtime  influenza   Vaccine 0.5 milliLiter(s) IntraMuscular once  levothyroxine 75 MICROGram(s) Oral daily  losartan 100 milliGRAM(s) Enteral Tube daily  multivitamin  Chewable 1 Tablet(s) Oral daily  nystatin Powder 1 Application(s) Topical two times a day  pantoprazole    Tablet 40 milliGRAM(s) Oral before breakfast  senna 2 Tablet(s) Oral at bedtime  sodium chloride 0.45%. 1000 milliLiter(s) (75 mL/Hr) IV Continuous <Continuous>    MEDICATIONS  (PRN):  acetaminophen   Tablet .. 650 milliGRAM(s) Oral every 6 hours PRN Temp greater or equal to 38C (100.4F)  acetaminophen  Suppository .. 650 milliGRAM(s) Rectal every 6 hours PRN Temp greater or equal to 38C (100.4F)  Mouth kote 1 Spray(s) 1 Spray(s) Oral every 2 hours PRN dry mouth  temazepam 15 milliGRAM(s) Oral once PRN Insomnia    Vital Signs Last 24 Hrs  T(C): 35.7 (28 Sep 2019 05:04), Max: 35.8 (27 Sep 2019 21:00)  T(F): 96.3 (28 Sep 2019 05:04), Max: 96.4 (27 Sep 2019 21:00)  HR: 59 (28 Sep 2019 05:04) (53 - 59)  BP: 180/76 (28 Sep 2019 05:04) (171/71 - 188/78)  BP(mean): --  RR: 16 (28 Sep 2019 05:04) (16 - 16)  SpO2: --    GENERAL: elderly, fragile and  chronically ill looking, resting in bed, NAD + O2 NC, + feeding tube  HEAD:  Atraumatic, Normocephalic  EYES: EOMI, PERRLA, conjunctiva and sclera clear  ENT: Moist mucous membranes   NECK: Supple, No JVD  CHEST/LUNG: Shallow respirations, scattered rhonchi  HEART: S1S2 irreg  ABDOMEN: Globular, soft and benign  EXTREMITIES:  advanced arthritic changes, dec motor function, poor mm mass and tone  NERVOUS SYSTEM:  non focal                                   12.0   9.37  )-----------( 200      ( 28 Sep 2019 06:53 )             35.9     09-28    138  |  96<L>  |  38<H>  ----------------------------<  177<H>  3.5   |  31  |  1.0    Ca    8.9      28 Sep 2019 06:53    TPro  6.3  /  Alb  3.5  /  TBili  0.8  /  DBili  x   /  AST  30  /  ALT  11  /  AlkPhos  111  09-28

## 2019-09-28 NOTE — PROGRESS NOTE ADULT - ASSESSMENT
87 year old female admitted s/p fall episode at home with generalized weakness and severe bradycardia ;sp rapid response respiratory failure, sp intubation, aspiration, fever PNA/pneumonitis    # Respiratory Failure - aspiration  secretions  s/p intubation and  bronch extubated seen by SLP    - desaturation on high flow O2,  secretions   -  SLP evaluation  extubated MBBS failed feeding   -  enteral feeding for now need to resolved decision on feeding ; PEG family ok with peg   - at risk for aspiration   - spoke with family at [facility] aware of risk still wants        # Cardiac / CHF  / HTN    - ff by cardio  - pulse better 60s   - continue lasix for volume overload and CHF  - reviewed echo  - monitor weight and pulse        # Parkinson's / dysphagia   - will   need PEG if family amenable spoke with daughter ammenable   - continue Sinemet  -repeat  + cont dysphagia   -monitor electrolytes and renal parameters    # Hypothyroidism     - levothyroxine     # GI and DVT prophylaxis     - pantoprazole   - enoxaparin    advance care  noted  CPR,  full code, but overall guarded state    disposition -   probable STR

## 2019-09-28 NOTE — PROGRESS NOTE ADULT - ASSESSMENT
Awake responsive.  No complaints.  GI DVT prophylaxis,.    OOB to chair, If stable.  Nutritional support.  BP high for PEG. May need for PEG mild sedation. Will increase amlodipine. Risk PEG moderate.

## 2019-09-29 LAB
ANION GAP SERPL CALC-SCNC: 11 MMOL/L — SIGNIFICANT CHANGE UP (ref 7–14)
BLD GP AB SCN SERPL QL: SIGNIFICANT CHANGE UP
BUN SERPL-MCNC: 36 MG/DL — HIGH (ref 10–20)
CALCIUM SERPL-MCNC: 8.5 MG/DL — SIGNIFICANT CHANGE UP (ref 8.5–10.1)
CHLORIDE SERPL-SCNC: 93 MMOL/L — LOW (ref 98–110)
CO2 SERPL-SCNC: 28 MMOL/L — SIGNIFICANT CHANGE UP (ref 17–32)
CREAT SERPL-MCNC: 1 MG/DL — SIGNIFICANT CHANGE UP (ref 0.7–1.5)
GLUCOSE BLDC GLUCOMTR-MCNC: 194 MG/DL — HIGH (ref 70–99)
GLUCOSE BLDC GLUCOMTR-MCNC: 234 MG/DL — HIGH (ref 70–99)
GLUCOSE BLDC GLUCOMTR-MCNC: 246 MG/DL — HIGH (ref 70–99)
GLUCOSE BLDC GLUCOMTR-MCNC: 293 MG/DL — HIGH (ref 70–99)
GLUCOSE SERPL-MCNC: 373 MG/DL — HIGH (ref 70–99)
HCT VFR BLD CALC: 31.4 % — LOW (ref 37–47)
HGB BLD-MCNC: 10.6 G/DL — LOW (ref 12–16)
MCHC RBC-ENTMCNC: 28.5 PG — SIGNIFICANT CHANGE UP (ref 27–31)
MCHC RBC-ENTMCNC: 33.8 G/DL — SIGNIFICANT CHANGE UP (ref 32–37)
MCV RBC AUTO: 84.4 FL — SIGNIFICANT CHANGE UP (ref 81–99)
NRBC # BLD: 0 /100 WBCS — SIGNIFICANT CHANGE UP (ref 0–0)
PLATELET # BLD AUTO: 158 K/UL — SIGNIFICANT CHANGE UP (ref 130–400)
POTASSIUM SERPL-MCNC: 3.1 MMOL/L — LOW (ref 3.5–5)
POTASSIUM SERPL-SCNC: 3.1 MMOL/L — LOW (ref 3.5–5)
RBC # BLD: 3.72 M/UL — LOW (ref 4.2–5.4)
RBC # FLD: 16.2 % — HIGH (ref 11.5–14.5)
SODIUM SERPL-SCNC: 132 MMOL/L — LOW (ref 135–146)
WBC # BLD: 8.72 K/UL — SIGNIFICANT CHANGE UP (ref 4.8–10.8)
WBC # FLD AUTO: 8.72 K/UL — SIGNIFICANT CHANGE UP (ref 4.8–10.8)

## 2019-09-29 RX ORDER — POTASSIUM CHLORIDE 20 MEQ
10 PACKET (EA) ORAL EVERY 6 HOURS
Refills: 0 | Status: DISCONTINUED | OUTPATIENT
Start: 2019-09-29 | End: 2019-09-29

## 2019-09-29 RX ORDER — INSULIN GLARGINE 100 [IU]/ML
6 INJECTION, SOLUTION SUBCUTANEOUS AT BEDTIME
Refills: 0 | Status: DISCONTINUED | OUTPATIENT
Start: 2019-09-29 | End: 2019-10-03

## 2019-09-29 RX ORDER — SODIUM CHLORIDE 9 MG/ML
1000 INJECTION INTRAMUSCULAR; INTRAVENOUS; SUBCUTANEOUS
Refills: 0 | Status: DISCONTINUED | OUTPATIENT
Start: 2019-09-29 | End: 2019-10-03

## 2019-09-29 RX ORDER — POTASSIUM CHLORIDE 20 MEQ
20 PACKET (EA) ORAL
Refills: 0 | Status: COMPLETED | OUTPATIENT
Start: 2019-09-29 | End: 2019-09-30

## 2019-09-29 RX ADMIN — CARVEDILOL PHOSPHATE 25 MILLIGRAM(S): 80 CAPSULE, EXTENDED RELEASE ORAL at 05:50

## 2019-09-29 RX ADMIN — Medication 100 MILLIGRAM(S): at 15:00

## 2019-09-29 RX ADMIN — NYSTATIN CREAM 1 APPLICATION(S): 100000 CREAM TOPICAL at 17:48

## 2019-09-29 RX ADMIN — Medication 0.1 MILLIGRAM(S): at 15:14

## 2019-09-29 RX ADMIN — CARBIDOPA AND LEVODOPA 1 TABLET(S): 25; 100 TABLET ORAL at 11:29

## 2019-09-29 RX ADMIN — DULOXETINE HYDROCHLORIDE 60 MILLIGRAM(S): 30 CAPSULE, DELAYED RELEASE ORAL at 11:29

## 2019-09-29 RX ADMIN — SENNA PLUS 2 TABLET(S): 8.6 TABLET ORAL at 22:28

## 2019-09-29 RX ADMIN — PANTOPRAZOLE SODIUM 40 MILLIGRAM(S): 20 TABLET, DELAYED RELEASE ORAL at 05:50

## 2019-09-29 RX ADMIN — Medication 75 MICROGRAM(S): at 05:50

## 2019-09-29 RX ADMIN — Medication 50 MILLIEQUIVALENT(S): at 22:27

## 2019-09-29 RX ADMIN — BUDESONIDE AND FORMOTEROL FUMARATE DIHYDRATE 2 PUFF(S): 160; 4.5 AEROSOL RESPIRATORY (INHALATION) at 08:10

## 2019-09-29 RX ADMIN — BUDESONIDE AND FORMOTEROL FUMARATE DIHYDRATE 2 PUFF(S): 160; 4.5 AEROSOL RESPIRATORY (INHALATION) at 22:28

## 2019-09-29 RX ADMIN — LOSARTAN POTASSIUM 100 MILLIGRAM(S): 100 TABLET, FILM COATED ORAL at 05:50

## 2019-09-29 RX ADMIN — AMLODIPINE BESYLATE 10 MILLIGRAM(S): 2.5 TABLET ORAL at 05:51

## 2019-09-29 RX ADMIN — Medication 100 MILLIGRAM(S): at 22:29

## 2019-09-29 RX ADMIN — CHLORHEXIDINE GLUCONATE 1 APPLICATION(S): 213 SOLUTION TOPICAL at 05:51

## 2019-09-29 RX ADMIN — Medication 1 TABLET(S): at 11:29

## 2019-09-29 RX ADMIN — NYSTATIN CREAM 1 APPLICATION(S): 100000 CREAM TOPICAL at 05:52

## 2019-09-29 RX ADMIN — CARBIDOPA AND LEVODOPA 1 TABLET(S): 25; 100 TABLET ORAL at 22:28

## 2019-09-29 RX ADMIN — Medication 25 MILLIGRAM(S): at 22:28

## 2019-09-29 RX ADMIN — Medication 81 MILLIGRAM(S): at 11:29

## 2019-09-29 RX ADMIN — SODIUM CHLORIDE 75 MILLILITER(S): 9 INJECTION, SOLUTION INTRAVENOUS at 05:52

## 2019-09-29 RX ADMIN — Medication 100 MILLIGRAM(S): at 05:51

## 2019-09-29 RX ADMIN — INSULIN GLARGINE 6 UNIT(S): 100 INJECTION, SOLUTION SUBCUTANEOUS at 22:28

## 2019-09-29 RX ADMIN — ENOXAPARIN SODIUM 30 MILLIGRAM(S): 100 INJECTION SUBCUTANEOUS at 11:30

## 2019-09-29 NOTE — PROGRESS NOTE ADULT - ASSESSMENT
87 year old female admitted s/p fall episode at home with generalized weakness and severe bradycardia ;sp rapid response respiratory failure, sp intubation, aspiration, fever PNA/pneumonitis    # Respiratory Failure - aspiration  secretions  s/p intubation and  bronch extubated seen by SLP    - desaturation on high flow O2,  secretions   -  SLP evaluation  extubated MBBS failed feeding   -  enteral feeding for now need to resolved decision on feeding ; PEG family ok with peg   - at risk for aspiration      # Cardiac / CHF  / HTN    - ff by cardio  - pulse better 60s   - continue lasix for volume overload and CHF  - reviewed echo  - monitor weight and pulse        # Parkinson's / dysphagia   - will   need PEG if family amenable spoke with daughter ammenable   - continue Sinemet  -repeat  + cont dysphagia   -monitor electrolytes and renal parameters    # Hypothyroidism     - levothyroxine     # GI and DVT prophylaxis     - pantoprazole   - enoxaparin    advance care  noted  CPR,  full code, but overall guarded state    disposition -   probable STR

## 2019-09-29 NOTE — PROGRESS NOTE ADULT - SUBJECTIVE AND OBJECTIVE BOX
88 yo F presents to the ED after VN found her very weak, bradycardic and in respiratory distress. Pt  was brought to ER  in by EMS. Pt also gave Hx of having fallen on  Sunday 9/1/19.  Pt has mobility dysfunction and ambulates with walker with difficulty navigating steps with Hx of progressive Parkinsons. . Pt ambulates with a walker few steps.  Pt was noted to be bradycardic 20-30s. Pt had ARF, cardiac arrest and was intubated. Pt was tx for aspiration PNA, pneumoniits and sucessfully extubated but remains on high flow oxygen.  Pt has continued difficulty with dysphagia ans clearing secretions.    interval :  extubated s/p speech and swallow/ failed due to severe swallowing dysfunction, pt remains on O2 via NC and is being fed via feeding tube; peg held due to elevated bp seen by cardiology and neprgolohy ; patient awake alert saying she wants to go home     PAST MEDICAL & SURGICAL HISTORY:  Parkinson disease  Hypothyroid  Rib fractures  Congestive heart failure  Hypertension  No significant past surgical history    MEDICATIONS  (STANDING):  amLODIPine   Tablet 10 milliGRAM(s) Oral daily  aspirin  chewable 81 milliGRAM(s) Oral daily  buDESOnide 160 MICROgram(s)/formoterol 4.5 MICROgram(s) Inhaler 2 Puff(s) Inhalation two times a day  carbidopa/levodopa  10/100 1 Tablet(s) Oral daily  carbidopa/levodopa  25/100 1 Tablet(s) Oral at bedtime  carvedilol 25 milliGRAM(s) Oral every 12 hours  chlorhexidine 4% Liquid 1 Application(s) Topical <User Schedule>  docusate sodium Liquid 100 milliGRAM(s) Oral three times a day  DULoxetine 60 milliGRAM(s) Oral daily  enoxaparin Injectable 30 milliGRAM(s) SubCutaneous daily  hydrochlorothiazide 25 milliGRAM(s) Oral at bedtime  influenza   Vaccine 0.5 milliLiter(s) IntraMuscular once  insulin glargine Injectable (LANTUS) 6 Unit(s) SubCutaneous at bedtime  levothyroxine 75 MICROGram(s) Oral daily  losartan 100 milliGRAM(s) Enteral Tube daily  multivitamin  Chewable 1 Tablet(s) Oral daily  nystatin Powder 1 Application(s) Topical two times a day  pantoprazole    Tablet 40 milliGRAM(s) Oral before breakfast  senna 2 Tablet(s) Oral at bedtime  sodium chloride 0.45%. 1000 milliLiter(s) (75 mL/Hr) IV Continuous <Continuous>    MEDICATIONS  (PRN):  acetaminophen   Tablet .. 650 milliGRAM(s) Oral every 6 hours PRN Temp greater or equal to 38C (100.4F)  acetaminophen  Suppository .. 650 milliGRAM(s) Rectal every 6 hours PRN Temp greater or equal to 38C (100.4F)  Mouth kote 1 Spray(s) 1 Spray(s) Oral every 2 hours PRN dry mouth  temazepam 15 milliGRAM(s) Oral once PRN Insomnia    Vital Signs Last 24 Hrs  T(C): 36.1 (29 Sep 2019 13:34), Max: 36.9 (29 Sep 2019 05:00)  T(F): 96.9 (29 Sep 2019 13:34), Max: 98.5 (29 Sep 2019 05:00)  HR: 61 (29 Sep 2019 17:18) (49 - 61)  BP: 185/79 (29 Sep 2019 17:18) (157/70 - 195/81)  BP(mean): --  RR: 16 (29 Sep 2019 13:34) (16 - 16)  SpO2: --    CAPILLARY BLOOD GLUCOSE      POCT Blood Glucose.: 246 mg/dL (29 Sep 2019 18:51)  POCT Blood Glucose.: 234 mg/dL (29 Sep 2019 11:23)  POCT Blood Glucose.: 293 mg/dL (29 Sep 2019 07:21)    GENERAL: elderly, fragile and  chronically ill looking, resting in bed, NAD + O2 NC, + feeding tube  HEAD:  Atraumatic, Normocephalic  EYES: EOMI, PERRLA, conjunctiva and sclera clear  ENT: Moist mucous membranes   NECK: Supple, No JVD  CHEST/LUNG: Shallow respirations, scattered rhonchi  HEART: S1S2 irreg  ABDOMEN: Globular, soft and benign  EXTREMITIES:  advanced arthritic changes, dec motor function, poor mm mass and tone  NERVOUS SYSTEM:  non focal                          10.6   8.72  )-----------( 158      ( 29 Sep 2019 08:38 )             31.4   09-29    132<L>  |  93<L>  |  36<H>  ----------------------------<  373<H>  3.1<L>   |  28  |  1.0    Ca    8.5      29 Sep 2019 08:38    TPro  6.3  /  Alb  3.5  /  TBili  0.8  /  DBili  x   /  AST  30  /  ALT  11  /  AlkPhos  111  09-28

## 2019-09-30 LAB
ANION GAP SERPL CALC-SCNC: 13 MMOL/L — SIGNIFICANT CHANGE UP (ref 7–14)
APTT BLD: 27.6 SEC — SIGNIFICANT CHANGE UP (ref 27–39.2)
BUN SERPL-MCNC: 29 MG/DL — HIGH (ref 10–20)
CALCIUM SERPL-MCNC: 9.1 MG/DL — SIGNIFICANT CHANGE UP (ref 8.5–10.1)
CHLORIDE SERPL-SCNC: 95 MMOL/L — LOW (ref 98–110)
CO2 SERPL-SCNC: 30 MMOL/L — SIGNIFICANT CHANGE UP (ref 17–32)
CREAT SERPL-MCNC: 1 MG/DL — SIGNIFICANT CHANGE UP (ref 0.7–1.5)
GLUCOSE BLDC GLUCOMTR-MCNC: 151 MG/DL — HIGH (ref 70–99)
GLUCOSE BLDC GLUCOMTR-MCNC: 152 MG/DL — HIGH (ref 70–99)
GLUCOSE BLDC GLUCOMTR-MCNC: 164 MG/DL — HIGH (ref 70–99)
GLUCOSE BLDC GLUCOMTR-MCNC: 173 MG/DL — HIGH (ref 70–99)
GLUCOSE SERPL-MCNC: 172 MG/DL — HIGH (ref 70–99)
HCT VFR BLD CALC: 34.6 % — LOW (ref 37–47)
HGB BLD-MCNC: 11.7 G/DL — LOW (ref 12–16)
INR BLD: 1.08 RATIO — SIGNIFICANT CHANGE UP (ref 0.65–1.3)
MCHC RBC-ENTMCNC: 28 PG — SIGNIFICANT CHANGE UP (ref 27–31)
MCHC RBC-ENTMCNC: 33.8 G/DL — SIGNIFICANT CHANGE UP (ref 32–37)
MCV RBC AUTO: 82.8 FL — SIGNIFICANT CHANGE UP (ref 81–99)
NRBC # BLD: 0 /100 WBCS — SIGNIFICANT CHANGE UP (ref 0–0)
PLATELET # BLD AUTO: 187 K/UL — SIGNIFICANT CHANGE UP (ref 130–400)
POTASSIUM SERPL-MCNC: 3.8 MMOL/L — SIGNIFICANT CHANGE UP (ref 3.5–5)
POTASSIUM SERPL-SCNC: 3.8 MMOL/L — SIGNIFICANT CHANGE UP (ref 3.5–5)
PROTHROM AB SERPL-ACNC: 12.4 SEC — SIGNIFICANT CHANGE UP (ref 9.95–12.87)
RBC # BLD: 4.18 M/UL — LOW (ref 4.2–5.4)
RBC # FLD: 16.4 % — HIGH (ref 11.5–14.5)
SODIUM SERPL-SCNC: 138 MMOL/L — SIGNIFICANT CHANGE UP (ref 135–146)
WBC # BLD: 10.87 K/UL — HIGH (ref 4.8–10.8)
WBC # FLD AUTO: 10.87 K/UL — HIGH (ref 4.8–10.8)

## 2019-09-30 RX ORDER — DIPHENHYDRAMINE HCL 50 MG
25 CAPSULE ORAL ONCE
Refills: 0 | Status: COMPLETED | OUTPATIENT
Start: 2019-09-30 | End: 2019-10-02

## 2019-09-30 RX ORDER — CEFAZOLIN SODIUM 1 G
1000 VIAL (EA) INJECTION ONCE
Refills: 0 | Status: DISCONTINUED | OUTPATIENT
Start: 2019-09-30 | End: 2019-09-30

## 2019-09-30 RX ORDER — HYDRALAZINE HCL 50 MG
5 TABLET ORAL ONCE
Refills: 0 | Status: COMPLETED | OUTPATIENT
Start: 2019-09-30 | End: 2019-09-30

## 2019-09-30 RX ORDER — CEFAZOLIN SODIUM 1 G
1000 VIAL (EA) INJECTION ONCE
Refills: 0 | Status: COMPLETED | OUTPATIENT
Start: 2019-10-01 | End: 2019-10-01

## 2019-09-30 RX ADMIN — CARVEDILOL PHOSPHATE 25 MILLIGRAM(S): 80 CAPSULE, EXTENDED RELEASE ORAL at 06:13

## 2019-09-30 RX ADMIN — SENNA PLUS 2 TABLET(S): 8.6 TABLET ORAL at 21:52

## 2019-09-30 RX ADMIN — INSULIN GLARGINE 6 UNIT(S): 100 INJECTION, SOLUTION SUBCUTANEOUS at 21:51

## 2019-09-30 RX ADMIN — Medication 100 MILLIGRAM(S): at 11:13

## 2019-09-30 RX ADMIN — LOSARTAN POTASSIUM 100 MILLIGRAM(S): 100 TABLET, FILM COATED ORAL at 06:12

## 2019-09-30 RX ADMIN — CARVEDILOL PHOSPHATE 25 MILLIGRAM(S): 80 CAPSULE, EXTENDED RELEASE ORAL at 19:43

## 2019-09-30 RX ADMIN — Medication 0.5 MILLIGRAM(S): at 15:51

## 2019-09-30 RX ADMIN — Medication 50 MILLIEQUIVALENT(S): at 03:20

## 2019-09-30 RX ADMIN — CARBIDOPA AND LEVODOPA 1 TABLET(S): 25; 100 TABLET ORAL at 21:54

## 2019-09-30 RX ADMIN — TEMAZEPAM 15 MILLIGRAM(S): 15 CAPSULE ORAL at 21:50

## 2019-09-30 RX ADMIN — BUDESONIDE AND FORMOTEROL FUMARATE DIHYDRATE 2 PUFF(S): 160; 4.5 AEROSOL RESPIRATORY (INHALATION) at 08:20

## 2019-09-30 RX ADMIN — CHLORHEXIDINE GLUCONATE 1 APPLICATION(S): 213 SOLUTION TOPICAL at 06:13

## 2019-09-30 RX ADMIN — NYSTATIN CREAM 1 APPLICATION(S): 100000 CREAM TOPICAL at 06:14

## 2019-09-30 RX ADMIN — SODIUM CHLORIDE 50 MILLILITER(S): 9 INJECTION INTRAMUSCULAR; INTRAVENOUS; SUBCUTANEOUS at 03:20

## 2019-09-30 RX ADMIN — Medication 100 MILLIGRAM(S): at 06:13

## 2019-09-30 RX ADMIN — Medication 75 MICROGRAM(S): at 06:13

## 2019-09-30 RX ADMIN — NYSTATIN CREAM 1 APPLICATION(S): 100000 CREAM TOPICAL at 19:45

## 2019-09-30 RX ADMIN — Medication 25 MILLIGRAM(S): at 21:53

## 2019-09-30 RX ADMIN — PANTOPRAZOLE SODIUM 40 MILLIGRAM(S): 20 TABLET, DELAYED RELEASE ORAL at 06:12

## 2019-09-30 RX ADMIN — AMLODIPINE BESYLATE 10 MILLIGRAM(S): 2.5 TABLET ORAL at 06:12

## 2019-09-30 RX ADMIN — Medication 100 MILLIGRAM(S): at 21:52

## 2019-09-30 RX ADMIN — Medication 5 MILLIGRAM(S): at 14:32

## 2019-09-30 NOTE — CHART NOTE - NSCHARTNOTEFT_GEN_A_CORE
Patient scheduled for PEG placement today. BP elevated to 166/70 w/ HR 50 - will prescribe IV hydralazine 5mg prior to OR. Will also prescribe ativan 0.25 prior to OR for anxiety.    Case d/w Dr. Curry

## 2019-09-30 NOTE — CHART NOTE - NSCHARTNOTEFT_GEN_A_CORE
PEG placement cancelled today.     Pt received ativan prior to procedure (before cancellation).    Pt now with erythematous cheeks and b/l upper extremities - will order benadryl to ensure no allergic rxn.

## 2019-09-30 NOTE — PROGRESS NOTE ADULT - SUBJECTIVE AND OBJECTIVE BOX
88 yo F presents to the ED after VN found her very weak, bradycardic and in respiratory distress. Pt  was brought to ER  in by EMS. Pt also gave Hx of having fallen on  Sunday 9/1/19.  Pt has mobility dysfunction and ambulates with walker with difficulty navigating steps with Hx of progressive Parkinsons. . Pt ambulates with a walker few steps.  Pt was noted to be bradycardic 20-30s. Pt had ARF, cardiac arrest and was intubated. Pt was tx for aspiration PNA, pneumoniits and sucessfully extubated but remains on high flow oxygen.  Pt has continued difficulty with dysphagia ans clearing secretions.    interval :  extubated s/p speech and swallow/ failed due to severe swallowing dysfunction, pt remains on O2 via NC and is being fed via feeding tube; peg held due to elevated bp seen by cardiology and neprgolohy ; patient awake alert saying she wants to go home     PAST MEDICAL & SURGICAL HISTORY:  Parkinson disease  Hypothyroid  Rib fractures  Congestive heart failure  Hypertension  No significant past surgical history    MEDICATIONS  (STANDING):  amLODIPine   Tablet 10 milliGRAM(s) Oral daily  aspirin  chewable 81 milliGRAM(s) Oral daily  buDESOnide 160 MICROgram(s)/formoterol 4.5 MICROgram(s) Inhaler 2 Puff(s) Inhalation two times a day  carbidopa/levodopa  10/100 1 Tablet(s) Oral daily  carbidopa/levodopa  25/100 1 Tablet(s) Oral at bedtime  carvedilol 25 milliGRAM(s) Oral every 12 hours  ceFAZolin   IVPB 1000 milliGRAM(s) IV Intermittent once  chlorhexidine 4% Liquid 1 Application(s) Topical <User Schedule>  docusate sodium Liquid 100 milliGRAM(s) Oral three times a day  DULoxetine 60 milliGRAM(s) Oral daily  enoxaparin Injectable 30 milliGRAM(s) SubCutaneous daily  hydrochlorothiazide 25 milliGRAM(s) Oral at bedtime  influenza   Vaccine 0.5 milliLiter(s) IntraMuscular once  insulin glargine Injectable (LANTUS) 6 Unit(s) SubCutaneous at bedtime  levothyroxine 75 MICROGram(s) Oral daily  losartan 100 milliGRAM(s) Enteral Tube daily  multivitamin  Chewable 1 Tablet(s) Oral daily  nystatin Powder 1 Application(s) Topical two times a day  pantoprazole    Tablet 40 milliGRAM(s) Oral before breakfast  senna 2 Tablet(s) Oral at bedtime  sodium chloride 0.9%. 1000 milliLiter(s) (50 mL/Hr) IV Continuous <Continuous>    MEDICATIONS  (PRN):  acetaminophen   Tablet .. 650 milliGRAM(s) Oral every 6 hours PRN Temp greater or equal to 38C (100.4F)  acetaminophen  Suppository .. 650 milliGRAM(s) Rectal every 6 hours PRN Temp greater or equal to 38C (100.4F)  Mouth kote 1 Spray(s) 1 Spray(s) Oral every 2 hours PRN dry mouth  temazepam 15 milliGRAM(s) Oral once PRN Insomnia    Vital Signs Last 24 Hrs  T(C): 36.2 (30 Sep 2019 05:00), Max: 36.2 (29 Sep 2019 22:19)  T(F): 97.1 (30 Sep 2019 05:00), Max: 97.2 (29 Sep 2019 22:19)  HR: 50 (30 Sep 2019 05:00) (49 - 61)  BP: 166/70 (30 Sep 2019 05:00) (163/70 - 195/81)  BP(mean): --  RR: 16 (30 Sep 2019 05:00) (16 - 16)  SpO2: --    CAPILLARY BLOOD GLUCOSE      POCT Blood Glucose.: 194 mg/dL (29 Sep 2019 22:02)  POCT Blood Glucose.: 246 mg/dL (29 Sep 2019 18:51)  POCT Blood Glucose.: 234 mg/dL (29 Sep 2019 11:23)  POCT Blood Glucose.: 293 mg/dL (29 Sep 2019 07:21)        GENERAL: elderly, fragile and  chronically ill looking, resting in bed, NAD + O2 NC, + feeding tube  HEAD:  Atraumatic, Normocephalic  EYES: EOMI, PERRLA, conjunctiva and sclera clear  ENT: Moist mucous membranes   NECK: Supple, No JVD  CHEST/LUNG: Shallow respirations, scattered rhonchi  HEART: S1S2 irreg  ABDOMEN: Globular, soft and benign  EXTREMITIES:  advanced arthritic changes, dec motor function, poor mm mass and tone  NERVOUS SYSTEM:  non focal                          10.6   8.72  )-----------( 158      ( 29 Sep 2019 08:38 )             31.4   09-29    132<L>  |  93<L>  |  36<H>  ----------------------------<  373<H>  3.1<L>   |  28  |  1.0    Ca    8.5      29 Sep 2019 08:38    TPro  6.3  /  Alb  3.5  /  TBili  0.8  /  DBili  x   /  AST  30  /  ALT  11  /  AlkPhos  111  09-28

## 2019-09-30 NOTE — PROGRESS NOTE ADULT - SUBJECTIVE AND OBJECTIVE BOX
87yFemale  Being followed for PEG tube placement  Interval history: Patient scheduled for PEG tube placement today, procedure cancelled today due to unforeseen emergency in ambulatory surgery unit. No hematemesis, melena, blood in stool reported.      PAST MEDICAL & SURGICAL HISTORY:   Parkinson disease  Hypothyroid  Rib fractures  Congestive heart failure  Hypertension  No significant past surgical history          Social History: No smoking. No alcohol. No illegal drug use.            MEDICATIONS  (STANDING):  amLODIPine   Tablet 10 milliGRAM(s) Oral daily  aspirin  chewable 81 milliGRAM(s) Oral daily  buDESOnide 160 MICROgram(s)/formoterol 4.5 MICROgram(s) Inhaler 2 Puff(s) Inhalation two times a day  carbidopa/levodopa  10/100 1 Tablet(s) Oral daily  carbidopa/levodopa  25/100 1 Tablet(s) Oral at bedtime  carvedilol 25 milliGRAM(s) Oral every 12 hours  chlorhexidine 4% Liquid 1 Application(s) Topical <User Schedule>  docusate sodium Liquid 100 milliGRAM(s) Oral three times a day  DULoxetine 60 milliGRAM(s) Oral daily  enoxaparin Injectable 30 milliGRAM(s) SubCutaneous daily  hydrochlorothiazide 25 milliGRAM(s) Oral at bedtime  influenza   Vaccine 0.5 milliLiter(s) IntraMuscular once  insulin glargine Injectable (LANTUS) 6 Unit(s) SubCutaneous at bedtime  levothyroxine 75 MICROGram(s) Oral daily  losartan 100 milliGRAM(s) Enteral Tube daily  multivitamin  Chewable 1 Tablet(s) Oral daily  nystatin Powder 1 Application(s) Topical two times a day  pantoprazole    Tablet 40 milliGRAM(s) Oral before breakfast  senna 2 Tablet(s) Oral at bedtime  sodium chloride 0.9%. 1000 milliLiter(s) (50 mL/Hr) IV Continuous <Continuous>    MEDICATIONS  (PRN):  acetaminophen   Tablet .. 650 milliGRAM(s) Oral every 6 hours PRN Temp greater or equal to 38C (100.4F)  acetaminophen  Suppository .. 650 milliGRAM(s) Rectal every 6 hours PRN Temp greater or equal to 38C (100.4F)  Mouth kote 1 Spray(s) 1 Spray(s) Oral every 2 hours PRN dry mouth  temazepam 15 milliGRAM(s) Oral once PRN Insomnia      Allergies:     No Known Allergies    Intolerances          REVIEW OF SYSTEMS:  Unobtainable       VITAL SIGNS:   T(F): 96.7 (09-30-19 @ 14:05), Max: 97.2 (09-29-19 @ 22:19)  HR: 51 (09-30-19 @ 15:28) (49 - 61)  BP: 160/54 (09-30-19 @ 15:28) (145/65 - 191/72)  RR: 16 (09-30-19 @ 15:28) (16 - 16)  SpO2: 97% (09-30-19 @ 15:28) (97% - 98%)    PHYSICAL EXAM:  GENERAL: Did not want to talk to me after I said case was cancelled today, no acute distress. +NG tube  HEAD:  Atraumatic, Normocephalic  EYES: conjunctiva and sclera clear  NECK: Supple, no JVD or thyromegaly  CHEST/LUNG: Clear to auscultation bilaterally; No wheeze, rhonchi, or rales  HEART: Regular rate and rhythm; normal S1, S2, No murmurs.  ABDOMEN: Soft, nontender, nondistended; Bowel sounds present, no abdominal bruit, masses, or hepatosplenomegaly  NEUROLOGY: No asterixis or tremor.   SKIN: Intact, no jaundice            LABS:                        11.7   10.87 )-----------( 187      ( 30 Sep 2019 06:33 )             34.6     09-30    138  |  95<L>  |  29<H>  ----------------------------<  172<H>  3.8   |  30  |  1.0    Ca    9.1      30 Sep 2019 06:33        PT/INR - ( 30 Sep 2019 06:33 )   PT: 12.40 sec;   INR: 1.08 ratio         PTT - ( 30 Sep 2019 06:33 )  PTT:27.6 sec    IMAGING:          < from: Xray Chest 1 View- PORTABLE-Routine (09.21.19 @ 09:50) >  EXAM:  XR CHEST PORTABLE ROUTINE 1V            PROCEDURE DATE:  09/21/2019            INTERPRETATION:  Clinical History / Reason for exam: Opacities    Comparison : Chest radiograph September 20, 2019.    Technique/Positioning: AP.    Findings:    Support devices: An enteric tube is seen projecting under the diaphragm.    Cardiac/mediastinum/hilum: Stable cardiomegaly.    Lung parenchyma/Pleura: Left basilar opacity/pleural effusion, stable.   Right basilar atelectasis, stable.    Skeleton/softtissues: Stable.    Impression:      Stable left basilar opacity/pleural effusion and right basilar   atelectasis.                      KAREN HEMPHILL M.D., ATTENDING RADIOLOGIST  This document has been electronically signed. Sep 21 2019  1:43PM              < end of copied text >

## 2019-09-30 NOTE — PROGRESS NOTE ADULT - ASSESSMENT
88 yo female pmh bradycardia s/p rapid response 9/6/19 GI initially consulted for endoscopic NG tube placement as NG tube attempts were unsuccessful. EGD performed: see note large food impaction entire length of esophagus Food removed and NGT endoscopically placed 9/6/19    GI reconsulted for PEG tube placement  Rec  -Patient scheduled for PEG tube placement today, procedure cancelled today due to unforeseen emergency in ambulatory surgery unit/ endoscopy suite. Met with family and apologized for the unfortunate situation.  -PEG to be rescheduled Tuesday insofar as BP under control, will schedule for tomorrow  -Case extensively discussed with family at bedside and patient's HCP, patient unable to swallow again after speech evaluation  -Cardio consult appreciated patient intermediate risk for PEG placement  -IV antibiotic prophylaxis the day of the Peg, IV Cefazolin 1g on hold, send patient down with Cefazolin to be given 30 minutes prior to procedure  -Please hold anticoagulation/ heparin/Lovenox prior to procedure  -NPO after midnight the night prior to Peg  -Optimize electrolytes, Optimize Blood Pressure   -AM CBC, AM BMP, AM PT,PTT, INR  -Active type and screen     PEG scheduled for: 10/01/19  -Consent to obtained from: Roxanna Knight  -Phone number of Proxy/ relative: [phone]

## 2019-10-01 ENCOUNTER — TRANSCRIPTION ENCOUNTER (OUTPATIENT)
Age: 84
End: 2019-10-01

## 2019-10-01 LAB
ANION GAP SERPL CALC-SCNC: 11 MMOL/L — SIGNIFICANT CHANGE UP (ref 7–14)
BUN SERPL-MCNC: 23 MG/DL — HIGH (ref 10–20)
CALCIUM SERPL-MCNC: 9.1 MG/DL — SIGNIFICANT CHANGE UP (ref 8.5–10.1)
CHLORIDE SERPL-SCNC: 99 MMOL/L — SIGNIFICANT CHANGE UP (ref 98–110)
CO2 SERPL-SCNC: 31 MMOL/L — SIGNIFICANT CHANGE UP (ref 17–32)
CREAT SERPL-MCNC: 1 MG/DL — SIGNIFICANT CHANGE UP (ref 0.7–1.5)
GLUCOSE BLDC GLUCOMTR-MCNC: 120 MG/DL — HIGH (ref 70–99)
GLUCOSE BLDC GLUCOMTR-MCNC: 79 MG/DL — SIGNIFICANT CHANGE UP (ref 70–99)
GLUCOSE BLDC GLUCOMTR-MCNC: 86 MG/DL — SIGNIFICANT CHANGE UP (ref 70–99)
GLUCOSE SERPL-MCNC: 130 MG/DL — HIGH (ref 70–99)
HCT VFR BLD CALC: 31.2 % — LOW (ref 37–47)
HGB BLD-MCNC: 10.5 G/DL — LOW (ref 12–16)
MCHC RBC-ENTMCNC: 28.5 PG — SIGNIFICANT CHANGE UP (ref 27–31)
MCHC RBC-ENTMCNC: 33.7 G/DL — SIGNIFICANT CHANGE UP (ref 32–37)
MCV RBC AUTO: 84.6 FL — SIGNIFICANT CHANGE UP (ref 81–99)
NRBC # BLD: 0 /100 WBCS — SIGNIFICANT CHANGE UP (ref 0–0)
PLATELET # BLD AUTO: 174 K/UL — SIGNIFICANT CHANGE UP (ref 130–400)
POTASSIUM SERPL-MCNC: 3.4 MMOL/L — LOW (ref 3.5–5)
POTASSIUM SERPL-SCNC: 3.4 MMOL/L — LOW (ref 3.5–5)
RBC # BLD: 3.69 M/UL — LOW (ref 4.2–5.4)
RBC # FLD: 16.5 % — HIGH (ref 11.5–14.5)
SODIUM SERPL-SCNC: 141 MMOL/L — SIGNIFICANT CHANGE UP (ref 135–146)
WBC # BLD: 8.93 K/UL — SIGNIFICANT CHANGE UP (ref 4.8–10.8)
WBC # FLD AUTO: 8.93 K/UL — SIGNIFICANT CHANGE UP (ref 4.8–10.8)

## 2019-10-01 PROCEDURE — 43246 EGD PLACE GASTROSTOMY TUBE: CPT

## 2019-10-01 RX ORDER — HYDRALAZINE HCL 50 MG
10 TABLET ORAL ONCE
Refills: 0 | Status: COMPLETED | OUTPATIENT
Start: 2019-10-01 | End: 2019-10-01

## 2019-10-01 RX ADMIN — Medication 0.2 MILLIGRAM(S): at 06:46

## 2019-10-01 RX ADMIN — CARVEDILOL PHOSPHATE 25 MILLIGRAM(S): 80 CAPSULE, EXTENDED RELEASE ORAL at 18:45

## 2019-10-01 RX ADMIN — CHLORHEXIDINE GLUCONATE 1 APPLICATION(S): 213 SOLUTION TOPICAL at 05:18

## 2019-10-01 RX ADMIN — Medication 100 MILLIGRAM(S): at 05:19

## 2019-10-01 RX ADMIN — Medication 0.2 MILLIGRAM(S): at 18:42

## 2019-10-01 RX ADMIN — Medication 75 MICROGRAM(S): at 05:19

## 2019-10-01 RX ADMIN — PANTOPRAZOLE SODIUM 40 MILLIGRAM(S): 20 TABLET, DELAYED RELEASE ORAL at 05:19

## 2019-10-01 RX ADMIN — NYSTATIN CREAM 1 APPLICATION(S): 100000 CREAM TOPICAL at 05:20

## 2019-10-01 RX ADMIN — Medication 25 MILLIGRAM(S): at 21:20

## 2019-10-01 RX ADMIN — NYSTATIN CREAM 1 APPLICATION(S): 100000 CREAM TOPICAL at 18:46

## 2019-10-01 RX ADMIN — CARBIDOPA AND LEVODOPA 1 TABLET(S): 25; 100 TABLET ORAL at 18:42

## 2019-10-01 RX ADMIN — BUDESONIDE AND FORMOTEROL FUMARATE DIHYDRATE 2 PUFF(S): 160; 4.5 AEROSOL RESPIRATORY (INHALATION) at 21:18

## 2019-10-01 RX ADMIN — ENOXAPARIN SODIUM 30 MILLIGRAM(S): 100 INJECTION SUBCUTANEOUS at 18:43

## 2019-10-01 RX ADMIN — AMLODIPINE BESYLATE 10 MILLIGRAM(S): 2.5 TABLET ORAL at 05:18

## 2019-10-01 RX ADMIN — INSULIN GLARGINE 6 UNIT(S): 100 INJECTION, SOLUTION SUBCUTANEOUS at 21:19

## 2019-10-01 RX ADMIN — SENNA PLUS 2 TABLET(S): 8.6 TABLET ORAL at 21:20

## 2019-10-01 RX ADMIN — Medication 100 MILLIGRAM(S): at 18:43

## 2019-10-01 RX ADMIN — CARVEDILOL PHOSPHATE 25 MILLIGRAM(S): 80 CAPSULE, EXTENDED RELEASE ORAL at 05:18

## 2019-10-01 RX ADMIN — Medication 100 MILLIGRAM(S): at 21:21

## 2019-10-01 RX ADMIN — LOSARTAN POTASSIUM 100 MILLIGRAM(S): 100 TABLET, FILM COATED ORAL at 05:18

## 2019-10-01 RX ADMIN — SODIUM CHLORIDE 50 MILLILITER(S): 9 INJECTION INTRAMUSCULAR; INTRAVENOUS; SUBCUTANEOUS at 11:39

## 2019-10-01 RX ADMIN — Medication 81 MILLIGRAM(S): at 18:42

## 2019-10-01 RX ADMIN — Medication 0.2 MILLIGRAM(S): at 21:19

## 2019-10-01 RX ADMIN — CARBIDOPA AND LEVODOPA 1 TABLET(S): 25; 100 TABLET ORAL at 21:18

## 2019-10-01 RX ADMIN — DULOXETINE HYDROCHLORIDE 60 MILLIGRAM(S): 30 CAPSULE, DELAYED RELEASE ORAL at 18:43

## 2019-10-01 RX ADMIN — Medication 100 MILLIGRAM(S): at 09:43

## 2019-10-01 NOTE — CHART NOTE - NSCHARTNOTEFT_GEN_A_CORE
Called by RN - pt w/ some blood oozing from PEG insertion site. New pressure dressing applied w/ abdominal binder. Please recheck surgical site overnight - consider silver nitrate if continues. RN instructed to contact GI if bleeding worsens.

## 2019-10-01 NOTE — PROGRESS NOTE ADULT - SUBJECTIVE AND OBJECTIVE BOX
88 yo F presents to the ED after VN found her very weak, bradycardic and in respiratory distress. Pt  was brought to ER  in by EMS. Pt also gave Hx of having fallen on  Sunday 9/1/19.  Pt has mobility dysfunction and ambulates with walker with difficulty navigating steps with Hx of progressive Parkinsons. . Pt ambulates with a walker few steps.  Pt was noted to be bradycardic 20-30s. Pt had ARF, cardiac arrest and was intubated. Pt was tx for aspiration PNA, pneumoniits and sucessfully extubated but remains on high flow oxygen.  Pt has continued difficulty with dysphagia ans clearing secretions.    interval :  extubated s/p speech and swallow/ failed due to severe swallowing dysfunction, pt remains on O2 via NC and is being fed via feeding tube; peg held due to elevated bp seen by cardiology and neprgolohy ; patient awake alert saying she wants to go home     PAST MEDICAL & SURGICAL HISTORY:  Parkinson disease  Hypothyroid  Rib fractures  Congestive heart failure  Hypertension  No significant past surgical history    MEDICATIONS  (STANDING):  amLODIPine   Tablet 10 milliGRAM(s) Oral daily  aspirin  chewable 81 milliGRAM(s) Oral daily  buDESOnide 160 MICROgram(s)/formoterol 4.5 MICROgram(s) Inhaler 2 Puff(s) Inhalation two times a day  carbidopa/levodopa  10/100 1 Tablet(s) Oral daily  carbidopa/levodopa  25/100 1 Tablet(s) Oral at bedtime  carvedilol 25 milliGRAM(s) Oral every 12 hours  ceFAZolin   IVPB 1000 milliGRAM(s) IV Intermittent once  chlorhexidine 4% Liquid 1 Application(s) Topical <User Schedule>  cloNIDine 0.2 milliGRAM(s) Oral every 8 hours  diphenhydrAMINE 25 milliGRAM(s) Oral once  docusate sodium Liquid 100 milliGRAM(s) Oral three times a day  DULoxetine 60 milliGRAM(s) Oral daily  enoxaparin Injectable 30 milliGRAM(s) SubCutaneous daily  hydrochlorothiazide 25 milliGRAM(s) Oral at bedtime  influenza   Vaccine 0.5 milliLiter(s) IntraMuscular once  insulin glargine Injectable (LANTUS) 6 Unit(s) SubCutaneous at bedtime  levothyroxine 75 MICROGram(s) Oral daily  losartan 100 milliGRAM(s) Enteral Tube daily  multivitamin  Chewable 1 Tablet(s) Oral daily  nystatin Powder 1 Application(s) Topical two times a day  pantoprazole    Tablet 40 milliGRAM(s) Oral before breakfast  senna 2 Tablet(s) Oral at bedtime  sodium chloride 0.9%. 1000 milliLiter(s) (50 mL/Hr) IV Continuous <Continuous>    MEDICATIONS  (PRN):  acetaminophen   Tablet .. 650 milliGRAM(s) Oral every 6 hours PRN Temp greater or equal to 38C (100.4F)  acetaminophen  Suppository .. 650 milliGRAM(s) Rectal every 6 hours PRN Temp greater or equal to 38C (100.4F)  Mouth kote 1 Spray(s) 1 Spray(s) Oral every 2 hours PRN dry mouth      Vital Signs Last 24 Hrs  T(C): 36.7 (01 Oct 2019 05:26), Max: 36.7 (01 Oct 2019 05:26)  T(F): 98.1 (01 Oct 2019 05:26), Max: 98.1 (01 Oct 2019 05:26)  HR: 56 (01 Oct 2019 05:26) (49 - 57)  BP: 177/76 (01 Oct 2019 05:26) (145/65 - 191/72)  BP(mean): --  RR: 16 (01 Oct 2019 05:26) (16 - 16)  SpO2: 97% (30 Sep 2019 15:28) (97% - 98%)    CAPILLARY BLOOD GLUCOSE      POCT Blood Glucose.: 173 mg/dL (30 Sep 2019 21:13)  POCT Blood Glucose.: 152 mg/dL (30 Sep 2019 16:25)  POCT Blood Glucose.: 164 mg/dL (30 Sep 2019 11:33)  POCT Blood Glucose.: 151 mg/dL (30 Sep 2019 07:50)        GENERAL: elderly, fragile and  chronically ill looking, resting in bed, NAD + O2 NC, + feeding tube  HEAD:  Atraumatic, Normocephalic  EYES: EOMI, PERRLA, conjunctiva and sclera clear  ENT: Moist mucous membranes   NECK: Supple, No JVD  CHEST/LUNG: Shallow respirations, scattered rhonchi  HEART: S1S2 irreg  ABDOMEN: Globular, soft and benign  EXTREMITIES:  advanced arthritic changes, dec motor function, poor mm mass and tone  NERVOUS SYSTEM:  non focal                          10.6   8.72  )-----------( 158      ( 29 Sep 2019 08:38 )             31.4   09-29    132<L>  |  93<L>  |  36<H>  ----------------------------<  373<H>  3.1<L>   |  28  |  1.0    Ca    8.5      29 Sep 2019 08:38    TPro  6.3  /  Alb  3.5  /  TBili  0.8  /  DBili  x   /  AST  30  /  ALT  11  /  AlkPhos  111  09-28

## 2019-10-01 NOTE — PROGRESS NOTE ADULT - ASSESSMENT
87 year old female admitted s/p fall episode at home with generalized weakness and severe bradycardia ;sp rapid response respiratory failure, sp intubation, aspiration, fever PNA/pneumonitis    # Respiratory Failure - aspiration  secretions  s/p intubation and  bronch extubated seen by SLP    - desaturation on high flow O2,  secretions   -  SLP evaluation  extubated MBBS failed feeding   -  enteral feeding for now need to resolved decision on feeding ; PEG family ok with peg   - at risk for aspiration      # Cardiac / CHF  / HTN    - ff by cardio  - pulse better 60s   - bp noted on losarta , amldopine and clonidie , cervedilol   - reviewed echo  - monitor weight and pulse        # Parkinson's / dysphagia   - will   need PEG if family amenable spoke with daughter ammenable   - continue Sinemet  -repeat  + cont dysphagia   -monitor electrolytes and renal parameters    # Hypothyroidism     - levothyroxine     # GI and DVT prophylaxis     - pantoprazole   - enoxaparin    advance care  noted  CPR,  full code, but overall guarded state    disposition -   probable STR

## 2019-10-01 NOTE — CHART NOTE - NSCHARTNOTEFT_GEN_A_CORE
Patient seen at bedside - happy, resting comfortably.    Pt went for PEG placement today - procedure successful. Pt may be placed back on tube feeds 24hrs post procedure.     Patient without questions or concerns at this time.     Patient encouraged to contact PA with any further questions or concerns.

## 2019-10-02 ENCOUNTER — TRANSCRIPTION ENCOUNTER (OUTPATIENT)
Age: 84
End: 2019-10-02

## 2019-10-02 LAB
ANION GAP SERPL CALC-SCNC: 9 MMOL/L — SIGNIFICANT CHANGE UP (ref 7–14)
BLD GP AB SCN SERPL QL: SIGNIFICANT CHANGE UP
BUN SERPL-MCNC: 22 MG/DL — HIGH (ref 10–20)
CALCIUM SERPL-MCNC: 8.5 MG/DL — SIGNIFICANT CHANGE UP (ref 8.5–10.1)
CHLORIDE SERPL-SCNC: 101 MMOL/L — SIGNIFICANT CHANGE UP (ref 98–110)
CO2 SERPL-SCNC: 31 MMOL/L — SIGNIFICANT CHANGE UP (ref 17–32)
CREAT SERPL-MCNC: 1 MG/DL — SIGNIFICANT CHANGE UP (ref 0.7–1.5)
GLUCOSE BLDC GLUCOMTR-MCNC: 123 MG/DL — HIGH (ref 70–99)
GLUCOSE BLDC GLUCOMTR-MCNC: 242 MG/DL — HIGH (ref 70–99)
GLUCOSE BLDC GLUCOMTR-MCNC: 87 MG/DL — SIGNIFICANT CHANGE UP (ref 70–99)
GLUCOSE BLDC GLUCOMTR-MCNC: 97 MG/DL — SIGNIFICANT CHANGE UP (ref 70–99)
GLUCOSE SERPL-MCNC: 100 MG/DL — HIGH (ref 70–99)
HCT VFR BLD CALC: 26 % — LOW (ref 37–47)
HCT VFR BLD CALC: 27.5 % — LOW (ref 37–47)
HGB BLD-MCNC: 8.5 G/DL — LOW (ref 12–16)
HGB BLD-MCNC: 9.2 G/DL — LOW (ref 12–16)
MAGNESIUM SERPL-MCNC: 1.6 MG/DL — LOW (ref 1.8–2.4)
MCHC RBC-ENTMCNC: 28.2 PG — SIGNIFICANT CHANGE UP (ref 27–31)
MCHC RBC-ENTMCNC: 29 PG — SIGNIFICANT CHANGE UP (ref 27–31)
MCHC RBC-ENTMCNC: 32.7 G/DL — SIGNIFICANT CHANGE UP (ref 32–37)
MCHC RBC-ENTMCNC: 33.5 G/DL — SIGNIFICANT CHANGE UP (ref 32–37)
MCV RBC AUTO: 86.4 FL — SIGNIFICANT CHANGE UP (ref 81–99)
MCV RBC AUTO: 86.8 FL — SIGNIFICANT CHANGE UP (ref 81–99)
NRBC # BLD: 0 /100 WBCS — SIGNIFICANT CHANGE UP (ref 0–0)
NRBC # BLD: 0 /100 WBCS — SIGNIFICANT CHANGE UP (ref 0–0)
PLATELET # BLD AUTO: 152 K/UL — SIGNIFICANT CHANGE UP (ref 130–400)
PLATELET # BLD AUTO: 161 K/UL — SIGNIFICANT CHANGE UP (ref 130–400)
POTASSIUM SERPL-MCNC: 3.2 MMOL/L — LOW (ref 3.5–5)
POTASSIUM SERPL-SCNC: 3.2 MMOL/L — LOW (ref 3.5–5)
RBC # BLD: 3.01 M/UL — LOW (ref 4.2–5.4)
RBC # BLD: 3.17 M/UL — LOW (ref 4.2–5.4)
RBC # FLD: 16.9 % — HIGH (ref 11.5–14.5)
RBC # FLD: 17.2 % — HIGH (ref 11.5–14.5)
SODIUM SERPL-SCNC: 141 MMOL/L — SIGNIFICANT CHANGE UP (ref 135–146)
WBC # BLD: 10.38 K/UL — SIGNIFICANT CHANGE UP (ref 4.8–10.8)
WBC # BLD: 9.29 K/UL — SIGNIFICANT CHANGE UP (ref 4.8–10.8)
WBC # FLD AUTO: 10.38 K/UL — SIGNIFICANT CHANGE UP (ref 4.8–10.8)
WBC # FLD AUTO: 9.29 K/UL — SIGNIFICANT CHANGE UP (ref 4.8–10.8)

## 2019-10-02 PROCEDURE — 99232 SBSQ HOSP IP/OBS MODERATE 35: CPT

## 2019-10-02 RX ORDER — DULOXETINE HYDROCHLORIDE 30 MG/1
1 CAPSULE, DELAYED RELEASE ORAL
Qty: 0 | Refills: 0 | DISCHARGE

## 2019-10-02 RX ORDER — LEVOTHYROXINE SODIUM 125 MCG
1 TABLET ORAL
Qty: 0 | Refills: 0 | DISCHARGE

## 2019-10-02 RX ORDER — SENNA PLUS 8.6 MG/1
2 TABLET ORAL
Qty: 0 | Refills: 0 | DISCHARGE
Start: 2019-10-02

## 2019-10-02 RX ORDER — DULOXETINE HYDROCHLORIDE 30 MG/1
1 CAPSULE, DELAYED RELEASE ORAL
Qty: 0 | Refills: 0 | DISCHARGE
Start: 2019-10-02

## 2019-10-02 RX ORDER — CARBIDOPA AND LEVODOPA 25; 100 MG/1; MG/1
1 TABLET ORAL
Qty: 0 | Refills: 0 | DISCHARGE

## 2019-10-02 RX ORDER — LEVOTHYROXINE SODIUM 125 MCG
1 TABLET ORAL
Qty: 0 | Refills: 0 | DISCHARGE
Start: 2019-10-02

## 2019-10-02 RX ORDER — ALBUTEROL 90 UG/1
2 AEROSOL, METERED ORAL
Qty: 0 | Refills: 0 | DISCHARGE
Start: 2019-10-02

## 2019-10-02 RX ORDER — POTASSIUM CHLORIDE 20 MEQ
1 PACKET (EA) ORAL
Qty: 0 | Refills: 0 | DISCHARGE

## 2019-10-02 RX ORDER — TEMAZEPAM 15 MG/1
1 CAPSULE ORAL
Qty: 0 | Refills: 0 | DISCHARGE

## 2019-10-02 RX ORDER — NYSTATIN CREAM 100000 [USP'U]/G
1 CREAM TOPICAL
Qty: 0 | Refills: 0 | DISCHARGE
Start: 2019-10-02

## 2019-10-02 RX ORDER — INSULIN GLARGINE 100 [IU]/ML
6 INJECTION, SOLUTION SUBCUTANEOUS
Qty: 0 | Refills: 0 | DISCHARGE
Start: 2019-10-02

## 2019-10-02 RX ORDER — FUROSEMIDE 40 MG
1 TABLET ORAL
Qty: 0 | Refills: 0 | DISCHARGE

## 2019-10-02 RX ORDER — LOSARTAN POTASSIUM 100 MG/1
1 TABLET, FILM COATED ORAL
Qty: 0 | Refills: 0 | DISCHARGE

## 2019-10-02 RX ORDER — POTASSIUM CHLORIDE 20 MEQ
20 PACKET (EA) ORAL DAILY
Refills: 0 | Status: DISCONTINUED | OUTPATIENT
Start: 2019-10-02 | End: 2019-10-03

## 2019-10-02 RX ORDER — ENOXAPARIN SODIUM 100 MG/ML
30 INJECTION SUBCUTANEOUS
Qty: 0 | Refills: 0 | DISCHARGE
Start: 2019-10-02

## 2019-10-02 RX ORDER — CARVEDILOL PHOSPHATE 80 MG/1
1 CAPSULE, EXTENDED RELEASE ORAL
Qty: 0 | Refills: 0 | DISCHARGE

## 2019-10-02 RX ORDER — CARVEDILOL PHOSPHATE 80 MG/1
1 CAPSULE, EXTENDED RELEASE ORAL
Qty: 0 | Refills: 0 | DISCHARGE
Start: 2019-10-02

## 2019-10-02 RX ORDER — AMLODIPINE BESYLATE 2.5 MG/1
1 TABLET ORAL
Qty: 0 | Refills: 0 | DISCHARGE
Start: 2019-10-02

## 2019-10-02 RX ORDER — ASPIRIN/CALCIUM CARB/MAGNESIUM 324 MG
1 TABLET ORAL
Qty: 0 | Refills: 0 | DISCHARGE

## 2019-10-02 RX ORDER — BUDESONIDE AND FORMOTEROL FUMARATE DIHYDRATE 160; 4.5 UG/1; UG/1
2 AEROSOL RESPIRATORY (INHALATION)
Qty: 0 | Refills: 0 | DISCHARGE
Start: 2019-10-02

## 2019-10-02 RX ORDER — CARBIDOPA AND LEVODOPA 25; 100 MG/1; MG/1
1 TABLET ORAL
Qty: 0 | Refills: 0 | DISCHARGE
Start: 2019-10-02

## 2019-10-02 RX ORDER — ASPIRIN/CALCIUM CARB/MAGNESIUM 324 MG
1 TABLET ORAL
Qty: 0 | Refills: 0 | DISCHARGE
Start: 2019-10-02

## 2019-10-02 RX ORDER — POTASSIUM CHLORIDE 20 MEQ
20 PACKET (EA) ORAL DAILY
Refills: 0 | Status: DISCONTINUED | OUTPATIENT
Start: 2019-10-02 | End: 2019-10-02

## 2019-10-02 RX ORDER — PANTOPRAZOLE SODIUM 20 MG/1
1 TABLET, DELAYED RELEASE ORAL
Qty: 0 | Refills: 0 | DISCHARGE
Start: 2019-10-02

## 2019-10-02 RX ORDER — LOSARTAN POTASSIUM 100 MG/1
1 TABLET, FILM COATED ORAL
Qty: 0 | Refills: 0 | DISCHARGE
Start: 2019-10-02

## 2019-10-02 RX ORDER — DOCUSATE SODIUM 100 MG
10 CAPSULE ORAL
Qty: 0 | Refills: 0 | DISCHARGE
Start: 2019-10-02

## 2019-10-02 RX ADMIN — INSULIN GLARGINE 6 UNIT(S): 100 INJECTION, SOLUTION SUBCUTANEOUS at 21:36

## 2019-10-02 RX ADMIN — Medication 0.2 MILLIGRAM(S): at 05:21

## 2019-10-02 RX ADMIN — SODIUM CHLORIDE 50 MILLILITER(S): 9 INJECTION INTRAMUSCULAR; INTRAVENOUS; SUBCUTANEOUS at 05:22

## 2019-10-02 RX ADMIN — PANTOPRAZOLE SODIUM 40 MILLIGRAM(S): 20 TABLET, DELAYED RELEASE ORAL at 05:21

## 2019-10-02 RX ADMIN — Medication 100 MILLIGRAM(S): at 21:36

## 2019-10-02 RX ADMIN — Medication 75 MICROGRAM(S): at 05:21

## 2019-10-02 RX ADMIN — AMLODIPINE BESYLATE 10 MILLIGRAM(S): 2.5 TABLET ORAL at 05:22

## 2019-10-02 RX ADMIN — Medication 25 MILLIGRAM(S): at 21:35

## 2019-10-02 RX ADMIN — CARVEDILOL PHOSPHATE 25 MILLIGRAM(S): 80 CAPSULE, EXTENDED RELEASE ORAL at 18:12

## 2019-10-02 RX ADMIN — CARVEDILOL PHOSPHATE 25 MILLIGRAM(S): 80 CAPSULE, EXTENDED RELEASE ORAL at 05:21

## 2019-10-02 RX ADMIN — NYSTATIN CREAM 1 APPLICATION(S): 100000 CREAM TOPICAL at 05:22

## 2019-10-02 RX ADMIN — CHLORHEXIDINE GLUCONATE 1 APPLICATION(S): 213 SOLUTION TOPICAL at 05:21

## 2019-10-02 RX ADMIN — CARBIDOPA AND LEVODOPA 1 TABLET(S): 25; 100 TABLET ORAL at 21:36

## 2019-10-02 RX ADMIN — Medication 0.2 MILLIGRAM(S): at 21:35

## 2019-10-02 RX ADMIN — Medication 0.2 MILLIGRAM(S): at 13:03

## 2019-10-02 RX ADMIN — Medication 25 MILLIGRAM(S): at 13:04

## 2019-10-02 RX ADMIN — SENNA PLUS 2 TABLET(S): 8.6 TABLET ORAL at 21:35

## 2019-10-02 RX ADMIN — Medication 100 MILLIGRAM(S): at 13:05

## 2019-10-02 RX ADMIN — Medication 20 MILLIEQUIVALENT(S): at 18:10

## 2019-10-02 RX ADMIN — ENOXAPARIN SODIUM 30 MILLIGRAM(S): 100 INJECTION SUBCUTANEOUS at 18:11

## 2019-10-02 RX ADMIN — NYSTATIN CREAM 1 APPLICATION(S): 100000 CREAM TOPICAL at 18:12

## 2019-10-02 RX ADMIN — CARBIDOPA AND LEVODOPA 1 TABLET(S): 25; 100 TABLET ORAL at 13:03

## 2019-10-02 RX ADMIN — Medication 81 MILLIGRAM(S): at 13:03

## 2019-10-02 RX ADMIN — Medication 100 MILLIGRAM(S): at 05:21

## 2019-10-02 RX ADMIN — DULOXETINE HYDROCHLORIDE 60 MILLIGRAM(S): 30 CAPSULE, DELAYED RELEASE ORAL at 13:04

## 2019-10-02 RX ADMIN — LOSARTAN POTASSIUM 100 MILLIGRAM(S): 100 TABLET, FILM COATED ORAL at 05:22

## 2019-10-02 NOTE — DISCHARGE NOTE PROVIDER - INSTRUCTIONS
Tube Feeds: Tube Feeds: Osmolite 1.5  240 ml  Q8h Tube Feeds: Osmolite 1.5  240 ml  Q8h.  Beneprotien via Peg 2X/day  Flush with 100ml water pro/post feeds Tube Feeds via Peg:  Osmolite 1.5  240 ml: 6A and 10P,  360ml 2P.  Beneprotien via Peg 2X/day  Flush with 100ml water pro/post feeds Tube Feeds via Peg:  Osmolite 1.5  240 ml: 6A and 10P,  360ml 2P.  Beneprotien via Peg 2X/day  Flush with 100ml water pro/post feeds    -No Hx Diabetes: on Osmolite 1.5 with Lantus 6 u QHS presently. Perform Finger stick before 6AM feed daily: if persistently elevated, consider changing feed to Glucerna/Diabetic formula

## 2019-10-02 NOTE — DISCHARGE NOTE PROVIDER - CARE PROVIDER_API CALL
Unruly Curry)  Internal Medicine  [address] [place] [address], [address]  [place] [address]  Phone: [phone]  Fax: [phone]  Follow Up Time:

## 2019-10-02 NOTE — PROGRESS NOTE ADULT - ASSESSMENT
87 year old female admitted s/p fall episode at home with generalized weakness and severe bradycardia ;sp rapid response respiratory failure, sp intubation, aspiration, fever PNA/pneumonitis    # Respiratory Failure - aspiration  secretions  s/p intubation and  bronch extubated seen by SLP    - desaturation on high flow O2,  secretions   -  SLP evaluation  extubated MBBS failed feeding   -  enteral feeding for now need to resolved decision on feeding ; s/p PEG  - at risk for aspiration      # Cardiac / CHF  / HTN    - ff by cardio  - pulse better 60s   - bp noted on losarta , amldopine and clonidie , cervedilol   - reviewed echo  - monitor weight and pulse        # Parkinson's / dysphagia   - s/p peg   - continue Sinemet  -repeat  + cont dysphagia   -monitor electrolytes and renal parameters    # Hypothyroidism     - levothyroxine     # GI and DVT prophylaxis     - pantoprazole   - enoxaparin    advance care  noted  CPR,  full code, but overall guarded state    disposition str

## 2019-10-02 NOTE — DISCHARGE NOTE PROVIDER - NSDCCPCAREPLAN_GEN_ALL_CORE_FT
PRINCIPAL DISCHARGE DIAGNOSIS  Diagnosis: Respiratory failure  Assessment and Plan of Treatment: S/P Respiratoty failute, N/C 2 LPM, completed IV ABX      SECONDARY DISCHARGE DIAGNOSES  Diagnosis: Dysphagia  Assessment and Plan of Treatment: S/P Peg tube insertion: tube feeds    Diagnosis: Weakness  Assessment and Plan of Treatment: needs PT for ambulation PRINCIPAL DISCHARGE DIAGNOSIS  Diagnosis: Respiratory failure  Assessment and Plan of Treatment: S/P Respiratoty failute, N/C 2 LPM, completed IV ABX      SECONDARY DISCHARGE DIAGNOSES  Diagnosis: Dysphagia  Assessment and Plan of Treatment: S/P Peg tube insertion: tube feeds    Diagnosis: Weakness  Assessment and Plan of Treatment: needs PT for ambulation, on DVT prophylaxis until fully ambulatory

## 2019-10-02 NOTE — PROGRESS NOTE ADULT - SUBJECTIVE AND OBJECTIVE BOX
87yFemale  Being followed for s/p PEG tube placement  Interval history: Patient denies nausea, vomiting, hematemesis, melena, blood in stool, diarrhea, constipation, abdominal pain. PEG tube evaluated blood clots around PEG tube      PAST MEDICAL & SURGICAL HISTORY:   Parkinson disease  Hypothyroid  Rib fractures  Congestive heart failure  Hypertension  No significant past surgical history          Social History: No smoking. No alcohol. No illegal drug use.          MEDICATIONS:  MEDICATIONS  (STANDING):  amLODIPine   Tablet 10 milliGRAM(s) Oral daily  aspirin  chewable 81 milliGRAM(s) Oral daily  buDESOnide 160 MICROgram(s)/formoterol 4.5 MICROgram(s) Inhaler 2 Puff(s) Inhalation two times a day  carbidopa/levodopa  10/100 1 Tablet(s) Oral daily  carbidopa/levodopa  25/100 1 Tablet(s) Oral at bedtime  carvedilol 25 milliGRAM(s) Oral every 12 hours  chlorhexidine 4% Liquid 1 Application(s) Topical <User Schedule>  cloNIDine 0.2 milliGRAM(s) Oral every 8 hours  diphenhydrAMINE 25 milliGRAM(s) Oral once  docusate sodium Liquid 100 milliGRAM(s) Oral three times a day  DULoxetine 60 milliGRAM(s) Oral daily  enoxaparin Injectable 30 milliGRAM(s) SubCutaneous daily  hydrochlorothiazide 25 milliGRAM(s) Oral at bedtime  influenza   Vaccine 0.5 milliLiter(s) IntraMuscular once  insulin glargine Injectable (LANTUS) 6 Unit(s) SubCutaneous at bedtime  levothyroxine 75 MICROGram(s) Oral daily  losartan 100 milliGRAM(s) Enteral Tube daily  multivitamin  Chewable 1 Tablet(s) Oral daily  nystatin Powder 1 Application(s) Topical two times a day  pantoprazole    Tablet 40 milliGRAM(s) Oral before breakfast  senna 2 Tablet(s) Oral at bedtime  sodium chloride 0.9%. 1000 milliLiter(s) (50 mL/Hr) IV Continuous <Continuous>    MEDICATIONS  (PRN):  acetaminophen   Tablet .. 650 milliGRAM(s) Oral every 6 hours PRN Temp greater or equal to 38C (100.4F)  acetaminophen  Suppository .. 650 milliGRAM(s) Rectal every 6 hours PRN Temp greater or equal to 38C (100.4F)  Mouth kote 1 Spray(s) 1 Spray(s) Oral every 2 hours PRN dry mouth      Allergies:    No Known Allergies    Intolerances          REVIEW OF SYSTEMS:  General:  No weight loss, fevers, or chills.  Eyes:  No reported pain or visual changes  ENT:  No sore throat or runny nose.  NECK: No stiffness   CV:  No chest pain or palpitations.  Resp:  No shortness of breath, cough  GI:  No abdominal pain, nausea, vomiting, dysphagia, diarrhea or constipation. No rectal bleeding, melena, or hematemesis.  Muscle:  No aches or weakness  Neuro:  No tingling, numbness         VITAL SIGNS:   T(F): 96.8 (10-02-19 @ 05:31), Max: 99.1 (10-01-19 @ 22:08)  HR: 87 (10-02-19 @ 05:31) (51 - 87)  BP: 125/59 (10-02-19 @ 05:31) (125/59 - 165/72)  RR: 16 (10-02-19 @ 05:31) (16 - 17)  SpO2: --    PHYSICAL EXAM:  GENERAL: AAOx3, no acute distress.  HEAD:  Atraumatic, Normocephalic  EYES: conjunctiva and sclera clear  NECK: Supple, no JVD or thyromegaly  CHEST/LUNG: Clear to auscultation bilaterally; No wheeze, rhonchi, or rales  HEART: Regular rate and rhythm; normal S1, S2, No murmurs.  ABDOMEN: Soft, nontender, nondistended; Bowel sounds present, no abdominal bruit, masses, or hepatosplenomegaly +PEG with blood clots around it  NEUROLOGY: No asterixis or tremor.   SKIN: Intact, no jaundice            LABS:                        8.5    9.29  )-----------( 152      ( 02 Oct 2019 07:07 )             26.0     10    141  |  101  |  22<H>  ----------------------------<  100<H>  3.2<L>   |  31  |  1.0    Ca    8.5      02 Oct 2019 07:07  Mg     1.6     10            IMAGING:  < from: EGD w/ PEG Placement (10.01.19 @ 10:00) >  PEG Placement Report Date: 10/1/2019 10:00 AM   Patient Name: BOB ENGLE   MRN: [id number]   Account Number: [id number]  Gender: Female    (age): 1932 (87)   Instrument(s): GIF-H190 ([id number])([id number])    Attending:   Josse Alonzo MD     Fellow:   Homer Rey MD       Referring Physician:   JOSÉ MANUEL [facility] [address], [place] [address] [phone] (phone)  [phone] (fax)       Nurse(s):   Mary Anne Dunlap         History of Present Illness:   H&P was previously reviewed.       Administered Medications: As per Anesthesiology Record     Indications: Malnutrition: 263.9 - E46  PEG Placement:   Consent: The procedure, indications, preparation and potential complications  were explained to the patient, who indicated understanding and signed the  corresponding consent forms.     Percutaneous endoscopic gastrostomy: In a darkened room, the abdominal wall was  transilluminated and a site was selected. Indentation of the gastric wall by  external finger pressure was demonstrated. The skin was surgically prepared and  anesthetized with xylocaine. A small incision was made in the abdominal wall  using a surgical blade. A 25-gauge needle with cannula was inserted through the  abdominal wall and into the gastric lumen. A guidewire was passed through the  cannula, was caught by the snare passed through the endoscope and brought out  through the mouth. A Anders-Cook 24 Fr PEG tube was secured to the guidewire and  pulled through the abdominal wall. A satisfactory final position was confirmed  endoscopically. The gastrostomy tube was secured with the outer flange  positioned at cm. The post procedure appearances were satisfactory.      Limitations/Complications: There were no apparent limitations or complications                  Plan: Can start meds, can start peg feeds in am, please insert binder, please do  not insert gauze under the bumper, plz flush with water after meds and feeds,  clean with soap and water twice daily    Attending Participation:   I was present and participated during the entire procedure, including non-pope  portions.       Josse Alonzo MD    Version 1, Electronically signed on 10/1/2019 11:27:47 AM by Josse Alonzo MD    < end of copied text > 87yFemale  Being followed for s/p PEG tube placement  Interval history: Patient denies nausea, vomiting, hematemesis, melena, blood in stool, diarrhea, constipation, abdominal pain. PEG tube evaluated blood clots around PEG tube      PAST MEDICAL & SURGICAL HISTORY:   Parkinson disease  Hypothyroid  Rib fractures  Congestive heart failure  Hypertension  No significant past surgical history          Social History: No smoking. No alcohol. No illegal drug use.          MEDICATIONS:  MEDICATIONS  (STANDING):  amLODIPine   Tablet 10 milliGRAM(s) Oral daily  aspirin  chewable 81 milliGRAM(s) Oral daily  buDESOnide 160 MICROgram(s)/formoterol 4.5 MICROgram(s) Inhaler 2 Puff(s) Inhalation two times a day  carbidopa/levodopa  10/100 1 Tablet(s) Oral daily  carbidopa/levodopa  25/100 1 Tablet(s) Oral at bedtime  carvedilol 25 milliGRAM(s) Oral every 12 hours  chlorhexidine 4% Liquid 1 Application(s) Topical <User Schedule>  cloNIDine 0.2 milliGRAM(s) Oral every 8 hours  diphenhydrAMINE 25 milliGRAM(s) Oral once  docusate sodium Liquid 100 milliGRAM(s) Oral three times a day  DULoxetine 60 milliGRAM(s) Oral daily  enoxaparin Injectable 30 milliGRAM(s) SubCutaneous daily  hydrochlorothiazide 25 milliGRAM(s) Oral at bedtime  influenza   Vaccine 0.5 milliLiter(s) IntraMuscular once  insulin glargine Injectable (LANTUS) 6 Unit(s) SubCutaneous at bedtime  levothyroxine 75 MICROGram(s) Oral daily  losartan 100 milliGRAM(s) Enteral Tube daily  multivitamin  Chewable 1 Tablet(s) Oral daily  nystatin Powder 1 Application(s) Topical two times a day  pantoprazole    Tablet 40 milliGRAM(s) Oral before breakfast  senna 2 Tablet(s) Oral at bedtime  sodium chloride 0.9%. 1000 milliLiter(s) (50 mL/Hr) IV Continuous <Continuous>    MEDICATIONS  (PRN):  acetaminophen   Tablet .. 650 milliGRAM(s) Oral every 6 hours PRN Temp greater or equal to 38C (100.4F)  acetaminophen  Suppository .. 650 milliGRAM(s) Rectal every 6 hours PRN Temp greater or equal to 38C (100.4F)  Mouth kote 1 Spray(s) 1 Spray(s) Oral every 2 hours PRN dry mouth      Allergies:    No Known Allergies    Intolerances          REVIEW OF SYSTEMS:  General:  No fevers, or chills.  Eyes:  No reported pain or visual changes  ENT:  No sore throat or runny nose.  NECK: No stiffness   CV:  No chest pain or palpitations.  Resp:  No shortness of breath, cough  GI:  No abdominal pain, nausea, vomiting, dysphagia, diarrhea or constipation. No rectal bleeding, melena, or hematemesis.  Neuro:  No tingling, numbness         VITAL SIGNS:   T(F): 96.8 (10-02-19 @ 05:31), Max: 99.1 (10-01-19 @ 22:08)  HR: 87 (10-02-19 @ 05:31) (51 - 87)  BP: 125/59 (10-02-19 @ 05:31) (125/59 - 165/72)  RR: 16 (10-02-19 @ 05:31) (16 - 17)  SpO2: --    PHYSICAL EXAM:  GENERAL: Alert and oriented to person and place not time, no acute distress.  HEAD:  Atraumatic, Normocephalic  EYES: conjunctiva and sclera clear  NECK: Supple, no JVD or thyromegaly  CHEST/LUNG: Clear to auscultation bilaterally; No wheeze, rhonchi, or rales  HEART: Regular rate and rhythm; normal S1, S2, No murmurs.  ABDOMEN: Soft, nontender, nondistended; Bowel sounds present, no abdominal bruit, masses, or hepatosplenomegaly +PEG tube  NEUROLOGY: No asterixis or tremor.   SKIN: Intact, no jaundice            LABS:                        8.5    9.29  )-----------( 152      ( 02 Oct 2019 07:07 )             26.0     10    141  |  101  |  22<H>  ----------------------------<  100<H>  3.2<L>   |  31  |  1.0    Ca    8.5      02 Oct 2019 07:07  Mg     1.6     10            IMAGING:  < from: EGD w/ PEG Placement (10.01.19 @ 10:00) >  PEG Placement Report Date: 10/1/2019 10:00 AM   Patient Name: BOB ENGLE   MRN: [id number]   Account Number: [id number]  Gender: Female    (age): 1932 (87)   Instrument(s): GIF-H190 ([id number])([id number])    Attending:   Josse Alonzo MD     Fellow:   Homer Rey MD       Referring Physician:   JOS[facility] MANUEL [facility] [address], [place] [address]  [phone] (phone)  [phone] (fax)       Nurse(s):   Mary Anne Dunlap         History of Present Illness:   H&P was previously reviewed.       Administered Medications: As per Anesthesiology Record     Indications: Malnutrition: 263.9 - E46  PEG Placement:   Consent: The procedure, indications, preparation and potential complications  were explained to the patient, who indicated understanding and signed the  corresponding consent forms.     Percutaneous endoscopic gastrostomy: In a darkened room, the abdominal wall was  transilluminated and a site was selected. Indentation of the gastric wall by  external finger pressure was demonstrated. The skin was surgically prepared and  anesthetized with xylocaine. A small incision was made in the abdominal wall  using a surgical blade. A 25-gauge needle with cannula was inserted through the  abdominal wall and into the gastric lumen. A guidewire was passed through the  cannula, was caught by the snare passed through the endoscope and brought out  through the mouth. A Anders-Cook 24 Fr PEG tube was secured to the guidewire and  pulled through the abdominal wall. A satisfactory final position was confirmed  endoscopically. The gastrostomy tube was secured with the outer flange  positioned at cm. The post procedure appearances were satisfactory.      Limitations/Complications: There were no apparent limitations or complications                  Plan: Can start meds, can start peg feeds in am, please insert binder, please do  not insert gauze under the bumper, plz flush with water after meds and feeds,  clean with soap and water twice daily    Attending Participation:   I was present and participated during the entire procedure, including non-pope  portions.       Josse Alonzo MD    Version 1, Electronically signed on 10/1/2019 11:27:47 AM by Josse Alonzo MD    < end of copied text >

## 2019-10-02 NOTE — PROGRESS NOTE ADULT - ASSESSMENT
86 yo female pmh bradycardia s/p rapid response 9/6/19 GI initially consulted for endoscopic NG tube placement as NG tube attempts were unsuccessful. EGD performed: see note large food impaction entire length of esophagus Food removed and NGT endoscopically placed 9/6/19. Patient s/p PEG placement yesterday    Patient s/p PEG tube placement, bleeding noted around PEG site with fresh blood clots  Rec  -We cleaned PEG site and tightened external bumper closer to skin to help healing and prevent rebleeding, will follow PEG again today and tomorrow to be assured bleeding stops. PEG was flushed with 50cc syringe with gastric content returned and no blood returned from stomach.  -Can start meds,  -Can start peg feeds   -Please insert binder, please flush with water after meds and feeds, clean with soap and water twice daily 86 yo female pmh bradycardia s/p rapid response 9/6/19 GI initially consulted for endoscopic NG tube placement as NG tube attempts were unsuccessful. EGD performed: see note large food impaction entire length of esophagus Food removed and NGT endoscopically placed 9/6/19. Patient s/p PEG placement yesterday    Patient s/p PEG tube placement, bleeding noted around PEG site with fresh blood clots  Rec  -We cleaned PEG site and tightened external bumper closer to skin to help healing and prevent rebleeding, will follow PEG again today and tomorrow to be assured bleeding stops. PEG was flushed with 50cc syringe with gastric content returned and no blood returned from stomach.  -Can start meds,  -Can start peg feeds   -Please insert binder, please flush with water after meds and feeds, clean with soap and water twice daily  -active type and screen  -Monitor CBC 88 yo female pmh bradycardia s/p rapid response 9/6/19 GI initially consulted for endoscopic NG tube placement as NG tube attempts were unsuccessful. EGD performed: see note large food impaction entire length of esophagus Food removed and NGT endoscopically placed 9/6/19. Patient s/p PEG placement yesterday    Patient s/p PEG tube placement, bleeding noted around PEG site with fresh blood clots------>resolved evaluated PEG tube three times today after initial episode of bleeding around PEG tube no further bleeding noted  Rec  -We cleaned PEG site and tightened external bumper closer to skin to help healing and prevent rebleeding. PEG was flushed with 50cc syringe with gastric content returned and no blood returned from stomach, this was done three times throughout day  -Can start meds,  -Can start peg feeds   -Please insert binder, please flush with water after meds and feeds, clean with soap and water twice daily  -active type and screen  -Monitor CBC    Recall GI if needed

## 2019-10-02 NOTE — CHART NOTE - NSCHARTNOTEFT_GEN_A_CORE
Patient S/P PEG tube insertion  -some oozing of blood from insertion site  -seen by GI: Site cleaned, Bumper tightened to abdominal wall, PEG tube flushed: No Blood return from stomach  -GI will follow up tomorrow  -can use PEG for feeds and meds

## 2019-10-02 NOTE — PROGRESS NOTE ADULT - SUBJECTIVE AND OBJECTIVE BOX
86 yo F presents to the ED after VN found her very weak, bradycardic and in respiratory distress. Pt  was brought to ER  in by EMS. Pt also gave Hx of having fallen on  Sunday 9/1/19.  Pt has mobility dysfunction and ambulates with walker with difficulty navigating steps with Hx of progressive Parkinsons. . Pt ambulates with a walker few steps.  Pt was noted to be bradycardic 20-30s. Pt had ARF, cardiac arrest and was intubated. Pt was tx for aspiration PNA, pneumoniits and sucessfully extubated but remains on high flow oxygen.  Pt has continued difficulty with dysphagia ans clearing secretions.    interval :  extubated s/p speech and swallow/ failed due to severe swallowing dysfunction, pt remains on O2 via NC and is being fed via feeding tube; s/p peg    PAST MEDICAL & SURGICAL HISTORY:  Parkinson disease  Hypothyroid  Rib fractures  Congestive heart failure  Hypertension  No significant past surgical history    MEDICATIONS  (STANDING):  amLODIPine   Tablet 10 milliGRAM(s) Oral daily  aspirin  chewable 81 milliGRAM(s) Oral daily  buDESOnide 160 MICROgram(s)/formoterol 4.5 MICROgram(s) Inhaler 2 Puff(s) Inhalation two times a day  carbidopa/levodopa  10/100 1 Tablet(s) Oral daily  carbidopa/levodopa  25/100 1 Tablet(s) Oral at bedtime  carvedilol 25 milliGRAM(s) Oral every 12 hours  ceFAZolin   IVPB 1000 milliGRAM(s) IV Intermittent once  chlorhexidine 4% Liquid 1 Application(s) Topical <User Schedule>  cloNIDine 0.2 milliGRAM(s) Oral every 8 hours  diphenhydrAMINE 25 milliGRAM(s) Oral once  docusate sodium Liquid 100 milliGRAM(s) Oral three times a day  DULoxetine 60 milliGRAM(s) Oral daily  enoxaparin Injectable 30 milliGRAM(s) SubCutaneous daily  hydrochlorothiazide 25 milliGRAM(s) Oral at bedtime  influenza   Vaccine 0.5 milliLiter(s) IntraMuscular once  insulin glargine Injectable (LANTUS) 6 Unit(s) SubCutaneous at bedtime  levothyroxine 75 MICROGram(s) Oral daily  losartan 100 milliGRAM(s) Enteral Tube daily  multivitamin  Chewable 1 Tablet(s) Oral daily  nystatin Powder 1 Application(s) Topical two times a day  pantoprazole    Tablet 40 milliGRAM(s) Oral before breakfast  senna 2 Tablet(s) Oral at bedtime  sodium chloride 0.9%. 1000 milliLiter(s) (50 mL/Hr) IV Continuous <Continuous>    MEDICATIONS  (PRN):  acetaminophen   Tablet .. 650 milliGRAM(s) Oral every 6 hours PRN Temp greater or equal to 38C (100.4F)  acetaminophen  Suppository .. 650 milliGRAM(s) Rectal every 6 hours PRN Temp greater or equal to 38C (100.4F)  Mouth kote 1 Spray(s) 1 Spray(s) Oral every 2 hours PRN dry mouth    Vital Signs Last 24 Hrs  T(C): 36 (02 Oct 2019 05:31), Max: 37.3 (01 Oct 2019 22:08)  T(F): 96.8 (02 Oct 2019 05:31), Max: 99.1 (01 Oct 2019 22:08)  HR: 87 (02 Oct 2019 05:31) (50 - 87)  BP: 125/59 (02 Oct 2019 05:31) (125/59 - 165/72)  BP(mean): --  RR: 16 (02 Oct 2019 05:31) (16 - 50)  SpO2: 100% (01 Oct 2019 10:09) (100% - 100%)    CAPILLARY BLOOD GLUCOSE      POCT Blood Glucose.: 79 mg/dL (01 Oct 2019 21:52)  POCT Blood Glucose.: 86 mg/dL (01 Oct 2019 11:17)  POCT Blood Glucose.: 120 mg/dL (01 Oct 2019 07:24)        GENERAL: elderly, fragile and  chronically ill looking, resting in bed, NAD + O2 NC, + feeding tube  HEAD:  Atraumatic, Normocephalic  EYES: EOMI, PERRLA, conjunctiva and sclera clear  ENT: Moist mucous membranes   NECK: Supple, No JVD  CHEST/LUNG: Shallow respirations, scattered rhonchi  HEART: S1S2 irreg  ABDOMEN: Globular, soft and benign + peg blood around site   EXTREMITIES:  advanced arthritic changes, dec motor function, poor mm mass and tone  NERVOUS SYSTEM:  non focal                           10.5   8.93  )-----------( 174      ( 01 Oct 2019 07:12 )             31.2   10-01    141  |  99  |  23<H>  ----------------------------<  130<H>  3.4<L>   |  31  |  1.0    Ca    9.1      01 Oct 2019 07:12

## 2019-10-03 ENCOUNTER — TRANSCRIPTION ENCOUNTER (OUTPATIENT)
Age: 84
End: 2019-10-03

## 2019-10-03 VITALS
TEMPERATURE: 98 F | SYSTOLIC BLOOD PRESSURE: 123 MMHG | DIASTOLIC BLOOD PRESSURE: 57 MMHG | WEIGHT: 154.32 LBS | RESPIRATION RATE: 16 BRPM | HEART RATE: 54 BPM

## 2019-10-03 LAB
ANION GAP SERPL CALC-SCNC: 10 MMOL/L — SIGNIFICANT CHANGE UP (ref 7–14)
BUN SERPL-MCNC: 32 MG/DL — HIGH (ref 10–20)
CALCIUM SERPL-MCNC: 8.2 MG/DL — LOW (ref 8.5–10.1)
CHLORIDE SERPL-SCNC: 103 MMOL/L — SIGNIFICANT CHANGE UP (ref 98–110)
CO2 SERPL-SCNC: 28 MMOL/L — SIGNIFICANT CHANGE UP (ref 17–32)
CREAT SERPL-MCNC: 1.2 MG/DL — SIGNIFICANT CHANGE UP (ref 0.7–1.5)
GLUCOSE BLDC GLUCOMTR-MCNC: 161 MG/DL — HIGH (ref 70–99)
GLUCOSE BLDC GLUCOMTR-MCNC: 234 MG/DL — HIGH (ref 70–99)
GLUCOSE BLDC GLUCOMTR-MCNC: 281 MG/DL — HIGH (ref 70–99)
GLUCOSE SERPL-MCNC: 241 MG/DL — HIGH (ref 70–99)
HCT VFR BLD CALC: 25.5 % — LOW (ref 37–47)
HGB BLD-MCNC: 8.5 G/DL — LOW (ref 12–16)
MCHC RBC-ENTMCNC: 28.9 PG — SIGNIFICANT CHANGE UP (ref 27–31)
MCHC RBC-ENTMCNC: 33.3 G/DL — SIGNIFICANT CHANGE UP (ref 32–37)
MCV RBC AUTO: 86.7 FL — SIGNIFICANT CHANGE UP (ref 81–99)
NRBC # BLD: 0 /100 WBCS — SIGNIFICANT CHANGE UP (ref 0–0)
PLATELET # BLD AUTO: 153 K/UL — SIGNIFICANT CHANGE UP (ref 130–400)
POTASSIUM SERPL-MCNC: 3.5 MMOL/L — SIGNIFICANT CHANGE UP (ref 3.5–5)
POTASSIUM SERPL-SCNC: 3.5 MMOL/L — SIGNIFICANT CHANGE UP (ref 3.5–5)
RBC # BLD: 2.94 M/UL — LOW (ref 4.2–5.4)
RBC # FLD: 17.3 % — HIGH (ref 11.5–14.5)
SODIUM SERPL-SCNC: 141 MMOL/L — SIGNIFICANT CHANGE UP (ref 135–146)
WBC # BLD: 9.18 K/UL — SIGNIFICANT CHANGE UP (ref 4.8–10.8)
WBC # FLD AUTO: 9.18 K/UL — SIGNIFICANT CHANGE UP (ref 4.8–10.8)

## 2019-10-03 RX ORDER — POTASSIUM CHLORIDE 20 MEQ
1 PACKET (EA) ORAL
Qty: 0 | Refills: 0 | DISCHARGE
Start: 2019-10-03

## 2019-10-03 RX ADMIN — CARBIDOPA AND LEVODOPA 1 TABLET(S): 25; 100 TABLET ORAL at 13:26

## 2019-10-03 RX ADMIN — LOSARTAN POTASSIUM 100 MILLIGRAM(S): 100 TABLET, FILM COATED ORAL at 05:35

## 2019-10-03 RX ADMIN — Medication 100 MILLIGRAM(S): at 13:35

## 2019-10-03 RX ADMIN — Medication 0.2 MILLIGRAM(S): at 05:35

## 2019-10-03 RX ADMIN — Medication 75 MICROGRAM(S): at 05:35

## 2019-10-03 RX ADMIN — Medication 100 MILLIGRAM(S): at 05:36

## 2019-10-03 RX ADMIN — NYSTATIN CREAM 1 APPLICATION(S): 100000 CREAM TOPICAL at 17:31

## 2019-10-03 RX ADMIN — Medication 1 TABLET(S): at 13:34

## 2019-10-03 RX ADMIN — Medication 81 MILLIGRAM(S): at 13:26

## 2019-10-03 RX ADMIN — ENOXAPARIN SODIUM 30 MILLIGRAM(S): 100 INJECTION SUBCUTANEOUS at 13:34

## 2019-10-03 RX ADMIN — Medication 1 APPLICATION(S): at 17:30

## 2019-10-03 RX ADMIN — NYSTATIN CREAM 1 APPLICATION(S): 100000 CREAM TOPICAL at 05:34

## 2019-10-03 RX ADMIN — Medication 0.2 MILLIGRAM(S): at 13:34

## 2019-10-03 RX ADMIN — DULOXETINE HYDROCHLORIDE 60 MILLIGRAM(S): 30 CAPSULE, DELAYED RELEASE ORAL at 13:26

## 2019-10-03 RX ADMIN — AMLODIPINE BESYLATE 10 MILLIGRAM(S): 2.5 TABLET ORAL at 05:36

## 2019-10-03 RX ADMIN — Medication 20 MILLIEQUIVALENT(S): at 17:29

## 2019-10-03 RX ADMIN — CARVEDILOL PHOSPHATE 25 MILLIGRAM(S): 80 CAPSULE, EXTENDED RELEASE ORAL at 17:30

## 2019-10-03 RX ADMIN — CARVEDILOL PHOSPHATE 25 MILLIGRAM(S): 80 CAPSULE, EXTENDED RELEASE ORAL at 05:36

## 2019-10-03 NOTE — PROGRESS NOTE ADULT - SUBJECTIVE AND OBJECTIVE BOX
88 yo female adm 9/3 with bradycardia. PMH notable for Parkinson's with progression, CHF, hypothyroidism.  pt found to be hypoxic and more confused 9/6 and was intubated.  + aspiration   EGD done 9/9  pt successfully extubated 9/15, needed high flow O2 for a time, now comfortable with O2 by NC.  Pt with progression of dysphagia r/t Parkinson's disease; speech recommend NPO.  Of note, found food impaction in entire esophagus earlier this admission.  GT placed by GI on 10/1.     Vital Signs Last 24 Hrs  T(C): 36.6 (03 Oct 2019 05:09), Max: 36.6 (03 Oct 2019 05:09)  T(F): 97.9 (03 Oct 2019 05:09), Max: 97.9 (03 Oct 2019 05:09)  HR: 54 (03 Oct 2019 05:09) (54 - 74)  BP: 123/57 (03 Oct 2019 05:09) (120/80 - 129/57)  RR: 16 (03 Oct 2019 05:09) (16 - 16)  alert, verbal, cooperative  skin turgor good  anicteric, conj pink  abd soft, NT, ND, chubby.  GT site c/d/i, no erythema or drainage  no signif LE edema    MEDICATIONS  (STANDING):  amLODIPine   Tablet 10 milliGRAM(s) Oral daily  aspirin  chewable 81 milliGRAM(s) Oral daily  buDESOnide 160 MICROgram(s)/formoterol 4.5 MICROgram(s) Inhaler 2 Puff(s) Inhalation two times a day  carbidopa/levodopa  10/100 1 Tablet(s) Oral daily  carbidopa/levodopa  25/100 1 Tablet(s) Oral at bedtime  carvedilol 25 milliGRAM(s) Oral every 12 hours  chlorhexidine 4% Liquid 1 Application(s) Topical <User Schedule>  cloNIDine 0.2 milliGRAM(s) Oral every 8 hours  docusate sodium Liquid 100 milliGRAM(s) Oral three times a day  DULoxetine 60 milliGRAM(s) Oral daily  enoxaparin Injectable 30 milliGRAM(s) SubCutaneous daily  hydrochlorothiazide 25 milliGRAM(s) Oral at bedtime  influenza   Vaccine 0.5 milliLiter(s) IntraMuscular once  insulin glargine Injectable (LANTUS) 6 Unit(s) SubCutaneous at bedtime  levothyroxine 75 MICROGram(s) Oral daily  losartan 100 milliGRAM(s) Enteral Tube daily  multivitamin  Chewable 1 Tablet(s) Oral daily  nystatin Powder 1 Application(s) Topical two times a day  pantoprazole    Tablet 40 milliGRAM(s) Oral before breakfast  potassium chloride   Powder 20 milliEquivalent(s) Oral daily  senna 2 Tablet(s) Oral at bedtime                        8.5    9.18  )-----------( 153      ( 03 Oct 2019 06:35 )             25.5   10-03    141  |  103  |  32<H>  ----------------------------<  241<H>  3.5   |  28  |  1.2    Ca    8.2<L>      03 Oct 2019 06:35  Mg     1.6     10-02  Phosphorus Level, Serum in AM (09.25.19 @ 06:33)    Phosphorus Level, Serum: 3.1 mg/dL    POCT Blood Glucose.: 234 mg/dL (03 Oct 2019 11:15)  POCT Blood Glucose.: 281 mg/dL (03 Oct 2019 07:18)  POCT Blood Glucose.: 123 mg/dL (02 Oct 2019 21:33)  POCT Blood Glucose.: 242 mg/dL (02 Oct 2019 16:27)

## 2019-10-03 NOTE — PROGRESS NOTE ADULT - ASSESSMENT
87 year old female admitted s/p fall episode at home with generalized weakness and severe bradycardia ;sp rapid response respiratory failure, sp intubation, aspiration, fever PNA/pneumonitis    # Respiratory Failure - aspiration  secretions  s/p intubation and  bronch extubated seen by SLP    - desaturation on high flow O2,  secretions   -  SLP evaluation  extubated MBBS failed feeding   -  enteral feeding for now need to resolved decision on feeding ; s/p PEG  - at risk for aspiration      # Cardiac / CHF  / HTN    - ff by cardio  - pulse better 60s   - bp noted on losarta , amldopine and clonidie , cervedilol   - reviewed echo  - monitor weight and pulse        # Parkinson's / dysphagia   - s/p peg   - continue Sinemet  -repeat  + cont dysphagia   -monitor electrolytes and renal parameters    # Hypothyroidism     - levothyroxine     # GI and DVT prophylaxis     - pantoprazole   - enoxaparin    advance care  noted  CPR,  full code, but overall guarded state    disposition ? patient states wants to go home will speak with miriam

## 2019-10-03 NOTE — DISCHARGE NOTE NURSING/CASE MANAGEMENT/SOCIAL WORK - PATIENT PORTAL LINK FT
You can access the FollowMyHealth Patient Portal offered by [facility] by registering at the following website: [email or web address]. By joining [facility]’s FollowMyHealth portal, you will also be able to view your health information using other applications (apps) compatible with our system.

## 2019-10-03 NOTE — CHART NOTE - NSCHARTNOTEFT_GEN_A_CORE
Patient found to have scattered Erythematous circular rash, appears Fungal in origin.  Plan: discussed with Dr Curry :  Start topical antifungal cream to affected areas Q12H X 10 days.

## 2019-10-03 NOTE — PROGRESS NOTE ADULT - ASSESSMENT
IMP:  - acute resp failure - resolved  - aspiration  - Parkinson's disease with dysphagia - new GT placed 10/1  - hypophosphatemia  - DM on Lantus - poc glucose all elevated    SUGGEST:  - change feeds to Glucerna 1.2 - 360 ml x 3 feeds per day  - flush water 50 ml before and 50 ml after each feeding. Monitor BUN - increase post feed to 100 ml if BUN rises.  - Prosource TF one packet daily for 2-4 weeks, then can likely d/c it  - cont chewable or liquid multivitamin via GT - re-evaluate in about a month  - check poc glucose before each feeding - would be better to feed at 7am, noon, 5pm to "match" glc sampling schedules.  Q 8h feeding schedule is not ideal. Meal-patter schedule works better for insulin management, as well as for care at home.  - treat Mg. Check K, phos, Mg after 24 h of this feeding and replete if needed.

## 2019-10-03 NOTE — PROGRESS NOTE ADULT - REASON FOR ADMISSION
Weakness and bradycardia
Weakness, bradycardia, respiratory distress, aspiration PNA/pneumonitis sp intubation
Generalized Weakness and Bradycardia, Respiratory Failure, Aspiration PNA/pneumonitis,
Weakness and bradycardia
bradycardia
Weakness and bradycardia

## 2019-10-03 NOTE — PROGRESS NOTE ADULT - SUBJECTIVE AND OBJECTIVE BOX
86 yo F presents to the ED after VN found her very weak, bradycardic and in respiratory distress. Pt  was brought to ER  in by EMS. Pt also gave Hx of having fallen on  Sunday 9/1/19.  Pt has mobility dysfunction and ambulates with walker with difficulty navigating steps with Hx of progressive Parkinsons. . Pt ambulates with a walker few steps.  Pt was noted to be bradycardic 20-30s. Pt had ARF, cardiac arrest and was intubated. Pt was tx for aspiration PNA, pneumoniits and sucessfully extubated but remains on high flow oxygen.  Pt has continued difficulty with dysphagia ans clearing secretions.    interval :  extubated s/p speech and swallow/ failed due to severe swallowing dysfunction, pt remains on O2 via NC and is being fed via feeding tube; s/p peg    PAST MEDICAL & SURGICAL HISTORY:  Parkinson disease  Hypothyroid  Rib fractures  Congestive heart failure  Hypertension  No significant past surgical history    MEDICATIONS  (STANDING):  amLODIPine   Tablet 10 milliGRAM(s) Oral daily  aspirin  chewable 81 milliGRAM(s) Oral daily  buDESOnide 160 MICROgram(s)/formoterol 4.5 MICROgram(s) Inhaler 2 Puff(s) Inhalation two times a day  carbidopa/levodopa  10/100 1 Tablet(s) Oral daily  carbidopa/levodopa  25/100 1 Tablet(s) Oral at bedtime  carvedilol 25 milliGRAM(s) Oral every 12 hours  ceFAZolin   IVPB 1000 milliGRAM(s) IV Intermittent once  chlorhexidine 4% Liquid 1 Application(s) Topical <User Schedule>  cloNIDine 0.2 milliGRAM(s) Oral every 8 hours  diphenhydrAMINE 25 milliGRAM(s) Oral once  docusate sodium Liquid 100 milliGRAM(s) Oral three times a day  DULoxetine 60 milliGRAM(s) Oral daily  enoxaparin Injectable 30 milliGRAM(s) SubCutaneous daily  hydrochlorothiazide 25 milliGRAM(s) Oral at bedtime  influenza   Vaccine 0.5 milliLiter(s) IntraMuscular once  insulin glargine Injectable (LANTUS) 6 Unit(s) SubCutaneous at bedtime  levothyroxine 75 MICROGram(s) Oral daily  losartan 100 milliGRAM(s) Enteral Tube daily  multivitamin  Chewable 1 Tablet(s) Oral daily  nystatin Powder 1 Application(s) Topical two times a day  pantoprazole    Tablet 40 milliGRAM(s) Oral before breakfast  senna 2 Tablet(s) Oral at bedtime  sodium chloride 0.9%. 1000 milliLiter(s) (50 mL/Hr) IV Continuous <Continuous>    MEDICATIONS  (PRN):  acetaminophen   Tablet .. 650 milliGRAM(s) Oral every 6 hours PRN Temp greater or equal to 38C (100.4F)  acetaminophen  Suppository .. 650 milliGRAM(s) Rectal every 6 hours PRN Temp greater or equal to 38C (100.4F)  Mouth kote 1 Spray(s) 1 Spray(s) Oral every 2 hours PRN dry mouth    Vital Signs Last 24 Hrs  T(C): 36.6 (03 Oct 2019 05:09), Max: 36.6 (03 Oct 2019 05:09)  T(F): 97.9 (03 Oct 2019 05:09), Max: 97.9 (03 Oct 2019 05:09)  HR: 54 (03 Oct 2019 05:09) (54 - 74)  BP: 123/57 (03 Oct 2019 05:09) (120/80 - 129/57)  BP(mean): --  RR: 16 (03 Oct 2019 05:09) (16 - 16)  SpO2: --    CAPILLARY BLOOD GLUCOSE      POCT Blood Glucose.: 123 mg/dL (02 Oct 2019 21:33)  POCT Blood Glucose.: 242 mg/dL (02 Oct 2019 16:27)  POCT Blood Glucose.: 87 mg/dL (02 Oct 2019 11:14)  POCT Blood Glucose.: 97 mg/dL (02 Oct 2019 07:21)          GENERAL: elderly, fragile and  chronically ill looking, resting in bed, NAD + O2 NC, + feeding tube  HEAD:  Atraumatic, Normocephalic  EYES: EOMI, PERRLA, conjunctiva and sclera clear  ENT: Moist mucous membranes   NECK: Supple, No JVD  CHEST/LUNG: Shallow respirations, scattered rhonchi  HEART: S1S2 irreg  ABDOMEN: Globular, soft and benign + peg blood around site   EXTREMITIES:  advanced arthritic changes, dec motor function, poor mm mass and tone  NERVOUS SYSTEM:  non focal                           10.5   8.93  )-----------( 174      ( 01 Oct 2019 07:12 )             31.2   10-01    141  |  99  |  23<H>  ----------------------------<  130<H>  3.4<L>   |  31  |  1.0    Ca    9.1      01 Oct 2019 07:12

## 2019-10-04 ENCOUNTER — OUTPATIENT (OUTPATIENT)
Dept: OUTPATIENT SERVICES | Facility: HOSPITAL | Age: 84
LOS: 1 days | Discharge: HOME | End: 2019-10-04

## 2019-10-05 DIAGNOSIS — I48.91 UNSPECIFIED ATRIAL FIBRILLATION: ICD-10-CM

## 2019-10-05 DIAGNOSIS — N18.9 CHRONIC KIDNEY DISEASE, UNSPECIFIED: ICD-10-CM

## 2019-10-09 ENCOUNTER — OUTPATIENT (OUTPATIENT)
Dept: OUTPATIENT SERVICES | Facility: HOSPITAL | Age: 84
LOS: 1 days | Discharge: HOME | End: 2019-10-09

## 2019-10-09 DIAGNOSIS — R79.9 ABNORMAL FINDING OF BLOOD CHEMISTRY, UNSPECIFIED: ICD-10-CM

## 2019-10-11 ENCOUNTER — OUTPATIENT (OUTPATIENT)
Dept: OUTPATIENT SERVICES | Facility: HOSPITAL | Age: 84
LOS: 1 days | Discharge: HOME | End: 2019-10-11

## 2019-10-11 DIAGNOSIS — I48.91 UNSPECIFIED ATRIAL FIBRILLATION: ICD-10-CM

## 2019-10-11 DIAGNOSIS — I46.9 CARDIAC ARREST, CAUSE UNSPECIFIED: ICD-10-CM

## 2019-10-11 DIAGNOSIS — Z74.09 OTHER REDUCED MOBILITY: ICD-10-CM

## 2019-10-11 DIAGNOSIS — J98.11 ATELECTASIS: ICD-10-CM

## 2019-10-11 DIAGNOSIS — J96.01 ACUTE RESPIRATORY FAILURE WITH HYPOXIA: ICD-10-CM

## 2019-10-11 DIAGNOSIS — R53.1 WEAKNESS: ICD-10-CM

## 2019-10-11 DIAGNOSIS — I25.10 ATHEROSCLEROTIC HEART DISEASE OF NATIVE CORONARY ARTERY WITHOUT ANGINA PECTORIS: ICD-10-CM

## 2019-10-11 DIAGNOSIS — K94.21 GASTROSTOMY HEMORRHAGE: ICD-10-CM

## 2019-10-11 DIAGNOSIS — E83.41 HYPERMAGNESEMIA: ICD-10-CM

## 2019-10-11 DIAGNOSIS — E11.22 TYPE 2 DIABETES MELLITUS WITH DIABETIC CHRONIC KIDNEY DISEASE: ICD-10-CM

## 2019-10-11 DIAGNOSIS — I13.0 HYPERTENSIVE HEART AND CHRONIC KIDNEY DISEASE WITH HEART FAILURE AND STAGE 1 THROUGH STAGE 4 CHRONIC KIDNEY DISEASE, OR UNSPECIFIED CHRONIC KIDNEY DISEASE: ICD-10-CM

## 2019-10-11 DIAGNOSIS — K59.09 OTHER CONSTIPATION: ICD-10-CM

## 2019-10-11 DIAGNOSIS — K29.60 OTHER GASTRITIS WITHOUT BLEEDING: ICD-10-CM

## 2019-10-11 DIAGNOSIS — D64.9 ANEMIA, UNSPECIFIED: ICD-10-CM

## 2019-10-11 DIAGNOSIS — J69.0 PNEUMONITIS DUE TO INHALATION OF FOOD AND VOMIT: ICD-10-CM

## 2019-10-11 DIAGNOSIS — N17.9 ACUTE KIDNEY FAILURE, UNSPECIFIED: ICD-10-CM

## 2019-10-11 DIAGNOSIS — E03.9 HYPOTHYROIDISM, UNSPECIFIED: ICD-10-CM

## 2019-10-11 DIAGNOSIS — R13.10 DYSPHAGIA, UNSPECIFIED: ICD-10-CM

## 2019-10-11 DIAGNOSIS — M19.90 UNSPECIFIED OSTEOARTHRITIS, UNSPECIFIED SITE: ICD-10-CM

## 2019-10-11 DIAGNOSIS — K21.9 GASTRO-ESOPHAGEAL REFLUX DISEASE WITHOUT ESOPHAGITIS: ICD-10-CM

## 2019-10-11 DIAGNOSIS — K20.8 OTHER ESOPHAGITIS: ICD-10-CM

## 2019-10-11 DIAGNOSIS — I50.32 CHRONIC DIASTOLIC (CONGESTIVE) HEART FAILURE: ICD-10-CM

## 2019-10-11 DIAGNOSIS — R33.9 RETENTION OF URINE, UNSPECIFIED: ICD-10-CM

## 2019-10-11 DIAGNOSIS — N18.3 CHRONIC KIDNEY DISEASE, STAGE 3 (MODERATE): ICD-10-CM

## 2019-10-11 DIAGNOSIS — G20 PARKINSON'S DISEASE: ICD-10-CM

## 2019-10-11 DIAGNOSIS — E83.39 OTHER DISORDERS OF PHOSPHORUS METABOLISM: ICD-10-CM

## 2019-10-11 DIAGNOSIS — B36.9 SUPERFICIAL MYCOSIS, UNSPECIFIED: ICD-10-CM

## 2019-10-11 DIAGNOSIS — R00.1 BRADYCARDIA, UNSPECIFIED: ICD-10-CM

## 2019-10-11 DIAGNOSIS — T17.928A FOOD IN RESPIRATORY TRACT, PART UNSPECIFIED CAUSING OTHER INJURY, INITIAL ENCOUNTER: ICD-10-CM

## 2019-10-11 DIAGNOSIS — R11.0 NAUSEA: ICD-10-CM

## 2019-10-11 DIAGNOSIS — E87.0 HYPEROSMOLALITY AND HYPERNATREMIA: ICD-10-CM

## 2019-10-12 ENCOUNTER — INPATIENT (INPATIENT)
Facility: HOSPITAL | Age: 84
LOS: 8 days | End: 2019-10-21
Attending: INTERNAL MEDICINE | Admitting: INTERNAL MEDICINE
Payer: MEDICARE

## 2019-10-12 VITALS
OXYGEN SATURATION: 100 % | HEART RATE: 33 BPM | SYSTOLIC BLOOD PRESSURE: 114 MMHG | DIASTOLIC BLOOD PRESSURE: 50 MMHG | RESPIRATION RATE: 22 BRPM

## 2019-10-12 LAB
ALBUMIN SERPL ELPH-MCNC: 3.2 G/DL — LOW (ref 3.5–5.2)
ALP SERPL-CCNC: 100 U/L — SIGNIFICANT CHANGE UP (ref 30–115)
ALT FLD-CCNC: <5 U/L — SIGNIFICANT CHANGE UP (ref 0–41)
ANION GAP SERPL CALC-SCNC: 8 MMOL/L — SIGNIFICANT CHANGE UP (ref 7–14)
AST SERPL-CCNC: 14 U/L — SIGNIFICANT CHANGE UP (ref 0–41)
BASE EXCESS BLDV CALC-SCNC: 6.2 MMOL/L — HIGH (ref -2–2)
BASOPHILS # BLD AUTO: 0.03 K/UL — SIGNIFICANT CHANGE UP (ref 0–0.2)
BASOPHILS NFR BLD AUTO: 0.4 % — SIGNIFICANT CHANGE UP (ref 0–1)
BILIRUB SERPL-MCNC: 0.5 MG/DL — SIGNIFICANT CHANGE UP (ref 0.2–1.2)
BUN SERPL-MCNC: 83 MG/DL — CRITICAL HIGH (ref 10–20)
CA-I SERPL-SCNC: 1.19 MMOL/L — SIGNIFICANT CHANGE UP (ref 1.12–1.3)
CALCIUM SERPL-MCNC: 9 MG/DL — SIGNIFICANT CHANGE UP (ref 8.5–10.1)
CHLORIDE SERPL-SCNC: 86 MMOL/L — LOW (ref 98–110)
CO2 SERPL-SCNC: 30 MMOL/L — SIGNIFICANT CHANGE UP (ref 17–32)
CREAT SERPL-MCNC: 1.8 MG/DL — HIGH (ref 0.7–1.5)
EOSINOPHIL # BLD AUTO: 0.08 K/UL — SIGNIFICANT CHANGE UP (ref 0–0.7)
EOSINOPHIL NFR BLD AUTO: 1 % — SIGNIFICANT CHANGE UP (ref 0–8)
GAS PNL BLDV: 122 MMOL/L — LOW (ref 136–145)
GAS PNL BLDV: SIGNIFICANT CHANGE UP
GAS PNL BLDV: SIGNIFICANT CHANGE UP
GLUCOSE SERPL-MCNC: 126 MG/DL — HIGH (ref 70–99)
HCO3 BLDV-SCNC: 31 MMOL/L — HIGH (ref 22–29)
HCT VFR BLD CALC: 22.4 % — LOW (ref 37–47)
HCT VFR BLDA CALC: 21 % — LOW (ref 34–44)
HGB BLD CALC-MCNC: 6.8 G/DL — LOW (ref 14–18)
HGB BLD-MCNC: 7.3 G/DL — LOW (ref 12–16)
IMM GRANULOCYTES NFR BLD AUTO: 1.3 % — HIGH (ref 0.1–0.3)
INR BLD: 1.19 RATIO — SIGNIFICANT CHANGE UP (ref 0.65–1.3)
LACTATE BLDV-MCNC: 0.9 MMOL/L — SIGNIFICANT CHANGE UP (ref 0.5–1.6)
LYMPHOCYTES # BLD AUTO: 0.71 K/UL — LOW (ref 1.2–3.4)
LYMPHOCYTES # BLD AUTO: 9.3 % — LOW (ref 20.5–51.1)
MAGNESIUM SERPL-MCNC: 2.7 MG/DL — HIGH (ref 1.8–2.4)
MCHC RBC-ENTMCNC: 28.6 PG — SIGNIFICANT CHANGE UP (ref 27–31)
MCHC RBC-ENTMCNC: 32.6 G/DL — SIGNIFICANT CHANGE UP (ref 32–37)
MCV RBC AUTO: 87.8 FL — SIGNIFICANT CHANGE UP (ref 81–99)
MONOCYTES # BLD AUTO: 0.44 K/UL — SIGNIFICANT CHANGE UP (ref 0.1–0.6)
MONOCYTES NFR BLD AUTO: 5.7 % — SIGNIFICANT CHANGE UP (ref 1.7–9.3)
NEUTROPHILS # BLD AUTO: 6.3 K/UL — SIGNIFICANT CHANGE UP (ref 1.4–6.5)
NEUTROPHILS NFR BLD AUTO: 82.3 % — HIGH (ref 42.2–75.2)
NRBC # BLD: 0 /100 WBCS — SIGNIFICANT CHANGE UP (ref 0–0)
NT-PROBNP SERPL-SCNC: HIGH PG/ML (ref 0–300)
PCO2 BLDV: 46 MMHG — SIGNIFICANT CHANGE UP (ref 41–51)
PH BLDV: 7.44 — HIGH (ref 7.26–7.43)
PLATELET # BLD AUTO: 189 K/UL — SIGNIFICANT CHANGE UP (ref 130–400)
PO2 BLDV: 89 MMHG — HIGH (ref 20–40)
POTASSIUM BLDV-SCNC: 6.6 MMOL/L — HIGH (ref 3.3–5.6)
POTASSIUM SERPL-MCNC: 6.5 MMOL/L — CRITICAL HIGH (ref 3.5–5)
POTASSIUM SERPL-SCNC: 6.5 MMOL/L — CRITICAL HIGH (ref 3.5–5)
PROT SERPL-MCNC: 5.6 G/DL — LOW (ref 6–8)
PROTHROM AB SERPL-ACNC: 13.7 SEC — HIGH (ref 9.95–12.87)
RBC # BLD: 2.55 M/UL — LOW (ref 4.2–5.4)
RBC # FLD: 19.1 % — HIGH (ref 11.5–14.5)
SAO2 % BLDV: 97 % — SIGNIFICANT CHANGE UP
SODIUM SERPL-SCNC: 124 MMOL/L — LOW (ref 135–146)
TROPONIN T SERPL-MCNC: 0.01 NG/ML — SIGNIFICANT CHANGE UP
WBC # BLD: 7.66 K/UL — SIGNIFICANT CHANGE UP (ref 4.8–10.8)
WBC # FLD AUTO: 7.66 K/UL — SIGNIFICANT CHANGE UP (ref 4.8–10.8)

## 2019-10-12 PROCEDURE — 71045 X-RAY EXAM CHEST 1 VIEW: CPT | Mod: 26

## 2019-10-12 PROCEDURE — 70450 CT HEAD/BRAIN W/O DYE: CPT | Mod: 26

## 2019-10-12 PROCEDURE — 31500 INSERT EMERGENCY AIRWAY: CPT | Mod: GC

## 2019-10-12 PROCEDURE — 99291 CRITICAL CARE FIRST HOUR: CPT | Mod: 25,GC

## 2019-10-12 PROCEDURE — 93010 ELECTROCARDIOGRAM REPORT: CPT | Mod: 76

## 2019-10-12 RX ORDER — MIDAZOLAM HYDROCHLORIDE 1 MG/ML
0.02 INJECTION, SOLUTION INTRAMUSCULAR; INTRAVENOUS
Qty: 100 | Refills: 0 | Status: DISCONTINUED | OUTPATIENT
Start: 2019-10-12 | End: 2019-10-13

## 2019-10-12 RX ORDER — CHLORHEXIDINE GLUCONATE 213 G/1000ML
15 SOLUTION TOPICAL EVERY 12 HOURS
Refills: 0 | Status: DISCONTINUED | OUTPATIENT
Start: 2019-10-12 | End: 2019-10-21

## 2019-10-12 RX ORDER — CALCIUM GLUCONATE 100 MG/ML
2 VIAL (ML) INTRAVENOUS ONCE
Refills: 0 | Status: COMPLETED | OUTPATIENT
Start: 2019-10-12 | End: 2019-10-12

## 2019-10-12 RX ORDER — INSULIN HUMAN 100 [IU]/ML
10 INJECTION, SOLUTION SUBCUTANEOUS ONCE
Refills: 0 | Status: COMPLETED | OUTPATIENT
Start: 2019-10-12 | End: 2019-10-12

## 2019-10-12 RX ORDER — ATROPINE SULFATE 0.1 MG/ML
0.5 SYRINGE (ML) INJECTION ONCE
Refills: 0 | Status: COMPLETED | OUTPATIENT
Start: 2019-10-12 | End: 2019-10-12

## 2019-10-12 RX ORDER — KETAMINE HYDROCHLORIDE 100 MG/ML
150 INJECTION INTRAMUSCULAR; INTRAVENOUS ONCE
Refills: 0 | Status: DISCONTINUED | OUTPATIENT
Start: 2019-10-12 | End: 2019-10-12

## 2019-10-12 RX ORDER — PANTOPRAZOLE SODIUM 20 MG/1
80 TABLET, DELAYED RELEASE ORAL ONCE
Refills: 0 | Status: COMPLETED | OUTPATIENT
Start: 2019-10-12 | End: 2019-10-12

## 2019-10-12 RX ORDER — ALBUTEROL 90 UG/1
2.5 AEROSOL, METERED ORAL
Refills: 0 | Status: COMPLETED | OUTPATIENT
Start: 2019-10-12 | End: 2019-10-13

## 2019-10-12 RX ADMIN — Medication 0.5 MILLIGRAM(S): at 22:11

## 2019-10-12 RX ADMIN — MIDAZOLAM HYDROCHLORIDE 1.5 MG/KG/HR: 1 INJECTION, SOLUTION INTRAMUSCULAR; INTRAVENOUS at 22:23

## 2019-10-12 RX ADMIN — KETAMINE HYDROCHLORIDE 150 MILLIGRAM(S): 100 INJECTION INTRAMUSCULAR; INTRAVENOUS at 22:07

## 2019-10-12 NOTE — ED PROCEDURE NOTE - ATTENDING CONTRIBUTION TO CARE
I was present for and supervised the key/critical aspects of the procedures performed during the care of the patient. ET Intubation

## 2019-10-12 NOTE — ED PROVIDER NOTE - ATTENDING CONTRIBUTION TO CARE
87f w a hx of a-fib, HTN, hypothyroid, Parkinson's, & CHF. Pt BIB EMS from [facility] for eval of AMS, chest discomfort, & dyspnea progressing today. Symptoms are constant, severe, no exacerbating/alleviating. Pt very poor historian due to AMS and respiratory distress. Hx obtained from EMS and NH notes. Patient received lasix 40mg via PEG & 20mg IM prior to transport.    I am unable to obtain a comprehensive history, review of systems, past medical history, and/or physical exam due to constraints imposed by the urgency of the patient's clinical condition and/or mental status.     Review of Systems  Unable to assess due to respiratory distress & AMS    Physical Exam  General: Sleepy but arouseable, mod dist, elderly/frail, NCAT  Eyes: PERRL, EOMI, no icterus, lids are normal  ENT: External inspection normal, pale/moist membranes, pharynx normal  CV: S1S2, bradycardia, regular rhythm, no murmur/gallops/rubs, +JVD to jaw, 2+ pulses b/l, diffuse edema to all ext, no cords/homans, warm/well-perfused  Respiratory: Increased respiratory rate/effort, mod respiratory distress, normal voice, speaking full sentences, lungs rales to auscultation b/l upper and decreased at bases, no stridor  Abdomen: Soft abdomen, +PEG, no tender/distended/guarding/rebound, no CVA tender. Black stool on rectal exam  Musculoskeletal: N/V intact, no kelly tender/deform  Neck: FROM neck, supple, no meningismus, trachea midline, no JVD, no cspine tender/step-offs, no lymphadenopathy  Integumentary: Color pale for race, warm and dry, no rash  Neuro: Sleepy but arouseable, CN 2-12 grossly intact, moving all 4 ext spontaneously, occasional myoclonic movements b/l.     87f w AMS & respiratory distress concerning for decompensated CHF. Pt requires intubation. --Labs, EKG, CXR, CT head, resp support, admit

## 2019-10-12 NOTE — ED PROVIDER NOTE - PROGRESS NOTE DETAILS
Family at bedside, discussed labs. Will continue to update Spoke to ICU Fellow Dr. Boyd, approved for the unit. Dr. Larson approved Spoke to GI fellow, will follow patient, trend Hgb Spoke to nephrology Dr. Morrow, who states trend potassium, lasix, and will follow.

## 2019-10-12 NOTE — ED PROVIDER NOTE - CLINICAL SUMMARY MEDICAL DECISION MAKING FREE TEXT BOX
87f w AMS & respiratory distress concerning for decompensated CHF. Pt intubated. Labs, EKG, & imaging reviewed. Care d/w ICU, Nephrology, & GI. Patient admitted for further care and management.

## 2019-10-12 NOTE — ED ADULT NURSE NOTE - PMH
Congestive heart failure    Hypertension    Hypothyroid    Parkinson disease    Rib fractures Congestive heart failure    Dysphagia    Hyperglycemia    Hypertension    Hypothyroid    Parkinson disease    Respiratory distress    Rib fractures

## 2019-10-12 NOTE — ED PROVIDER NOTE - OBJECTIVE STATEMENT
88 yo F with hx of CHF, Afib on ASA, HTN, hypothyroid, parkinson's disease, BIBA from [facility] for evaluation of worsening shortness of breath, onset today, associated with chest discomfort and hypoxia. Per EMS, pt was found with O2 sat at 80%, she is usually awake and alert today she has been less alert. No fever, no chills, no nausea, no vomiting, no back pain, no abdominal pain. No other symptoms reported. 88 yo F with hx of CHF, Afib on ASA, HTN, hypothyroid, parkinson's disease, BIBA from [facility] for evaluation of worsening shortness of breath, onset today, associated with chest discomfort and hypoxia. Per EMS, pt was found with O2 sat at 80%, she is usually awake and alert today she has been less alert. Pt was given 20 mg of Lasix IM and 40 mg of Lasix through the PEG tube. No fever, no chills, no nausea, no vomiting, no back pain, no abdominal pain. No other symptoms reported.

## 2019-10-12 NOTE — ED PROVIDER NOTE - GASTROINTESTINAL, MLM
Abdomen soft, non-tender, non distended, no rebound, no rigidity, no guarding. Abdomen soft, non-tender, non distended, no rebound, no rigidity, no guarding. Rectal exam: black stool, good rectal tone, no bright red blood

## 2019-10-12 NOTE — ED PROVIDER NOTE - CRITICAL CARE PROVIDED
consultation with other physicians/direct patient care (not related to procedure)/interpretation of diagnostic studies/additional history taking/documentation

## 2019-10-12 NOTE — ED PROVIDER NOTE - CARE PLAN
Principal Discharge DX:	Fluid overload  Secondary Diagnosis:	Respiratory distress  Secondary Diagnosis:	Anemia  Secondary Diagnosis:	Black stool  Secondary Diagnosis:	Hyperkalemia Principal Discharge DX:	Fluid overload  Secondary Diagnosis:	Respiratory distress  Secondary Diagnosis:	Anemia  Secondary Diagnosis:	Black stool  Secondary Diagnosis:	Hyperkalemia  Secondary Diagnosis:	Renal failure

## 2019-10-12 NOTE — ED ADULT NURSE NOTE - NSIMPLEMENTINTERV_GEN_ALL_ED
Implemented All Fall with Harm Risk Interventions:  [place] to call system. Call bell, personal items and telephone within reach. Instruct patient to call for assistance. Room bathroom lighting operational. Non-slip footwear when patient is off stretcher. Physically safe environment: no spills, clutter or unnecessary equipment. Stretcher in lowest position, wheels locked, appropriate side rails in place. Provide visual cue, wrist band, yellow gown, etc. Monitor gait and stability. Monitor for mental status changes and reorient to person, place, and time. Review medications for side effects contributing to fall risk. Reinforce activity limits and safety measures with patient and family. Provide visual clues: red socks.

## 2019-10-12 NOTE — ED PROCEDURE NOTE - CPROC ED TRACHE INTUB DETAIL1
Patient was pre-oxygenated. An endotracheal tube (ETT) was placed through the vocal cords into the trachea.  ETT position was confirmed by auscultation of bilateral breath sounds to all lung fields. ETCO2 level was appropriate. Patient connected to ventilator with settings as ordered./Patient was pre-oxygenated. An endotracheal tube (ETT) was placed through the vocal cords into the trachea.  ETT position was confirmed by auscultation of bilateral breath sounds to all lung fields. ETCO2 level was appropriate.

## 2019-10-13 LAB
ALBUMIN SERPL ELPH-MCNC: 2.9 G/DL — LOW (ref 3.5–5.2)
ALP SERPL-CCNC: 99 U/L — SIGNIFICANT CHANGE UP (ref 30–115)
ALT FLD-CCNC: <5 U/L — SIGNIFICANT CHANGE UP (ref 0–41)
ANION GAP SERPL CALC-SCNC: 11 MMOL/L — SIGNIFICANT CHANGE UP (ref 7–14)
ANION GAP SERPL CALC-SCNC: 14 MMOL/L — SIGNIFICANT CHANGE UP (ref 7–14)
APPEARANCE UR: CLEAR — SIGNIFICANT CHANGE UP
AST SERPL-CCNC: 16 U/L — SIGNIFICANT CHANGE UP (ref 0–41)
BACTERIA # UR AUTO: NEGATIVE — SIGNIFICANT CHANGE UP
BASE EXCESS BLDA CALC-SCNC: 8.2 MMOL/L — HIGH (ref -2–2)
BASE EXCESS BLDA CALC-SCNC: 9.2 MMOL/L — HIGH (ref -2–2)
BASOPHILS # BLD AUTO: 0.02 K/UL — SIGNIFICANT CHANGE UP (ref 0–0.2)
BASOPHILS NFR BLD AUTO: 0.2 % — SIGNIFICANT CHANGE UP (ref 0–1)
BILIRUB SERPL-MCNC: 0.5 MG/DL — SIGNIFICANT CHANGE UP (ref 0.2–1.2)
BILIRUB UR-MCNC: NEGATIVE — SIGNIFICANT CHANGE UP
BUN SERPL-MCNC: 81 MG/DL — CRITICAL HIGH (ref 10–20)
BUN SERPL-MCNC: 88 MG/DL — CRITICAL HIGH (ref 10–20)
CALCIUM SERPL-MCNC: 9.4 MG/DL — SIGNIFICANT CHANGE UP (ref 8.5–10.1)
CALCIUM SERPL-MCNC: 9.5 MG/DL — SIGNIFICANT CHANGE UP (ref 8.5–10.1)
CHLORIDE SERPL-SCNC: 85 MMOL/L — LOW (ref 98–110)
CHLORIDE SERPL-SCNC: 88 MMOL/L — LOW (ref 98–110)
CO2 SERPL-SCNC: 28 MMOL/L — SIGNIFICANT CHANGE UP (ref 17–32)
CO2 SERPL-SCNC: 28 MMOL/L — SIGNIFICANT CHANGE UP (ref 17–32)
COLOR SPEC: SIGNIFICANT CHANGE UP
CREAT SERPL-MCNC: 1.8 MG/DL — HIGH (ref 0.7–1.5)
CREAT SERPL-MCNC: 1.8 MG/DL — HIGH (ref 0.7–1.5)
DIFF PNL FLD: ABNORMAL
DIGOXIN SERPL-MCNC: <0.3 NG/ML — LOW (ref 0.8–2)
EOSINOPHIL # BLD AUTO: 0.05 K/UL — SIGNIFICANT CHANGE UP (ref 0–0.7)
EOSINOPHIL NFR BLD AUTO: 0.4 % — SIGNIFICANT CHANGE UP (ref 0–8)
EPI CELLS # UR: 1 /HPF — SIGNIFICANT CHANGE UP (ref 0–5)
GAS PNL BLDA: SIGNIFICANT CHANGE UP
GLUCOSE SERPL-MCNC: 114 MG/DL — HIGH (ref 70–99)
GLUCOSE SERPL-MCNC: 150 MG/DL — HIGH (ref 70–99)
GLUCOSE UR QL: NEGATIVE — SIGNIFICANT CHANGE UP
HCO3 BLDA-SCNC: 32 MMOL/L — HIGH (ref 23–27)
HCO3 BLDA-SCNC: 32 MMOL/L — HIGH (ref 23–27)
HCT VFR BLD CALC: 23.2 % — LOW (ref 37–47)
HGB BLD-MCNC: 7.6 G/DL — LOW (ref 12–16)
HYALINE CASTS # UR AUTO: 7 /LPF — SIGNIFICANT CHANGE UP (ref 0–7)
IMM GRANULOCYTES NFR BLD AUTO: 1 % — HIGH (ref 0.1–0.3)
KETONES UR-MCNC: NEGATIVE — SIGNIFICANT CHANGE UP
LEUKOCYTE ESTERASE UR-ACNC: ABNORMAL
LYMPHOCYTES # BLD AUTO: 0.86 K/UL — LOW (ref 1.2–3.4)
LYMPHOCYTES # BLD AUTO: 7.5 % — LOW (ref 20.5–51.1)
MAGNESIUM SERPL-MCNC: 2.5 MG/DL — HIGH (ref 1.8–2.4)
MCHC RBC-ENTMCNC: 28.6 PG — SIGNIFICANT CHANGE UP (ref 27–31)
MCHC RBC-ENTMCNC: 32.8 G/DL — SIGNIFICANT CHANGE UP (ref 32–37)
MCV RBC AUTO: 87.2 FL — SIGNIFICANT CHANGE UP (ref 81–99)
MONOCYTES # BLD AUTO: 0.4 K/UL — SIGNIFICANT CHANGE UP (ref 0.1–0.6)
MONOCYTES NFR BLD AUTO: 3.5 % — SIGNIFICANT CHANGE UP (ref 1.7–9.3)
NEUTROPHILS # BLD AUTO: 10 K/UL — HIGH (ref 1.4–6.5)
NEUTROPHILS NFR BLD AUTO: 87.4 % — HIGH (ref 42.2–75.2)
NITRITE UR-MCNC: NEGATIVE — SIGNIFICANT CHANGE UP
NRBC # BLD: 0 /100 WBCS — SIGNIFICANT CHANGE UP (ref 0–0)
NT-PROBNP SERPL-SCNC: HIGH PG/ML (ref 0–300)
OSMOLALITY SERPL: 298 MOSMOL/KG — SIGNIFICANT CHANGE UP (ref 280–301)
PCO2 BLDA: 34 MMHG — LOW (ref 38–42)
PCO2 BLDA: 40 MMHG — SIGNIFICANT CHANGE UP (ref 38–42)
PH BLDA: 7.51 — HIGH (ref 7.38–7.42)
PH BLDA: 7.58 — HIGH (ref 7.38–7.42)
PH UR: 7 — SIGNIFICANT CHANGE UP (ref 5–8)
PLATELET # BLD AUTO: 219 K/UL — SIGNIFICANT CHANGE UP (ref 130–400)
PO2 BLDA: 143 MMHG — HIGH (ref 78–95)
PO2 BLDA: 167 MMHG — HIGH (ref 78–95)
POTASSIUM SERPL-MCNC: 4.9 MMOL/L — SIGNIFICANT CHANGE UP (ref 3.5–5)
POTASSIUM SERPL-MCNC: 5.5 MMOL/L — HIGH (ref 3.5–5)
POTASSIUM SERPL-SCNC: 4.9 MMOL/L — SIGNIFICANT CHANGE UP (ref 3.5–5)
POTASSIUM SERPL-SCNC: 5.5 MMOL/L — HIGH (ref 3.5–5)
PROT SERPL-MCNC: 5.5 G/DL — LOW (ref 6–8)
PROT UR-MCNC: NEGATIVE — SIGNIFICANT CHANGE UP
RBC # BLD: 2.66 M/UL — LOW (ref 4.2–5.4)
RBC # FLD: 19 % — HIGH (ref 11.5–14.5)
RBC CASTS # UR COMP ASSIST: 21 /HPF — HIGH (ref 0–4)
SAO2 % BLDA: 100 % — HIGH (ref 94–98)
SAO2 % BLDA: 99 % — HIGH (ref 94–98)
SODIUM SERPL-SCNC: 124 MMOL/L — LOW (ref 135–146)
SODIUM SERPL-SCNC: 130 MMOL/L — LOW (ref 135–146)
SP GR SPEC: 1.01 — LOW (ref 1.01–1.02)
TROPONIN T SERPL-MCNC: 0.01 NG/ML — SIGNIFICANT CHANGE UP
UROBILINOGEN FLD QL: SIGNIFICANT CHANGE UP
WBC # BLD: 11.44 K/UL — HIGH (ref 4.8–10.8)
WBC # FLD AUTO: 11.44 K/UL — HIGH (ref 4.8–10.8)
WBC UR QL: 75 /HPF — HIGH (ref 0–5)

## 2019-10-13 PROCEDURE — 99223 1ST HOSP IP/OBS HIGH 75: CPT

## 2019-10-13 PROCEDURE — 71045 X-RAY EXAM CHEST 1 VIEW: CPT | Mod: 26

## 2019-10-13 PROCEDURE — 93010 ELECTROCARDIOGRAM REPORT: CPT | Mod: 77

## 2019-10-13 PROCEDURE — 93010 ELECTROCARDIOGRAM REPORT: CPT

## 2019-10-13 PROCEDURE — 93970 EXTREMITY STUDY: CPT | Mod: 26

## 2019-10-13 RX ORDER — FUROSEMIDE 40 MG
40 TABLET ORAL ONCE
Refills: 0 | Status: DISCONTINUED | OUTPATIENT
Start: 2019-10-13 | End: 2019-10-13

## 2019-10-13 RX ORDER — CEFTRIAXONE 500 MG/1
1000 INJECTION, POWDER, FOR SOLUTION INTRAMUSCULAR; INTRAVENOUS EVERY 24 HOURS
Refills: 0 | Status: DISCONTINUED | OUTPATIENT
Start: 2019-10-13 | End: 2019-10-13

## 2019-10-13 RX ORDER — SENNA PLUS 8.6 MG/1
2 TABLET ORAL AT BEDTIME
Refills: 0 | Status: DISCONTINUED | OUTPATIENT
Start: 2019-10-13 | End: 2019-10-21

## 2019-10-13 RX ORDER — MEROPENEM 1 G/30ML
500 INJECTION INTRAVENOUS EVERY 12 HOURS
Refills: 0 | Status: COMPLETED | OUTPATIENT
Start: 2019-10-13 | End: 2019-10-20

## 2019-10-13 RX ORDER — FENTANYL CITRATE 50 UG/ML
0.5 INJECTION INTRAVENOUS
Qty: 2500 | Refills: 0 | Status: DISCONTINUED | OUTPATIENT
Start: 2019-10-13 | End: 2019-10-14

## 2019-10-13 RX ORDER — LEVOTHYROXINE SODIUM 125 MCG
75 TABLET ORAL DAILY
Refills: 0 | Status: DISCONTINUED | OUTPATIENT
Start: 2019-10-13 | End: 2019-10-21

## 2019-10-13 RX ORDER — DEXTROSE 50 % IN WATER 50 %
50 SYRINGE (ML) INTRAVENOUS ONCE
Refills: 0 | Status: COMPLETED | OUTPATIENT
Start: 2019-10-13 | End: 2019-10-13

## 2019-10-13 RX ORDER — PANTOPRAZOLE SODIUM 20 MG/1
40 TABLET, DELAYED RELEASE ORAL EVERY 12 HOURS
Refills: 0 | Status: DISCONTINUED | OUTPATIENT
Start: 2019-10-13 | End: 2019-10-21

## 2019-10-13 RX ORDER — INSULIN GLARGINE 100 [IU]/ML
6 INJECTION, SOLUTION SUBCUTANEOUS AT BEDTIME
Refills: 0 | Status: DISCONTINUED | OUTPATIENT
Start: 2019-10-13 | End: 2019-10-21

## 2019-10-13 RX ORDER — SENNA PLUS 8.6 MG/1
2 TABLET ORAL AT BEDTIME
Refills: 0 | Status: DISCONTINUED | OUTPATIENT
Start: 2019-10-13 | End: 2019-10-13

## 2019-10-13 RX ORDER — DOCUSATE SODIUM 100 MG
100 CAPSULE ORAL DAILY
Refills: 0 | Status: DISCONTINUED | OUTPATIENT
Start: 2019-10-13 | End: 2019-10-13

## 2019-10-13 RX ORDER — MEROPENEM 1 G/30ML
INJECTION INTRAVENOUS
Refills: 0 | Status: COMPLETED | OUTPATIENT
Start: 2019-10-13 | End: 2019-10-20

## 2019-10-13 RX ORDER — VANCOMYCIN HCL 1 G
1000 VIAL (EA) INTRAVENOUS EVERY 24 HOURS
Refills: 0 | Status: DISCONTINUED | OUTPATIENT
Start: 2019-10-13 | End: 2019-10-15

## 2019-10-13 RX ORDER — PANTOPRAZOLE SODIUM 20 MG/1
8 TABLET, DELAYED RELEASE ORAL
Qty: 80 | Refills: 0 | Status: DISCONTINUED | OUTPATIENT
Start: 2019-10-13 | End: 2019-10-13

## 2019-10-13 RX ORDER — LEVOTHYROXINE SODIUM 125 MCG
75 TABLET ORAL DAILY
Refills: 0 | Status: DISCONTINUED | OUTPATIENT
Start: 2019-10-13 | End: 2019-10-13

## 2019-10-13 RX ORDER — MEROPENEM 1 G/30ML
500 INJECTION INTRAVENOUS ONCE
Refills: 0 | Status: COMPLETED | OUTPATIENT
Start: 2019-10-13 | End: 2019-10-13

## 2019-10-13 RX ORDER — CARBIDOPA AND LEVODOPA 25; 100 MG/1; MG/1
1 TABLET ORAL DAILY
Refills: 0 | Status: DISCONTINUED | OUTPATIENT
Start: 2019-10-13 | End: 2019-10-13

## 2019-10-13 RX ORDER — CARBIDOPA AND LEVODOPA 25; 100 MG/1; MG/1
1 TABLET ORAL DAILY
Refills: 0 | Status: DISCONTINUED | OUTPATIENT
Start: 2019-10-13 | End: 2019-10-21

## 2019-10-13 RX ORDER — FUROSEMIDE 40 MG
40 TABLET ORAL ONCE
Refills: 0 | Status: COMPLETED | OUTPATIENT
Start: 2019-10-13 | End: 2019-10-13

## 2019-10-13 RX ORDER — PROPOFOL 10 MG/ML
10 INJECTION, EMULSION INTRAVENOUS
Qty: 1000 | Refills: 0 | Status: DISCONTINUED | OUTPATIENT
Start: 2019-10-13 | End: 2019-10-15

## 2019-10-13 RX ORDER — PANTOPRAZOLE SODIUM 20 MG/1
80 TABLET, DELAYED RELEASE ORAL ONCE
Refills: 0 | Status: DISCONTINUED | OUTPATIENT
Start: 2019-10-13 | End: 2019-10-13

## 2019-10-13 RX ORDER — CARBIDOPA AND LEVODOPA 25; 100 MG/1; MG/1
1 TABLET ORAL AT BEDTIME
Refills: 0 | Status: DISCONTINUED | OUTPATIENT
Start: 2019-10-13 | End: 2019-10-13

## 2019-10-13 RX ORDER — CARBIDOPA AND LEVODOPA 25; 100 MG/1; MG/1
1 TABLET ORAL AT BEDTIME
Refills: 0 | Status: DISCONTINUED | OUTPATIENT
Start: 2019-10-13 | End: 2019-10-21

## 2019-10-13 RX ORDER — BACITRACIN ZINC 500 UNIT/G
1 OINTMENT IN PACKET (EA) TOPICAL
Refills: 0 | Status: DISCONTINUED | OUTPATIENT
Start: 2019-10-13 | End: 2019-10-21

## 2019-10-13 RX ORDER — DULOXETINE HYDROCHLORIDE 30 MG/1
60 CAPSULE, DELAYED RELEASE ORAL DAILY
Refills: 0 | Status: DISCONTINUED | OUTPATIENT
Start: 2019-10-13 | End: 2019-10-13

## 2019-10-13 RX ADMIN — PANTOPRAZOLE SODIUM 40 MILLIGRAM(S): 20 TABLET, DELAYED RELEASE ORAL at 05:28

## 2019-10-13 RX ADMIN — Medication 1 APPLICATION(S): at 06:15

## 2019-10-13 RX ADMIN — MEROPENEM 100 MILLIGRAM(S): 1 INJECTION INTRAVENOUS at 18:56

## 2019-10-13 RX ADMIN — MIDAZOLAM HYDROCHLORIDE 1.5 MG/KG/HR: 1 INJECTION, SOLUTION INTRAMUSCULAR; INTRAVENOUS at 07:00

## 2019-10-13 RX ADMIN — MEROPENEM 100 MILLIGRAM(S): 1 INJECTION INTRAVENOUS at 11:11

## 2019-10-13 RX ADMIN — Medication 2 GRAM(S): at 00:46

## 2019-10-13 RX ADMIN — PANTOPRAZOLE SODIUM 80 MILLIGRAM(S): 20 TABLET, DELAYED RELEASE ORAL at 00:16

## 2019-10-13 RX ADMIN — Medication 50 MILLILITER(S): at 00:16

## 2019-10-13 RX ADMIN — ALBUTEROL 2.5 MILLIGRAM(S): 90 AEROSOL, METERED ORAL at 00:16

## 2019-10-13 RX ADMIN — Medication 40 MILLIGRAM(S): at 05:28

## 2019-10-13 RX ADMIN — CARBIDOPA AND LEVODOPA 1 TABLET(S): 25; 100 TABLET ORAL at 11:17

## 2019-10-13 RX ADMIN — FENTANYL CITRATE 3.75 MICROGRAM(S)/KG/HR: 50 INJECTION INTRAVENOUS at 10:10

## 2019-10-13 RX ADMIN — CEFTRIAXONE 100 MILLIGRAM(S): 500 INJECTION, POWDER, FOR SOLUTION INTRAMUSCULAR; INTRAVENOUS at 05:28

## 2019-10-13 RX ADMIN — Medication 1 APPLICATION(S): at 18:55

## 2019-10-13 RX ADMIN — Medication 75 MICROGRAM(S): at 06:15

## 2019-10-13 RX ADMIN — ALBUTEROL 2.5 MILLIGRAM(S): 90 AEROSOL, METERED ORAL at 00:39

## 2019-10-13 RX ADMIN — ALBUTEROL 2.5 MILLIGRAM(S): 90 AEROSOL, METERED ORAL at 00:30

## 2019-10-13 RX ADMIN — INSULIN HUMAN 10 UNIT(S): 100 INJECTION, SOLUTION SUBCUTANEOUS at 00:16

## 2019-10-13 RX ADMIN — CHLORHEXIDINE GLUCONATE 15 MILLILITER(S): 213 SOLUTION TOPICAL at 06:15

## 2019-10-13 RX ADMIN — PANTOPRAZOLE SODIUM 40 MILLIGRAM(S): 20 TABLET, DELAYED RELEASE ORAL at 18:56

## 2019-10-13 RX ADMIN — Medication 250 MILLIGRAM(S): at 06:15

## 2019-10-13 RX ADMIN — PROPOFOL 4.5 MICROGRAM(S)/KG/MIN: 10 INJECTION, EMULSION INTRAVENOUS at 08:15

## 2019-10-13 RX ADMIN — Medication 200 GRAM(S): at 00:16

## 2019-10-13 RX ADMIN — Medication 1 TABLET(S): at 11:17

## 2019-10-13 NOTE — CONSULT NOTE ADULT - ASSESSMENT
87 year old female with history of Afib not on AC, HTN, CKD, Parkinson, hypothyroidism, DL, recently placed PEG on 10/1/2019 for malnutrition    presenting from [facility] for increased lethargy and SOB   GI consulted for black stool     #) Acute respiratory failure - pulmonary edema /CHF/PNA   currently intubated and on mechanical  ventillation  #) GHASSAN   #) Anemia   Hb gradually trended down from 12 to 7.6 over two weeks   since admission Hb stable at 7.6  reported h/o black stool ,currently PEG tube draining bile   given h/o erosive esophagitis , recent PEG placement R/o Upper gi bleed     Rec   NPO for now , hold tube feeds   monitor BM   Monitor CBC q8 and transfuse to keep Hb > 8   IV protonix 40 BID   Once cardiopulmonary status is optimized will consider EGD +/- colonoscopy   Please notify GI team if pt develops active GI bleed at that point will consider emergent EGD     #) PEG tube cellulitis   rec   IV abx   topical abx at PEG site   daily PEG care

## 2019-10-13 NOTE — CONSULT NOTE ADULT - SUBJECTIVE AND OBJECTIVE BOX
Patient is a 87y old  Female who presents with a chief complaint of desaturation (13 Oct 2019 02:29)      HPI:  87 year old female with history of Afib not on AC, HTN, CKD, CARDIAC ARREST, Parkinson, hypothyroidism, DL presenting from [facility] for increased lethargy and SOB not improving on NR, was 80% so was intubated in ED. A per NH papers patient was complaining of chest pain and SOB at 9 pm and Lasix 40 was given oral then lasix 20 mg IM- no fever no cough reported per NH STAS done by ED resident showed black stools, GI were contacted and they asked for Hg trending - NG tube was flushed and OG tube draining bile - patient sedated by VERSED and vitals were stable when examined in ED (13 Oct 2019 02:29)      PAST MEDICAL & SURGICAL HISTORY:  Hyperglycemia  Dysphagia  Respiratory distress  Parkinson disease  Hypothyroid  Rib fractures  Congestive heart failure  Hypertension  No significant past surgical history      SOCIAL HX:   Smoking  Denies                       ETOH    denies                          FAMILY HISTORY:  No pertinent family history in first degree relatives  :  No known cardiovacular family hisotry     ROS:  See HPI     Allergies    No Known Allergies    Intolerances          PHYSICAL EXAM    ICU Vital Signs Last 24 Hrs  T(C): 35.9 (13 Oct 2019 06:30), Max: 36.2 (13 Oct 2019 03:30)  T(F): 96.6 (13 Oct 2019 06:30), Max: 97.2 (13 Oct 2019 04:30)  HR: 52 (13 Oct 2019 07:16) (33 - 61)  BP: 151/67 (13 Oct 2019 07:16) (92/46 - 156/70)  BP(mean): 91 (13 Oct 2019 06:30) (64 - 91)  ABP: --  ABP(mean): --  RR: 16 (13 Oct 2019 07:16) (16 - 22)  SpO2: 100% (13 Oct 2019 07:16) (100% - 100%)      General: In NAD   HEENT:  KALE   ,intubated           Lungs: Bilateral BS  Cardiovascular: Regular  Gastrointestinal: Soft, Positive BS, PEG+  Musculoskeletal: No clubbing.  Moves all extremities.  Skin: Warm.  Intact  Neurological: Non focal      10-12-19 @ 07:01  -  10-13-19 @ 07:00  --------------------------------------------------------  IN:  Total IN: 0 mL    OUT:    Voided: 750 mL  Total OUT: 750 mL    Total NET: -750 mL          LABS:                          7.3    7.66  )-----------( 189      ( 12 Oct 2019 21:55 )             22.4                                               10-12    124<L>  |  86<L>  |  83<HH>  ----------------------------<  126<H>  6.5<HH>   |  30  |  1.8<H>    Ca    9.0      12 Oct 2019 21:55  Mg     2.7     10-12    TPro  5.6<L>  /  Alb  3.2<L>  /  TBili  0.5  /  DBili  x   /  AST  14  /  ALT  <5  /  AlkPhos  100  10-12      PT/INR - ( 12 Oct 2019 21:55 )   PT: 13.70 sec;   INR: 1.19 ratio                                                Urinalysis Basic - ( 13 Oct 2019 06:30 )    Color: Light Yellow / Appearance: Clear / S.008 / pH: x  Gluc: x / Ketone: Negative  / Bili: Negative / Urobili: <2 mg/dL   Blood: x / Protein: Negative / Nitrite: Negative   Leuk Esterase: Large / RBC: 21 /HPF / WBC 75 /HPF   Sq Epi: x / Non Sq Epi: 1 /HPF / Bacteria: Negative        CARDIAC MARKERS ( 12 Oct 2019 21:55 )  x     / 0.01 ng/mL / x     / x     / x                                                LIVER FUNCTIONS - ( 12 Oct 2019 21:55 )  Alb: 3.2 g/dL / Pro: 5.6 g/dL / ALK PHOS: 100 U/L / ALT: <5 U/L / AST: 14 U/L / GGT: x                                                                                               Mode: AC/ CMV (Assist Control/ Continuous Mandatory Ventilation)  RR (machine): 16  TV (machine): 400  FiO2: 40  PEEP: 5  ITime: 1  MAP: 10  PIP: 34                                      ABG - ( 13 Oct 2019 02:05 )  pH, Arterial: 7.52  pH, Blood: x     /  pCO2: 37    /  pO2: 426   / HCO3: 30    / Base Excess: 7.2   /  SaO2: 101             < from: CT Head No Cont (10.12.19 @ 23:35) >  No CT evidence of acute intracranial pathology. Stable chronic   microvascular ischemic changes    < end of copied text >    < from: CT Chest No Cont (19 @ 20:56) >  1. No evidence of pneumomediastinum to suggest perforation.    2. Distal tip of and endotracheal tube at level of isaac (subsequently   retracted to 3 cm above isaac).    3. Trace bilateral pleural effusions with interstitial edema and   groundglass lung attenuation likely representing pulmonary edema.   Cardiomegaly. Findings may be attributed to CHF. Bibasilar airspace   consolidation, which may represent edema, atelectasis or pneumonia in   appropriate clinical setting.    4. Stable prominent mediastinal lymph nodes, up to 1.1 cm short axis at   right lower paratracheal station, nonspecific in setting of probable CHF.    5. Enteric tube extends below diaphragm to stomach.      < end of copied text >      X-Rays                                                                            MEDICATIONS  (STANDING):  carbidopa/levodopa  10/100 1 Tablet(s) Oral daily  carbidopa/levodopa  25/100 1 Tablet(s) Oral at bedtime  cefTRIAXone   IVPB 1000 milliGRAM(s) IV Intermittent every 24 hours  chlorhexidine 0.12% Liquid 15 milliLiter(s) Oral Mucosa every 12 hours  clotrimazole 1% Topical Cream - Peds 1 Application(s) Topical two times a day  docusate sodium 100 milliGRAM(s) Oral daily  DULoxetine 60 milliGRAM(s) Oral daily  insulin glargine Injectable (LANTUS) 6 Unit(s) SubCutaneous at bedtime  levoFLOXacin IVPB 750 milliGRAM(s) IV Intermittent every 24 hours  levothyroxine 75 MICROGram(s) Oral daily  midazolam Infusion 0.02 mG/kG/Hr (1.5 mL/Hr) IV Continuous <Continuous>  multivitamin 1 Tablet(s) Oral daily  pantoprazole  Injectable 40 milliGRAM(s) IV Push every 12 hours  senna 2 Tablet(s) Oral at bedtime  vancomycin  IVPB 1000 milliGRAM(s) IV Intermittent every 24 hours    MEDICATIONS  (PRN): Patient is a 87y old  Female who presents with a chief complaint of desaturation (13 Oct 2019 02:29)      HPI:  87 year old female with history of Afib not on AC, HTN, CKD, CARDIAC ARREST, Parkinson, hypothyroidism, DL presenting from [facility] for increased lethargy and SOB not improving on NR, was 80% so was intubated in ED. A per NH papers patient was complaining of chest pain and SOB at 9 pm and Lasix 40 was given oral then lasix 20 mg IM- no fever no cough reported per NH STAS done by ED resident showed black stools, GI were contacted and they asked for Hg trending - NG tube was flushed and OG tube draining bile - patient sedated by VERSED and vitals were stable when examined in ED (13 Oct 2019 02:29)      PAST MEDICAL & SURGICAL HISTORY:  Hyperglycemia  Dysphagia  Respiratory distress  Parkinson disease  Hypothyroid  Rib fractures  Congestive heart failure  Hypertension  No significant past surgical history      SOCIAL HX:   Smoking  No                     ETOH    No                        FAMILY HISTORY:  No pertinent family history in first degree relatives  :  No known cardiovacular family hisotry     ROS:  See HPI     Allergies    No Known Allergies    Intolerances          PHYSICAL EXAM    ICU Vital Signs Last 24 Hrs  T(C): 35.9 (13 Oct 2019 06:30), Max: 36.2 (13 Oct 2019 03:30)  T(F): 96.6 (13 Oct 2019 06:30), Max: 97.2 (13 Oct 2019 04:30)  HR: 52 (13 Oct 2019 07:16) (33 - 61)  BP: 151/67 (13 Oct 2019 07:16) (92/46 - 156/70)  BP(mean): 91 (13 Oct 2019 06:30) (64 - 91)  ABP: --  ABP(mean): --  RR: 16 (13 Oct 2019 07:16) (16 - 22)  SpO2: 100% (13 Oct 2019 07:16) (100% - 100%)      General: In NAD Sedated  HEENT: + ET         Lungs: Bilateral BS  Cardiovascular: Regular  Gastrointestinal: Soft, Positive BS, PEG+  Musculoskeletal: No clubbing.  Moves all extremities.  Skin: Warm.    Neurological: Non focal      10-12-19 @ 07:01  -  10-13-19 @ 07:00  --------------------------------------------------------  IN:  Total IN: 0 mL    OUT:    Voided: 750 mL  Total OUT: 750 mL    Total NET: -750 mL          LABS:                          7.3    7.66  )-----------( 189      ( 12 Oct 2019 21:55 )             22.4                                               10-12    124<L>  |  86<L>  |  83<HH>  ----------------------------<  126<H>  6.5<HH>   |  30  |  1.8<H>    Ca    9.0      12 Oct 2019 21:55  Mg     2.7     10-12    TPro  5.6<L>  /  Alb  3.2<L>  /  TBili  0.5  /  DBili  x   /  AST  14  /  ALT  <5  /  AlkPhos  100  10-      PT/INR - ( 12 Oct 2019 21:55 )   PT: 13.70 sec;   INR: 1.19 ratio                                                Urinalysis Basic - ( 13 Oct 2019 06:30 )    Color: Light Yellow / Appearance: Clear / S.008 / pH: x  Gluc: x / Ketone: Negative  / Bili: Negative / Urobili: <2 mg/dL   Blood: x / Protein: Negative / Nitrite: Negative   Leuk Esterase: Large / RBC: 21 /HPF / WBC 75 /HPF   Sq Epi: x / Non Sq Epi: 1 /HPF / Bacteria: Negative        CARDIAC MARKERS ( 12 Oct 2019 21:55 )  x     / 0.01 ng/mL / x     / x     / x                                                LIVER FUNCTIONS - ( 12 Oct 2019 21:55 )  Alb: 3.2 g/dL / Pro: 5.6 g/dL / ALK PHOS: 100 U/L / ALT: <5 U/L / AST: 14 U/L / GGT: x                                                                                               Mode: AC/ CMV (Assist Control/ Continuous Mandatory Ventilation)  RR (machine): 16  TV (machine): 400  FiO2: 40  PEEP: 5  ITime: 1  MAP: 10  PIP: 34                                      ABG - ( 13 Oct 2019 02:05 )  pH, Arterial: 7.52  pH, Blood: x     /  pCO2: 37    /  pO2: 426   / HCO3: 30    / Base Excess: 7.2   /  SaO2: 101             < from: CT Head No Cont (10.12.19 @ 23:35) >  No CT evidence of acute intracranial pathology. Stable chronic   microvascular ischemic changes    < end of copied text >    < from: CT Chest No Cont (19 @ 20:56) >  1. No evidence of pneumomediastinum to suggest perforation.    2. Distal tip of and endotracheal tube at level of isaac (subsequently   retracted to 3 cm above isaac).    3. Trace bilateral pleural effusions with interstitial edema and   groundglass lung attenuation likely representing pulmonary edema.   Cardiomegaly. Findings may be attributed to CHF. Bibasilar airspace   consolidation, which may represent edema, atelectasis or pneumonia in   appropriate clinical setting.    4. Stable prominent mediastinal lymph nodes, up to 1.1 cm short axis at   right lower paratracheal station, nonspecific in setting of probable CHF.    5. Enteric tube extends below diaphragm to stomach.      < end of copied text >      X-Rays                                                                            MEDICATIONS  (STANDING):  carbidopa/levodopa  10/100 1 Tablet(s) Oral daily  carbidopa/levodopa  25/100 1 Tablet(s) Oral at bedtime  cefTRIAXone   IVPB 1000 milliGRAM(s) IV Intermittent every 24 hours  chlorhexidine 0.12% Liquid 15 milliLiter(s) Oral Mucosa every 12 hours  clotrimazole 1% Topical Cream - Peds 1 Application(s) Topical two times a day  docusate sodium 100 milliGRAM(s) Oral daily  DULoxetine 60 milliGRAM(s) Oral daily  insulin glargine Injectable (LANTUS) 6 Unit(s) SubCutaneous at bedtime  levoFLOXacin IVPB 750 milliGRAM(s) IV Intermittent every 24 hours  levothyroxine 75 MICROGram(s) Oral daily  midazolam Infusion 0.02 mG/kG/Hr (1.5 mL/Hr) IV Continuous <Continuous>  multivitamin 1 Tablet(s) Oral daily  pantoprazole  Injectable 40 milliGRAM(s) IV Push every 12 hours  senna 2 Tablet(s) Oral at bedtime  vancomycin  IVPB 1000 milliGRAM(s) IV Intermittent every 24 hours    MEDICATIONS  (PRN):

## 2019-10-13 NOTE — CONSULT NOTE ADULT - ASSESSMENT
87 year old female with history of Afib not on AC, HTN, CKD, Parkinson, hypothyroidism, DL presenting from [facility] for increased lethargy and SOB not improving on NR sp dark stools , SOB , resp failure intubated on MV     ·	GHASSAN on ? CKD ( had creat of 1.4 > 1 month ago)/ Hyperkalemia/ Hyponatremia/ anemia acute on chronic/ resp failure   ·	sp IV lasix / non oliguric   ·	keep in neg balance  ·	has high BNP check 2 decho  ·	check UA and urine lytes urine urea  ·	GI on case   ·	K within normal now  ·	sodium probably due to CHF / improved after lasix  ·	MV as per pulm team  ·	No need for RRT    will follow

## 2019-10-13 NOTE — H&P ADULT - ASSESSMENT
IMPRESSION:        PLAN    CNS: CT brain negative -     HEENT: Oral care     PULMONARY:  HOB @ 45 degrees, RLL consolidation, volume overload    CARDIOVASCULAR: 2d echo, fu 2nd CE set , Give 40 lasix - keep in -ve balance -hold bp meds and BB -    GI: GI prophylaxis- PPI IV BID and NPO until GI eval.  Trend Hg - if stable resume PEG feeds and DVT px - STAS dark stools but NG draining bile - fu GI eval - CT abd/pelvis when stable     RENAL:  HypoNatermia- GHASSAN- fu urine studies, Renal consult -    INFECTIOUS DISEASE: pancx, ceftriaxone    HEMATOLOGICAL:  DVT prophylaxis SCD.     ENDOCRINE:  Follow up TSH     MUSCULOSKELETAL: Bedrest    Admit to MICU   Poor prognosis   palliative eval IMPRESSION:  Hypoxemic respiratory failure   Bradycardia   Pulmonary edema   RLL consolidation ?      PLAN    CNS: CT brain negative - sedation with Versed     HEENT: Oral care     PULMONARY:  HOB @ 45 degrees, RLL consolidation, volume overload    CARDIOVASCULAR: 2d echo, fu 2nd CE set , Give 40 lasix - keep in -ve balance -hold bp meds and BB -    GI: GI prophylaxis- PPI IV BID and NPO until GI eval.  Trend Hg - if stable resume PEG feeds and DVT px - STAS dark stools but NG draining bile - fu GI eval - CT abd/pelvis when stable     RENAL:  Hyponatremia- GHASSAN- fu urine studies, Renal consult -IV lasix     INFECTIOUS DISEASE: pancx, ceftriaxone    HEMATOLOGICAL:  DVT prophylaxis SCD.     ENDOCRINE:  Follow up TSH     MUSCULOSKELETAL: Bedrest    Admit to MICU   Poor prognosis   palliative eval IMPRESSION:  Hypoxemic respiratory failure   Bradycardia   Pulmonary edema   RLL consolidation ?      PLAN    CNS: CT brain negative - sedation with Versed     HEENT: Oral care     PULMONARY:  HOB @ 45 degrees, RLL consolidation, volume overload- keep -ve balance - fu CXR     CARDIOVASCULAR: 2d echo, fu 2nd CE set , I<O - Afib bradycardia -hold bp meds and BB -    GI: GI prophylaxis- PPI IV BID and NPO until GI eval.  Trend Hg - if stable resume PEG feeds and DVT px - STAS dark stools but NG draining bile - fu GI eval - CT abd/pelvis when stable     RENAL:  Hyponatremia- GHASSAN- fu urine studies, Renal consult -IV lasix     INFECTIOUS DISEASE: pancx, RLL consolidation-  ceftriaxone- vanco- levofloxacin - fu MRSA - discontinue vanc if MRSA -ve     HEMATOLOGICAL:  DVT prophylaxis SCD. - VA duplex fu     ENDOCRINE:  Follow up TSH     MUSCULOSKELETAL: Bedrest    Admit to MICU   Poor prognosis IMPRESSION:  Hypoxemic respiratory failure   Bradycardia   Pulmonary edema   RLL consolidation ?      PLAN    CNS: CT brain negative - sedation with Versed     HEENT: Oral care     PULMONARY:  HOB @ 45 degrees, raise ET tube 1 cm - repeat CXR - RLL consolidation, volume overload- keep -ve balance - fu CXR     CARDIOVASCULAR: 2d echo, fu 2nd CE set , I<O - Afib bradycardia -hold bp meds and BB -    GI: GI prophylaxis- PPI IV BID and NPO until GI eval.  Trend Hg - if stable resume PEG feeds and DVT px - STAS dark stools but NG draining bile - fu GI eval - CT abd/pelvis when stable     RENAL:  Hyponatremia- GHASSAN- fu urine studies, Renal consult -IV lasix     INFECTIOUS DISEASE: pancx, RLL consolidation-  ceftriaxone- vanco- levofloxacin - fu MRSA - discontinue vanc if MRSA -ve     HEMATOLOGICAL:  DVT prophylaxis SCD. - VA duplex fu     ENDOCRINE:  Follow up TSH     MUSCULOSKELETAL: Bedrest    Admit to MICU   Poor prognosis

## 2019-10-13 NOTE — H&P ADULT - HISTORY OF PRESENT ILLNESS
87 year old female with history of Afib not on AC, HTN, CKD, Parkinson, hypothyroidism , DL presenting from [facility] for SOB not improving on NR, was 80% so was intubated in ED.     STAS done by ED resident showed black stools, GI were contacted and they asked for Hg trending 87 year old female with history of Afib not on AC, HTN, CKD, Parkinson, hypothyroidism, DL presenting from [facility] for increased lethargy and SOB not improving on NR, was 80% so was intubated in ED. A per NH papers patient was complaining of chest pain and SOB at 9 pm and Lasix 40 was given oral then lasix 20 mg IV    STAS done by ED resident showed black stools, GI were contacted and they asked for Hg trending 87 year old female with history of Afib not on AC, HTN, CKD, Parkinson, hypothyroidism, DL presenting from [facility] for increased lethargy and SOB not improving on NR, was 80% so was intubated in ED. A per NH papers patient was complaining of chest pain and SOB at 9 pm and Lasix 40 was given oral then lasix 20 mg IM- no fever no cough reported per NH   STAS done by ED resident showed black stools, GI were contacted and they asked for Hg trending - NG tube was flushed and OG tube draining bile - patient sedated by VERSED and vitals were stable when examined in ED

## 2019-10-13 NOTE — ED ADULT NURSE REASSESSMENT NOTE - NS ED NURSE REASSESS COMMENT FT1
pt is sedated with fentanyl at 1mcg/kg/hr, on ventilator O2 sat 100%, swelling noted to right hand with cyanotic fingertips, pulse is present and palpable, pt is sedated with fentanyl at 1mcg/kg/hr, on ventilator O2 sat 100%, swelling noted to right hand with cyanotic fingertips, pulse is present and palpable, vitals are similar to previous, no active bleeding identified, OG tube draining brown colored contents ( 200ml in cannister at this time), sanchez draining yellow clear urine approx 100 ml in bag at this time

## 2019-10-13 NOTE — PROGRESS NOTE ADULT - ASSESSMENT
Acute Hypoxic Respiratory Failure, Pulmonary Edema, sp intubation  GHASSAN on CKD  Hyponatremia  Anemia  Hx of HTN, ASHD, CAD, cardiac arrhythmia, afib ( no AC), CHF  Hx of DLD  Hx of Hypothyroidism  Hx of Parkinsons, Mobility Dysfunction, frequent falls, Progressively Bedbound  Hx of OA, DDD, DJD, Osteoporosis, Kyphosis, Rib Fx    in the ER pr in hypoxic RF unresponsive to 100% NRM, intubated, sedated and placed on Vent support  pt noted to be in volume overload started on IV diuretics  hyponatremia of 124, improved to 130 afer IV diuretic  pt cultured and placed on IV Vanco and Meropenem  Nare MRSA is negative, may D/C Vanco  Pul-critical care consult  Renal consult  monitor CBC, renal parameters and electrolytes  check TSH and AM cortisol  advance care: pt is a full code as per family/healthcare proxy  guarded state

## 2019-10-13 NOTE — H&P ADULT - NSHPLABSRESULTS_GEN_ALL_CORE
7.3    7.66  )-----------( 189      ( 12 Oct 2019 21:55 )             22.4       10-12    124<L>  |  86<L>  |  83<HH>  ----------------------------<  126<H>  6.5<HH>   |  30  |  1.8<H>    Ca    9.0      12 Oct 2019 21:55  Mg     2.7     10-12    TPro  5.6<L>  /  Alb  3.2<L>  /  TBili  0.5  /  DBili  x   /  AST  14  /  ALT  <5  /  AlkPhos  100  10-12      LIVER FUNCTIONS - ( 12 Oct 2019 21:55 )  Alb: 3.2 g/dL / Pro: 5.6 g/dL / ALK PHOS: 100 U/L / ALT: <5 U/L / AST: 14 U/L / GGT: x             PT/INR - ( 12 Oct 2019 21:55 )   PT: 13.70 sec;   INR: 1.19 ratio                     CARDIAC MARKERS ( 12 Oct 2019 21:55 )  x     / 0.01 ng/mL / x     / x     / x

## 2019-10-13 NOTE — PROGRESS NOTE ADULT - SUBJECTIVE AND OBJECTIVE BOX
BOB ENGLE 86yo W Female sent from [facility] for worsening generalized weakness, lethargy and respiratory distress.  The pt was noted to be hypoxic and in pulmonary edema with a low Na of 124.  She was given IV lasix and a trial on a NRM butremained hypoxic at 80%. She was intubated and placed on ventilator support.  The pt had recently been hospitalized [facility] and sent to SNF for rehabilitation.  She has multiple medical issues and is mostly bed bound.  The PMHx includes:  HTN, ASHD, CAD, CHF, afib ( no AC), DLD, Hypothyroidism CKD, Parkinsons, Mobility Dysfunction, OA, DDD, DJD, Osteoporosis, Kyphosis, Rib Fx,    INTERVAL HPI/OVERNIGHT EVENTS:  the pt was seein in the ER critical care area, she is on vent support ans sedated, she was evaluated by Pul-critical care    MEDICATIONS  (STANDING):  carbidopa/levodopa  10/100 1 Tablet(s) Oral daily  carbidopa/levodopa  25/100 1 Tablet(s) Oral at bedtime  chlorhexidine 0.12% Liquid 15 milliLiter(s) Oral Mucosa every 12 hours  clotrimazole 1% Topical Cream - Peds 1 Application(s) Topical two times a day  fentaNYL   Infusion. 0.5 MICROgram(s)/kG/Hr (3.75 mL/Hr) IV Continuous <Continuous>  insulin glargine Injectable (LANTUS) 6 Unit(s) SubCutaneous at bedtime  levothyroxine 75 MICROGram(s) Oral daily  meropenem  IVPB      meropenem  IVPB 500 milliGRAM(s) IV Intermittent every 12 hours  multivitamin 1 Tablet(s) Oral daily  pantoprazole  Injectable 40 milliGRAM(s) IV Push every 12 hours  propofol Infusion 10 MICROgram(s)/kG/Min (4.5 mL/Hr) IV Continuous <Continuous>  senna 2 Tablet(s) Oral at bedtime  vancomycin  IVPB 1000 milliGRAM(s) IV Intermittent every 24 hours    MEDICATIONS  (PRN):      Allergies    No Known Allergies  	    Vital Signs Last 24 Hrs  T(C): 35.6 (13 Oct 2019 07:16), Max: 36.2 (13 Oct 2019 03:30)  T(F): 96.1 (13 Oct 2019 07:16), Max: 97.2 (13 Oct 2019 04:30)  HR: 42 (13 Oct 2019 14:00) (33 - 61)  BP: 121/59 (13 Oct 2019 14:00) (92/46 - 156/70)  BP(mean): 91 (13 Oct 2019 06:30) (64 - 91)  RR: 16 (13 Oct 2019 14:00) (16 - 22)  SpO2: 100% (13 Oct 2019 14:00) (100% - 100%)    PHYSICAL EXAM:      Constitutional:  pt sedated and on vent support, thin, frail and chronically ill looking with gen pallor    Eyes:  closed, nonicteric    ENMT: + ET, + enteric tube, dental defects    Neck:  supple, + JVD, no bruits     Th kyphosisBack:    Respiratory:  harsh respirator BS, scattered rhonchi    Cardiovascular:  S1S2 irreg    Gastrointestinal: sl distended but soft and benign    Genitourinary:    Rectal: as per ER the STAS was + for dark stool    Extremities:  advanced arthritic changes, poor mm mass and tone    Vascular: dec pedal pulses    Neurological:  sedated    Skin: gen pallor, no rash    LABS:                        7.6    11.44 )-----------( 219      ( 13 Oct 2019 06:13 )             23.2     10-13    130 (124)  |  88<L>  |  81<HH>  ----------------------------<  114<H>  4.9   |  28  |  1.8<H>  GFR 25    Ca    9.4      13 Oct 2019 11:09  Mg     2.5     10-13  troponin <0.01  Dig <0.3  BNP 11,634    TPro  5.5<L>  /  Alb  2.9<L>  /  TBili  0.5  /  DBili  x   /  AST  16  /  ALT  <5  /  AlkPhos  99  10-13    PT/INR - ( 12 Oct 2019 21:55 )   PT: 13.70 sec;   INR: 1.19 ratio           Urinalysis Basic - ( 13 Oct 2019 06:30 )    Color: Light Yellow / Appearance: Clear / S.008 / pH: x  Gluc: x / Ketone: Negative  / Bili: Negative / Urobili: <2 mg/dL   Blood: x / Protein: Negative / Nitrite: Negative   Leuk Esterase: Large / RBC: 21 /HPF / WBC 75 /HPF   Sq Epi: x / Non Sq Epi: 1 /HPF / Bacteria: Negative        RADIOLOGY & ADDITIONAL TESTS:  CXR:  + ET, + enteric tube, bibasilar reticular changes    CTH:  volume loss, white matter changes, no acute intracranial hemorrhage BOB ENGLE 88yo W Female sent from [facility] for worsening generalized weakness, lethargy and respiratory distress.  The pt was noted to be hypoxic and in pulmonary edema with a low Na of 124.  She was given IV lasix and a trial on a NRM butremained hypoxic at 80%. She was intubated and placed on ventilator support.  The pt had recently been hospitalized [facility] and sent to SNF for rehabilitation.  She has multiple medical issues and is mostly bed bound.  The PMHx includes:  HTN, ASHD, CAD, CHF, afib ( no AC), DLD, Hypothyroidism CKD, Parkinsons, Mobility Dysfunction, OA, DDD, DJD, Osteoporosis, Kyphosis, Rib Fx,    INTERVAL HPI/OVERNIGHT EVENTS:  the pt was seein in the ER critical care area, she is on vent support and sedated, she was evaluated by Pul-critical care    MEDICATIONS  (STANDING):  carbidopa/levodopa  10/100 1 Tablet(s) Oral daily  carbidopa/levodopa  25/100 1 Tablet(s) Oral at bedtime  chlorhexidine 0.12% Liquid 15 milliLiter(s) Oral Mucosa every 12 hours  clotrimazole 1% Topical Cream - Peds 1 Application(s) Topical two times a day  fentaNYL   Infusion. 0.5 MICROgram(s)/kG/Hr (3.75 mL/Hr) IV Continuous <Continuous>  insulin glargine Injectable (LANTUS) 6 Unit(s) SubCutaneous at bedtime  levothyroxine 75 MICROGram(s) Oral daily  meropenem  IVPB      meropenem  IVPB 500 milliGRAM(s) IV Intermittent every 12 hours  multivitamin 1 Tablet(s) Oral daily  pantoprazole  Injectable 40 milliGRAM(s) IV Push every 12 hours  propofol Infusion 10 MICROgram(s)/kG/Min (4.5 mL/Hr) IV Continuous <Continuous>  senna 2 Tablet(s) Oral at bedtime  vancomycin  IVPB 1000 milliGRAM(s) IV Intermittent every 24 hours    MEDICATIONS  (PRN):      Allergies    No Known Allergies  	    Vital Signs Last 24 Hrs  T(C): 35.6 (13 Oct 2019 07:16), Max: 36.2 (13 Oct 2019 03:30)  T(F): 96.1 (13 Oct 2019 07:16), Max: 97.2 (13 Oct 2019 04:30)  HR: 42 (13 Oct 2019 14:00) (33 - 61)  BP: 121/59 (13 Oct 2019 14:00) (92/46 - 156/70)  BP(mean): 91 (13 Oct 2019 06:30) (64 - 91)  RR: 16 (13 Oct 2019 14:00) (16 - 22)  SpO2: 100% (13 Oct 2019 14:00) (100% - 100%)    PHYSICAL EXAM:      Constitutional:  pt sedated and on vent support, thin, frail and chronically ill looking with gen pallor    Eyes:  closed, nonicteric    ENMT: + ET, + enteric tube, dental defects    Neck:  supple, + JVD, no bruits     Th kyphosisBack:    Respiratory:  harsh respirator BS, scattered rhonchi    Cardiovascular:  S1S2 irreg    Gastrointestinal: sl distended but soft and benign    Genitourinary:    Rectal: as per ER the STAS was + for dark stool    Extremities:  advanced arthritic changes, poor mm mass and tone, + pitting edema, acrocyanosis,    Vascular: + radial pulses dec pedal pulses    Neurological:  sedated    Skin: gen pallor, no rash    LABS:                        7.6    11.44 )-----------( 219      ( 13 Oct 2019 06:13 )             23.2     10-13    130 (124)  |  88<L>  |  81<HH>  ----------------------------<  114<H>  4.9   |  28  |  1.8<H>  GFR 25    Ca    9.4      13 Oct 2019 11:09  Mg     2.5     10-13  troponin <0.01  Dig <0.3  BNP 11,634    TPro  5.5<L>  /  Alb  2.9<L>  /  TBili  0.5  /  DBili  x   /  AST  16  /  ALT  <5  /  AlkPhos  99  10-13    PT/INR - ( 12 Oct 2019 21:55 )   PT: 13.70 sec;   INR: 1.19 ratio           Urinalysis Basic - ( 13 Oct 2019 06:30 )    Color: Light Yellow / Appearance: Clear / S.008 / pH: x  Gluc: x / Ketone: Negative  / Bili: Negative / Urobili: <2 mg/dL   Blood: x / Protein: Negative / Nitrite: Negative   Leuk Esterase: Large / RBC: 21 /HPF / WBC 75 /HPF   Sq Epi: x / Non Sq Epi: 1 /HPF / Bacteria: Negative        RADIOLOGY & ADDITIONAL TESTS:  CXR:  + ET, + enteric tube, bibasilar reticular changes    CTH:  volume loss, white matter changes, no acute intracranial hemorrhage

## 2019-10-13 NOTE — CONSULT NOTE ADULT - ASSESSMENT
87 year old female with history of Afib not on AC, HTN, CKD, Parkinson, hypothyroidism, DL presenting from [facility] for increased lethargy and SOB not improving on NR, was 80% so was intubated in ED. Vascular surgery is consulted for cyanosis of the fingers bilaterally. Patient has palpable and dopplerable radial pulses bilaterally. patient also has 2+ non pitting edema of the arms    Plan  -Venous duplex of b/l UE to r/o DVTs  -serial pulse checks  -BP support  -monitor O2 sat  -R/o other causes of peripheral cyanosis 87 year old female with history of Afib not on AC, HTN, CKD, Parkinson, hypothyroidism, DL presenting from [facility] for increased lethargy and SOB not improving on NR, was 80% so was intubated in ED. Vascular surgery is consulted for cyanosis of the fingers bilaterally. Patient has palpable and dopplerable radial pulses bilaterally. patient also has 2+ non pitting edema of the arms. Patient is currently not on pressors. Patient is bradycardiac    Plan  -No acute surgical interventions at this time  -serial pulse checks  -BP support  -HR support  -monitor O2 sat  -R/o other causes of peripheral cyanosis, i.e. cardiac

## 2019-10-13 NOTE — CONSULT NOTE ADULT - SUBJECTIVE AND OBJECTIVE BOX
GI HPI:  Patient is a 87y old  Female who presents with a chief complaint of desaturation (13 Oct 2019 07:30)  . Patient complaining of       Hospital course:  87 year old female with history of Afib not on AC, HTN, CKD, Parkinson, hypothyroidism, DL presenting from [facility] for increased lethargy and SOB not improving on NR, was 80% so was intubated in ED. A per NH papers patient was complaining of chest pain and SOB at 9 pm and Lasix 40 was given oral then lasix 20 mg IM- no fever no cough reported per NH   STAS done by ED resident showed black stools, GI were contacted and they asked for Hg trending - NG tube was flushed and OG tube draining bile - patient sedated by VERSED and vitals were stable when examined in ED (13 Oct 2019 02:29)      PAST MEDICAL & SURGICAL HISTORY  Hyperglycemia  Dysphagia  Respiratory distress  Parkinson disease  Hypothyroid  Rib fractures  Congestive heart failure  Hypertension  No significant past surgical history      FAMILY HISTORY:  FAMILY HISTORY:  No pertinent family history in first degree relatives      SOCIAL HISTORY:  smoker:   Alcohol:  Drug:    ALLERGIES:  No Known Allergies      MEDICATIONS:  MEDICATIONS  (STANDING):  carbidopa/levodopa  10/100 1 Tablet(s) Oral daily  carbidopa/levodopa  25/100 1 Tablet(s) Oral at bedtime  cefTRIAXone   IVPB 1000 milliGRAM(s) IV Intermittent every 24 hours  chlorhexidine 0.12% Liquid 15 milliLiter(s) Oral Mucosa every 12 hours  clotrimazole 1% Topical Cream - Peds 1 Application(s) Topical two times a day  docusate sodium 100 milliGRAM(s) Oral daily  DULoxetine 60 milliGRAM(s) Oral daily  insulin glargine Injectable (LANTUS) 6 Unit(s) SubCutaneous at bedtime  levoFLOXacin IVPB 750 milliGRAM(s) IV Intermittent every 24 hours  levothyroxine 75 MICROGram(s) Oral daily  midazolam Infusion 0.02 mG/kG/Hr (1.5 mL/Hr) IV Continuous <Continuous>  multivitamin 1 Tablet(s) Oral daily  pantoprazole  Injectable 40 milliGRAM(s) IV Push every 12 hours  propofol Infusion 10 MICROgram(s)/kG/Min (4.5 mL/Hr) IV Continuous <Continuous>  senna 2 Tablet(s) Oral at bedtime  vancomycin  IVPB 1000 milliGRAM(s) IV Intermittent every 24 hours    MEDICATIONS  (PRN):      HOME MEDICATIONS:  Home Medications:  albuterol 90 mcg/inh inhalation aerosol: 2 puff(s) inhaled 4 times a day, As Needed for dyspnea (02 Oct 2019 09:43)  amLODIPine 10 mg oral tablet: 1 tab(s) orally once a day (02 Oct 2019 09:41)  aspirin 81 mg oral tablet, chewable: 1 tab(s) orally once a day (02 Oct 2019 09:41)  budesonide-formoterol 160 mcg-4.5 mcg/inh inhalation aerosol: 2 puff(s) inhaled 2 times a day (02 Oct 2019 09:43)  carbidopa-levodopa 10 mg-100 mg oral tablet: 1 tab(s) orally once a day (02 Oct 2019 09:41)  carbidopa-levodopa 25 mg-100 mg oral tablet: 1 tab(s) orally once a day (at bedtime) (02 Oct 2019 09:41)  carvedilol 25 mg oral tablet: 1 tab(s) orally every 12 hours (02 Oct 2019 09:41)  cloNIDine 0.2 mg oral tablet: 1 tab(s) orally every 8 hours (02 Oct 2019 09:41)  clotrimazole 1% topical cream: Apply topically to affected areas 2 times a day, apply to circular eryrhmatous blotches on arms/legs and any where else they are noticed X 10 days: likely a Fungal Rash (03 Oct 2019 14:37)  docusate sodium 10 mg/mL oral liquid: 10 milliliter(s) orally 3 times a day (02 Oct 2019 09:41)  DULoxetine 60 mg oral delayed release capsule: 1 cap(s) orally once a day (02 Oct 2019 09:41)  enoxaparin: 30 unit(s) subcutaneously once a day unill fully amnulatory then can discontinue Rx (02 Oct 2019 09:41)  hydroCHLOROthiazide 25 mg oral tablet: 1 tab(s) orally once a day (at bedtime) (02 Oct 2019 09:41)  insulin glargine: 6 unit(s)  once a day (at bedtime) (02 Oct 2019 09:41)  isosorbide mononitrate 30 mg oral tablet, extended release: 1 tab(s) orally once a day (in the morning) (03 Sep 2019 18:34)  levothyroxine 75 mcg (0.075 mg) oral tablet: 1 tab(s) orally once a day (02 Oct 2019 09:41)  losartan 100 mg oral tablet: 1 tab(s) orally once a day (02 Oct 2019 09:41)  Multiple Vitamins oral tablet, chewable: 1 tab(s) orally once a day (02 Oct 2019 09:41)  nystatin 100,000 units/g topical powder: 1 application topically 2 times a day (02 Oct 2019 09:41)  pantoprazole 40 mg oral delayed release tablet: 1 tab(s) orally once a day (before a meal) (02 Oct 2019 09:41)  potassium chloride 20 mEq oral powder for reconstitution: 1 packet(s) orally once a day (03 Oct 2019 12:44)  senna oral tablet: 2 tab(s) orally once a day (at bedtime) (02 Oct 2019 09:41)      ROS:     REVIEW OF SYSTEMS  General:  No fevers  Eyes:  No reported pain   ENT:  No sore throat   NECK: No stiffness   CV:  No chest pain   Resp:  No shortness of breath  GI:  See HPI  :  No dysuria  Muscle:  No weakness  Neuro:  No tingling  Endocrine:  No polyuria  Heme:  No ecchymosis          VITALS:   T(F): 96.1 (10-13 @ 07:16), Max: 97.2 (10-13 @ 04:30)  HR: 52 (10-13 @ 07:16) (33 - 61)  BP: 151/67 (10-13 @ 07:16) (92/46 - 156/70)  BP(mean): 91 (10-13 @ 06:30) (64 - 91)  RR: 16 (10-13 @ 07:16) (16 - 22)  SpO2: 100% (10-13 @ 07:16) (100% - 100%)    I&O's Summary    12 Oct 2019 07:01  -  13 Oct 2019 07:00  --------------------------------------------------------  IN: 0 mL / OUT: 750 mL / NET: -750 mL    13 Oct 2019 07:01  -  13 Oct 2019 08:57  --------------------------------------------------------  IN: 0.2 mL / OUT: 700 mL / NET: -699.8 mL        PHYSICAL EXAM:  EYES: No scleral icterus   LUNG: Clear to auscultation bilaterally; No rales, rhonchi, wheezing, or rubs  HEART: RRR; No murmurs  ABDOMEN: Soft, +BS, Abdominal Tenderness, No guarding, No Perez Sign   Rectal Exam:     LABS:                        7.6    11.44 )-----------( 219      ( 13 Oct 2019 06:13 )             23.2     PT/INR - ( 12 Oct 2019 21:55 )  INR: 1.19            LIVER FUNCTIONS - ( 13 Oct 2019 06:13 )  Alb: 2.9 g/dL / Pro: 5.5 g/dL / ALK PHOS: 99 U/L / ALT: <5 U/L / AST: 16 U/L / GGT: x           10-13    124<L>  |  85<L>  |  x   ----------------------------<  150<H>  5.5<H>   |  28  |  1.8<H>    Ca    9.5      13 Oct 2019 06:13  Mg     2.5     10-13      CARDIAC MARKERS ( 13 Oct 2019 06:13 )  x     / 0.01 ng/mL / x     / x     / x      CARDIAC MARKERS ( 12 Oct 2019 21:55 )  x     / 0.01 ng/mL / x     / x     / x              Previous EGD:    Previous colonoscopy:       RADIOLOGY: GI HPI:  Patient is a 87y old  Female who presents with a chief complaint of desaturation (13 Oct 2019 07:30)  87 year old female with history of Afib not on AC, HTN, CKD, Parkinson, hypothyroidism, DL presenting from [facility] for increased lethargy and SOB not improving on NR, was 80% so was intubated in ED. A per NH papers patient was complaining of chest pain and SOB at 9 pm and Lasix 40 was given oral then lasix 20 mg IM- no fever no cough reported per NH   STAS done by ED resident showed black stools - PEG tube was flushed and OG tube draining bile - patient sedated by VERSED and vitals were stable when examined in ED (13 Oct 2019 02:29)      PAST MEDICAL & SURGICAL HISTORY  Hyperglycemia  Dysphagia  Respiratory distress  Parkinson disease  Hypothyroid  Rib fractures  Congestive heart failure  Hypertension  No significant past surgical history      FAMILY HISTORY:  FAMILY HISTORY:  No pertinent family history in first degree relatives      SOCIAL HISTORY:  smoker: not a smoker   Alcohol: not alcoholic   Drug: no drug abuse     ALLERGIES:  No Known Allergies      MEDICATIONS:  MEDICATIONS  (STANDING):  carbidopa/levodopa  10/100 1 Tablet(s) Oral daily  carbidopa/levodopa  25/100 1 Tablet(s) Oral at bedtime  cefTRIAXone   IVPB 1000 milliGRAM(s) IV Intermittent every 24 hours  chlorhexidine 0.12% Liquid 15 milliLiter(s) Oral Mucosa every 12 hours  clotrimazole 1% Topical Cream - Peds 1 Application(s) Topical two times a day  docusate sodium 100 milliGRAM(s) Oral daily  DULoxetine 60 milliGRAM(s) Oral daily  insulin glargine Injectable (LANTUS) 6 Unit(s) SubCutaneous at bedtime  levoFLOXacin IVPB 750 milliGRAM(s) IV Intermittent every 24 hours  levothyroxine 75 MICROGram(s) Oral daily  midazolam Infusion 0.02 mG/kG/Hr (1.5 mL/Hr) IV Continuous <Continuous>  multivitamin 1 Tablet(s) Oral daily  pantoprazole  Injectable 40 milliGRAM(s) IV Push every 12 hours  propofol Infusion 10 MICROgram(s)/kG/Min (4.5 mL/Hr) IV Continuous <Continuous>  senna 2 Tablet(s) Oral at bedtime  vancomycin  IVPB 1000 milliGRAM(s) IV Intermittent every 24 hours    MEDICATIONS  (PRN):      HOME MEDICATIONS:  Home Medications:  albuterol 90 mcg/inh inhalation aerosol: 2 puff(s) inhaled 4 times a day, As Needed for dyspnea (02 Oct 2019 09:43)  amLODIPine 10 mg oral tablet: 1 tab(s) orally once a day (02 Oct 2019 09:41)  aspirin 81 mg oral tablet, chewable: 1 tab(s) orally once a day (02 Oct 2019 09:41)  budesonide-formoterol 160 mcg-4.5 mcg/inh inhalation aerosol: 2 puff(s) inhaled 2 times a day (02 Oct 2019 09:43)  carbidopa-levodopa 10 mg-100 mg oral tablet: 1 tab(s) orally once a day (02 Oct 2019 09:41)  carbidopa-levodopa 25 mg-100 mg oral tablet: 1 tab(s) orally once a day (at bedtime) (02 Oct 2019 09:41)  carvedilol 25 mg oral tablet: 1 tab(s) orally every 12 hours (02 Oct 2019 09:41)  cloNIDine 0.2 mg oral tablet: 1 tab(s) orally every 8 hours (02 Oct 2019 09:41)  clotrimazole 1% topical cream: Apply topically to affected areas 2 times a day, apply to circular eryrhmatous blotches on arms/legs and any where else they are noticed X 10 days: likely a Fungal Rash (03 Oct 2019 14:37)  docusate sodium 10 mg/mL oral liquid: 10 milliliter(s) orally 3 times a day (02 Oct 2019 09:41)  DULoxetine 60 mg oral delayed release capsule: 1 cap(s) orally once a day (02 Oct 2019 09:41)  enoxaparin: 30 unit(s) subcutaneously once a day unill fully amnulatory then can discontinue Rx (02 Oct 2019 09:41)  hydroCHLOROthiazide 25 mg oral tablet: 1 tab(s) orally once a day (at bedtime) (02 Oct 2019 09:41)  insulin glargine: 6 unit(s)  once a day (at bedtime) (02 Oct 2019 09:41)  isosorbide mononitrate 30 mg oral tablet, extended release: 1 tab(s) orally once a day (in the morning) (03 Sep 2019 18:34)  levothyroxine 75 mcg (0.075 mg) oral tablet: 1 tab(s) orally once a day (02 Oct 2019 09:41)  losartan 100 mg oral tablet: 1 tab(s) orally once a day (02 Oct 2019 09:41)  Multiple Vitamins oral tablet, chewable: 1 tab(s) orally once a day (02 Oct 2019 09:41)  nystatin 100,000 units/g topical powder: 1 application topically 2 times a day (02 Oct 2019 09:41)  pantoprazole 40 mg oral delayed release tablet: 1 tab(s) orally once a day (before a meal) (02 Oct 2019 09:41)  potassium chloride 20 mEq oral powder for reconstitution: 1 packet(s) orally once a day (03 Oct 2019 12:44)  senna oral tablet: 2 tab(s) orally once a day (at bedtime) (02 Oct 2019 09:41)      ROS:     REVIEW OF SYSTEMS  Unobtainable as pt is intubated and sedated          VITALS:   T(F): 96.1 (10-13 @ 07:16), Max: 97.2 (10-13 @ 04:30)  HR: 52 (10-13 @ 07:16) (33 - 61)  BP: 151/67 (10-13 @ 07:16) (92/46 - 156/70)  BP(mean): 91 (10-13 @ 06:30) (64 - 91)  RR: 16 (10-13 @ 07:16) (16 - 22)  SpO2: 100% (10-13 @ 07:16) (100% - 100%)    I&O's Summary    12 Oct 2019 07:01  -  13 Oct 2019 07:00  --------------------------------------------------------  IN: 0 mL / OUT: 750 mL / NET: -750 mL    13 Oct 2019 07:01  -  13 Oct 2019 08:57  --------------------------------------------------------  IN: 0.2 mL / OUT: 700 mL / NET: -699.8 mL        PHYSICAL EXAM:  Gen: pt intubated   EYES: No scleral icterus   LUNG: Clear to auscultation bilaterally;  HEART: s1 and s2 heard   ABDOMEN: Soft, +BS, no abd distension , no  Abdominal Tenderness, No guarding, No Perez Sign   +PEG tube - draining bile, +PEG site cellulitis with purulent drainage   Ext: no edema     LABS:                        7.6    11.44 )-----------( 219      ( 13 Oct 2019 06:13 )             23.2     PT/INR - ( 12 Oct 2019 21:55 )  INR: 1.19            LIVER FUNCTIONS - ( 13 Oct 2019 06:13 )  Alb: 2.9 g/dL / Pro: 5.5 g/dL / ALK PHOS: 99 U/L / ALT: <5 U/L / AST: 16 U/L / GGT: x           10-13    124<L>  |  85<L>  |  x   ----------------------------<  150<H>  5.5<H>   |  28  |  1.8<H>    Ca    9.5      13 Oct 2019 06:13  Mg     2.5     10-13      CARDIAC MARKERS ( 13 Oct 2019 06:13 )  x     / 0.01 ng/mL / x     / x     / x      CARDIAC MARKERS ( 12 Oct 2019 21:55 )  x     / 0.01 ng/mL / x     / x     / x          Previous EGD: 9/2019 erosive esophagitis , esophageal stricture , non erosive gastritis , duodenitis   10/1/2019 - PEG placement     RADIOLOGY:  < from: Xray Chest 1 View-PORTABLE IMMEDIATE (10.13.19 @ 08:20) >  Impression:      Unchanged endotracheal tube approximately 1 cm above isaac; retraction   recommended.    Stable reticular and bibasilar opacities.    < end of copied text >    < from: CT Head No Cont (10.12.19 @ 23:35) >    IMPRESSION:      No CT evidence of acute intracranial pathology. Stable chronic   microvascular ischemic changes    < end of copied text >

## 2019-10-13 NOTE — PHARMACOTHERAPY INTERVENTION NOTE - COMMENTS
creatinine clearance is very low 25 ml/min. The recommended dose is 1000 mg every 24 hours
it is a duplicate order, md will stop protonix infusion
it is duplicate order

## 2019-10-13 NOTE — H&P ADULT - NSHPPHYSICALEXAM_GEN_ALL_CORE
PHYSICAL EXAM:  GENERAL: intubated sedated   NECK: Supple, no lymphadenopathy, no JVD  CHEST/LUNG: CTAB; no wheezing - rales diffuse   HEART:   ABDOMEN: Soft, non-tender, distended  EXTREMITIES:  2+ peripheral pulses , no edema   NEUROLOGY:

## 2019-10-13 NOTE — CONSULT NOTE ADULT - SUBJECTIVE AND OBJECTIVE BOX
NEPHROLOGY CONSULTATION NOTE    THIS CONSULT IS INCOMPLETE / FULL CONSULT TO FOLLOW    Patient is a 87y Female whom presented to the hospital with     PAST MEDICAL & SURGICAL HISTORY:  Hyperglycemia  Dysphagia  Respiratory distress  Parkinson disease  Hypothyroid  Rib fractures  Congestive heart failure  Hypertension  No significant past surgical history    Allergies:  No Known Allergies    Home Medications Reviewed  Hospital Medications:   MEDICATIONS  (STANDING):  carbidopa/levodopa  10/100 1 Tablet(s) Oral daily  carbidopa/levodopa  25/100 1 Tablet(s) Oral at bedtime  chlorhexidine 0.12% Liquid 15 milliLiter(s) Oral Mucosa every 12 hours  clotrimazole 1% Topical Cream - Peds 1 Application(s) Topical two times a day  fentaNYL   Infusion. 0.5 MICROgram(s)/kG/Hr (3.75 mL/Hr) IV Continuous <Continuous>  insulin glargine Injectable (LANTUS) 6 Unit(s) SubCutaneous at bedtime  levothyroxine 75 MICROGram(s) Oral daily  meropenem  IVPB      meropenem  IVPB 500 milliGRAM(s) IV Intermittent every 12 hours  multivitamin 1 Tablet(s) Oral daily  pantoprazole  Injectable 40 milliGRAM(s) IV Push every 12 hours  propofol Infusion 10 MICROgram(s)/kG/Min (4.5 mL/Hr) IV Continuous <Continuous>  senna 2 Tablet(s) Oral at bedtime  vancomycin  IVPB 1000 milliGRAM(s) IV Intermittent every 24 hours      SOCIAL HISTORY:  Denies ETOH,Smoking,   FAMILY HISTORY:  No pertinent family history in first degree relatives        REVIEW OF SYSTEMS:  CONSTITUTIONAL: No weakness, fevers or chills  EYES/ENT: No visual changes;  No vertigo or throat pain   NECK: No pain or stiffness  RESPIRATORY: No cough, wheezing, hemoptysis; No shortness of breath  CARDIOVASCULAR: No chest pain or palpitations.  GASTROINTESTINAL: No abdominal or epigastric pain. No nausea, vomiting, or hematemesis; No diarrhea or constipation. No melena or hematochezia.  GENITOURINARY: No dysuria, frequency, foamy urine, urinary urgency, incontinence or hematuria  NEUROLOGICAL: No numbness or weakness  SKIN: No itching, burning, rashes, or lesions   VASCULAR: No bilateral lower extremity edema.   All other review of systems is negative unless indicated above.    VITALS:  T(F): 96.1 (10-13-19 @ 07:16), Max: 97.2 (10-13-19 @ 04:30)  HR: 59 (10-13-19 @ 09:22)  BP: 151/67 (10-13-19 @ 07:16)  RR: 16 (10-13-19 @ 07:16)  SpO2: 100% (10-13-19 @ 09:22)    10-12 @ 07:01  -  10-13 @ 07:00  --------------------------------------------------------  IN: 0 mL / OUT: 750 mL / NET: -750 mL    10-13 @ 07:01  -  10-13 @ 13:09  --------------------------------------------------------  IN: 0.2 mL / OUT: 700 mL / NET: -699.8 mL        Weight (kg): 75 (10-12 @ 22:16)    10-13-19 @ 07:01  -  10-13-19 @ 13:09  --------------------------------------------------------  IN: 0 mL / OUT: 700 mL / NET: -700 mL      I&O's Detail    12 Oct 2019 07:  -  13 Oct 2019 07:00  --------------------------------------------------------  IN:  Total IN: 0 mL    OUT:    Voided: 750 mL  Total OUT: 750 mL    Total NET: -750 mL      13 Oct 2019 07:  -  13 Oct 2019 13:09  --------------------------------------------------------  IN:    midazolam Infusion: 0.2 mL  Total IN: 0.2 mL    OUT:    Indwelling Catheter - Urethral: 700 mL  Total OUT: 700 mL    Total NET: -699.8 mL            PHYSICAL EXAM:  Constitutional: NAD  HEENT: anicteric sclera, oropharynx clear, MMM  Neck: No JVD  Respiratory: CTAB, no wheezes, rales or rhonchi  Cardiovascular: S1, S2, RRR  Gastrointestinal: BS+, soft, NT/ND  Extremities: No cyanosis or clubbing. No peripheral edema  Neurological: A/O x 3, no focal deficits  Psychiatric: Normal mood, normal affect  : No CVA tenderness. No sanchez.   Skin: No rashes  Vascular Access:    LABS:  10-13    130<L>  |  88<L>  |  81<HH>  ----------------------------<  114<H>  4.9   |  28  |  1.8<H>    Ca    9.4      13 Oct 2019 11:09  Mg     2.5     10-13    TPro  5.5<L>  /  Alb  2.9<L>  /  TBili  0.5  /  DBili      /  AST  16  /  ALT  <5  /  AlkPhos  99  10-13    Creatinine Trend: 1.8 <--, 1.8 <--, 1.8 <--, 1.2 <--, 1.0 <--, 1.0 <--, 1.0 <--, 1.0 <--, 1.0 <--, 1.1 <--, 1.2 <--, 1.4 <--, 1.4 <--, 1.6 <--, 2.0 <--, 2.2 <--, 2.0 <--, 1.7 <--, 1.6 <--, 1.4 <--, 1.3 <--, 1.1 <--, 1.0 <--, 0.9 <--                        7.6    11.44 )-----------( 219      ( 13 Oct 2019 06:13 )             23.2     Urine Studies:  Urinalysis Basic - ( 13 Oct 2019 06:30 )    Color: Light Yellow / Appearance: Clear / S.008 / pH:   Gluc:  / Ketone: Negative  / Bili: Negative / Urobili: <2 mg/dL   Blood:  / Protein: Negative / Nitrite: Negative   Leuk Esterase: Large / RBC: 21 /HPF / WBC 75 /HPF   Sq Epi:  / Non Sq Epi: 1 /HPF / Bacteria: Negative                RADIOLOGY & ADDITIONAL STUDIES: NEPHROLOGY CONSULTATION NOTE    Patient intubated history obtained from chart     87 year old female with history of Afib not on AC, HTN, CKD, Parkinson, hypothyroidism, DL presenting from [facility] for increased lethargy and SOB not improving on NR, was 80% so was intubated in ED. A per NH papers patient was complaining of chest pain and SOB at 9 pm and Lasix 40 was given oral then lasix 20 mg IM- no fever no cough reported per NH   STAS done by ED resident showed black stools, GI were contacted and they asked for Hg trending - NG tube was flushed and OG tube draining bile - patient sedated by VERSED and vitals were stable when examined in ED   Seen today intubated on MV / non oliguric / unresponsive in ED / no other clinical signs     PAST MEDICAL & SURGICAL HISTORY:  Hyperglycemia  Dysphagia  Respiratory distress  Parkinson disease  Hypothyroid  Rib fractures  Congestive heart failure  Hypertension  No significant past surgical history    Allergies:  No Known Allergies    Home Medications Reviewed  Hospital Medications:   MEDICATIONS  (STANDING):  carbidopa/levodopa  10/100 1 Tablet(s) Oral daily  carbidopa/levodopa  25/100 1 Tablet(s) Oral at bedtime  clotrimazole 1% Topical Cream - Peds 1 Application(s) Topical two times a day  fentaNYL   Infusion. 0.5 MICROgram(s)/kG/Hr (3.75 mL/Hr) IV Continuous <Continuous>  insulin glargine Injectable (LANTUS) 6 Unit(s) SubCutaneous at bedtime  levothyroxine 75 MICROGram(s) Oral daily  meropenem  IVPB 500 milliGRAM(s) IV Intermittent every 12 hours  multivitamin 1 Tablet(s) Oral daily  pantoprazole  Injectable 40 milliGRAM(s) IV Push every 12 hours  propofol Infusion 10 MICROgram(s)/kG/Min (4.5 mL/Hr) IV Continuous <Continuous>  senna 2 Tablet(s) Oral at bedtime  vancomycin  IVPB 1000 milliGRAM(s) IV Intermittent every 24 hours      SOCIAL HISTORY:  Denies ETOH,Smoking,   FAMILY HISTORY:  No pertinent family history in first degree relatives        REVIEW OF SYSTEMS:  all neg unless noted above     VITALS:  T(F): 96.1 (10-13-19 @ 07:16), Max: 97.2 (10-13-19 @ 04:30)  HR: 59 (10-13-19 @ 09:22)  BP: 151/67 (10-13-19 @ 07:16)  RR: 16 (10-13-19 @ 07:16)  SpO2: 100% (10-13-19 @ 09:22)    10-12 @ :  -  10-13 @ 07:00  --------------------------------------------------------  IN: 0 mL / OUT: 750 mL / NET: -750 mL    10-13 @ 07:  -  10-13 @ 13:09  --------------------------------------------------------  IN: 0.2 mL / OUT: 700 mL / NET: -699.8 mL        Weight (kg): 75 (10-12 @ 22:16)    10-13-19 @ 07:  -  10-13-19 @ 13:09  --------------------------------------------------------  IN: 0 mL / OUT: 700 mL / NET: -700 mL      I&O's Detail    12 Oct 2019 07:  -  13 Oct 2019 07:00  --------------------------------------------------------  IN:  Total IN: 0 mL    OUT:    Voided: 750 mL  Total OUT: 750 mL    Total NET: -750 mL      13 Oct 2019 07:  -  13 Oct 2019 13:09  --------------------------------------------------------  IN:    midazolam Infusion: 0.2 mL  Total IN: 0.2 mL    OUT:    Indwelling Catheter - Urethral: 700 mL  Total OUT: 700 mL    Total NET: -699.8 mL            PHYSICAL EXAM:  Constitutional: intubated on MV   HEENT: anicteric sclera, oropharynx clear, MMM  Neck: positive JVD  Respiratory: CTAB, no wheezes, rales or rhonchi  Cardiovascular: S1, S2, RRR  Gastrointestinal: BS+, soft, NT/ND  Extremities: No cyanosis or clubbing. No peripheral edema  Neurological: A/O x 3, no focal deficits  Psychiatric: Normal mood, normal affect  : No CVA tenderness. No sanchez.   Skin: No rashes  Vascular Access:    LABS:  10-13    130<L>  |  88<L>  |  81<HH>  ----------------------------<  114<H>  4.9   |  28  |  1.8<H>    Potassium Trend: 4.9<--, 5.5<--, 6.5<--    SODIUM TREND:  Sodium 130 [10-13 @ 11:09]  Sodium 124 [10-13 @ 06:13]  Sodium 124 [10-12 @ 21:55]  Sodium 141 [10-03 @ 06:35]  Sodium 141 [10-02 @ 07:07]  Sodium 141 [10-01 @ 07:12]  Sodium 138 [ @ 06:33]  Sodium 132 [ @ 08:38]  Sodium 138 [ @ 06:53]  Sodium 141 [ @ 07:32]    Ca    9.4      13 Oct 2019 11:09  Mg     2.5     10-13    TPro  5.5<L>  /  Alb  2.9<L>  /  TBili  0.5  /  DBili      /  AST  16  /  ALT  <5  /  AlkPhos  99  10-13    Creatinine Trend: 1.8 <--, 1.8 <--, 1.8 <--, 1.2 <--, 1.0 <--, 1.0 <--, 1.0 <--, 1.0 <--, 1.0 <--, 1.1 <--, 1.2 <--, 1.4 <--, 1.4 <--, 1.6 <--, 2.0 <--, 2.2 <--, 2.0 <--, 1.7 <--, 1.6 <--, 1.4 <--, 1.3 <--, 1.1 <--, 1.0 <--, 0.9 <--                        7.6    11.44 )-----------( 219      ( 13 Oct 2019 06:13 )             23.2   Hemoglobin: 7.6 g/dL (10-13 @ 06:13)  Hemoglobin: 7.3 g/dL (10-12 @ 21:55)    Urine Studies:  Urinalysis Basic - ( 13 Oct 2019 06:30 )    Color: Light Yellow / Appearance: Clear / S.008 / pH:   Gluc:  / Ketone: Negative  / Bili: Negative / Urobili: <2 mg/dL   Blood:  / Protein: Negative / Nitrite: Negative   Leuk Esterase: Large / RBC: 21 /HPF / WBC 75 /HPF   Sq Epi:  / Non Sq Epi: 1 /HPF / Bacteria: Negative                RADIOLOGY & ADDITIONAL STUDIES:  < from: CT Head No Cont (10.12.19 @ 23:35) >    IMPRESSION:      No CT evidence of acute intracranial pathology. Stable chronic   microvascular ischemic changes    < end of copied text >  < from: Xray Chest 1 View-PORTABLE IMMEDIATE (10.12.19 @ 23:11) >  Impression:      Endotracheal tube is 1.8 cm above the isaac; retraction is recommended    Increased reticular opacities with bibasilar opacities.     < end of copied text >

## 2019-10-13 NOTE — CONSULT NOTE ADULT - SUBJECTIVE AND OBJECTIVE BOX
BOB ENGLE [id number]  87y Female      HPI:  87 year old female with history of Afib not on AC, HTN, CKD, Parkinson, hypothyroidism, DL presenting from [facility] for increased lethargy and SOB not improving on NR, was 80% so was intubated in ED. A per NH papers patient was complaining of chest pain and SOB at 9 pm and Lasix 40 was given oral then lasix 20 mg IM- no fever no cough reported per NH   STAS done by ED resident showed black stools, GI were contacted and they asked for Hg trending - NG tube was flushed and OG tube draining bile - patient sedated by VERSED and vitals were stable when examined in ED (13 Oct 2019 02:29)    Vascular surgery is consulted for cyanosis of the fingers bilaterally.    PAST MEDICAL & SURGICAL HISTORY:  Hyperglycemia  Dysphagia  Respiratory distress  Parkinson disease  Hypothyroid  Rib fractures  Congestive heart failure  Hypertension  No significant past surgical history        MEDICATIONS  (STANDING):  BACItracin   Ointment 1 Application(s) Topical two times a day  carbidopa/levodopa  10/100 1 Tablet(s) Oral daily  carbidopa/levodopa  25/100 1 Tablet(s) Oral at bedtime  chlorhexidine 0.12% Liquid 15 milliLiter(s) Oral Mucosa every 12 hours  clotrimazole 1% Topical Cream - Peds 1 Application(s) Topical two times a day  fentaNYL   Infusion. 0.5 MICROgram(s)/kG/Hr (3.75 mL/Hr) IV Continuous <Continuous>  insulin glargine Injectable (LANTUS) 6 Unit(s) SubCutaneous at bedtime  levothyroxine 75 MICROGram(s) Oral daily  meropenem  IVPB      meropenem  IVPB 500 milliGRAM(s) IV Intermittent every 12 hours  multivitamin 1 Tablet(s) Oral daily  pantoprazole  Injectable 40 milliGRAM(s) IV Push every 12 hours  propofol Infusion 10 MICROgram(s)/kG/Min (4.5 mL/Hr) IV Continuous <Continuous>  senna 2 Tablet(s) Oral at bedtime  vancomycin  IVPB 1000 milliGRAM(s) IV Intermittent every 24 hours    MEDICATIONS  (PRN):      Allergies    No Known Allergies    Intolerances        REVIEW OF SYSTEMS    [x ] A ten-point review of systems was otherwise negative except as noted.  [ ] Due to altered mental status/intubation, subjective information were not able to be obtained from the patient. History was obtained, to the extent possible, from review of the chart and collateral sources of information.      Vital Signs Last 24 Hrs  T(C): 36.5 (13 Oct 2019 15:31), Max: 36.5 (13 Oct 2019 15:31)  T(F): 97.7 (13 Oct 2019 15:31), Max: 97.7 (13 Oct 2019 15:31)  HR: 46 (13 Oct 2019 19:23) (33 - 61)  BP: 125/51 (13 Oct 2019 19:23) (92/46 - 156/70)  BP(mean): 91 (13 Oct 2019 06:30) (64 - 91)  RR: 16 (13 Oct 2019 19:23) (16 - 22)  SpO2: 100% (13 Oct 2019 19:23) (100% - 100%)    PHYSICAL EXAM:  GENERAL: NAD, intubated  CHEST/LUNG: Clear to auscultation bilaterally  HEART: Regular rate and rhythm  ABDOMEN: Soft, Nontender, Nondistended;   EXTREMITIES:  2+ non-pitting edema of the b/l upper extremities. Right radial pulse palpable and dopplerable, [id number] blister on right wrist, [id number] bruise on right forearm. Left radial pulse palpable and dopplerable. b/l LE DP and PT palpable and dopplerable.       LABS:  Labs:  CAPILLARY BLOOD GLUCOSE      POCT Blood Glucose.: 114 mg/dL (13 Oct 2019 00:14)  POCT Blood Glucose.: 116 mg/dL (13 Oct 2019 00:11)  POCT Blood Glucose.: 145 mg/dL (12 Oct 2019 21:53)                          7.6    11.44 )-----------( 219      ( 13 Oct 2019 06:13 )             23.2       Auto Immature Granulocyte %: 1.0 % (10-13-19 @ 06:13)  Auto Neutrophil %: 87.4 % (10-13-19 @ 06:13)  Auto Neutrophil %: 82.3 % (10-12-19 @ 21:55)  Auto Immature Granulocyte %: 1.3 % (10-12-19 @ 21:55)    10-13    130<L>  |  88<L>  |  81<HH>  ----------------------------<  114<H>  4.9   |  28  |  1.8<H>      Calcium, Total Serum: 9.4 mg/dL (10-13-19 @ 11:09)      LFTs:             5.5  | 0.5  | 16       ------------------[99      ( 13 Oct 2019 06:13 )  2.9  | x    | <5          Lipase:x      Amylase:x         Blood Gas Arterial, Lactate: 0.5 mmoL/L (10-13-19 @ 17:38)  Blood Gas Arterial, Lactate: 0.5 mmoL/L (10-13-19 @ 02:05)  Blood Gas Venous - Lactate: 0.9 mmoL/L (10-12-19 @ 21:59)    ABG - ( 13 Oct 2019 17:38 )  pH: 7.58  /  pCO2: 34    /  pO2: 167   / HCO3: 32    / Base Excess: 9.2   /  SaO2: 100             ABG - ( 13 Oct 2019 02:05 )  pH: 7.52  /  pCO2: 37    /  pO2: 426   / HCO3: 30    / Base Excess: 7.2   /  SaO2: 101         Coags:     13.70  ----< 1.19    ( 12 Oct 2019 21:55 )     x           CARDIAC MARKERS ( 13 Oct 2019 06:13 )  x     / 0.01 ng/mL / x     / x     / x      CARDIAC MARKERS ( 12 Oct 2019 21:55 )  x     / 0.01 ng/mL / x     / x     / x          Serum Pro-Brain Natriuretic Peptide: 15604 pg/mL (10-13-19 @ 06:13)  Serum Pro-Brain Natriuretic Peptide: 78425 pg/mL (10-12-19 @ 21:55)      Urinalysis Basic - ( 13 Oct 2019 06:30 )    Color: Light Yellow / Appearance: Clear / S.008 / pH: x  Gluc: x / Ketone: Negative  / Bili: Negative / Urobili: <2 mg/dL   Blood: x / Protein: Negative / Nitrite: Negative   Leuk Esterase: Large / RBC: 21 /HPF / WBC 75 /HPF   Sq Epi: x / Non Sq Epi: 1 /HPF / Bacteria: Negative            RADIOLOGY & ADDITIONAL STUDIES:

## 2019-10-13 NOTE — CONSULT NOTE ADULT - ASSESSMENT
IMPRESSION  PNA? likely HAP  Volume Overload /Pulmonary edema  Hyponatremia  R/o GIB   Afib not on AC    PLAN:    CNS: Spontaneous awakening trial    HEENT: Oral care    PULMONARY:  HOB @ 45 degrees.  , repeat CXR, pull ET tube 1 cm     CARDIOVASCULAR:keep I<O, Daily weight, Hold coreg as patient bradycardic, cardio eval    GI:GI eval, GI prophylaxis. NPO until evaluated by GI    RENAL:  Follow up lytes.  Correct as needed    INFECTIOUS DISEASE: c/w vanc, Meropenem ,Follow up cultures. RSV ,nasal MRSA, urine legionella, urine strep    HEMATOLOGICAL: SCDs , Monitor CBC, maintain active type and screen    ENDOCRINE:  Follow up FS.  Insulin protocol if needed.    MUSCULOSKELETAL:Bedrest      Poor prognosis IMPRESSION    Acute hypoxemic respiratory failure  Pulmonary edema   Doubt PNA  HO Afib not on AC  GHASSAN    PLAN:    CNS: Spontaneous awakening trial    HEENT: Oral care    PULMONARY:  HOB @ 45 degrees.  Pull ET tube 1 cm. .  RR 14.  Wean FiO@.  SBT after diuresis     CARDIOVASCULAR: keep I<O, Daily weight, Hold coreg as patient bradycardic, cardio eval    GI:GI eval, GI prophylaxis. NPO until evaluated by GI    RENAL:  Follow up lytes.  Correct as needed    INFECTIOUS DISEASE: c/w vanc, Meropenem ,Follow up cultures.     HEMATOLOGICAL: SCDs , Monitor CBC, maintain active type and screen    ENDOCRINE:  Follow up FS.  Insulin protocol if needed.    MUSCULOSKELETAL:  Bedrest    Admit to Vent Unit    Poor prognosis

## 2019-10-14 LAB
GLUCOSE BLDC GLUCOMTR-MCNC: 70 MG/DL — SIGNIFICANT CHANGE UP (ref 70–99)
GLUCOSE BLDC GLUCOMTR-MCNC: 80 MG/DL — SIGNIFICANT CHANGE UP (ref 70–99)
GLUCOSE BLDC GLUCOMTR-MCNC: 87 MG/DL — SIGNIFICANT CHANGE UP (ref 70–99)
GLUCOSE BLDC GLUCOMTR-MCNC: 92 MG/DL — SIGNIFICANT CHANGE UP (ref 70–99)
TSH SERPL-MCNC: 5.8 UIU/ML — HIGH (ref 0.27–4.2)

## 2019-10-14 PROCEDURE — 99222 1ST HOSP IP/OBS MODERATE 55: CPT

## 2019-10-14 PROCEDURE — 71045 X-RAY EXAM CHEST 1 VIEW: CPT | Mod: 26

## 2019-10-14 RX ORDER — DEXTROSE 50 % IN WATER 50 %
50 SYRINGE (ML) INTRAVENOUS ONCE
Refills: 0 | Status: COMPLETED | OUTPATIENT
Start: 2019-10-14 | End: 2019-10-14

## 2019-10-14 RX ORDER — FENTANYL CITRATE 50 UG/ML
0.5 INJECTION INTRAVENOUS
Qty: 2500 | Refills: 0 | Status: DISCONTINUED | OUTPATIENT
Start: 2019-10-14 | End: 2019-10-14

## 2019-10-14 RX ORDER — MIDAZOLAM HYDROCHLORIDE 1 MG/ML
0.02 INJECTION, SOLUTION INTRAMUSCULAR; INTRAVENOUS
Qty: 100 | Refills: 0 | Status: DISCONTINUED | OUTPATIENT
Start: 2019-10-14 | End: 2019-10-16

## 2019-10-14 RX ORDER — FUROSEMIDE 40 MG
40 TABLET ORAL DAILY
Refills: 0 | Status: DISCONTINUED | OUTPATIENT
Start: 2019-10-14 | End: 2019-10-21

## 2019-10-14 RX ADMIN — Medication 250 MILLIGRAM(S): at 08:06

## 2019-10-14 RX ADMIN — MIDAZOLAM HYDROCHLORIDE 1.5 MG/KG/HR: 1 INJECTION, SOLUTION INTRAMUSCULAR; INTRAVENOUS at 22:06

## 2019-10-14 RX ADMIN — CARBIDOPA AND LEVODOPA 1 TABLET(S): 25; 100 TABLET ORAL at 12:09

## 2019-10-14 RX ADMIN — Medication 1 APPLICATION(S): at 07:17

## 2019-10-14 RX ADMIN — CHLORHEXIDINE GLUCONATE 15 MILLILITER(S): 213 SOLUTION TOPICAL at 17:53

## 2019-10-14 RX ADMIN — MEROPENEM 100 MILLIGRAM(S): 1 INJECTION INTRAVENOUS at 07:24

## 2019-10-14 RX ADMIN — PANTOPRAZOLE SODIUM 40 MILLIGRAM(S): 20 TABLET, DELAYED RELEASE ORAL at 07:28

## 2019-10-14 RX ADMIN — MIDAZOLAM HYDROCHLORIDE 1.5 MG/KG/HR: 1 INJECTION, SOLUTION INTRAMUSCULAR; INTRAVENOUS at 09:04

## 2019-10-14 RX ADMIN — MEROPENEM 100 MILLIGRAM(S): 1 INJECTION INTRAVENOUS at 18:21

## 2019-10-14 RX ADMIN — Medication 50 MILLILITER(S): at 09:32

## 2019-10-14 RX ADMIN — PANTOPRAZOLE SODIUM 40 MILLIGRAM(S): 20 TABLET, DELAYED RELEASE ORAL at 18:23

## 2019-10-14 RX ADMIN — Medication 1 APPLICATION(S): at 18:23

## 2019-10-14 RX ADMIN — Medication 1 TABLET(S): at 12:09

## 2019-10-14 RX ADMIN — Medication 40 MILLIGRAM(S): at 17:54

## 2019-10-14 RX ADMIN — Medication 1 APPLICATION(S): at 18:48

## 2019-10-14 RX ADMIN — Medication 1 APPLICATION(S): at 07:30

## 2019-10-14 RX ADMIN — CHLORHEXIDINE GLUCONATE 15 MILLILITER(S): 213 SOLUTION TOPICAL at 08:00

## 2019-10-14 NOTE — PROGRESS NOTE ADULT - SUBJECTIVE AND OBJECTIVE BOX
BOB ENGLE 86yo W Female sent from [facility] for worsening generalized weakness, lethargy and respiratory distress.  The pt was noted to be hypoxic and in pulmonary edema with a low Na of 124.  She was given IV lasix and a trial on a NRM butremained hypoxic at 80%. She was intubated and placed on ventilator support.  The pt had recently been hospitalized [facility] and sent to SNF for rehabilitation.  She has multiple medical issues and is mostly bed bound.  The PMHx includes:  HTN, ASHD, CAD, CHF, afib ( no AC), DLD, Hypothyroidism CKD, Parkinsons, Mobility Dysfunction, OA, DDD, DJD, Osteoporosis, Kyphosis, Rib Fx,    INTERVAL HPI/OVERNIGHT EVENTS:  the pt was transferred to the Vent unit, remain sedated and on ventilator support, family introduced to palliative care, need to assess goal of care, no labs today    MEDICATIONS  (STANDING):  carbidopa/levodopa  10/100 1 Tablet(s) Oral daily  carbidopa/levodopa  25/100 1 Tablet(s) Oral at bedtime  chlorhexidine 0.12% Liquid 15 milliLiter(s) Oral Mucosa every 12 hours  clotrimazole 1% Topical Cream - Peds 1 Application(s) Topical two times a day  fentaNYL   Infusion. 0.5 MICROgram(s)/kG/Hr (3.75 mL/Hr) IV Continuous <Continuous>  insulin glargine Injectable (LANTUS) 6 Unit(s) SubCutaneous at bedtime  levothyroxine 75 MICROGram(s) Oral daily  meropenem  IVPB      meropenem  IVPB 500 milliGRAM(s) IV Intermittent every 12 hours  multivitamin 1 Tablet(s) Oral daily  pantoprazole  Injectable 40 milliGRAM(s) IV Push every 12 hours  propofol Infusion 10 MICROgram(s)/kG/Min (4.5 mL/Hr) IV Continuous <Continuous>  senna 2 Tablet(s) Oral at bedtime  vancomycin  IVPB 1000 milliGRAM(s) IV Intermittent every 24 hours    MEDICATIONS  (PRN):      Allergies    No Known Allergies  	    Vital Signs Last 24 Hrs    T(F): 97.1  HR: 51  BP: 147/57    RR: 14  SpO2: 96%    PHYSICAL EXAM:      Constitutional:  pt sedated and on vent support, thin, frail and chronically ill looking with gen pallor    Eyes:  closed, nonicteric    ENMT: + ET, + enteric tube, dental defects    Neck:  supple, + JVD, no bruits    Back:  TH kyphosis    Respiratory:  respirator BS, scattered rhonchi    Cardiovascular:  S1S2 irreg, rosalind    Gastrointestinal: sl distended but soft and benign    Genitourinary:   + sanchez    Rectal: as per ER the STAS was + for dark stool    Extremities:  advanced arthritic changes, poor mm mass and tone, + pitting edema, acrocyanosis,    Vascular: + radial pulses,  dec pedal pulses    Neurological:  sedated    Skin: gen pallor, no rash    LABS:  10/13                      7.6    11.44 )-----------( 219                   23.2     10-13    130 (124)  |  88<L>  |  81<HH>  ----------------------------<  114<H>  4.9   |  28  |  1.8<H>  GFR 25    Ca    9.4      13 Oct 2019 11:09  Mg     2.5     10-13  troponin <0.01  Dig <0.3  BNP 11,634    TPro  5.5<L>  /  Alb  2.9<L>  /  TBili  0.5  /  DBili  x   /  AST  16  /  ALT  <5  /  AlkPhos  99  10-13    PT/INR - ( 12 Oct 2019 21:55 )   PT: 13.70 sec;   INR: 1.19 ratio           Urinalysis Basic - ( 13 Oct 2019 06:30 )    Color: Light Yellow / Appearance: Clear / S.008 / pH: x  Gluc: x / Ketone: Negative  / Bili: Negative / Urobili: <2 mg/dL   Blood: x / Protein: Negative / Nitrite: Negative   Leuk Esterase: Large / RBC: 21 /HPF / WBC 75 /HPF   Sq Epi: x / Non Sq Epi: 1 /HPF / Bacteria: Negative        RADIOLOGY & ADDITIONAL TESTS:  CXR:  + ET, + enteric tube, bibasilar reticular changes    CTH:  volume loss, white matter changes, no acute intracranial hemorrhage

## 2019-10-14 NOTE — PROGRESS NOTE ADULT - ASSESSMENT
87 year old female with history of Afib not on AC, HTN, CKD, Parkinson, hypothyroidism, DL presenting from [facility] for increased lethargy and SOB not improving on NR sp dark stools , SOB , resp failure intubated on MV     ·	GHASSAN on ? CKD ( had creat of 1.4 > 1 month ago)/ Hyperkalemia/ Hyponatremia/ anemia acute on chronic/ resp failure   ·	sp IV lasix / non oliguric /cont for now   ·	keep in neg balance  ·	has high BNP check 2 decho  ·	check UA and urine lytes urine urea  ·	GI on case   ·	K within normal now  ·	MV as per pulm team  ·	No need for RRT  ·	if family refusing blood draws and wants comfort care may sign off     will follow

## 2019-10-14 NOTE — PROGRESS NOTE ADULT - ASSESSMENT
Acute Hypoxic Respiratory Failure, Pulmonary Edema, sp intubation  GHASSAN on CKD  Hyponatremia  Anemia  Hx of HTN, ASHD, CAD, cardiac arrhythmia, afib ( no AC), CHF  Hx of DLD  Hx of Hypothyroidism  Hx of Parkinsons, Mobility Dysfunction, frequent falls, Progressively Bedbound  Hx of OA, DDD, DJD, Osteoporosis, Kyphosis, Rib Fx    in the ER pt in hypoxic RF unresponsive to 100% NRM, intubated, sedated and placed on Vent support  pt noted to be in volume overload started on IV diuretics  hyponatremia of 124, improved to 130 afer IV diuretic  pt cultured and placed on IV Vanco and Meropenem  Nare MRSA is negative, may D/C Vanco  Pul-critical care consult:  pt transferred to the Vent unit, to maintain respiratory support  Renal consult and ff up appreciated  monitor CBC, renal parameters and electrolytes, no labs today as per family's wishes  check TSH and AM cortisol  advance care, health care proxy Violette Knight, introduced to palliative care, req 24 hrs to determine goals of care  guarded state

## 2019-10-14 NOTE — PATIENT PROFILE ADULT - BILL PAYMENT
CEFTIN SENT TO PHARMACY. ALSO, PATIENT FEELS LIKE MEDS FOR YEAST INFECTION ARE NOT WORKING. DIFLUCAN 150 MG SENT TO PHARMACY.
no

## 2019-10-14 NOTE — PROGRESS NOTE ADULT - SUBJECTIVE AND OBJECTIVE BOX
Nephrology progress note    THIS IS AN INCOMPLETE NOTE . FULL NOTE TO FOLLOW SHORTLY    Patient is seen and examined, events over the last 24 h noted .    Allergies:  No Known Allergies    Hospital Medications:   MEDICATIONS  (STANDING):  BACItracin   Ointment 1 Application(s) Topical two times a day  carbidopa/levodopa  10/100 1 Tablet(s) Oral daily  carbidopa/levodopa  25/100 1 Tablet(s) Oral at bedtime  chlorhexidine 0.12% Liquid 15 milliLiter(s) Oral Mucosa every 12 hours  clotrimazole 1% Topical Cream - Peds 1 Application(s) Topical two times a day  insulin glargine Injectable (LANTUS) 6 Unit(s) SubCutaneous at bedtime  levothyroxine 75 MICROGram(s) Oral daily  meropenem  IVPB      meropenem  IVPB 500 milliGRAM(s) IV Intermittent every 12 hours  midazolam Infusion 0.02 mG/kG/Hr (1.5 mL/Hr) IV Continuous <Continuous>  multivitamin 1 Tablet(s) Oral daily  pantoprazole  Injectable 40 milliGRAM(s) IV Push every 12 hours  propofol Infusion 10 MICROgram(s)/kG/Min (4.5 mL/Hr) IV Continuous <Continuous>  senna 2 Tablet(s) Oral at bedtime  vancomycin  IVPB 1000 milliGRAM(s) IV Intermittent every 24 hours        VITALS:  T(F): 97 (10-14-19 @ 07:56), Max: 98.2 (10-14-19 @ 04:11)  HR: 59 (10-14-19 @ 08:00)  BP: 129/56 (10-14-19 @ 07:56)  RR: 18 (10-14-19 @ 07:56)  SpO2: 98% (10-14-19 @ 08:00)  Wt(kg): --    10-12 @ :  -  10-13 @ 07:00  --------------------------------------------------------  IN: 0 mL / OUT: 750 mL / NET: -750 mL    10-13 @ 07:01  -  10-14 @ 07:00  --------------------------------------------------------  IN: 0.2 mL / OUT: 700 mL / NET: -699.8 mL          PHYSICAL EXAM:  Constitutional: NAD  HEENT: anicteric sclera, oropharynx clear, MMM  Neck: No JVD  Respiratory: CTAB, no wheezes, rales or rhonchi  Cardiovascular: S1, S2, RRR  Gastrointestinal: BS+, soft, NT/ND  Extremities: No cyanosis or clubbing. No peripheral edema  :  No sanchez.   Skin: No rashes    LABS:  10-13    130<L>  |  88<L>  |  81<HH>  ----------------------------<  114<H>  4.9   |  28  |  1.8<H>    Ca    9.4      13 Oct 2019 11:09  Mg     2.5     10-13    TPro  5.5<L>  /  Alb  2.9<L>  /  TBili  0.5  /  DBili      /  AST  16  /  ALT  <5  /  AlkPhos  99  10-13                          7.6    11.44 )-----------( 219      ( 13 Oct 2019 06:13 )             23.2       Urine Studies:  Urinalysis Basic - ( 13 Oct 2019 06:30 )    Color: Light Yellow / Appearance: Clear / S.008 / pH:   Gluc:  / Ketone: Negative  / Bili: Negative / Urobili: <2 mg/dL   Blood:  / Protein: Negative / Nitrite: Negative   Leuk Esterase: Large / RBC: 21 /HPF / WBC 75 /HPF   Sq Epi:  / Non Sq Epi: 1 /HPF / Bacteria: Negative        RADIOLOGY & ADDITIONAL STUDIES: Nephrology progress note  Patient is seen and examined, events over the last 24 h noted .  still intubated on MV   sanchez in place non oliguric     Allergies:  No Known Allergies    Hospital Medications:   MEDICATIONS  (STANDING):  BACItracin   Ointment 1 Application(s) Topical two times a day  carbidopa/levodopa  10/100 1 Tablet(s) Oral daily  carbidopa/levodopa  25/100 1 Tablet(s) Oral at bedtime  clotrimazole 1% Topical Cream - Peds 1 Application(s) Topical two times a day  insulin glargine Injectable (LANTUS) 6 Unit(s) SubCutaneous at bedtime  levothyroxine 75 MICROGram(s) Oral daily   meropenem  IVPB 500 milliGRAM(s) IV Intermittent every 12 hours  midazolam Infusion 0.02 mG/kG/Hr (1.5 mL/Hr) IV Continuous <Continuous>  multivitamin 1 Tablet(s) Oral daily  pantoprazole  Injectable 40 milliGRAM(s) IV Push every 12 hours  propofol Infusion 10 MICROgram(s)/kG/Min (4.5 mL/Hr) IV Continuous <Continuous>  senna 2 Tablet(s) Oral at bedtime  vancomycin  IVPB 1000 milliGRAM(s) IV Intermittent every 24 hours        VITALS:  T(F): 97 (10-14-19 @ 07:56), Max: 98.2 (10-14-19 @ 04:11)  HR: 59 (10-14-19 @ 08:00)  BP: 129/56 (10-14-19 @ 07:56)  RR: 18 (10-14-19 @ 07:56)  SpO2: 98% (10-14-19 @ 08:00)      10-12 @ 07:  -  10-13 @ 07:00  --------------------------------------------------------  IN: 0 mL / OUT: 750 mL / NET: -750 mL    10-13 @ 07:01  -  10-14 @ 07:00  --------------------------------------------------------  IN: 0.2 mL / OUT: 700 mL / NET: -699.8 mL          PHYSICAL EXAM:  Constitutional: intubated on MV   HEENT: anicteric sclera, oropharynx clear, MMM  Neck: No JVD  Respiratory: CTAB, no wheezes, rales or rhonchi  Cardiovascular: S1, S2, RRR  Gastrointestinal: BS+, soft, NT/ND  Extremities: No cyanosis or clubbing. alphonso one edema   :  positive sanchez   Skin: No rashes    LABS:    10-13    130<L>  |  88<L>  |  81<HH>  ----------------------------<  114<H>  4.9   |  28  |  1.8<H>    Ca    9.4      13 Oct 2019 11:09  Mg     2.5     10-13    TPro  5.5<L>  /  Alb  2.9<L>  /  TBili  0.5  /  DBili      /  AST  16  /  ALT  <5  /  AlkPhos  99  10-13                          7.6    11.44 )-----------( 219      ( 13 Oct 2019 06:13 )             23.2       Urine Studies:  Urinalysis Basic - ( 13 Oct 2019 06:30 )    Color: Light Yellow / Appearance: Clear / S.008 / pH:   Gluc:  / Ketone: Negative  / Bili: Negative / Urobili: <2 mg/dL   Blood:  / Protein: Negative / Nitrite: Negative   Leuk Esterase: Large / RBC: 21 /HPF / WBC 75 /HPF   Sq Epi:  / Non Sq Epi: 1 /HPF / Bacteria: Negative        RADIOLOGY & ADDITIONAL STUDIES:

## 2019-10-15 LAB
ALBUMIN SERPL ELPH-MCNC: 2.8 G/DL — LOW (ref 3.5–5.2)
ALP SERPL-CCNC: 94 U/L — SIGNIFICANT CHANGE UP (ref 30–115)
ALT FLD-CCNC: 9 U/L — SIGNIFICANT CHANGE UP (ref 0–41)
ANION GAP SERPL CALC-SCNC: 14 MMOL/L — SIGNIFICANT CHANGE UP (ref 7–14)
AST SERPL-CCNC: 16 U/L — SIGNIFICANT CHANGE UP (ref 0–41)
BASOPHILS # BLD AUTO: 0.03 K/UL — SIGNIFICANT CHANGE UP (ref 0–0.2)
BASOPHILS NFR BLD AUTO: 0.5 % — SIGNIFICANT CHANGE UP (ref 0–1)
BILIRUB SERPL-MCNC: 0.5 MG/DL — SIGNIFICANT CHANGE UP (ref 0.2–1.2)
BUN SERPL-MCNC: 69 MG/DL — CRITICAL HIGH (ref 10–20)
CALCIUM SERPL-MCNC: 9 MG/DL — SIGNIFICANT CHANGE UP (ref 8.5–10.1)
CHLORIDE SERPL-SCNC: 89 MMOL/L — LOW (ref 98–110)
CO2 SERPL-SCNC: 30 MMOL/L — SIGNIFICANT CHANGE UP (ref 17–32)
CREAT ?TM UR-MCNC: 13 MG/DL — SIGNIFICANT CHANGE UP
CREAT SERPL-MCNC: 1.9 MG/DL — HIGH (ref 0.7–1.5)
EOSINOPHIL # BLD AUTO: 0.31 K/UL — SIGNIFICANT CHANGE UP (ref 0–0.7)
EOSINOPHIL NFR BLD AUTO: 4.7 % — SIGNIFICANT CHANGE UP (ref 0–8)
GLUCOSE BLDC GLUCOMTR-MCNC: 116 MG/DL — HIGH (ref 70–99)
GLUCOSE BLDC GLUCOMTR-MCNC: 82 MG/DL — SIGNIFICANT CHANGE UP (ref 70–99)
GLUCOSE BLDC GLUCOMTR-MCNC: 83 MG/DL — SIGNIFICANT CHANGE UP (ref 70–99)
GLUCOSE BLDC GLUCOMTR-MCNC: 88 MG/DL — SIGNIFICANT CHANGE UP (ref 70–99)
GLUCOSE SERPL-MCNC: 117 MG/DL — HIGH (ref 70–99)
HCT VFR BLD CALC: 23.5 % — LOW (ref 37–47)
HGB BLD-MCNC: 7.8 G/DL — LOW (ref 12–16)
IMM GRANULOCYTES NFR BLD AUTO: 0.8 % — HIGH (ref 0.1–0.3)
LYMPHOCYTES # BLD AUTO: 0.57 K/UL — LOW (ref 1.2–3.4)
LYMPHOCYTES # BLD AUTO: 8.7 % — LOW (ref 20.5–51.1)
MAGNESIUM SERPL-MCNC: 2.1 MG/DL — SIGNIFICANT CHANGE UP (ref 1.8–2.4)
MCHC RBC-ENTMCNC: 28.9 PG — SIGNIFICANT CHANGE UP (ref 27–31)
MCHC RBC-ENTMCNC: 33.2 G/DL — SIGNIFICANT CHANGE UP (ref 32–37)
MCV RBC AUTO: 87 FL — SIGNIFICANT CHANGE UP (ref 81–99)
MONOCYTES # BLD AUTO: 0.46 K/UL — SIGNIFICANT CHANGE UP (ref 0.1–0.6)
MONOCYTES NFR BLD AUTO: 7 % — SIGNIFICANT CHANGE UP (ref 1.7–9.3)
NEUTROPHILS # BLD AUTO: 5.11 K/UL — SIGNIFICANT CHANGE UP (ref 1.4–6.5)
NEUTROPHILS NFR BLD AUTO: 78.3 % — HIGH (ref 42.2–75.2)
NRBC # BLD: 0 /100 WBCS — SIGNIFICANT CHANGE UP (ref 0–0)
PLATELET # BLD AUTO: 213 K/UL — SIGNIFICANT CHANGE UP (ref 130–400)
POTASSIUM SERPL-MCNC: 4 MMOL/L — SIGNIFICANT CHANGE UP (ref 3.5–5)
POTASSIUM SERPL-SCNC: 4 MMOL/L — SIGNIFICANT CHANGE UP (ref 3.5–5)
POTASSIUM UR-SCNC: 20 MMOL/L — SIGNIFICANT CHANGE UP
PROT SERPL-MCNC: 5.4 G/DL — LOW (ref 6–8)
RBC # BLD: 2.7 M/UL — LOW (ref 4.2–5.4)
RBC # FLD: 19.5 % — HIGH (ref 11.5–14.5)
SODIUM SERPL-SCNC: 133 MMOL/L — LOW (ref 135–146)
SODIUM UR-SCNC: 67 MMOL/L — SIGNIFICANT CHANGE UP
WBC # BLD: 6.53 K/UL — SIGNIFICANT CHANGE UP (ref 4.8–10.8)
WBC # FLD AUTO: 6.53 K/UL — SIGNIFICANT CHANGE UP (ref 4.8–10.8)

## 2019-10-15 PROCEDURE — 99221 1ST HOSP IP/OBS SF/LOW 40: CPT

## 2019-10-15 PROCEDURE — 93306 TTE W/DOPPLER COMPLETE: CPT | Mod: 26

## 2019-10-15 RX ADMIN — CHLORHEXIDINE GLUCONATE 15 MILLILITER(S): 213 SOLUTION TOPICAL at 05:20

## 2019-10-15 RX ADMIN — PANTOPRAZOLE SODIUM 40 MILLIGRAM(S): 20 TABLET, DELAYED RELEASE ORAL at 18:05

## 2019-10-15 RX ADMIN — CARBIDOPA AND LEVODOPA 1 TABLET(S): 25; 100 TABLET ORAL at 18:07

## 2019-10-15 RX ADMIN — CARBIDOPA AND LEVODOPA 1 TABLET(S): 25; 100 TABLET ORAL at 21:00

## 2019-10-15 RX ADMIN — Medication 1 APPLICATION(S): at 18:03

## 2019-10-15 RX ADMIN — Medication 1 APPLICATION(S): at 05:20

## 2019-10-15 RX ADMIN — Medication 250 MILLIGRAM(S): at 05:20

## 2019-10-15 RX ADMIN — MEROPENEM 100 MILLIGRAM(S): 1 INJECTION INTRAVENOUS at 05:21

## 2019-10-15 RX ADMIN — MEROPENEM 100 MILLIGRAM(S): 1 INJECTION INTRAVENOUS at 18:05

## 2019-10-15 RX ADMIN — SENNA PLUS 2 TABLET(S): 8.6 TABLET ORAL at 21:01

## 2019-10-15 RX ADMIN — Medication 1 TABLET(S): at 11:57

## 2019-10-15 RX ADMIN — PANTOPRAZOLE SODIUM 40 MILLIGRAM(S): 20 TABLET, DELAYED RELEASE ORAL at 05:20

## 2019-10-15 RX ADMIN — Medication 1 APPLICATION(S): at 18:04

## 2019-10-15 RX ADMIN — CHLORHEXIDINE GLUCONATE 15 MILLILITER(S): 213 SOLUTION TOPICAL at 18:03

## 2019-10-15 RX ADMIN — Medication 40 MILLIGRAM(S): at 05:20

## 2019-10-15 NOTE — DIETITIAN INITIAL EVALUATION ADULT. - ENERGY NEEDS
estimated calorie needs: 60-70% PSE 2010 = 770 - 898 kcals vs 22-25 kcal/kg IBW = 997 - 1133 kcals vs. 11-14 kcal/kg ABW= 839 - 1068 kcals (average is ~900 - 1000 kcals/day) for obese/intubated   estimated protein needs: 68 - 77 gms/day (1.5 - 1.7 gm/kg IBW for obese/intubated, renal fxn considered.   estimated fluid need: per VENT team

## 2019-10-15 NOTE — CONSULT NOTE ADULT - ATTENDING COMMENTS
I have personally seen, examined, and participated in the care of this patient. I have reviewed all pertinent clinical information, including history, physical exam, plan and the NP's note and agree except as noted.    87 year old woman with significant medical history above admitted for respiratory failure requiring intubation, hyponatremia peripheral cyanosis, and dark stools.  Palliative care was consulted for GOC.  Up until this point, the patient's daughter and HCP has decided on DNR and no further blood draws, which will need to be clarified.  Patient noted to be vented today and able to follow some commands by blinking eyes.  Plan for family meeting with HCP tomorrow regarding next steps.
Patient evaluated. Labs and imaging reviewed. I agree with the above current plan of care.
palpable radial pulses and warm fingers, edema ++.    No vascular intervention.

## 2019-10-15 NOTE — DIETITIAN INITIAL EVALUATION ADULT. - PHYSICAL APPEARANCE
BMI 32.8 IBW: 45.3 kg On past adm at [facility], pt weighted 67.9 kg on (9/9/19). CHF hx  and 3+ edema b/l hands noted. BS 10 skin intact.

## 2019-10-15 NOTE — DIETITIAN INITIAL EVALUATION ADULT. - FACTORS AFF FOOD INTAKE
intubated to vent, off sedation. Pt is from [facility]. Per NH documents, pt was on glucerna 1.2 70ml/hr for total volume 1000ml/day via PEG. TF has not been started yet per RN. BROOKE per EMR. More in depth nutrition hx not available as pt intubated.

## 2019-10-15 NOTE — PROGRESS NOTE ADULT - SUBJECTIVE AND OBJECTIVE BOX
BOB ENGLE 88yo W Female sent from [facility] for worsening generalized weakness, lethargy and respiratory distress.  The pt was noted to be hypoxic and in pulmonary edema with a low Na of 124.  She was given IV lasix and a trial on a NRM butremained hypoxic at 80%. She was intubated and placed on ventilator support.  The pt had recently been [facility] and [facility] to SNF for rehabilitation.  She has multiple medical issues and is mostly bed bound.  The PMHx includes:  HTN, ASHD, CAD, CHF, afib ( no AC), DLD, Hypothyroidism CKD, Parkinsons, Mobility Dysfunction, OA, DDD, DJD, Osteoporosis, Kyphosis, Rib Fx,    INTERVAL HPI/OVERNIGHT EVENTS:  the pt is in the vent unit, remains on vent support, off sedation, more alert eyes opened, palliative care introduced to family    MEDICATIONS  (STANDING):  carbidopa/levodopa  10/100 1 Tablet(s) Oral daily  carbidopa/levodopa  25/100 1 Tablet(s) Oral at bedtime  chlorhexidine 0.12% Liquid 15 milliLiter(s) Oral Mucosa every 12 hours  clotrimazole 1% Topical Cream - Peds 1 Application(s) Topical two times a day  fentaNYL   Infusion. 0.5 MICROgram(s)/kG/Hr (3.75 mL/Hr) IV Continuous <Continuous>  insulin glargine Injectable (LANTUS) 6 Unit(s) SubCutaneous at bedtime  levothyroxine 75 MICROGram(s) Oral daily  meropenem  IVPB      meropenem  IVPB 500 milliGRAM(s) IV Intermittent every 12 hours  multivitamin 1 Tablet(s) Oral daily  pantoprazole  Injectable 40 milliGRAM(s) IV Push every 12 hours  propofol Infusion 10 MICROgram(s)/kG/Min (4.5 mL/Hr) IV Continuous <Continuous>  senna 2 Tablet(s) Oral at bedtime  vancomycin  IVPB 1000 milliGRAM(s) IV Intermittent every 24 hours    MEDICATIONS  (PRN):      Allergies    No Known Allergies  	    Vital Signs Last 24 Hrs    T(F): 98.5  HR: 77  BP: 122/85    RR: 16  SpO2: 100%    PHYSICAL EXAM:      Constitutional:  pt  off sedation, more alert, eyes opened and tracking,remains on vent support, thin, frail and chronically ill looking with gen pallor    Eyes:  opened, tracking, nonicteric    ENMT: + ET, + enteric tube, dental defects    Neck:  supple, + JVD, no bruits    Back:  TH kyphosis    Respiratory:  respirator BS, scattered rhonchi    Cardiovascular:  S1S2 irreg,     Gastrointestinal: sl distended but soft and benign, + PEG, NG to intermittent suction    Genitourinary:   + sanchez    Rectal: as per ER the STAS was + for dark stool    Extremities:  advanced arthritic changes, poor mm mass and tone, + pitting edema    Vascular: + radial pulses,  dec pedal pulses, + acrocyanosis    Neurological:  sedated    Skin: gen pallor, no rash    LABS:                      7.8   6.5 )-----------( 213                   23     10-13    133 (124)  |  89  |  69  ----------------------------<  117  4.0   |  30  |  1.9    GFR 25,23    Ca    9.4        Mg     2.5, 2.1  troponin <0.01  Dig <0.3  BNP 11,634    TPro  5.5<L>  /  Alb  2.9<L>  /  TBili  0.5  /  DBili  x   /  AST  16  /  ALT  <5  /  AlkPhos  99  10-13    PT/INR - ( 12 Oct 2019 21:55 )   PT: 13.70 sec;   INR: 1.19 ratio           Urinalysis Basic - ( 13 Oct 2019 06:30 )    Color: Light Yellow / Appearance: Clear / S.008 / pH: x  Gluc: x / Ketone: Negative  / Bili: Negative / Urobili: <2 mg/dL   Blood: x / Protein: Negative / Nitrite: Negative   Leuk Esterase: Large / RBC: 21 /HPF / WBC 75 /HPF   Sq Epi: x / Non Sq Epi: 1 /HPF / Bacteria: Negative        RADIOLOGY & ADDITIONAL TESTS:  CXR:  + ET, + enteric tube, bibasilar reticular changes    ECHO:  normal global ventricular sys function, mitral leaflet thickening, annular calcification, severe TR, sclerotic aortic valve, paradoxical septal motion ( ? R vent overload)    CTH:  volume loss, white matter changes, no acute intracranial hemorrhage

## 2019-10-15 NOTE — PROGRESS NOTE ADULT - ASSESSMENT
Acute Hypoxic Respiratory Failure, Pulmonary Edema, sp intubation  GHASSAN on CKD  Hyponatremia  Anemia, R/o GIB, black stools  Failure to thrive  Hx of HTN, ASHD, CAD, cardiac arrhythmia, afib ( no AC), CHF  Hx of DLD  Hx of Hypothyroidism  Hx of Parkinsons, Mobility Dysfunction, frequent falls, Progressively Bedbound  Hx of OA, DDD, DJD, Osteoporosis, Kyphosis, Rib Fx    in the ER pt in hypoxic RF unresponsive to 100% NRM, intubated, sedated and placed on Vent support  pt noted to be in volume overload started on IV diuretics  hyponatremia of 124, improved to 130 afer IV diuretic, today 133  pt cultured and placed on IV Vanco and Meropenem  Nare MRSA is negative, may D/C Vanco  Pul-critical care consult:  pt transferred to the Vent unit, to maintain respiratory support  Renal consult and ff up appreciated  monitor CBC, renal parameters and electrolytes  check TSH and AM cortisol  advance care, health care proxy Violette Knight, introduced to palliative care, to determine  goals of care  guarded state  made DNR

## 2019-10-15 NOTE — DIETITIAN INITIAL EVALUATION ADULT. - OTHER INFO
Pt adm to ED for Acute Hypoxic Respiratory Failure, Pulmonary Edema, sp intubation. volume overload started on IV diuretics.  PEG tube cellulitis, recent PEG placement R/o Upper gi bleed - GI following. GHASSAN on ? CKD. No need for RRT. Nephrology following. family introduced to palliative care. made DNR.

## 2019-10-15 NOTE — CHART NOTE - NSCHARTNOTEFT_GEN_A_CORE
Palliative Care LMSW contacted patient's daughter; family meeting set up for tomorrow 10/16/19 at 10am.

## 2019-10-15 NOTE — PROGRESS NOTE ADULT - SUBJECTIVE AND OBJECTIVE BOX
Patient is a 87y old  Female who presents with a chief complaint of desaturation (15 Oct 2019 15:25)        Over Night Events: None     Hospital Day: 2        ROS:  See HPI    PHYSICAL EXAM    ICU Vital Signs Last 24 Hrs  T(C): 37.2 (15 Oct 2019 15:55), Max: 37.2 (15 Oct 2019 15:55)  T(F): 99 (15 Oct 2019 15:55), Max: 99 (15 Oct 2019 15:55)  HR: 62 (15 Oct 2019 15:55) (51 - 77)  BP: 156/69 (15 Oct 2019 15:55) (122/85 - 156/69)  BP(mean): 99 (15 Oct 2019 15:55) (93 - 100)  ABP: --  ABP(mean): --  RR: 16 (15 Oct 2019 15:55) (14 - 16)  SpO2: 100% (15 Oct 2019 08:10) (96% - 100%)      General:  HEENT: KALE             Lymphatic system: No cervical LN   Lungs: Bilateral BS, +ETT   Cardiovascular: Regular   Gastrointestinal: Soft, Positive BS  Extremities: No clubbing.  Moves extremities.   Skin: Warm, intact  Neurological: No motor or sensory deficit       10-14-19 @ 07:01  -  10-15-19 @ 07:00  --------------------------------------------------------  IN:    midazolam Infusion: 7.5 mL  Total IN: 7.5 mL    OUT:    Indwelling Catheter - Urethral: 2200 mL  Total OUT: 2200 mL    Total NET: -2192.5 mL      10-15-19 @ 07:01  -  10-15-19 @ 16:04  --------------------------------------------------------  IN:  Total IN: 0 mL    OUT:    Indwelling Catheter - Urethral: 1100 mL  Total OUT: 1100 mL    Total NET: -1100 mL          LABS:                            7.8    6.53  )-----------( 213      ( 15 Oct 2019 05:39 )             23.5                                               10-15    133<L>  |  89<L>  |  69<HH>  ----------------------------<  117<H>  4.0   |  30  |  1.9<H>    Ca    9.0      15 Oct 2019 05:39  Mg     2.1     10-15    TPro  5.4<L>  /  Alb  2.8<L>  /  TBili  0.5  /  DBili  x   /  AST  16  /  ALT  9   /  AlkPhos  94  10-15                                                                                           LIVER FUNCTIONS - ( 15 Oct 2019 05:39 )  Alb: 2.8 g/dL / Pro: 5.4 g/dL / ALK PHOS: 94 U/L / ALT: 9 U/L / AST: 16 U/L / GGT: x                                                  Culture - Blood (collected 13 Oct 2019 06:13)  Source: .Blood None  Preliminary Report (14 Oct 2019 13:01):    No growth to date.                                                   Mode: AC/ CMV (Assist Control/ Continuous Mandatory Ventilation)  RR (machine): 12  TV (machine): 350  FiO2: 30  PEEP: 5  ITime: 1  MAP: 8.8  PIP: 29                                      ABG - ( 13 Oct 2019 21:56 )  pH, Arterial: 7.51  pH, Blood: x     /  pCO2: 40    /  pO2: 143   / HCO3: 32    / Base Excess: 8.2   /  SaO2: 99                  MEDICATIONS  (STANDING):  BACItracin   Ointment 1 Application(s) Topical two times a day  carbidopa/levodopa  10/100 1 Tablet(s) Oral daily  carbidopa/levodopa  25/100 1 Tablet(s) Oral at bedtime  chlorhexidine 0.12% Liquid 15 milliLiter(s) Oral Mucosa every 12 hours  clotrimazole 1% Topical Cream - Peds 1 Application(s) Topical two times a day  furosemide   Injectable 40 milliGRAM(s) IV Push daily  insulin glargine Injectable (LANTUS) 6 Unit(s) SubCutaneous at bedtime  levothyroxine 75 MICROGram(s) Oral daily  meropenem  IVPB      meropenem  IVPB 500 milliGRAM(s) IV Intermittent every 12 hours  midazolam Infusion 0.02 mG/kG/Hr (1.5 mL/Hr) IV Continuous <Continuous>  multivitamin 1 Tablet(s) Oral daily  pantoprazole  Injectable 40 milliGRAM(s) IV Push every 12 hours  senna 2 Tablet(s) Oral at bedtime    MEDICATIONS  (PRN):      Xrays:                                                                                     ECHO

## 2019-10-15 NOTE — CONSULT NOTE ADULT - SUBJECTIVE AND OBJECTIVE BOX
REQUESTED OF: DR june    Chart reviewed, Hospital Day 2    BOB ENGLE 87yFemale  HPI:  87 year old female with history of Afib not on AC, HTN, CKD, Parkinson, hypothyroidism, DL presenting from [facility] for increased lethargy and SOB not improving on NR, was 80% so was intubated in ED. A per NH papers patient was complaining of chest pain and SOB at 9 pm and Lasix 40 was given oral then lasix 20 mg IM- no fever no cough reported per NH   STAS done by ED resident showed black stools, GI were contacted and they asked for Hg trending - NG tube was flushed and OG tube draining bile - patient sedated by VERSED and vitals were stable when examined in ED (13 Oct 2019 02:29)        PAST MEDICAL & SURGICAL HISTORY:  Hyperglycemia  Dysphagia  Respiratory distress  Parkinson disease  Hypothyroid  Rib fractures  Congestive heart failure  Hypertension  No significant past surgical history      Subjective and Objective:  Today,  Discussed with [facility] vent unit NP    Focused Palliative Care Evaluation:                   Symptoms:                                      Pain difficult to assess                                     Dyspnea on vent                                      N/V has OG tube to drainage                                     Appetite N/A (+) Gtube                                     Anxiety appears anxious and agitated                                     Other _____________________                     Support Devices:              PHYSICAL EXAM:      Constitutional: Pt is an elderly female who appears chronically ill    Respiratory: vented    Cardiovascular: (-) JVD    Gastrointestinal: (+) GTUBE    Genitourinary: sanchez to straight drainage    Extremities: no edema, cachectic     Neurological: pt appears agitated    Skin: intact          T(C): 36.9, Max: 36.9 (07:10)  HR: 55 (51 - 77)  BP: 122/85 (122/85 - 154/67)  RR: 16 (14 - 16)  SpO2: 100% (96% - 100%)      LABS/STUDIES:  10-15    133<L>  |  89<L>  |  69<HH>  ----------------------------<  117<H>  4.0   |  30  |  1.9<H>    Ca    9.0      15 Oct 2019 05:39  Mg     2.1     10-15    TPro  5.4<L>  /  Alb  2.8<L>  /  TBili  0.5  /  DBili  x   /  AST  16  /  ALT  9   /  AlkPhos  94  10-15                            7.8    6.53  )-----------( 213      ( 15 Oct 2019 05:39 )             23.5       MEDICATIONS  (STANDING):  BACItracin   Ointment 1 Application(s) Topical two times a day  carbidopa/levodopa  10/100 1 Tablet(s) Oral daily  carbidopa/levodopa  25/100 1 Tablet(s) Oral at bedtime  chlorhexidine 0.12% Liquid 15 milliLiter(s) Oral Mucosa every 12 hours  clotrimazole 1% Topical Cream - Peds 1 Application(s) Topical two times a day  furosemide   Injectable 40 milliGRAM(s) IV Push daily  insulin glargine Injectable (LANTUS) 6 Unit(s) SubCutaneous at bedtime  levothyroxine 75 MICROGram(s) Oral daily  meropenem  IVPB      meropenem  IVPB 500 milliGRAM(s) IV Intermittent every 12 hours  midazolam Infusion 0.02 mG/kG/Hr (1.5 mL/Hr) IV Continuous <Continuous>  multivitamin 1 Tablet(s) Oral daily  pantoprazole  Injectable 40 milliGRAM(s) IV Push every 12 hours  senna 2 Tablet(s) Oral at bedtime    MEDICATIONS  (PRN):          iStop:   Rx Written	Rx Dispensed	Drug	Quantity	Days Supply	Prescriber Name  08/02/2019	08/20/2019	temazepam 15 mg capsule	60	30	Rob Valdivia  06/27/2019	08/10/2019	lyrica 100 mg capsule	60	30	Rob Valdivia  06/27/2019	06/29/2019	temazepam 15 mg capsule	60	30	Rob Valdivia  06/27/2019	06/28/2019	lyrica 100 mg capsule	60	30	Rob Valdivia  12/19/2018	05/29/2019	lyrica 100 mg capsule	60	30	Unruly Curry MD  12/19/2018	04/26/2019	lyrica 100 mg capsule	60	30	CurryUnruly hassan MD  04/16/2019	04/16/2019	temazepam 15 mg capsule	60	30	Unruly Curry MD  12/19/2018	03/29/2019	lyrica 100 mg capsule	60	30	Unruly Curry MD  12/19/2018	02/19/2019	lyrica 100 mg capsule	60	30	Unruly Curry MD  02/18/2019	02/19/2019	temazepam 15 mg capsule	60	30	Petrungaro, Ehsan Grande PA-C  12/19/2018	01/18/2019	lyrica 100 mg capsule	60	30	CurryUnruly MD  01/16/2019	01/18/2019	temazepam 15 mg capsule	60	30	Curry, Unruly MANN MD  12/19/2018	12/21/2018	lyrica 100 mg capsule	60	30	Curry, Unruly MANN MD  11/06/2018	11/24/2018	temazepam 15 mg capsule	60	30	Curry, Unruly MANN MD  05/16/2018	11/12/2018	lyrica 100 mg capsule	60	30	Ok Schilling  10/10/2018	10/22/2018	temazepam 15 mg capsule	60	30	Ok Schilling  Patient Name:	Bob Engle	YOB: 1932  Address:	[address] [place] [address]	Sex:	Female  Rx Written	Rx Dispensed	Drug	Quantity	Days Supply	Prescriber Name  11/26/2018	11/26/2018	hydrocodone-acetaminophen 7.5-325 mg tablet	60	30	Ryan Benjamin MD  10/29/2018	10/29/2018	hydrocodone-acetaminophen 7.5-325 mg tablet	60	30	Ryan Benjamin MD      PPS  Level    10%       Note PPS = Palliative Performance Scale; (c)2001, [facility] Society       Range from 100% meaning Full ambulation/self-care/intake/Level of Consicous                                                                              to        10% meaning Bedbound/Unable to do any activity/extensive disease /Total Care/ No PO intake/ LOC=Full/drowsy/+/-confusion        (0% = death)                     Prior to acute illness, patient's functionality reportedly 20%

## 2019-10-15 NOTE — PROGRESS NOTE ADULT - ASSESSMENT
Impression:    Cardio respiratory arrest  aspiration of breakfast   pneumonia/pneumonitis   LLL atelectasis  rosalind cardia  hypernatremia worsening again        PLAN:    CNS: avoid sedation     HEENT: jacob    PULMONARY:  pulmonary toilet  taper O2  holds lasix    GI: GI prophylaxis. NG  Feeding started      RENAL: follow lytes       INFECTIOUS DISEASE: on abx. ID following     HEMATOLOGICAL:  DVT prophylaxis.    ENDOCRINE:  Follow up FS.  Insulin protocol if needed.    MUSCULOSKELETAL: Moves all extremities     Palliative care meeting with family tomorrow

## 2019-10-15 NOTE — PROGRESS NOTE ADULT - ASSESSMENT
87 year old female with history of Afib not on AC, HTN, CKD, Parkinson, hypothyroidism, DL presenting from [facility] for increased lethargy and SOB not improving on NR sp dark stools , SOB , resp failure intubated on MV     ·	GHASSAN on ? CKD ( had creat of 1.4 > 1 month ago)/ Hyperkalemia/ Hyponatremia/ anemia acute on chronic/ resp failure   ·	CHF - b/l opacities and effusions, cont  IV lasix / non oliguric   ·	keep in neg balance  ·	has high BNP check 2 decho  ·	No need for RRT  ·	if family refusing blood draws and wants comfort care   ·	may sign off if they choose comfort care    will follow

## 2019-10-15 NOTE — DIETITIAN INITIAL EVALUATION ADULT. - ENTERAL
When medically feasible, consider Osmolite 1.5 starting at 10ml/hr and increase by 15ml after 4 hours to goal rate of 25ml/hr + prosource no carb TF packets 3x/day for a total of 1020 kcals, 70 gms protein, 456 ml free H2O. Additional flushes per VENT team.

## 2019-10-15 NOTE — CONSULT NOTE ADULT - ASSESSMENT
Consult Summary: Pt is an elderly female from STR at [facility]. Pt has overall declined noted with respiratory failure in the setting of heart failure and worsening renal failure. Pt seen appears very alert, vent settings low. Only recently intubated. Pt also low GFR and anemia (in last month). Pt has PD and has dysphagia and a Gtube.     Pt has a daughter who is her HCP. Palliative care team reached out to her and she will come in to discuss goals of care based on her overall declining health status and poor prognosis r/t debility (PD) heart and kidney failure. Pt's HCP made pt DNR w Dr Hernandez. Also does not want blood draws. Will need to clarify.      Morphine Equivalent Daily Dose (MEDD): 0/24/hrs    Recommendations:  DNR  Ongoing medical management  No blood draws  meet w palliative care 10/16/19 at 10 am      Please Call [phone] PRN

## 2019-10-16 LAB
GAS PNL BLDA: SIGNIFICANT CHANGE UP
GLUCOSE BLDC GLUCOMTR-MCNC: 135 MG/DL — HIGH (ref 70–99)
GLUCOSE BLDC GLUCOMTR-MCNC: 138 MG/DL — HIGH (ref 70–99)
GLUCOSE BLDC GLUCOMTR-MCNC: 149 MG/DL — HIGH (ref 70–99)
GLUCOSE BLDC GLUCOMTR-MCNC: 156 MG/DL — HIGH (ref 70–99)

## 2019-10-16 PROCEDURE — 99233 SBSQ HOSP IP/OBS HIGH 50: CPT

## 2019-10-16 PROCEDURE — 99497 ADVNCD CARE PLAN 30 MIN: CPT | Mod: 25

## 2019-10-16 RX ADMIN — Medication 1 APPLICATION(S): at 17:04

## 2019-10-16 RX ADMIN — Medication 75 MICROGRAM(S): at 05:13

## 2019-10-16 RX ADMIN — CARBIDOPA AND LEVODOPA 1 TABLET(S): 25; 100 TABLET ORAL at 12:40

## 2019-10-16 RX ADMIN — Medication 1 APPLICATION(S): at 05:14

## 2019-10-16 RX ADMIN — Medication 1 APPLICATION(S): at 05:13

## 2019-10-16 RX ADMIN — SENNA PLUS 2 TABLET(S): 8.6 TABLET ORAL at 21:11

## 2019-10-16 RX ADMIN — CHLORHEXIDINE GLUCONATE 15 MILLILITER(S): 213 SOLUTION TOPICAL at 17:04

## 2019-10-16 RX ADMIN — MEROPENEM 100 MILLIGRAM(S): 1 INJECTION INTRAVENOUS at 05:12

## 2019-10-16 RX ADMIN — MEROPENEM 100 MILLIGRAM(S): 1 INJECTION INTRAVENOUS at 17:03

## 2019-10-16 RX ADMIN — PANTOPRAZOLE SODIUM 40 MILLIGRAM(S): 20 TABLET, DELAYED RELEASE ORAL at 05:13

## 2019-10-16 RX ADMIN — Medication 40 MILLIGRAM(S): at 05:13

## 2019-10-16 RX ADMIN — PANTOPRAZOLE SODIUM 40 MILLIGRAM(S): 20 TABLET, DELAYED RELEASE ORAL at 17:03

## 2019-10-16 RX ADMIN — CHLORHEXIDINE GLUCONATE 15 MILLILITER(S): 213 SOLUTION TOPICAL at 05:13

## 2019-10-16 RX ADMIN — CARBIDOPA AND LEVODOPA 1 TABLET(S): 25; 100 TABLET ORAL at 21:13

## 2019-10-16 RX ADMIN — INSULIN GLARGINE 6 UNIT(S): 100 INJECTION, SOLUTION SUBCUTANEOUS at 21:58

## 2019-10-16 NOTE — PROGRESS NOTE ADULT - ASSESSMENT
Assessment	    Acute Hypoxic Respiratory Failure, Pulmonary Edema, sp intubation  GHASSAN on CKD  Hyponatremia  Anemia      PLAN:    CNS: Titrate off versed infusion.     HEENT: Oral care    PULMONARY:  HOB @ 45 degrees.  No Vent Changes now. Weaning Trial and possible extubation today.     CARDIOVASCULAR: Normal Sinus now.     GI: GI prophylaxis. Feeding via PEG. OGT dcd. Glucerna     RENAL:  Follow up lytes.  Correct as needed    INFECTIOUS DISEASE: Follow up cultures. Continue meropenum at this time.     HEMATOLOGICAL:  DVT prophylaxis.    ENDOCRINE:  Follow up FS.  Insulin protocol if needed.    MUSCULOSKELETAL: OOB when extubated.

## 2019-10-16 NOTE — PROGRESS NOTE ADULT - ASSESSMENT
87 year old female with history of Afib not on AC, HTN, CKD, Parkinson, hypothyroidism, DL presenting from [facility] for increased lethargy and SOB not improving on NR sp dark stools , SOB , resp failure intubated on MV     ·	GHASSAN on ? CKD ( had creat of 1.4 > 1 month ago)/ Hyperkalemia/ Hyponatremia/ anemia acute on chronic/ resp failure   ·	CHF - b/l opacities and effusions, cont  IV lasix / non oliguric   ·	keep in neg balance  ·	has high BNP check 2 decho  ·	No need for RRT  ·	will sign off / comfort care

## 2019-10-16 NOTE — PROGRESS NOTE ADULT - ASSESSMENT
87yFemale being evaluated for goals of care and symptom management. Daughter, and decision maker Roxanna Araujo          Recommendations:  DNR  Hospice consult  DNI when extubated 87yFemale being evaluated for goals of care and symptom management. Daughter, and decision maker Roxanna Davenport  and granddaughter. They reviewed her overall condition and decline from Parkinson's disease. They said her functional status has been basically bedbound x 6 months. Pt with Gtube placed a few weeks ago. Family educated about end stage parkinson's disease and feedings. They said they would not have wanted her intubated, and if she is extubated they will make her DNI.     Reviewed hospice and they would like to meet with hospice as they want a comfort based approach to care. They are aware mom could start declining and that overall she has a poor prognosis.           Recommendations:  DNR/DNI (pt extubated today)  Hospice consult  palliative care will follow  continue feedings and med management.  If pt declines family would likely want comfort care.

## 2019-10-16 NOTE — PROGRESS NOTE ADULT - SUBJECTIVE AND OBJECTIVE BOX
87yFemale with diagnosis: FLUID OVERLOAD;RESPIRATORY DISTRESS;ANEMIA;BLACK STOOL;HYPERKALEMIA      Patient seen for follow up and meeting with the family       PHYSICAL EXAM  Pt less alert on sedation  S/P weening trial, tolerated 45 min off on CPAP      T(C): , Max: 37.2 (15:55)  T(F): 98.7  HR: 60 (55 - 73)  BP: 126/57 (126/57 - 156/69)  RR: 15 (15 - 17)  SpO2: 100% (100% - 100%)              LABS:                          7.8    6.53  )-----------( 213      ( 15 Oct 2019 05:39 )             23.5                                                                                      10-15    133<L>  |  89<L>  |  69<HH>  ----------------------------<  117<H>  4.0   |  30  |  1.9<H>    Ca    9.0      15 Oct 2019 05:39  Mg     2.1     10-15    TPro  5.4<L>  /  Alb  2.8<L>  /  TBili  0.5  /  DBili  x   /  AST  16  /  ALT  9   /  AlkPhos  94  10-15                                                      MEDICATIONS  (STANDING):  BACItracin   Ointment 1 Application(s) Topical two times a day  carbidopa/levodopa  10/100 1 Tablet(s) Oral daily  carbidopa/levodopa  25/100 1 Tablet(s) Oral at bedtime  chlorhexidine 0.12% Liquid 15 milliLiter(s) Oral Mucosa every 12 hours  clotrimazole 1% Topical Cream - Peds 1 Application(s) Topical two times a day  furosemide   Injectable 40 milliGRAM(s) IV Push daily  insulin glargine Injectable (LANTUS) 6 Unit(s) SubCutaneous at bedtime  levothyroxine 75 MICROGram(s) Oral daily  meropenem  IVPB      meropenem  IVPB 500 milliGRAM(s) IV Intermittent every 12 hours  midazolam Infusion 0.02 mG/kG/Hr (1.5 mL/Hr) IV Continuous <Continuous>  multivitamin 1 Tablet(s) Oral daily  pantoprazole  Injectable 40 milliGRAM(s) IV Push every 12 hours  senna 2 Tablet(s) Oral at bedtime    MEDICATIONS  (PRN):

## 2019-10-16 NOTE — PROGRESS NOTE ADULT - SUBJECTIVE AND OBJECTIVE BOX
BOB ENGLE  87y  Female    Patient is a 87y old  Female who presents with a chief complaint of desaturation (16 Oct 2019 10:02)      SUBJECTIVE/OVERNIGHT EVENTS:      PAST MEDICAL & SURGICAL HISTORY:  Hyperglycemia  Dysphagia  Respiratory distress  Parkinson disease  Hypothyroid  Rib fractures  Congestive heart failure  Hypertension  No significant past surgical history    MEDICATIONS  (STANDING):  BACItracin   Ointment 1 Application(s) Topical two times a day  carbidopa/levodopa  10/100 1 Tablet(s) Oral daily  carbidopa/levodopa  25/100 1 Tablet(s) Oral at bedtime  chlorhexidine 0.12% Liquid 15 milliLiter(s) Oral Mucosa every 12 hours  clotrimazole 1% Topical Cream - Peds 1 Application(s) Topical two times a day  furosemide   Injectable 40 milliGRAM(s) IV Push daily  insulin glargine Injectable (LANTUS) 6 Unit(s) SubCutaneous at bedtime  levothyroxine 75 MICROGram(s) Oral daily  meropenem  IVPB      meropenem  IVPB 500 milliGRAM(s) IV Intermittent every 12 hours  midazolam Infusion 0.02 mG/kG/Hr (1.5 mL/Hr) IV Continuous <Continuous>  multivitamin 1 Tablet(s) Oral daily  pantoprazole  Injectable 40 milliGRAM(s) IV Push every 12 hours  senna 2 Tablet(s) Oral at bedtime    MEDICATIONS  (PRN):      OBJECTIVE:    Vital Signs Last 24 Hrs  T(C): 37.1 (16 Oct 2019 08:03), Max: 37.2 (15 Oct 2019 15:55)  T(F): 98.7 (16 Oct 2019 08:03), Max: 99 (15 Oct 2019 15:55)  HR: 60 (16 Oct 2019 08:03) (55 - 73)  BP: 126/57 (16 Oct 2019 08:03) (126/57 - 156/69)  BP(mean): 82 (16 Oct 2019 08:03) (82 - 99)  RR: 15 (16 Oct 2019 08:03) (15 - 17)  SpO2: 100% (15 Oct 2019 23:54) (100% - 100%)    General: In NAD  HEENT: + trach               Neck: Supple.   No Cervical LN    Lungs: Bilateral BS  Cardiovascular: Regular   Abdomen: Soft. + BS  Extremities: No CLubbing   Skin:   Neurological: non Focal     I&O's Summary    15 Oct 2019 07:01  -  16 Oct 2019 07:00  --------------------------------------------------------  IN: 1336 mL / OUT: 2000 mL / NET: -664 mL        BOWEL MOVEMENTS:    PRESSORS: No  SEDATION: Versed gtt   VENTED: Yes       LABS:                          7.8    6.53  )-----------( 213      ( 15 Oct 2019 05:39 )             23.5                                               10-15    133<L>  |  89<L>  |  69<HH>  ----------------------------<  117<H>  4.0   |  30  |  1.9<H>    Ca    9.0      15 Oct 2019 05:39  Mg     2.1     10-15    TPro  5.4<L>  /  Alb  2.8<L>  /  TBili  0.5  /  DBili  x   /  AST  16  /  ALT  9   /  AlkPhos  94  10-15                                                                                           LIVER FUNCTIONS - ( 15 Oct 2019 05:39 )  Alb: 2.8 g/dL / Pro: 5.4 g/dL / ALK PHOS: 94 U/L / ALT: 9 U/L / AST: 16 U/L / GGT: x                                                                                               Mode: AC/ CMV (Assist Control/ Continuous Mandatory Ventilation)  RR (machine): 12  TV (machine): 350  FiO2: 30  PEEP: 5  ITime: 1  MAP: 9  PIP: 29                                      ABG - ( 16 Oct 2019 09:29 )  pH, Arterial: 7.50  pH, Blood: x     /  pCO2: 51    /  pO2: 111   / HCO3: 39    / Base Excess: 14.3  /  SaO2: 99 BOB ENGLE  87y  Female    Patient is a 87y old  Female who presents with a chief complaint of desaturation (16 Oct 2019 10:02)      SUBJECTIVE/OVERNIGHT EVENTS: None     Hospital Day: 3      PAST MEDICAL & SURGICAL HISTORY:  Hyperglycemia  Dysphagia  Respiratory distress  Parkinson disease  Hypothyroid  Rib fractures  Congestive heart failure  Hypertension  No significant past surgical history    MEDICATIONS  (STANDING):  BACItracin   Ointment 1 Application(s) Topical two times a day  carbidopa/levodopa  10/100 1 Tablet(s) Oral daily  carbidopa/levodopa  25/100 1 Tablet(s) Oral at bedtime  chlorhexidine 0.12% Liquid 15 milliLiter(s) Oral Mucosa every 12 hours  clotrimazole 1% Topical Cream - Peds 1 Application(s) Topical two times a day  furosemide   Injectable 40 milliGRAM(s) IV Push daily  insulin glargine Injectable (LANTUS) 6 Unit(s) SubCutaneous at bedtime  levothyroxine 75 MICROGram(s) Oral daily  meropenem  IVPB      meropenem  IVPB 500 milliGRAM(s) IV Intermittent every 12 hours  midazolam Infusion 0.02 mG/kG/Hr (1.5 mL/Hr) IV Continuous <Continuous>  multivitamin 1 Tablet(s) Oral daily  pantoprazole  Injectable 40 milliGRAM(s) IV Push every 12 hours  senna 2 Tablet(s) Oral at bedtime    MEDICATIONS  (PRN):      OBJECTIVE:    Vital Signs Last 24 Hrs  T(C): 37.1 (16 Oct 2019 08:03), Max: 37.2 (15 Oct 2019 15:55)  T(F): 98.7 (16 Oct 2019 08:03), Max: 99 (15 Oct 2019 15:55)  HR: 60 (16 Oct 2019 08:03) (55 - 73)  BP: 126/57 (16 Oct 2019 08:03) (126/57 - 156/69)  BP(mean): 82 (16 Oct 2019 08:03) (82 - 99)  RR: 15 (16 Oct 2019 08:03) (15 - 17)  SpO2: 100% (15 Oct 2019 23:54) (100% - 100%)    General: In NAD  HEENT: + trach               Neck: Supple.   No Cervical LN    Lungs: Bilateral BS  Cardiovascular: Regular   Abdomen: Soft. + BS  Extremities: No Clubbing   Skin: Intact   Neurological: awake and alert      I&O's Summary    15 Oct 2019 07:01  -  16 Oct 2019 07:00  --------------------------------------------------------  IN: 1336 mL / OUT: 2000 mL / NET: -664 mL        BOWEL MOVEMENTS:    PRESSORS: No  SEDATION: Versed gtt   VENTED: Yes       LABS:                          7.8    6.53  )-----------( 213      ( 15 Oct 2019 05:39 )             23.5                                               10-15    133<L>  |  89<L>  |  69<HH>  ----------------------------<  117<H>  4.0   |  30  |  1.9<H>    Ca    9.0      15 Oct 2019 05:39  Mg     2.1     10-15    TPro  5.4<L>  /  Alb  2.8<L>  /  TBili  0.5  /  DBili  x   /  AST  16  /  ALT  9   /  AlkPhos  94  10-15                                                                                           LIVER FUNCTIONS - ( 15 Oct 2019 05:39 )  Alb: 2.8 g/dL / Pro: 5.4 g/dL / ALK PHOS: 94 U/L / ALT: 9 U/L / AST: 16 U/L / GGT: x                                                                                               Mode: AC/ CMV (Assist Control/ Continuous Mandatory Ventilation)  RR (machine): 12  TV (machine): 350  FiO2: 30  PEEP: 5  ITime: 1  MAP: 9  PIP: 29                                      ABG - ( 16 Oct 2019 09:29 )  pH, Arterial: 7.50  pH, Blood: x     /  pCO2: 51    /  pO2: 111   / HCO3: 39    / Base Excess: 14.3  /  SaO2: 99

## 2019-10-16 NOTE — PROGRESS NOTE ADULT - SUBJECTIVE AND OBJECTIVE BOX
Nephrology progress note    THIS IS AN INCOMPLETE NOTE . FULL NOTE TO FOLLOW SHORTLY    Patient is seen and examined, events over the last 24 h noted .    Allergies:  No Known Allergies    Hospital Medications:   MEDICATIONS  (STANDING):  BACItracin   Ointment 1 Application(s) Topical two times a day  carbidopa/levodopa  10/100 1 Tablet(s) Oral daily  carbidopa/levodopa  25/100 1 Tablet(s) Oral at bedtime  chlorhexidine 0.12% Liquid 15 milliLiter(s) Oral Mucosa every 12 hours  clotrimazole 1% Topical Cream - Peds 1 Application(s) Topical two times a day  furosemide   Injectable 40 milliGRAM(s) IV Push daily  insulin glargine Injectable (LANTUS) 6 Unit(s) SubCutaneous at bedtime  levothyroxine 75 MICROGram(s) Oral daily  meropenem  IVPB      meropenem  IVPB 500 milliGRAM(s) IV Intermittent every 12 hours  midazolam Infusion 0.02 mG/kG/Hr (1.5 mL/Hr) IV Continuous <Continuous>  multivitamin 1 Tablet(s) Oral daily  pantoprazole  Injectable 40 milliGRAM(s) IV Push every 12 hours  senna 2 Tablet(s) Oral at bedtime        VITALS:  T(F): 98.7 (10-16-19 @ 08:03), Max: 99 (10-15-19 @ 15:55)  HR: 60 (10-16-19 @ 08:03)  BP: 126/57 (10-16-19 @ 08:03)  RR: 15 (10-16-19 @ 08:03)  SpO2: 100% (10-15-19 @ 23:54)  Wt(kg): --    10-14 @ 07:  -  10-15 @ 07:00  --------------------------------------------------------  IN: 7.5 mL / OUT: 2200 mL / NET: -2192.5 mL    10-15 @ 07:01  -  10-16 @ 07:00  --------------------------------------------------------  IN: 1336 mL / OUT: 2000 mL / NET: -664 mL          PHYSICAL EXAM:  Constitutional: NAD  HEENT: anicteric sclera, oropharynx clear, MMM  Neck: No JVD  Respiratory: CTAB, no wheezes, rales or rhonchi  Cardiovascular: S1, S2, RRR  Gastrointestinal: BS+, soft, NT/ND  Extremities: No cyanosis or clubbing. No peripheral edema  :  No sanchez.   Skin: No rashes    LABS:  10-15    133<L>  |  89<L>  |  69<HH>  ----------------------------<  117<H>  4.0   |  30  |  1.9<H>    Ca    9.0      15 Oct 2019 05:39  Mg     2.1     10-15    TPro  5.4<L>  /  Alb  2.8<L>  /  TBili  0.5  /  DBili      /  AST  16  /  ALT  9   /  AlkPhos  94  10-15                          7.8    6.53  )-----------( 213      ( 15 Oct 2019 05:39 )             23.5       Urine Studies:  Urinalysis Basic - ( 13 Oct 2019 06:30 )    Color: Light Yellow / Appearance: Clear / S.008 / pH:   Gluc:  / Ketone: Negative  / Bili: Negative / Urobili: <2 mg/dL   Blood:  / Protein: Negative / Nitrite: Negative   Leuk Esterase: Large / RBC: 21 /HPF / WBC 75 /HPF   Sq Epi:  / Non Sq Epi: 1 /HPF / Bacteria: Negative      Sodium, Random Urine: 67.0 mmoL/L (10-15 @ 04:00)  Creatinine, Random Urine: 13 mg/dL (10-15 @ 04:00)  Potassium, Random Urine: 20 mmol/L (10-15 @ 04:00)    RADIOLOGY & ADDITIONAL STUDIES: Nephrology progress note  Patient is seen and examined, events over the last 24 h noted .  Patient is to be extubated     Allergies:  No Known Allergies    Hospital Medications:   MEDICATIONS  (STANDING):  BACItracin   Ointment 1 Application(s) Topical two times a day  carbidopa/levodopa  10/100 1 Tablet(s) Oral daily  carbidopa/levodopa  25/100 1 Tablet(s) Oral at bedtime  clotrimazole 1% Topical Cream - Peds 1 Application(s) Topical two times a day  furosemide   Injectable 40 milliGRAM(s) IV Push daily  insulin glargine Injectable (LANTUS) 6 Unit(s) SubCutaneous at bedtime  levothyroxine 75 MICROGram(s) Oral daily  meropenem  IVPB 500 milliGRAM(s) IV Intermittent every 12 hours  midazolam Infusion 0.02 mG/kG/Hr (1.5 mL/Hr) IV Continuous <Continuous>  multivitamin 1 Tablet(s) Oral daily  pantoprazole  Injectable 40 milliGRAM(s) IV Push every 12 hours  senna 2 Tablet(s) Oral at bedtime        VITALS:  T(F): 98.7 (10-16-19 @ 08:03), Max: 99 (10-15-19 @ 15:55)  HR: 60 (10-16-19 @ 08:03)  BP: 126/57 (10-16-19 @ 08:03)  RR: 15 (10-16-19 @ 08:03)  SpO2: 100% (10-15-19 @ 23:54)      10-14 @ 07:01  -  10-15 @ 07:00  --------------------------------------------------------  IN: 7.5 mL / OUT: 2200 mL / NET: -2192.5 mL    10-15 @ 07:01  -  10-16 @ 07:00  --------------------------------------------------------  IN: 1336 mL / OUT: 2000 mL / NET: -664 mL          PHYSICAL EXAM:  Constitutional: on MV   HEENT: anicteric sclera, oropharynx clear, MMM  Neck: No JVD  Respiratory: Crackles at base   Cardiovascular: S1, S2, RRR  Gastrointestinal: BS+, soft, NT/ND  Extremities: No cyanosis or clubbing. No peripheral edema  :  No sanchez.   Skin: No rashes    LABS:  10-15    133<L>  |  89<L>  |  69<HH>  ----------------------------<  117<H>  4.0   |  30  |  1.9<H>    Ca    9.0      15 Oct 2019 05:39  Mg     2.1     10-15    TPro  5.4<L>  /  Alb  2.8<L>  /  TBili  0.5  /  DBili      /  AST  16  /  ALT  9   /  AlkPhos  94  10-15                          7.8    6.53  )-----------( 213      ( 15 Oct 2019 05:39 )             23.5       Urine Studies:  Urinalysis Basic - ( 13 Oct 2019 06:30 )    Color: Light Yellow / Appearance: Clear / S.008 / pH:   Gluc:  / Ketone: Negative  / Bili: Negative / Urobili: <2 mg/dL   Blood:  / Protein: Negative / Nitrite: Negative   Leuk Esterase: Large / RBC: 21 /HPF / WBC 75 /HPF   Sq Epi:  / Non Sq Epi: 1 /HPF / Bacteria: Negative      Sodium, Random Urine: 67.0 mmoL/L (10-15 @ 04:00)  Creatinine, Random Urine: 13 mg/dL (10-15 @ 04:00)  Potassium, Random Urine: 20 mmol/L (10-15 @ 04:00)    RADIOLOGY & ADDITIONAL STUDIES:

## 2019-10-16 NOTE — PROGRESS NOTE ADULT - ASSESSMENT
Acute Hypoxic Respiratory Failure, Pulmonary Edema, sp intubation  GHASSAN on CKD  Hyponatremia  Anemia, R/o GIB, black stools  Failure to thrive  Hx of HTN, ASHD, CAD, cardiac arrhythmia, afib ( no AC), CHF  Hx of DLD  Hx of Hypothyroidism  Hx of Parkinsons, Mobility Dysfunction, frequent falls, Progressively Bedbound  Hx of OA, DDD, DJD, Osteoporosis, Kyphosis, Rib Fx    in the ER pt in hypoxic RF unresponsive to 100% NRM, intubated, sedated and placed on Vent support  pt noted to be in volume overload started on IV diuretics  hyponatremia of 124, improved to 130 afer IV diuretic, today 133  pt cultured and placed on IV Vanco and Meropenem  Nare MRSA is negative, may D/C Vanco  Pul-critical care consult:  pt transferred to the Vent unit, to maintain respiratory support  Renal consult and ff up appreciated  monitor CBC, renal parameters and electrolytes  check TSH and AM cortisol  advance care, health care proxy Violette Knight, introduced to palliative care, to determine  goals of care  guarded state  made DNR Acute Hypoxic Respiratory Failure, Pulmonary Edema, sp intubation  GHASSAN on CKD  Hyponatremia  Anemia, R/o GIB, black stools  Failure to thrive  Hx of HTN, ASHD, CAD, cardiac arrhythmia, afib ( no AC), CHF  Hx of DLD  Hx of Hypothyroidism  Hx of Parkinsons, Mobility Dysfunction, frequent falls, Progressively Bedbound  Hx of OA, DDD, DJD, Osteoporosis, Kyphosis, Rib Fx    pt off sedation, improved alertness, NG D/C started feeding via PEG, weaning off respirator in progress  in the ER pt in hypoxic RF unresponsive to 100% NRM, intubated, sedated and placed on Vent support  pt noted to be in volume overload started on IV diuretics  hyponatremia of 124, improved to 130 afer IV diuretic, today 133  pt cultured and placed on IV Vanco and Meropenem  Nare MRSA is negative, may D/C Vanco  Pul-critical care consult:  pt transferred to the Vent unit, to maintain respiratory support, weaning process  Renal consult and ff up appreciated  monitor CBC, renal parameters and electrolytes  check TSH and AM cortisol  advance care, health care proxy Violette Modica, introduced to palliative care, MOLST form signed to determine  goals of care  guarded state  made DNR/DNI

## 2019-10-16 NOTE — PROGRESS NOTE ADULT - SUBJECTIVE AND OBJECTIVE BOX
BOB ENGLE 86yo W Female sent from [facility] for worsening generalized weakness, lethargy and respiratory distress.  The pt was noted to be hypoxic and in pulmonary edema with a low Na of 124.  She was given IV lasix and a trial on a NRM butremained hypoxic at 80%. She was intubated and placed on ventilator support.  The pt had recently been hospitalized at the [facility] side and sent to SNF for rehabilitation.  She has multiple medical issues and is mostly bed bound.  The PMHx includes:  HTN, ASHD, CAD, CHF, afib ( no AC), DLD, Hypothyroidism CKD, Parkinsons, Mobility Dysfunction, OA, DDD, DJD, Osteoporosis, Kyphosis, Rib Fx,    INTERVAL HPI/OVERNIGHT EVENTS:  the pt is in the vent unit, remains on vent support, off sedation, more alert eyes opened, palliative care introduced to family    MEDICATIONS  (STANDING):  carbidopa/levodopa  10/100 1 Tablet(s) Oral daily  carbidopa/levodopa  25/100 1 Tablet(s) Oral at bedtime  chlorhexidine 0.12% Liquid 15 milliLiter(s) Oral Mucosa every 12 hours  clotrimazole 1% Topical Cream - Peds 1 Application(s) Topical two times a day  fentaNYL   Infusion. 0.5 MICROgram(s)/kG/Hr (3.75 mL/Hr) IV Continuous <Continuous>  insulin glargine Injectable (LANTUS) 6 Unit(s) SubCutaneous at bedtime  levothyroxine 75 MICROGram(s) Oral daily  meropenem  IVPB      meropenem  IVPB 500 milliGRAM(s) IV Intermittent every 12 hours  multivitamin 1 Tablet(s) Oral daily  pantoprazole  Injectable 40 milliGRAM(s) IV Push every 12 hours  propofol Infusion 10 MICROgram(s)/kG/Min (4.5 mL/Hr) IV Continuous <Continuous>  senna 2 Tablet(s) Oral at bedtime  vancomycin  IVPB 1000 milliGRAM(s) IV Intermittent every 24 hours    MEDICATIONS  (PRN):      Allergies    No Known Allergies  	    Vital Signs Last 24 Hrs    T(F): 98.5  HR: 77  BP: 122/85    RR: 16  SpO2: 100%    PHYSICAL EXAM:      Constitutional:  pt  off sedation, more alert, eyes opened and tracking,remains on vent support, thin, frail and chronically ill looking with gen pallor    Eyes:  opened, tracking, nonicteric    ENMT: + ET, + enteric tube, dental defects    Neck:  supple, + JVD, no bruits    Back:  TH kyphosis    Respiratory:  respirator BS, scattered rhonchi    Cardiovascular:  S1S2 irreg,     Gastrointestinal: sl distended but soft and benign, + PEG, NG to intermittent suction    Genitourinary:   + sanchez    Rectal: as per ER the STAS was + for dark stool    Extremities:  advanced arthritic changes, poor mm mass and tone, + pitting edema    Vascular: + radial pulses,  dec pedal pulses, + acrocyanosis    Neurological:  sedated    Skin: gen pallor, no rash    LABS:                      7.8   6.5 )-----------( 213                   23     10-13    133 (124)  |  89  |  69  ----------------------------<  117  4.0   |  30  |  1.9    GFR 25,23    Ca    9.4        Mg     2.5, 2.1  troponin <0.01  Dig <0.3  BNP 11,634    TPro  5.5<L>  /  Alb  2.9<L>  /  TBili  0.5  /  DBili  x   /  AST  16  /  ALT  <5  /  AlkPhos  99  10-13    PT/INR - ( 12 Oct 2019 21:55 )   PT: 13.70 sec;   INR: 1.19 ratio           Urinalysis Basic - ( 13 Oct 2019 06:30 )    Color: Light Yellow / Appearance: Clear / S.008 / pH: x  Gluc: x / Ketone: Negative  / Bili: Negative / Urobili: <2 mg/dL   Blood: x / Protein: Negative / Nitrite: Negative   Leuk Esterase: Large / RBC: 21 /HPF / WBC 75 /HPF   Sq Epi: x / Non Sq Epi: 1 /HPF / Bacteria: Negative        RADIOLOGY & ADDITIONAL TESTS:  CXR:  + ET, + enteric tube, bibasilar reticular changes    ECHO:  normal global ventricular sys function, mitral leaflet thickening, annular calcification, severe TR, sclerotic aortic valve, paradoxical septal motion ( ? R vent overload)    CTH:  volume loss, white matter changes, no acute intracranial hemorrhage BOB ENGLE 88yo W Female sent from [facility] for worsening generalized weakness, lethargy and respiratory distress.  The pt was noted to be hypoxic and in pulmonary edema with a low Na of 124.  She was given IV lasix and a trial on a NRM butremained hypoxic at 80%. She was intubated and placed on ventilator support.  The pt had recently been hospitalized at the [facility] and sent to SNF for rehabilitation.  She has multiple medical issues and is mostly bed bound.  The PMHx includes:  HTN, ASHD, CAD, CHF, afib ( no AC), DLD, Hypothyroidism CKD, Parkinsons, Mobility Dysfunction, OA, DDD, DJD, Osteoporosis, Kyphosis, Rib Fx,    INTERVAL HPI/OVERNIGHT EVENTS:  the pt is in the vent unit, remains on vent support, off sedation, more alert eyes opened, palliative care introduced to family, MOLST form signed, NG tube D/C, feedings via PEG    MEDICATIONS  (STANDING):  carbidopa/levodopa  10/100 1 Tablet(s) Oral daily  carbidopa/levodopa  25/100 1 Tablet(s) Oral at bedtime  chlorhexidine 0.12% Liquid 15 milliLiter(s) Oral Mucosa every 12 hours  clotrimazole 1% Topical Cream - Peds 1 Application(s) Topical two times a day  fentaNYL   Infusion. 0.5 MICROgram(s)/kG/Hr (3.75 mL/Hr) IV Continuous <Continuous>  insulin glargine Injectable (LANTUS) 6 Unit(s) SubCutaneous at bedtime  levothyroxine 75 MICROGram(s) Oral daily  meropenem  IVPB      meropenem  IVPB 500 milliGRAM(s) IV Intermittent every 12 hours  multivitamin 1 Tablet(s) Oral daily  pantoprazole  Injectable 40 milliGRAM(s) IV Push every 12 hours  propofol Infusion 10 MICROgram(s)/kG/Min (4.5 mL/Hr) IV Continuous <Continuous>  senna 2 Tablet(s) Oral at bedtime  vancomycin  IVPB 1000 milliGRAM(s) IV Intermittent every 24 hours    MEDICATIONS  (PRN):      Allergies    No Known Allergies  	    Vital Signs Last 24 Hrs    T(F): 98.4  HR: 64  BP: 151/67    RR: 16  SpO2: 100%, , rate 12, FIO2 30%    PHYSICAL EXAM:      Constitutional:  pt  off sedation, more alert, eyes opened,  on vent support, thin, frail and chronically ill looking with gen pallor    Eyes:  opened, tracking, nonicteric    ENMT: + ET,  dental defects    Neck:  supple, + JVD, no bruits    Back:  TH kyphosis    Respiratory:  respirator BS, scattered rhonchi    Cardiovascular:  S1S2 irreg,     Gastrointestinal: sl distended but soft and benign, + PEG, NG to intermittent suction    Genitourinary:   + sanchez    Rectal: as per ER the STAS was + for dark stool    Extremities:  advanced arthritic changes, poor mm mass and tone, + pitting edema    Vascular: + radial pulses,  dec pedal pulses, + acrocyanosis    Neurological:  sedated    Skin: gen pallor, no rash    LABS:                      7.8   6.5 )-----------( 213                   23     10-13    133 (124)  |  89  |  69  ----------------------------<  117  4.0   |  30  |  1.9    GFR 25,23    Ca    9.4        Mg     2.5, 2.1  troponin <0.01  Dig <0.3  BNP 11,634    TPro  5.5<L>  /  Alb  2.9<L>  /  TBili  0.5  /  DBili  x   /  AST  16  /  ALT  <5  /  AlkPhos  99  10-13    PT/INR - ( 12 Oct 2019 21:55 )   PT: 13.70 sec;   INR: 1.19 ratio           Urinalysis Basic - ( 13 Oct 2019 06:30 )    Color: Light Yellow / Appearance: Clear / S.008 / pH: x  Gluc: x / Ketone: Negative  / Bili: Negative / Urobili: <2 mg/dL   Blood: x / Protein: Negative / Nitrite: Negative   Leuk Esterase: Large / RBC: 21 /HPF / WBC 75 /HPF   Sq Epi: x / Non Sq Epi: 1 /HPF / Bacteria: Negative        RADIOLOGY & ADDITIONAL TESTS:  CXR:  + ET, + enteric tube, bibasilar reticular changes    ECHO:  normal global ventricular sys function, mitral leaflet thickening, annular calcification, severe TR, sclerotic aortic valve, paradoxical septal motion ( ? R vent overload)    CTH:  volume loss, white matter changes, no acute intracranial hemorrhage

## 2019-10-17 LAB
ALBUMIN SERPL ELPH-MCNC: 3.3 G/DL — LOW (ref 3.5–5.2)
ALP SERPL-CCNC: 95 U/L — SIGNIFICANT CHANGE UP (ref 30–115)
ALT FLD-CCNC: 8 U/L — SIGNIFICANT CHANGE UP (ref 0–41)
ANION GAP SERPL CALC-SCNC: 12 MMOL/L — SIGNIFICANT CHANGE UP (ref 7–14)
AST SERPL-CCNC: 15 U/L — SIGNIFICANT CHANGE UP (ref 0–41)
BILIRUB SERPL-MCNC: 0.5 MG/DL — SIGNIFICANT CHANGE UP (ref 0.2–1.2)
BUN SERPL-MCNC: 49 MG/DL — HIGH (ref 10–20)
CALCIUM SERPL-MCNC: 9.1 MG/DL — SIGNIFICANT CHANGE UP (ref 8.5–10.1)
CHLORIDE SERPL-SCNC: 91 MMOL/L — LOW (ref 98–110)
CO2 SERPL-SCNC: 38 MMOL/L — HIGH (ref 17–32)
CREAT SERPL-MCNC: 1.2 MG/DL — SIGNIFICANT CHANGE UP (ref 0.7–1.5)
GLUCOSE BLDC GLUCOMTR-MCNC: 120 MG/DL — HIGH (ref 70–99)
GLUCOSE BLDC GLUCOMTR-MCNC: 126 MG/DL — HIGH (ref 70–99)
GLUCOSE BLDC GLUCOMTR-MCNC: 129 MG/DL — HIGH (ref 70–99)
GLUCOSE BLDC GLUCOMTR-MCNC: 157 MG/DL — HIGH (ref 70–99)
GLUCOSE SERPL-MCNC: 108 MG/DL — HIGH (ref 70–99)
HCT VFR BLD CALC: 26.9 % — LOW (ref 37–47)
HGB BLD-MCNC: 8.5 G/DL — LOW (ref 12–16)
MAGNESIUM SERPL-MCNC: 1.9 MG/DL — SIGNIFICANT CHANGE UP (ref 1.8–2.4)
MCHC RBC-ENTMCNC: 28.1 PG — SIGNIFICANT CHANGE UP (ref 27–31)
MCHC RBC-ENTMCNC: 31.6 G/DL — LOW (ref 32–37)
MCV RBC AUTO: 89.1 FL — SIGNIFICANT CHANGE UP (ref 81–99)
NRBC # BLD: 0 /100 WBCS — SIGNIFICANT CHANGE UP (ref 0–0)
PHOSPHATE SERPL-MCNC: 3.6 MG/DL — SIGNIFICANT CHANGE UP (ref 2.1–4.9)
PLATELET # BLD AUTO: 233 K/UL — SIGNIFICANT CHANGE UP (ref 130–400)
POTASSIUM SERPL-MCNC: 3.4 MMOL/L — LOW (ref 3.5–5)
POTASSIUM SERPL-SCNC: 3.4 MMOL/L — LOW (ref 3.5–5)
PROT SERPL-MCNC: 5.9 G/DL — LOW (ref 6–8)
RBC # BLD: 3.02 M/UL — LOW (ref 4.2–5.4)
RBC # FLD: 19.3 % — HIGH (ref 11.5–14.5)
SODIUM SERPL-SCNC: 141 MMOL/L — SIGNIFICANT CHANGE UP (ref 135–146)
WBC # BLD: 9.06 K/UL — SIGNIFICANT CHANGE UP (ref 4.8–10.8)
WBC # FLD AUTO: 9.06 K/UL — SIGNIFICANT CHANGE UP (ref 4.8–10.8)

## 2019-10-17 PROCEDURE — 99232 SBSQ HOSP IP/OBS MODERATE 35: CPT

## 2019-10-17 RX ADMIN — CARBIDOPA AND LEVODOPA 1 TABLET(S): 25; 100 TABLET ORAL at 13:55

## 2019-10-17 RX ADMIN — Medication 1 APPLICATION(S): at 18:42

## 2019-10-17 RX ADMIN — MEROPENEM 100 MILLIGRAM(S): 1 INJECTION INTRAVENOUS at 18:42

## 2019-10-17 RX ADMIN — CHLORHEXIDINE GLUCONATE 15 MILLILITER(S): 213 SOLUTION TOPICAL at 05:06

## 2019-10-17 RX ADMIN — Medication 75 MICROGRAM(S): at 05:02

## 2019-10-17 RX ADMIN — INSULIN GLARGINE 6 UNIT(S): 100 INJECTION, SOLUTION SUBCUTANEOUS at 21:36

## 2019-10-17 RX ADMIN — MEROPENEM 100 MILLIGRAM(S): 1 INJECTION INTRAVENOUS at 05:03

## 2019-10-17 RX ADMIN — CARBIDOPA AND LEVODOPA 1 TABLET(S): 25; 100 TABLET ORAL at 21:36

## 2019-10-17 RX ADMIN — Medication 1 APPLICATION(S): at 05:06

## 2019-10-17 RX ADMIN — Medication 1 APPLICATION(S): at 05:03

## 2019-10-17 RX ADMIN — SENNA PLUS 2 TABLET(S): 8.6 TABLET ORAL at 21:36

## 2019-10-17 RX ADMIN — Medication 40 MILLIGRAM(S): at 05:03

## 2019-10-17 RX ADMIN — CHLORHEXIDINE GLUCONATE 15 MILLILITER(S): 213 SOLUTION TOPICAL at 18:42

## 2019-10-17 RX ADMIN — PANTOPRAZOLE SODIUM 40 MILLIGRAM(S): 20 TABLET, DELAYED RELEASE ORAL at 18:42

## 2019-10-17 RX ADMIN — PANTOPRAZOLE SODIUM 40 MILLIGRAM(S): 20 TABLET, DELAYED RELEASE ORAL at 05:03

## 2019-10-17 NOTE — PROGRESS NOTE ADULT - SUBJECTIVE AND OBJECTIVE BOX
BOB ENGLE 86yo W Female sent from [facility] for worsening generalized weakness, lethargy and respiratory distress.  The pt was noted to be hypoxic and in pulmonary edema with a low Na of 124.  She was given IV lasix and a trial on a NRM butremained hypoxic at 80%. She was intubated and placed on ventilator support.  The pt had recently been hospitalized at the [facility] and sent to SNF for rehabilitation.  She has multiple medical issues and is mostly bed bound.  The PMHx includes:  HTN, ASHD, CAD, CHF, afib ( no AC), DLD, Hypothyroidism CKD, Parkinsons, Mobility Dysfunction, OA, DDD, DJD, Osteoporosis, Kyphosis, Rib Fx,    INTERVAL HPI/OVERNIGHT EVENTS:  the pt is in the vent unit, extubated, being fed via PEG, VS stable, palliative care involved  MEDICATIONS  (STANDING):  carbidopa/levodopa  10/100 1 Tablet(s) Oral daily  carbidopa/levodopa  25/100 1 Tablet(s) Oral at bedtime  chlorhexidine 0.12% Liquid 15 milliLiter(s) Oral Mucosa every 12 hours  clotrimazole 1% Topical Cream - Peds 1 Application(s) Topical two times a day  fentaNYL   Infusion. 0.5 MICROgram(s)/kG/Hr (3.75 mL/Hr) IV Continuous <Continuous>  insulin glargine Injectable (LANTUS) 6 Unit(s) SubCutaneous at bedtime  levothyroxine 75 MICROGram(s) Oral daily  meropenem  IVPB      meropenem  IVPB 500 milliGRAM(s) IV Intermittent every 12 hours  multivitamin 1 Tablet(s) Oral daily  pantoprazole  Injectable 40 milliGRAM(s) IV Push every 12 hours  propofol Infusion 10 MICROgram(s)/kG/Min (4.5 mL/Hr) IV Continuous <Continuous>  senna 2 Tablet(s) Oral at bedtime  vancomycin  IVPB 1000 milliGRAM(s) IV Intermittent every 24 hours    MEDICATIONS  (PRN):      Allergies    No Known Allergies  	    Vital Signs Last 24 Hrs    T(F): 96.5  HR: 64  BP: 142/60    RR: 20  SpO2: 100%, NC    PHYSICAL EXAM:      Constitutional:  pt  off vent support on O2 NC, elderly WF, chronically ill looking, nonverbal but in nAD    Eyes:  opened, tracking, nonicteric    ENMT: + ET,  dental defects    Neck:  supple, + JVD, no bruits    Back:  TH kyphosis    Respiratory:  shallow respirations, no crackles, rales or wheezing    Cardiovascular:  S1S2 irreg, II/VI OZ    Gastrointestinal: sl distended but soft and benign, + PEG, feeding in progress    Genitourinary:   + sanchez      Extremities:  advanced arthritic changes, mild edema of the R hand and forearm,     Vascular: + radial pulses,  dec pedal pulses, resolved acrocyanosis, fingertips pink and warm    Neurological:  alert, calm    LABS:                      8.5  9 )-----------( 233                 26.9     10-13    141)  | 91  |  49  ----------------------------<  108  3.4   |  38 |  1.2    GFR 25,23, 41    Ca    9.4        Mg     2.5, 2.1  troponin <0.01  Dig <0.3  BNP 11,634    TPro  5.5<L>  /  Alb  2.9<L>  /  TBili  0.5  /  DBili  x   /  AST  16  /  ALT  <5  /  AlkPhos  99  10-13    PT/INR - ( 12 Oct 2019 21:55 )   PT: 13.70 sec;   INR: 1.19 ratio           Urinalysis Basic - ( 13 Oct 2019 06:30 )    Color: Light Yellow / Appearance: Clear / S.008 / pH: x  Gluc: x / Ketone: Negative  / Bili: Negative / Urobili: <2 mg/dL   Blood: x / Protein: Negative / Nitrite: Negative   Leuk Esterase: Large / RBC: 21 /HPF / WBC 75 /HPF   Sq Epi: x / Non Sq Epi: 1 /HPF / Bacteria: Negative        RADIOLOGY & ADDITIONAL TESTS:  CXR:  + ET, + enteric tube, bibasilar reticular changes    ECHO:  normal global ventricular sys function, mitral leaflet thickening, annular calcification, severe TR, sclerotic aortic valve, paradoxical septal motion ( ? R vent overload)    CTH:  volume loss, white matter changes, no acute intracranial hemorrhage

## 2019-10-17 NOTE — PROGRESS NOTE ADULT - ASSESSMENT
Assessment	    Acute Hypoxic Respiratory Failure, Pulmonary Edema, sp intubation  GHASSAN on CKD  Hyponatremia  Anemia      PLAN:    CNS: Keep off sedation     HEENT: Oral care    PULMONARY:  HOB @ 45 degrees. Extubated yesterday. Nasal Cannula     CARDIOVASCULAR: Normal Sinus now.     GI: GI prophylaxis. Feeding via PEG     RENAL:  Follow up lytes.  Correct as needed    INFECTIOUS DISEASE: Follow up cultures. Continue meropenum at this time.     HEMATOLOGICAL:  DVT prophylaxis.    ENDOCRINE:  Follow up FS.  Insulin protocol if needed.    MUSCULOSKELETAL: OOB if tolerates    Hospice Care Following as per family request   DNI/DNR

## 2019-10-17 NOTE — CHART NOTE - NSCHARTNOTEFT_GEN_A_CORE
Registered Dietitian Follow-Up     Patient Profile Reviewed                           Yes [x]   No []     Nutrition History Previously Obtained        Yes [x]  No []       Pertinent Subjective Information: Spoke w/ RN - pt extubated yesterday. Tolerating TF well.      Pertinent Medical Interventions: Acute Hypoxic Respiratory Failure, Pulmonary Edema, s/p extubation 1 day. Seen by palliative. Hospice c/s placed. DNR/DNI.      Diet order: Glucerna 1.2 240 ml q 6 hours (provides 1152 kcals, 57 gms protein, 778 ml free H2O)     Anthropometrics:  - Ht. 60 in   - Wt. 76.3 kg (10/14) no new weights   - %wt change  - BMI 32.8   - IBW: 45.3 kg     Pertinent Lab Data:   (10/17) H/H 8.5/26.9  K 3.4  BUN 49  glucose 108  GFR 41      Pertinent Meds: meropenem, lantus, lasix, synthroid, protonix      Physical Findings:  - Appearance: sleeping, extubated   - GI function: 10/16   - Tubes: PEG  - Oral/Mouth cavity: NPO  - Skin: BS 13 ecchymosis, 3+ b/l hand edema      Nutrition Requirements   Weight Used: ABW: 76.3 kg, needs adjusted pt extubated      Estimated Energy Needs    Continue []  Adjust [x] 1120 - 1232 kcals/day (MSJ x 1.0 - 1.1 AF for obesity)  Adjusted Energy Recommendations:   kcal/day        Estimated Protein Needs    Continue []  Adjust [x] 50 - 59 gms/day (1.1 - 1.3 gms/kg IBW)  Adjusted Protein Recommendations:   gm/day        Estimated Fluid Needs        Continue []  Adjust [x] per VENT team   Adjusted Fluid Recommendations:   mL/day     Nutrient Intake: meeting needs at goal     [] Previous Nutrition Diagnosis: Inadequate protein-energy intake.            [] Ongoing          [x] Resolved    [] No active nutrition diagnosis identified at this time    Nutrition Intervention EN      Goal/Expected Outcome: Pt to continue meeting % of estimated needs in 7 days      Indicator/Monitoring: RD to monitor energy intake, diet order, body comp, NFPF    Recommendation: Continue Glucerna 1.2 240 ml q 6 hours (provides 1152 kcals, 57 gms protein, 778 ml free H2O)

## 2019-10-17 NOTE — PROGRESS NOTE ADULT - ASSESSMENT
87yFemale being evaluated for goals of care and symptom management. Pt appears comfortable today s/p intubation. Pt had gastric decompression, now tolerating  G tube feedings. Pt on IV ABT for pneumonia. Met with pt's daughter and health surrogate yesterday. Pt DNR/DNI. Discussed hospice, consult was done. Hospice following for d/c plan.     Overall pt has poor prognosis is stable today. Palliative SW called daughter to see if we could touch base tomorrow to discuss further comfort measures vs limited medical interventions. Family had voiced they did not want to escalate care.       Recommendations:  DNR/DNI  hospice consult done, family receptive  ongoing medical management w IV ABT and feedings  Pt's health surrogate coming tomorrow at 10am palliative will check in  call x [phone] for any issues r/t symptom management or goals of care

## 2019-10-17 NOTE — PROGRESS NOTE ADULT - SUBJECTIVE AND OBJECTIVE BOX
BOB ENGLE  87y  Female    Patient is a 87y old  Female who presents with a chief complaint of Acute on chronic hypoxic RF, sp intubation, volume overload (16 Oct 2019 22:53)      SUBJECTIVE/OVERNIGHT EVENTS: None s/p extubation 1 day     Hospital Day: 4      PAST MEDICAL & SURGICAL HISTORY:  Hyperglycemia  Dysphagia  Respiratory distress  Parkinson disease  Hypothyroid  Rib fractures  Congestive heart failure  Hypertension  No significant past surgical history    MEDICATIONS  (STANDING):  BACItracin   Ointment 1 Application(s) Topical two times a day  carbidopa/levodopa  10/100 1 Tablet(s) Oral daily  carbidopa/levodopa  25/100 1 Tablet(s) Oral at bedtime  chlorhexidine 0.12% Liquid 15 milliLiter(s) Oral Mucosa every 12 hours  clotrimazole 1% Topical Cream - Peds 1 Application(s) Topical two times a day  furosemide   Injectable 40 milliGRAM(s) IV Push daily  insulin glargine Injectable (LANTUS) 6 Unit(s) SubCutaneous at bedtime  levothyroxine 75 MICROGram(s) Oral daily  meropenem  IVPB      meropenem  IVPB 500 milliGRAM(s) IV Intermittent every 12 hours  pantoprazole  Injectable 40 milliGRAM(s) IV Push every 12 hours  senna 2 Tablet(s) Oral at bedtime    MEDICATIONS  (PRN):      OBJECTIVE:    Vital Signs Last 24 Hrs  T(C): 35.8 (17 Oct 2019 08:14), Max: 36.9 (16 Oct 2019 15:36)  T(F): 96.5 (17 Oct 2019 08:14), Max: 98.4 (16 Oct 2019 15:36)  HR: 68 (17 Oct 2019 08:14) (59 - 68)  BP: 142/66 (17 Oct 2019 08:14) (134/62 - 151/67)  BP(mean): 95 (17 Oct 2019 08:14) (89 - 96)  RR: 20 (17 Oct 2019 08:14) (20 - 20)  SpO2: 100% (16 Oct 2019 23:41) (98% - 100%)    General: In NAD  HEENT: Nasal Cannula               Neck: Supple.   No Cervical LN    Lungs: Bilateral BS  Cardiovascular: Regular   Abdomen: Soft. + BS  Extremities: No Clubbing, moves all extremities    Skin: Intact, Warm   Neurological: Non Focal     I&O's Summary    16 Oct 2019 07:01  -  17 Oct 2019 07:00  --------------------------------------------------------  IN: 1230 mL / OUT: 250 mL / NET: 980 mL        BOWEL MOVEMENTS:    PRESSORS: No   SEDATION: No   VENTED: No       LABS:                          8.5    9.06  )-----------( 233      ( 17 Oct 2019 06:47 )             26.9                                               10-17    141  |  91<L>  |  49<H>  ----------------------------<  108<H>  3.4<L>   |  38<H>  |  1.2    Ca    9.1      17 Oct 2019 06:47  Phos  3.6     10-17  Mg     1.9     10-17    TPro  5.9<L>  /  Alb  3.3<L>  /  TBili  0.5  /  DBili  x   /  AST  15  /  ALT  8   /  AlkPhos  95  10-17                                                                                           LIVER FUNCTIONS - ( 17 Oct 2019 06:47 )  Alb: 3.3 g/dL / Pro: 5.9 g/dL / ALK PHOS: 95 U/L / ALT: 8 U/L / AST: 15 U/L / GGT: x                                                                                                                                   ABG - ( 16 Oct 2019 09:29 )  pH, Arterial: 7.50  pH, Blood: x     /  pCO2: 51    /  pO2: 111   / HCO3: 39    / Base Excess: 14.3  /  SaO2: 99

## 2019-10-17 NOTE — PROGRESS NOTE ADULT - ASSESSMENT
Acute Hypoxic Respiratory Failure, Pulmonary Edema, sp intubation, extubated 10/16  GHASSAN on CKD, resolved  Hyponatremia  Anemia, R/o GIB, black stools  Failure to thrive  Hx of HTN, ASHD, CAD, cardiac arrhythmia, afib ( no AC), CHF  Hx of DLD  Hx of Hypothyroidism  Hx of Parkinsons, Mobility Dysfunction, frequent falls, Progressively Bedbound  Hx of OA, DDD, DJD, Osteoporosis, Kyphosis, Rib Fx    pt extubated yesterday 10/16, remain on O2, oxygenating well, acrocyanosis resolved  nutrition via PEG  discussed pt with granddaughter at the bedside  palliative care involved, to meet with family again  MOLST signed  pt made DNR/DNI

## 2019-10-17 NOTE — PROGRESS NOTE ADULT - SUBJECTIVE AND OBJECTIVE BOX
87yFemale with diagnosis: FLUID OVERLOAD;RESPIRATORY DISTRESS;ANEMIA;BLACK STOOL;HYPERKALEMIA      Patient seen for follow up      PHYSICAL EXAM  Pt sleeping but arousible  no dyspnea noted, no pain   tolerating G tube feedings    T(C): , Max: 36.9 (15:36)  T(F): 96.5  HR: 68 (59 - 68)  BP: 142/66 (134/62 - 151/67)  RR: 20 (20 - 20)  SpO2: 100% (98% - 100%)              LABS:                          8.5    9.06  )-----------( 233      ( 17 Oct 2019 06:47 )             26.9                                                                                      10-17    141  |  91<L>  |  49<H>  ----------------------------<  108<H>  3.4<L>   |  38<H>  |  1.2    Ca    9.1      17 Oct 2019 06:47  Phos  3.6     10-17  Mg     1.9     10-17    TPro  5.9<L>  /  Alb  3.3<L>  /  TBili  0.5  /  DBili  x   /  AST  15  /  ALT  8   /  AlkPhos  95  10-17                                                      MEDICATIONS  (STANDING):  BACItracin   Ointment 1 Application(s) Topical two times a day  carbidopa/levodopa  10/100 1 Tablet(s) Oral daily  carbidopa/levodopa  25/100 1 Tablet(s) Oral at bedtime  chlorhexidine 0.12% Liquid 15 milliLiter(s) Oral Mucosa every 12 hours  clotrimazole 1% Topical Cream - Peds 1 Application(s) Topical two times a day  furosemide   Injectable 40 milliGRAM(s) IV Push daily  insulin glargine Injectable (LANTUS) 6 Unit(s) SubCutaneous at bedtime  levothyroxine 75 MICROGram(s) Oral daily  meropenem  IVPB      meropenem  IVPB 500 milliGRAM(s) IV Intermittent every 12 hours  pantoprazole  Injectable 40 milliGRAM(s) IV Push every 12 hours  senna 2 Tablet(s) Oral at bedtime    MEDICATIONS  (PRN):

## 2019-10-18 LAB
CULTURE RESULTS: SIGNIFICANT CHANGE UP
GLUCOSE BLDC GLUCOMTR-MCNC: 143 MG/DL — HIGH (ref 70–99)
GLUCOSE BLDC GLUCOMTR-MCNC: 167 MG/DL — HIGH (ref 70–99)
GLUCOSE BLDC GLUCOMTR-MCNC: 177 MG/DL — HIGH (ref 70–99)
SPECIMEN SOURCE: SIGNIFICANT CHANGE UP

## 2019-10-18 PROCEDURE — 99232 SBSQ HOSP IP/OBS MODERATE 35: CPT

## 2019-10-18 PROCEDURE — 71045 X-RAY EXAM CHEST 1 VIEW: CPT | Mod: 26

## 2019-10-18 RX ORDER — POTASSIUM CHLORIDE 20 MEQ
20 PACKET (EA) ORAL ONCE
Refills: 0 | Status: COMPLETED | OUTPATIENT
Start: 2019-10-18 | End: 2019-10-18

## 2019-10-18 RX ORDER — MORPHINE SULFATE 50 MG/1
2 CAPSULE, EXTENDED RELEASE ORAL EVERY 4 HOURS
Refills: 0 | Status: DISCONTINUED | OUTPATIENT
Start: 2019-10-18 | End: 2019-10-21

## 2019-10-18 RX ORDER — AMLODIPINE BESYLATE 2.5 MG/1
10 TABLET ORAL DAILY
Refills: 0 | Status: DISCONTINUED | OUTPATIENT
Start: 2019-10-18 | End: 2019-10-21

## 2019-10-18 RX ADMIN — Medication 75 MICROGRAM(S): at 05:36

## 2019-10-18 RX ADMIN — CARBIDOPA AND LEVODOPA 1 TABLET(S): 25; 100 TABLET ORAL at 11:34

## 2019-10-18 RX ADMIN — SENNA PLUS 2 TABLET(S): 8.6 TABLET ORAL at 21:34

## 2019-10-18 RX ADMIN — AMLODIPINE BESYLATE 10 MILLIGRAM(S): 2.5 TABLET ORAL at 17:03

## 2019-10-18 RX ADMIN — CHLORHEXIDINE GLUCONATE 15 MILLILITER(S): 213 SOLUTION TOPICAL at 05:36

## 2019-10-18 RX ADMIN — MORPHINE SULFATE 2 MILLIGRAM(S): 50 CAPSULE, EXTENDED RELEASE ORAL at 15:40

## 2019-10-18 RX ADMIN — CARBIDOPA AND LEVODOPA 1 TABLET(S): 25; 100 TABLET ORAL at 21:35

## 2019-10-18 RX ADMIN — MORPHINE SULFATE 2 MILLIGRAM(S): 50 CAPSULE, EXTENDED RELEASE ORAL at 19:42

## 2019-10-18 RX ADMIN — Medication 1 APPLICATION(S): at 05:36

## 2019-10-18 RX ADMIN — Medication 1 APPLICATION(S): at 17:03

## 2019-10-18 RX ADMIN — CHLORHEXIDINE GLUCONATE 15 MILLILITER(S): 213 SOLUTION TOPICAL at 17:03

## 2019-10-18 RX ADMIN — MORPHINE SULFATE 2 MILLIGRAM(S): 50 CAPSULE, EXTENDED RELEASE ORAL at 11:32

## 2019-10-18 RX ADMIN — Medication 40 MILLIGRAM(S): at 05:36

## 2019-10-18 RX ADMIN — Medication 50 MILLIEQUIVALENT(S): at 15:40

## 2019-10-18 RX ADMIN — Medication 1 APPLICATION(S): at 17:05

## 2019-10-18 RX ADMIN — MEROPENEM 100 MILLIGRAM(S): 1 INJECTION INTRAVENOUS at 05:36

## 2019-10-18 RX ADMIN — PANTOPRAZOLE SODIUM 40 MILLIGRAM(S): 20 TABLET, DELAYED RELEASE ORAL at 17:04

## 2019-10-18 RX ADMIN — Medication 1 APPLICATION(S): at 05:37

## 2019-10-18 RX ADMIN — MORPHINE SULFATE 2 MILLIGRAM(S): 50 CAPSULE, EXTENDED RELEASE ORAL at 12:30

## 2019-10-18 RX ADMIN — PANTOPRAZOLE SODIUM 40 MILLIGRAM(S): 20 TABLET, DELAYED RELEASE ORAL at 05:36

## 2019-10-18 RX ADMIN — MEROPENEM 100 MILLIGRAM(S): 1 INJECTION INTRAVENOUS at 17:04

## 2019-10-18 RX ADMIN — INSULIN GLARGINE 6 UNIT(S): 100 INJECTION, SOLUTION SUBCUTANEOUS at 21:34

## 2019-10-18 RX ADMIN — MORPHINE SULFATE 2 MILLIGRAM(S): 50 CAPSULE, EXTENDED RELEASE ORAL at 17:06

## 2019-10-18 NOTE — PROGRESS NOTE ADULT - ASSESSMENT
Acute Hypoxic Respiratory Failure, Pulmonary Edema, sp intubation, extubated 10/16  GHASSAN on CKD, resolved  Hyponatremia  Anemia, R/o GIB, black stools  Failure to thrive  Hx of HTN, ASHD, CAD, cardiac arrhythmia, afib ( no AC), CHF  Hx of DLD  Hx of Hypothyroidism  Hx of Parkinsons, Mobility Dysfunction, frequent falls, Progressively Bedbound  Hx of OA, DDD, DJD, Osteoporosis, Kyphosis, Rib Fx  DNR/DNI, comfort care    pt extubated yesterday 10/16, remains on O2  nutrition via PEG as per family wishes  complete course of Abx  Morphine drip    palliative care involved  SONGST signed  pt made DNR/DNI

## 2019-10-18 NOTE — PROGRESS NOTE ADULT - ASSESSMENT
87yFemale being evaluated for goals of care and symptom management. Pt's daughter Roxanna suarez present with her daughter to discuss plan of care. Medical update given by NP from Vent unit. Family expressed they don't want escalation if care and want comfort focused care. Continue ABT and feedings          Recommendations:  DNR/DNI  comfort focused care  Morphine 2mg IV Q 4 HRS PRN  Hospice d/c planning 87yFemale being evaluated for goals of care and symptom management. Pt's daughter Roxanna suarez present with her daughter to discuss plan of care. Medical update given by NP from Vent unit. Family expressed they don't want escalation if care and want comfort focused care. Continue ABT and feedings but no pressors or dialysis    Pt complaining of SOB, is alert and following simple commands. Pt started Morphine 2mg Iv PRN, with good response. Family would is agreeable to SNF with hospice. Pt still getting IV ABT and meds adjusted for comfort.      Recommendations:  DNR/DNI  comfort focused care  Morphine 2mg IV Q 4 HRS PRN  Hospice d/c planning  call x [phone] if symptoms worsening.

## 2019-10-18 NOTE — PROGRESS NOTE ADULT - SUBJECTIVE AND OBJECTIVE BOX
BOB ENGLE 86yo W Female sent from [facility] for worsening generalized weakness, lethargy and respiratory distress.  The pt was noted to be hypoxic and in pulmonary edema with a low Na of 124.  She was given IV lasix and a trial on a NRM butremained hypoxic at 80%. She was intubated and placed on ventilator support.  The pt had recently been hospitalized [facility] the [facility] and sent to SNF for rehabilitation.  She has multiple medical issues and is mostly bed bound.  The PMHx includes:  HTN, ASHD, CAD, CHF, afib ( no AC), DLD, Hypothyroidism CKD, Parkinsons, Mobility Dysfunction, OA, DDD, DJD, Osteoporosis, Kyphosis, Rib Fx,    INTERVAL HPI/OVERNIGHT EVENTS:  the pt is in the vent unit, DNR/DNI in place,  on O2 NC, morphine drip, nutrition via PEG, comfort measures only, palliative care involved    MEDICATIONS  (STANDING):  carbidopa/levodopa  10/100 1 Tablet(s) Oral daily  carbidopa/levodopa  25/100 1 Tablet(s) Oral at bedtime  chlorhexidine 0.12% Liquid 15 milliLiter(s) Oral Mucosa every 12 hours  clotrimazole 1% Topical Cream - Peds 1 Application(s) Topical two times a day  fentaNYL   Infusion. 0.5 MICROgram(s)/kG/Hr (3.75 mL/Hr) IV Continuous <Continuous>  insulin glargine Injectable (LANTUS) 6 Unit(s) SubCutaneous at bedtime  levothyroxine 75 MICROGram(s) Oral daily  meropenem  IVPB      meropenem  IVPB 500 milliGRAM(s) IV Intermittent every 12 hours  multivitamin 1 Tablet(s) Oral daily  pantoprazole  Injectable 40 milliGRAM(s) IV Push every 12 hours  propofol Infusion 10 MICROgram(s)/kG/Min (4.5 mL/Hr) IV Continuous <Continuous>  senna 2 Tablet(s) Oral at bedtime  vancomycin  IVPB 1000 milliGRAM(s) IV Intermittent every 24 hours    MEDICATIONS  (PRN):      Allergies    No Known Allergies  	    Vital Signs Last 24 Hrs    T(F): 96.8  HR: 100  BP: 182/88    RR: 20  SpO2: 100%, NC    PHYSICAL EXAM:      Constitutional:  pt i an elderly, chronically ill looking F, on comfort care and IV morphine drip, comfortable and in NAD, + O2 NC    Eyes:  opened, tracking, nonicteric    ENMT: + ET,  dental defects    Neck:  supple, + JVD, no bruits    Back:  TH kyphosis    Respiratory:  shallow respirations, no crackles, rales or wheezing    Cardiovascular:  S1S2 irreg, II/VI OZ    Gastrointestinal: sl distended but soft and benign, + PEG, feeding in progress    Genitourinary:   + sanchez      Extremities:  advanced arthritic changes, mild edema of the R hand and forearm,     Vascular: + radial pulses,  dec pedal pulses, resolved acrocyanosis, fingertips pink and warm    Neurological:  alert, calm    LABS:     10/17                 8.5  9 )-----------( 233                 26.9     10-13    141)  | 91  |  49  ----------------------------<  108  3.4   |  38 |  1.2    GFR 25,23, 41    Ca    9.4        Mg     2.5, 2.1  troponin <0.01  Dig <0.3  BNP 11,634    TPro  5.5<L>  /  Alb  2.9<L>  /  TBili  0.5  /  DBili  x   /  AST  16  /  ALT  <5  /  AlkPhos  99  10-13    PT/INR - ( 12 Oct 2019 21:55 )   PT: 13.70 sec;   INR: 1.19 ratio           Urinalysis Basic - ( 13 Oct 2019 06:30 )    Color: Light Yellow / Appearance: Clear / S.008 / pH: x  Gluc: x / Ketone: Negative  / Bili: Negative / Urobili: <2 mg/dL   Blood: x / Protein: Negative / Nitrite: Negative   Leuk Esterase: Large / RBC: 21 /HPF / WBC 75 /HPF   Sq Epi: x / Non Sq Epi: 1 /HPF / Bacteria: Negative        RADIOLOGY & ADDITIONAL TESTS:  CXR:  + ET, + enteric tube, bibasilar reticular changes    ECHO:  normal global ventricular sys function, mitral leaflet thickening, annular calcification, severe TR, sclerotic aortic valve, paradoxical septal motion ( ? R vent overload)    CTH:  volume loss, white matter changes, no acute intracranial hemorrhage

## 2019-10-18 NOTE — PROGRESS NOTE ADULT - SUBJECTIVE AND OBJECTIVE BOX
87yFemale with diagnosis: FLUID OVERLOAD;RESPIRATORY DISTRESS;ANEMIA;BLACK STOOL;HYPERKALEMIA      Patient seen,       PHYSICAL EXAM    T(C): , Max: 36.3 (23:00)  T(F): 96.9  HR: 110 (69 - 110)  BP: 188/86 (136/63 - 188/86)  RR: 20 (18 - 20)  SpO2: 99% (99% - 99%)              LABS:                          8.5    9.06  )-----------( 233      ( 17 Oct 2019 06:47 )             26.9                                                                                      10-17    141  |  91<L>  |  49<H>  ----------------------------<  108<H>  3.4<L>   |  38<H>  |  1.2    Ca    9.1      17 Oct 2019 06:47  Phos  3.6     10-17  Mg     1.9     10-17    TPro  5.9<L>  /  Alb  3.3<L>  /  TBili  0.5  /  DBili  x   /  AST  15  /  ALT  8   /  AlkPhos  95  10-17                                                      MEDICATIONS  (STANDING):  BACItracin   Ointment 1 Application(s) Topical two times a day  carbidopa/levodopa  10/100 1 Tablet(s) Oral daily  carbidopa/levodopa  25/100 1 Tablet(s) Oral at bedtime  chlorhexidine 0.12% Liquid 15 milliLiter(s) Oral Mucosa every 12 hours  clotrimazole 1% Topical Cream - Peds 1 Application(s) Topical two times a day  furosemide   Injectable 40 milliGRAM(s) IV Push daily  insulin glargine Injectable (LANTUS) 6 Unit(s) SubCutaneous at bedtime  levothyroxine 75 MICROGram(s) Oral daily  meropenem  IVPB      meropenem  IVPB 500 milliGRAM(s) IV Intermittent every 12 hours  pantoprazole  Injectable 40 milliGRAM(s) IV Push every 12 hours  potassium chloride  20 mEq/100 mL IVPB 20 milliEquivalent(s) IV Intermittent once  senna 2 Tablet(s) Oral at bedtime    MEDICATIONS  (PRN):  morphine  - Injectable 2 milliGRAM(s) IV Push every 4 hours PRN respiratory distress and pain

## 2019-10-18 NOTE — GOALS OF CARE CONVERSATION - ADVANCED CARE PLANNING - CONVERSATION/DISCUSSION
MOLST Discussed
Diagnosis/MOLST Discussed/Treatment Options/Prognosis
Diagnosis/MOLST Discussed/Hospice Referral/Prognosis/Treatment Options

## 2019-10-18 NOTE — PROGRESS NOTE ADULT - ASSESSMENT
Assessment	    Acute Hypoxic Respiratory Failure, Pulmonary Edema, sp intubation  GHASSAN on CKD  Hyponatremia  Anemia      PLAN:    CNS: Keep off sedation. Morphine drip.      HEENT: Oral care    PULMONARY:  HOB @ 45 degrees. Left Lung atelectasis Extubated x 2 days. Nasal Cannula     CARDIOVASCULAR: Hx of afib.     GI: GI prophylaxis. Feeding via PEG     RENAL:  Follow up lytes.  Correct as needed    INFECTIOUS DISEASE: Follow up cultures. Continue meropenum at this time.     HEMATOLOGICAL:  DVT prophylaxis.    ENDOCRINE:  Follow up FS.  Insulin protocol if needed.    MUSCULOSKELETAL: OOB if tolerates    Hospice Care Following as per family request   DNI/DNR

## 2019-10-18 NOTE — GOALS OF CARE CONVERSATION - ADVANCED CARE PLANNING - TREATMENT GUIDELINE COMMENT
Pt has a PEG, continue tube feedings if tolerated. IV ABT - finish current course, No escalation of care, NO PRESSORS NO DIALYSIS NO BIPAP
Plan is to continue ongoing medical management; no escalation of care. DNR/DNI.

## 2019-10-18 NOTE — GOALS OF CARE CONVERSATION - ADVANCED CARE PLANNING - CONVERSATION DETAILS
Daughter, Roxanna Knight is the healthcare proxy and requesting DNR (patient is already intubated) and no blood draws.  Wants to continue medical intervention for now and discuss with palliative team in the morning.  Consult placed.
Pt condition still poor, pt exrtubated. Met with pt's daughter Violette and granddaughter. Reviewed overall care. Want IV ABT now and feedings.     Discussed option of any escalation of medical care. they do not want to have pt on vasopressors or dialysis. No ICU level of care. If pt declines they would want CMO.
Patient is a 87 year old female with history of Afib not on AC, HTN, CKD, Parkinson, hypothyroidism, DL presenting from [facility] for increased lethargy and SOB not improving on NR, was 80% so was intubated in ED. A per NH papers patient was complaining of chest pain and SOB at 9 pm and Lasix 40 was given oral then lasix 20 mg IM- no fever no cough reported per NH   STAS done by ED resident showed black stools, GI were contacted and they asked for Hg trending - NG tube was flushed and OG tube draining bile - patient sedated by VERSED and vitals were stable when examined in ED.    Palliative care team met with the patient's family on unit to discuss overall goals of care.  Patient's daughter relating patient has been living with her for approximately 10 years and has been declining. Daughter relating patient had more of a decline since the summer. Patient's family stating they have been updated by the medical team and have a clear understanding of patient's overall medical conditions.  Discussed options going forward and family relating they wish for patient to be comfortable and do not wish to escalate care at this time. Family wishes to continue ongoing medical management; DNR/DNI.  Hospice introduced, family very receptive.      Plan is to continue ongoing medical management; no escalation of care. DNR/DNI.

## 2019-10-19 LAB
ALBUMIN SERPL ELPH-MCNC: 3.7 G/DL — SIGNIFICANT CHANGE UP (ref 3.5–5.2)
ALP SERPL-CCNC: 108 U/L — SIGNIFICANT CHANGE UP (ref 30–115)
ALT FLD-CCNC: 7 U/L — SIGNIFICANT CHANGE UP (ref 0–41)
ANION GAP SERPL CALC-SCNC: 11 MMOL/L — SIGNIFICANT CHANGE UP (ref 7–14)
AST SERPL-CCNC: 23 U/L — SIGNIFICANT CHANGE UP (ref 0–41)
BILIRUB SERPL-MCNC: 0.5 MG/DL — SIGNIFICANT CHANGE UP (ref 0.2–1.2)
BUN SERPL-MCNC: 48 MG/DL — HIGH (ref 10–20)
CALCIUM SERPL-MCNC: 9.9 MG/DL — SIGNIFICANT CHANGE UP (ref 8.5–10.1)
CHLORIDE SERPL-SCNC: 88 MMOL/L — LOW (ref 98–110)
CO2 SERPL-SCNC: 40 MMOL/L — HIGH (ref 17–32)
CREAT SERPL-MCNC: 1.2 MG/DL — SIGNIFICANT CHANGE UP (ref 0.7–1.5)
GLUCOSE BLDC GLUCOMTR-MCNC: 168 MG/DL — HIGH (ref 70–99)
GLUCOSE BLDC GLUCOMTR-MCNC: 174 MG/DL — HIGH (ref 70–99)
GLUCOSE BLDC GLUCOMTR-MCNC: 181 MG/DL — HIGH (ref 70–99)
GLUCOSE BLDC GLUCOMTR-MCNC: 188 MG/DL — HIGH (ref 70–99)
GLUCOSE SERPL-MCNC: 172 MG/DL — HIGH (ref 70–99)
HCT VFR BLD CALC: 29.1 % — LOW (ref 37–47)
HGB BLD-MCNC: 9.1 G/DL — LOW (ref 12–16)
MAGNESIUM SERPL-MCNC: 2.3 MG/DL — SIGNIFICANT CHANGE UP (ref 1.8–2.4)
MCHC RBC-ENTMCNC: 28.5 PG — SIGNIFICANT CHANGE UP (ref 27–31)
MCHC RBC-ENTMCNC: 31.3 G/DL — LOW (ref 32–37)
MCV RBC AUTO: 91.2 FL — SIGNIFICANT CHANGE UP (ref 81–99)
NRBC # BLD: 0 /100 WBCS — SIGNIFICANT CHANGE UP (ref 0–0)
PHOSPHATE SERPL-MCNC: 4.5 MG/DL — SIGNIFICANT CHANGE UP (ref 2.1–4.9)
PLATELET # BLD AUTO: 232 K/UL — SIGNIFICANT CHANGE UP (ref 130–400)
POTASSIUM SERPL-MCNC: 5 MMOL/L — SIGNIFICANT CHANGE UP (ref 3.5–5)
POTASSIUM SERPL-SCNC: 5 MMOL/L — SIGNIFICANT CHANGE UP (ref 3.5–5)
PROT SERPL-MCNC: 6.8 G/DL — SIGNIFICANT CHANGE UP (ref 6–8)
RBC # BLD: 3.19 M/UL — LOW (ref 4.2–5.4)
RBC # FLD: 19.3 % — HIGH (ref 11.5–14.5)
SODIUM SERPL-SCNC: 139 MMOL/L — SIGNIFICANT CHANGE UP (ref 135–146)
WBC # BLD: 11.32 K/UL — HIGH (ref 4.8–10.8)
WBC # FLD AUTO: 11.32 K/UL — HIGH (ref 4.8–10.8)

## 2019-10-19 RX ADMIN — Medication 40 MILLIGRAM(S): at 05:36

## 2019-10-19 RX ADMIN — MORPHINE SULFATE 2 MILLIGRAM(S): 50 CAPSULE, EXTENDED RELEASE ORAL at 12:35

## 2019-10-19 RX ADMIN — Medication 1 APPLICATION(S): at 18:12

## 2019-10-19 RX ADMIN — CARBIDOPA AND LEVODOPA 1 TABLET(S): 25; 100 TABLET ORAL at 12:35

## 2019-10-19 RX ADMIN — PANTOPRAZOLE SODIUM 40 MILLIGRAM(S): 20 TABLET, DELAYED RELEASE ORAL at 05:36

## 2019-10-19 RX ADMIN — PANTOPRAZOLE SODIUM 40 MILLIGRAM(S): 20 TABLET, DELAYED RELEASE ORAL at 18:11

## 2019-10-19 RX ADMIN — SENNA PLUS 2 TABLET(S): 8.6 TABLET ORAL at 22:14

## 2019-10-19 RX ADMIN — MEROPENEM 100 MILLIGRAM(S): 1 INJECTION INTRAVENOUS at 05:36

## 2019-10-19 RX ADMIN — AMLODIPINE BESYLATE 10 MILLIGRAM(S): 2.5 TABLET ORAL at 05:36

## 2019-10-19 RX ADMIN — INSULIN GLARGINE 6 UNIT(S): 100 INJECTION, SOLUTION SUBCUTANEOUS at 22:14

## 2019-10-19 RX ADMIN — MEROPENEM 100 MILLIGRAM(S): 1 INJECTION INTRAVENOUS at 18:10

## 2019-10-19 RX ADMIN — Medication 75 MICROGRAM(S): at 05:36

## 2019-10-19 RX ADMIN — Medication 1 APPLICATION(S): at 05:36

## 2019-10-19 RX ADMIN — CARBIDOPA AND LEVODOPA 1 TABLET(S): 25; 100 TABLET ORAL at 22:53

## 2019-10-19 RX ADMIN — Medication 1 APPLICATION(S): at 05:35

## 2019-10-19 RX ADMIN — CHLORHEXIDINE GLUCONATE 15 MILLILITER(S): 213 SOLUTION TOPICAL at 05:36

## 2019-10-19 RX ADMIN — CHLORHEXIDINE GLUCONATE 15 MILLILITER(S): 213 SOLUTION TOPICAL at 18:11

## 2019-10-19 NOTE — PROGRESS NOTE ADULT - ASSESSMENT
Assessment	    Acute Hypoxic Respiratory Failure, Pulmonary Edema  GHASSAN on CKD  Hyponatremia  Anemia      PLAN:    CNS: Keep off sedation.     HEENT: Oral care    PULMONARY:  HOB @ 45 degrees. Left Lung atelectasis,  Nasal Cannula , no bronch    CARDIOVASCULAR: Hx of afib.     GI: GI prophylaxis. Feeding via PEG     RENAL:  Follow up lytes.  Correct as needed    INFECTIOUS DISEASE: Follow up cultures.    HEMATOLOGICAL:  DVT prophylaxis.    ENDOCRINE:  Follow up FS.  Insulin protocol if needed.    MUSCULOSKELETAL: OOB if tolerates    Hospice Care Following as per family request   DNI/DNR

## 2019-10-19 NOTE — PROGRESS NOTE ADULT - ASSESSMENT
Acute Hypoxic Respiratory Failure, Pulmonary Edema, sp intubation, extubated 10/16  GHASSAN on CKD, resolved  Hyponatremia, resolved  Anemia of chronic disease  Failure to thrive  Hx of HTN, ASHD, CAD, cardiac arrhythmia, afib ( no AC), CHF  Hx of DLD  Hx of Hypothyroidism  Hx of Parkinsons, Mobility Dysfunction, frequent falls, Progressively Bedbound  Hx of OA, DDD, DJD, Osteoporosis, Kyphosis, Rib Fx  DNR/DNI, comfort care    pt extubated  10/16, remains on O2  nutrition via PEG as per family wishes  complete course of Abx  Morphine drip    palliative care involved  MOLST signed  pt made DNR/DNI

## 2019-10-19 NOTE — PROGRESS NOTE ADULT - SUBJECTIVE AND OBJECTIVE BOX
BOB ENGLE 86yo W Female sent from [facility] for worsening generalized weakness, lethargy and respiratory distress.  The pt was noted to be hypoxic and in pulmonary edema with a low Na of 124.  She was given IV lasix and a trial on a NRM butremained hypoxic at 80%. She was intubated and placed on ventilator support.  The pt had recently been hospitalized [facility] and sent to SNF for rehabilitation.  She has multiple medical issues and is mostly bed bound.  The PMHx includes:  HTN, ASHD, CAD, CHF, afib ( no AC), DLD, Hypothyroidism CKD, Parkinsons, Mobility Dysfunction, OA, DDD, DJD, Osteoporosis, Kyphosis, Rib Fx,    INTERVAL HPI/OVERNIGHT EVENTS:  the pt is in the vent unit, DNR/DNI in place,  on O2 NC, morphine drip, nutrition via PEG, comfort measures only, palliative care involved    MEDICATIONS  (STANDING):  carbidopa/levodopa  10/100 1 Tablet(s) Oral daily  carbidopa/levodopa  25/100 1 Tablet(s) Oral at bedtime  chlorhexidine 0.12% Liquid 15 milliLiter(s) Oral Mucosa every 12 hours  clotrimazole 1% Topical Cream - Peds 1 Application(s) Topical two times a day  fentaNYL   Infusion. 0.5 MICROgram(s)/kG/Hr (3.75 mL/Hr) IV Continuous <Continuous>  insulin glargine Injectable (LANTUS) 6 Unit(s) SubCutaneous at bedtime  levothyroxine 75 MICROGram(s) Oral daily  meropenem  IVPB      meropenem  IVPB 500 milliGRAM(s) IV Intermittent every 12 hours  multivitamin 1 Tablet(s) Oral daily  pantoprazole  Injectable 40 milliGRAM(s) IV Push every 12 hours  propofol Infusion 10 MICROgram(s)/kG/Min (4.5 mL/Hr) IV Continuous <Continuous>  senna 2 Tablet(s) Oral at bedtime  vancomycin  IVPB 1000 milliGRAM(s) IV Intermittent every 24 hours    MEDICATIONS  (PRN):      Allergies    No Known Allergies  	    Vital Signs Last 24 Hrs    T(F): 97.1  HR: 97  BP: 148/67    RR: 20  SpO2: 100%, NC    PHYSICAL EXAM:      Constitutional:  pt i an elderly, chronically ill looking F, on comfort care and IV morphine drip, comfortable and in NAD, + O2 NC    Eyes:  opened, tracking, nonicteric    ENMT: + ET,  dental defects    Neck:  supple, + JVD, no bruits    Back:  TH kyphosis    Respiratory:  shallow respirations, no crackles, rales or wheezing    Cardiovascular:  S1S2 irreg, II/VI OZ    Gastrointestinal: sl distended but soft and benign, + PEG, feeding in progress    Genitourinary:   + sanchez      Extremities:  advanced arthritic changes, mild edema of the R hand and forearm,     Vascular: + radial pulses,  dec pedal pulses, resolved acrocyanosis, fingertips pink and warm    Neurological:  alert, calm    LABS:                    9.1  11 )-----------( 232                 29    139)  | 88  |  48  ----------------------------<  172   5     |  40 |  1.2    GFR 25,23, 41    Ca    9.4        Mg     2.5, 2.1  troponin <0.01  Dig <0.3  BNP 11,634    TPro  5.5<L>  /  Alb  2.9<L>  /  TBili  0.5  /  DBili  x   /  AST  16  /  ALT  <5  /  AlkPhos  99  10-13    PT/INR - ( 12 Oct 2019 21:55 )   PT: 13.70 sec;   INR: 1.19 ratio           Urinalysis Basic - ( 13 Oct 2019 06:30 )    Color: Light Yellow / Appearance: Clear / S.008 / pH: x  Gluc: x / Ketone: Negative  / Bili: Negative / Urobili: <2 mg/dL   Blood: x / Protein: Negative / Nitrite: Negative   Leuk Esterase: Large / RBC: 21 /HPF / WBC 75 /HPF   Sq Epi: x / Non Sq Epi: 1 /HPF / Bacteria: Negative        RADIOLOGY & ADDITIONAL TESTS:  CXR:  + ET, + enteric tube, bibasilar reticular changes    ECHO:  normal global ventricular sys function, mitral leaflet thickening, annular calcification, severe TR, sclerotic aortic valve, paradoxical septal motion ( ? R vent overload)    CTH:  volume loss, white matter changes, no acute intracranial hemorrhage

## 2019-10-19 NOTE — PROGRESS NOTE ADULT - SUBJECTIVE AND OBJECTIVE BOX
OVERNIGHT EVENTS: events noted in round with vent unit team    Vital Signs Last 24 Hrs  T(C): 36.2 (19 Oct 2019 07:45), Max: 36.2 (19 Oct 2019 07:45)  T(F): 97.1 (19 Oct 2019 07:45), Max: 97.1 (19 Oct 2019 07:45)  HR: 97 (19 Oct 2019 07:45) (77 - 97)  BP: 148/67 (19 Oct 2019 07:45) (148/67 - 182/88)  BP(mean): 96 (19 Oct 2019 07:45) (96 - 126)  RR: 20 (19 Oct 2019 07:45) (18 - 20)    PHYSICAL EXAMINATION:    GENERAL: ill looking    HEENT: Head is normocephalic and atraumatic. Extraocular muscles are intact. Mucous membranes are moist.    NECK: Supple.    LUNGS: dec bs both bases    HEART: Regular rate and rhythm without murmur.    ABDOMEN: Soft, nontender, and nondistended.      EXTREMITIES: swelling +        LABS:                        9.1    11.32 )-----------( 232      ( 19 Oct 2019 05:49 )             29.1     10-19    139  |  88<L>  |  48<H>  ----------------------------<  172<H>  5.0   |  40<H>  |  1.2    Ca    9.9      19 Oct 2019 05:49  Phos  4.5     10-19  Mg     2.3     10-19    TPro  6.8  /  Alb  3.7  /  TBili  0.5  /  DBili  x   /  AST  23  /  ALT  7   /  AlkPhos  108  10-19                          10-18-19 @ 07:01  -  10-19-19 @ 07:00  --------------------------------------------------------  IN: 0 mL / OUT: 1150 mL / NET: -1150 mL        MICROBIOLOGY:      MEDICATIONS  (STANDING):  amLODIPine   Tablet 10 milliGRAM(s) Oral daily  BACItracin   Ointment 1 Application(s) Topical two times a day  carbidopa/levodopa  10/100 1 Tablet(s) Oral daily  carbidopa/levodopa  25/100 1 Tablet(s) Oral at bedtime  chlorhexidine 0.12% Liquid 15 milliLiter(s) Oral Mucosa every 12 hours  clotrimazole 1% Topical Cream - Peds 1 Application(s) Topical two times a day  furosemide   Injectable 40 milliGRAM(s) IV Push daily  insulin glargine Injectable (LANTUS) 6 Unit(s) SubCutaneous at bedtime  levothyroxine 75 MICROGram(s) Oral daily  meropenem  IVPB      meropenem  IVPB 500 milliGRAM(s) IV Intermittent every 12 hours  pantoprazole  Injectable 40 milliGRAM(s) IV Push every 12 hours  senna 2 Tablet(s) Oral at bedtime    MEDICATIONS  (PRN):  morphine  - Injectable 2 milliGRAM(s) IV Push every 4 hours PRN respiratory distress and pain      RADIOLOGY & ADDITIONAL STUDIES:

## 2019-10-20 LAB
GLUCOSE BLDC GLUCOMTR-MCNC: 150 MG/DL — HIGH (ref 70–99)
GLUCOSE BLDC GLUCOMTR-MCNC: 162 MG/DL — HIGH (ref 70–99)
GLUCOSE BLDC GLUCOMTR-MCNC: 167 MG/DL — HIGH (ref 70–99)
GLUCOSE BLDC GLUCOMTR-MCNC: 201 MG/DL — HIGH (ref 70–99)

## 2019-10-20 RX ADMIN — CARBIDOPA AND LEVODOPA 1 TABLET(S): 25; 100 TABLET ORAL at 12:58

## 2019-10-20 RX ADMIN — MORPHINE SULFATE 2 MILLIGRAM(S): 50 CAPSULE, EXTENDED RELEASE ORAL at 19:15

## 2019-10-20 RX ADMIN — MORPHINE SULFATE 2 MILLIGRAM(S): 50 CAPSULE, EXTENDED RELEASE ORAL at 19:01

## 2019-10-20 RX ADMIN — MORPHINE SULFATE 2 MILLIGRAM(S): 50 CAPSULE, EXTENDED RELEASE ORAL at 02:15

## 2019-10-20 RX ADMIN — INSULIN GLARGINE 6 UNIT(S): 100 INJECTION, SOLUTION SUBCUTANEOUS at 22:32

## 2019-10-20 RX ADMIN — Medication 1 APPLICATION(S): at 17:45

## 2019-10-20 RX ADMIN — MORPHINE SULFATE 2 MILLIGRAM(S): 50 CAPSULE, EXTENDED RELEASE ORAL at 01:57

## 2019-10-20 RX ADMIN — Medication 1 APPLICATION(S): at 05:24

## 2019-10-20 RX ADMIN — SENNA PLUS 2 TABLET(S): 8.6 TABLET ORAL at 22:00

## 2019-10-20 RX ADMIN — Medication 40 MILLIGRAM(S): at 05:25

## 2019-10-20 RX ADMIN — CARBIDOPA AND LEVODOPA 1 TABLET(S): 25; 100 TABLET ORAL at 22:00

## 2019-10-20 RX ADMIN — Medication 1 APPLICATION(S): at 17:44

## 2019-10-20 RX ADMIN — Medication 75 MICROGRAM(S): at 05:24

## 2019-10-20 RX ADMIN — CHLORHEXIDINE GLUCONATE 15 MILLILITER(S): 213 SOLUTION TOPICAL at 05:24

## 2019-10-20 RX ADMIN — MEROPENEM 100 MILLIGRAM(S): 1 INJECTION INTRAVENOUS at 05:23

## 2019-10-20 RX ADMIN — PANTOPRAZOLE SODIUM 40 MILLIGRAM(S): 20 TABLET, DELAYED RELEASE ORAL at 05:25

## 2019-10-20 RX ADMIN — AMLODIPINE BESYLATE 10 MILLIGRAM(S): 2.5 TABLET ORAL at 05:24

## 2019-10-20 RX ADMIN — PANTOPRAZOLE SODIUM 40 MILLIGRAM(S): 20 TABLET, DELAYED RELEASE ORAL at 17:44

## 2019-10-20 RX ADMIN — CHLORHEXIDINE GLUCONATE 15 MILLILITER(S): 213 SOLUTION TOPICAL at 17:44

## 2019-10-20 NOTE — PROGRESS NOTE ADULT - ASSESSMENT
Acute Hypoxic Respiratory Failure, Pulmonary Edema, sp intubation, extubated 10/16  GHASSAN on CKD, resolved  Hyponatremia, resolved  Anemia of chronic disease  Failure to thrive  Hx of HTN, ASHD, CAD, cardiac arrhythmia, afib ( no AC), CHF  Hx of DLD  Hx of Hypothyroidism  Hx of Parkinsons, Mobility Dysfunction, frequent falls, Progressively Bedbound  Hx of OA, DDD, DJD, Osteoporosis, Kyphosis, Rib Fx  DNR/DNI, comfort care    pt extubated  10/16, remains on O2, actually more alert, VS stable  nutrition via PEG as per family wishes  complete course og Abx    palliative care involved  MOLST signed  pt made DNR/DNI  if pt cont to stabilize will return to SNF, family may cont palliatiave care or transition pt to hospice care once at SNF

## 2019-10-20 NOTE — PROGRESS NOTE ADULT - ASSESSMENT
Assessment	    Acute Hypoxic Respiratory Failure, Pulmonary Edema  GHASSAN on CKD  Hyponatremia  Anemia      PLAN:    CNS: Keep off sedation.     HEENT: Oral care    PULMONARY:  HOB @ 45 degrees. Left Lung atelectasis,  Nasal Cannula , no bronch    CARDIOVASCULAR: Hx of afib. hold lasix    GI: GI prophylaxis. Feeding via PEG     RENAL:  Follow up lytes.  Correct as needed    INFECTIOUS DISEASE: Follow up cultures.    HEMATOLOGICAL:  DVT prophylaxis.    ENDOCRINE:  Follow up FS.  Insulin protocol if needed.    MUSCULOSKELETAL: OOB if tolerates    Hospice Care Following as per family request   DNI/DNR

## 2019-10-20 NOTE — PROGRESS NOTE ADULT - SUBJECTIVE AND OBJECTIVE BOX
OVERNIGHT EVENTS: events noted    Vital Signs Last 24 Hrs  T(C): 35.9 (20 Oct 2019 00:16), Max: 36.2 (19 Oct 2019 07:45)  T(F): 96.7 (20 Oct 2019 00:16), Max: 97.1 (19 Oct 2019 07:45)  HR: 84 (20 Oct 2019 00:16) (84 - 101)  BP: 158/80 (20 Oct 2019 00:16) (148/67 - 158/80)  BP(mean): 115 (20 Oct 2019 00:16) (96 - 115)  RR: 20 (20 Oct 2019 00:16) (18 - 20)    PHYSICAL EXAMINATION:    GENERAL: chronically ill looking    HEENT: Head is normocephalic and atraumatic. Extraocular muscles are intact. Mucous membranes are moist.    NECK: Supple.    LUNGS: dec bs both bases    HEART: Regular rate and rhythm without murmur.    ABDOMEN: Soft, nontender, and nondistended.      EXTREMITIES: swelling +        LABS:                        9.1    11.32 )-----------( 232      ( 19 Oct 2019 05:49 )             29.1     10-19    139  |  88<L>  |  48<H>  ----------------------------<  172<H>  5.0   |  40<H>  |  1.2    Ca    9.9      19 Oct 2019 05:49  Phos  4.5     10-19  Mg     2.3     10-19    TPro  6.8  /  Alb  3.7  /  TBili  0.5  /  DBili  x   /  AST  23  /  ALT  7   /  AlkPhos  108  10-19                          10-18-19 @ 07:01  -  10-19-19 @ 07:00  --------------------------------------------------------  IN: 0 mL / OUT: 1150 mL / NET: -1150 mL    10-19-19 @ 07:01  -  10-20-19 @ 06:25  --------------------------------------------------------  IN: 1460 mL / OUT: 200 mL / NET: 1260 mL        MICROBIOLOGY:      MEDICATIONS  (STANDING):  amLODIPine   Tablet 10 milliGRAM(s) Oral daily  BACItracin   Ointment 1 Application(s) Topical two times a day  carbidopa/levodopa  10/100 1 Tablet(s) Oral daily  carbidopa/levodopa  25/100 1 Tablet(s) Oral at bedtime  chlorhexidine 0.12% Liquid 15 milliLiter(s) Oral Mucosa every 12 hours  clotrimazole 1% Topical Cream - Peds 1 Application(s) Topical two times a day  furosemide   Injectable 40 milliGRAM(s) IV Push daily  insulin glargine Injectable (LANTUS) 6 Unit(s) SubCutaneous at bedtime  levothyroxine 75 MICROGram(s) Oral daily  pantoprazole  Injectable 40 milliGRAM(s) IV Push every 12 hours  senna 2 Tablet(s) Oral at bedtime    MEDICATIONS  (PRN):  morphine  - Injectable 2 milliGRAM(s) IV Push every 4 hours PRN respiratory distress and pain      RADIOLOGY & ADDITIONAL STUDIES:

## 2019-10-20 NOTE — PROGRESS NOTE ADULT - SUBJECTIVE AND OBJECTIVE BOX
BOB ENGLE 86yo W Female sent from [facility] for worsening generalized weakness, lethargy and respiratory distress.  The pt was noted to be hypoxic and in pulmonary edema with a low Na of 124.  She was given IV lasix and a trial on a NRM butremained hypoxic at 80%. She was intubated and placed on ventilator support.  The pt had recently been hospitalized [facility] and sent to SNF for rehabilitation.  She has multiple medical issues and is mostly bed bound.  The PMHx includes:  HTN, ASHD, CAD, CHF, afib ( no AC), DLD, Hypothyroidism CKD, Parkinsons, Mobility Dysfunction, OA, DDD, DJD, Osteoporosis, Kyphosis, Rib Fx,    INTERVAL HPI/OVERNIGHT EVENTS:  the pt is in the vent unit, DNR/DNI in place,  on O2 NC, nutrition via PEG,  palliative care involved    MEDICATIONS  (STANDING):  carbidopa/levodopa  10/100 1 Tablet(s) Oral daily  carbidopa/levodopa  25/100 1 Tablet(s) Oral at bedtime  chlorhexidine 0.12% Liquid 15 milliLiter(s) Oral Mucosa every 12 hours  clotrimazole 1% Topical Cream - Peds 1 Application(s) Topical two times a day  fentaNYL   Infusion. 0.5 MICROgram(s)/kG/Hr (3.75 mL/Hr) IV Continuous <Continuous>  insulin glargine Injectable (LANTUS) 6 Unit(s) SubCutaneous at bedtime  levothyroxine 75 MICROGram(s) Oral daily  meropenem  IVPB      meropenem  IVPB 500 milliGRAM(s) IV Intermittent every 12 hours  multivitamin 1 Tablet(s) Oral daily  pantoprazole  Injectable 40 milliGRAM(s) IV Push every 12 hours  propofol Infusion 10 MICROgram(s)/kG/Min (4.5 mL/Hr) IV Continuous <Continuous>  senna 2 Tablet(s) Oral at bedtime  vancomycin  IVPB 1000 milliGRAM(s) IV Intermittent every 24 hours    MEDICATIONS  (PRN):      Allergies    No Known Allergies  	    Vital Signs Last 24 Hrs    T(F): 96.3  HR: 89  BP: 141/74    RR: 20  SpO2: 100%, NC    PHYSICAL EXAM:      Constitutional:   elderly, chronically ill looking F, on comfort care,  comfortable and in NAD, + O2 NC    Eyes:  opened, tracking, nonicteric    ENMT: + ET,  dental defects    Neck:  supple, + JVD, no bruits    Back:  TH kyphosis    Respiratory:  shallow respirations, no crackles, rales or wheezing    Cardiovascular:  S1S2 irreg, II/VI OZ    Gastrointestinal: sl distended but soft and benign, + PEG, feeding in progress    Genitourinary:   + sanchez      Extremities:  advanced arthritic changes, mild edema of the R hand and forearm,     Vascular: + radial pulses,  dec pedal pulses, resolved acrocyanosis, fingertips pink and warm    Neurological:  alert, calm    LABS:       10/19             9.1  11 )-----------( 232                 29    139)  | 88  |  48  ----------------------------<  172   5     |  40 |  1.2    GFR 25,23, 41    Ca    9.4        Mg     2.5, 2.1  troponin <0.01  Dig <0.3  BNP 11,634    TPro  5.5<L>  /  Alb  2.9<L>  /  TBili  0.5  /  DBili  x   /  AST  16  /  ALT  <5  /  AlkPhos  99  10-13    PT/INR - ( 12 Oct 2019 21:55 )   PT: 13.70 sec;   INR: 1.19 ratio           Urinalysis Basic - ( 13 Oct 2019 06:30 )    Color: Light Yellow / Appearance: Clear / S.008 / pH: x  Gluc: x / Ketone: Negative  / Bili: Negative / Urobili: <2 mg/dL   Blood: x / Protein: Negative / Nitrite: Negative   Leuk Esterase: Large / RBC: 21 /HPF / WBC 75 /HPF   Sq Epi: x / Non Sq Epi: 1 /HPF / Bacteria: Negative        RADIOLOGY & ADDITIONAL TESTS:  CXR:  + ET, + enteric tube, bibasilar reticular changes    ECHO:  normal global ventricular sys function, mitral leaflet thickening, annular calcification, severe TR, sclerotic aortic valve, paradoxical septal motion ( ? R vent overload)    CTH:  volume loss, white matter changes, no acute intracranial hemorrhage

## 2019-10-21 VITALS
SYSTOLIC BLOOD PRESSURE: 140 MMHG | DIASTOLIC BLOOD PRESSURE: 67 MMHG | TEMPERATURE: 97 F | RESPIRATION RATE: 22 BRPM | HEART RATE: 82 BPM

## 2019-10-21 LAB
GLUCOSE BLDC GLUCOMTR-MCNC: 144 MG/DL — HIGH (ref 70–99)
GLUCOSE BLDC GLUCOMTR-MCNC: 160 MG/DL — HIGH (ref 70–99)

## 2019-10-21 PROCEDURE — 99233 SBSQ HOSP IP/OBS HIGH 50: CPT

## 2019-10-21 RX ORDER — MORPHINE SULFATE 50 MG/1
10 CAPSULE, EXTENDED RELEASE ORAL
Refills: 0 | Status: DISCONTINUED | OUTPATIENT
Start: 2019-10-21 | End: 2019-10-21

## 2019-10-21 RX ORDER — FUROSEMIDE 40 MG
40 TABLET ORAL DAILY
Refills: 0 | Status: DISCONTINUED | OUTPATIENT
Start: 2019-10-22 | End: 2019-10-21

## 2019-10-21 RX ORDER — MORPHINE SULFATE 50 MG/1
10 CAPSULE, EXTENDED RELEASE ORAL EVERY 4 HOURS
Refills: 0 | Status: DISCONTINUED | OUTPATIENT
Start: 2019-10-21 | End: 2019-10-21

## 2019-10-21 RX ADMIN — CARBIDOPA AND LEVODOPA 1 TABLET(S): 25; 100 TABLET ORAL at 11:54

## 2019-10-21 RX ADMIN — Medication 40 MILLIGRAM(S): at 05:09

## 2019-10-21 RX ADMIN — Medication 1 APPLICATION(S): at 05:08

## 2019-10-21 RX ADMIN — MORPHINE SULFATE 2 MILLIGRAM(S): 50 CAPSULE, EXTENDED RELEASE ORAL at 13:41

## 2019-10-21 RX ADMIN — AMLODIPINE BESYLATE 10 MILLIGRAM(S): 2.5 TABLET ORAL at 05:09

## 2019-10-21 RX ADMIN — MORPHINE SULFATE 10 MILLIGRAM(S): 50 CAPSULE, EXTENDED RELEASE ORAL at 14:29

## 2019-10-21 RX ADMIN — Medication 1 APPLICATION(S): at 05:09

## 2019-10-21 RX ADMIN — Medication 0.5 MILLIGRAM(S): at 14:32

## 2019-10-21 RX ADMIN — MORPHINE SULFATE 2 MILLIGRAM(S): 50 CAPSULE, EXTENDED RELEASE ORAL at 00:49

## 2019-10-21 RX ADMIN — MORPHINE SULFATE 2 MILLIGRAM(S): 50 CAPSULE, EXTENDED RELEASE ORAL at 01:20

## 2019-10-21 RX ADMIN — Medication 75 MICROGRAM(S): at 05:09

## 2019-10-21 RX ADMIN — MORPHINE SULFATE 2 MILLIGRAM(S): 50 CAPSULE, EXTENDED RELEASE ORAL at 12:58

## 2019-10-21 RX ADMIN — MORPHINE SULFATE 10 MILLIGRAM(S): 50 CAPSULE, EXTENDED RELEASE ORAL at 15:00

## 2019-10-21 RX ADMIN — PANTOPRAZOLE SODIUM 40 MILLIGRAM(S): 20 TABLET, DELAYED RELEASE ORAL at 05:09

## 2019-10-21 RX ADMIN — CHLORHEXIDINE GLUCONATE 15 MILLILITER(S): 213 SOLUTION TOPICAL at 05:09

## 2019-10-21 NOTE — PROGRESS NOTE ADULT - PROVIDER SPECIALTY LIST ADULT
Internal Medicine
Nephrology
Nephrology
Palliative Care
Palliative Care
Pulmonology
Internal Medicine
Nephrology
Internal Medicine
Internal Medicine
Palliative Care
Palliative Care
Pulmonology

## 2019-10-21 NOTE — PROGRESS NOTE ADULT - ASSESSMENT
87yFemale being evaluated for goals of care and symptom management. Pt's family - daughter Mame (next of kin) wants hospice care. Wants mom comfort measures. Pt getting tube feeding because pt was tolerating it last week. Pt appears more short of breath even after Morphine 2mg IV and restless.     Spoke to daughter Roxanna suarez, she is aware mom is gravely ill and declining. She wants comfort. Explained that feeding maybe overloading her. She said if stopping it makes her comfortable - its our call. Also she does not want Insulin or fingersticks. Explained mom is more symptomatic and we will initiate RTC Morphine (change to Roxanol b/c pt maybe d/c on hospice to [facility]). Pt restless wants to drink fluids but has dysphagia and does not tolerate it. She appears anxious, eyes tearing.      Recommendations:  DNR/DNI/CMO  D/C IV Morphine  Roxanol 10mg po Q 4 HRS RTC  Roxanol 10mg Q 1 HR PRN for respiratory distress or pain  Ativan 0.5mg IV Q 4 HRS PRN for restlessness and anxiety (if d/c on hospice they have concentrated Ativan po)  call x [phone] for questions 87yFemale being evaluated for goals of care and symptom management. Pt's family - daughter Mame (next of kin) wants hospice care. Wants mom comfort measures. Pt getting tube feeding because pt was tolerating it last week. Pt appears more short of breath even after Morphine 2mg IV and restless.     Spoke to daughter Roxanna suarez, she is aware mom is gravely ill and declining. She wants comfort. Explained that feeding maybe overloading her. She said if stopping it makes her comfortable - its our call. Also she does not want Insulin or fingersticks. Explained mom is more symptomatic and we will initiate RTC Morphine (change to Roxanol b/c pt maybe d/c on hospice to [facility]). Pt restless wants to drink fluids but has dysphagia and does not tolerate it. She appears anxious, eyes tearing.      Recommendations:  DNR/DNI/CMO  D/C IV Morphine  Roxanol 10mg po Q 4 HRS RTC  Roxanol 10mg Q 1 HR PRN for respiratory distress or pain  Ativan 0.5mg IV Q 4 HRS PRN for restlessness and anxiety (if d/c on hospice they have concentrated Ativan po)  call x [phone] for questions  above d/w DR Yonas Roberts medical resident

## 2019-10-21 NOTE — PROGRESS NOTE ADULT - SUBJECTIVE AND OBJECTIVE BOX
87yFemale with diagnosis: FLUID OVERLOAD;RESPIRATORY DISTRESS;ANEMIA;BLACK STOOL;HYPERKALEMIA      Patient seen for follow up      PHYSICAL EXAM  Pt with cyanotic lips and tongue  Tachypnea noted   Pt anxious/restless    T(C): , Max: 35.9 (07:47)  T(F): 96.7  HR: 76 (75 - 89)  BP: 153/70 (141/74 - 153/70)  RR: 20 (20 - 20)  SpO2: --              LABS:                                                                                                                                               MEDICATIONS  (STANDING):  amLODIPine   Tablet 10 milliGRAM(s) Oral daily  BACItracin   Ointment 1 Application(s) Topical two times a day  carbidopa/levodopa  10/100 1 Tablet(s) Oral daily  carbidopa/levodopa  25/100 1 Tablet(s) Oral at bedtime  chlorhexidine 0.12% Liquid 15 milliLiter(s) Oral Mucosa every 12 hours  clotrimazole 1% Topical Cream - Peds 1 Application(s) Topical two times a day  furosemide   Injectable 40 milliGRAM(s) IV Push daily  levothyroxine 75 MICROGram(s) Oral daily  morphine Concentrate 10 milliGRAM(s) Oral every 4 hours  pantoprazole  Injectable 40 milliGRAM(s) IV Push every 12 hours  senna 2 Tablet(s) Oral at bedtime    MEDICATIONS  (PRN):  LORazepam   Injectable 0.5 milliGRAM(s) IV Push every 4 hours PRN restlessness and anxiety  morphine Concentrate 10 milliGRAM(s) Oral every 1 hour PRN respiratory distress or pain

## 2019-10-21 NOTE — DISCHARGE NOTE FOR THE EXPIRED PATIENT - HOSPITAL COURSE
88yo W Female sent from [facility] for worsening generalized weakness, lethargy and respiratory distress.  The pt was noted to be hypoxic and in pulmonary edema with a low Na of 124.  She was given IV lasix and a trial on a NRM but remained hypoxic at 80%. She was intubated and placed on ventilator support.  The pt had recently been hospitalized at the [facility] and sent to SNF for rehabilitation.  She has multiple medical issues and is mostly bed bound.  The PMHx includes:  HTN, ASHD, CAD, CHF, afib ( no AC), DLD, Hypothyroidism CKD, Parkinsons, Mobility Dysfunction, OA, DDD, DJD, Osteoporosis, Kyphosis, Rib Fx.    Ultimately found to have suspected aspiration pneumonia on presentation. Patient was pending for transfer back to [facility] and transtion over to Hospice. She passed away while awaiting transfer.

## 2019-10-21 NOTE — PROGRESS NOTE ADULT - SUBJECTIVE AND OBJECTIVE BOX
BOB ENGLE  87y  Female    Patient is a 87y old  Female who presents with a chief complaint of desaturation (21 Oct 2019 09:28)      SUBJECTIVE/OVERNIGHT EVENTS:  None    PAST MEDICAL & SURGICAL HISTORY:  Hyperglycemia  Dysphagia  Respiratory distress  Parkinson disease  Hypothyroid  Rib fractures  Congestive heart failure  Hypertension  No significant past surgical history    MEDICATIONS  (STANDING):  amLODIPine   Tablet 10 milliGRAM(s) Oral daily  BACItracin   Ointment 1 Application(s) Topical two times a day  carbidopa/levodopa  10/100 1 Tablet(s) Oral daily  carbidopa/levodopa  25/100 1 Tablet(s) Oral at bedtime  chlorhexidine 0.12% Liquid 15 milliLiter(s) Oral Mucosa every 12 hours  clotrimazole 1% Topical Cream - Peds 1 Application(s) Topical two times a day  furosemide   Injectable 40 milliGRAM(s) IV Push daily  insulin glargine Injectable (LANTUS) 6 Unit(s) SubCutaneous at bedtime  levothyroxine 75 MICROGram(s) Oral daily  pantoprazole  Injectable 40 milliGRAM(s) IV Push every 12 hours  senna 2 Tablet(s) Oral at bedtime    MEDICATIONS  (PRN):  morphine  - Injectable 2 milliGRAM(s) IV Push every 4 hours PRN respiratory distress and pain      OBJECTIVE:    Vital Signs Last 24 Hrs  T(C): 35.9 (21 Oct 2019 07:47), Max: 35.9 (21 Oct 2019 07:47)  T(F): 96.7 (21 Oct 2019 07:47), Max: 96.7 (21 Oct 2019 07:47)  HR: 76 (21 Oct 2019 07:47) (75 - 89)  BP: 153/70 (21 Oct 2019 07:47) (141/74 - 153/70)  BP(mean): 100 (21 Oct 2019 07:47) (98 - 102)  RR: 20 (21 Oct 2019 07:47) (20 - 20)  SpO2: --    General: In NAD  HEENT: Nasal Cannula   Neck: Supple   No Cervical LN    Lungs: Decreased L lung sounds  Cardiovascular: Regular   Abdomen: Soft, + BS  Extremities: No Clubbing   Skin: Intact  Neurological: non Focal, awake but lethargic     I&O's Summary    20 Oct 2019 07:01  -  21 Oct 2019 07:00  --------------------------------------------------------  IN: 1360 mL / OUT: 502 mL / NET: 858 mL

## 2019-10-21 NOTE — PROGRESS NOTE ADULT - REASON FOR ADMISSION
desaturation
desaturation, acute hypoxic RF, Pul Edema, sp intubation, Hyponatremia
Acute on chronic hypoxic RF, sp intubation, Hyponatremia
acute on chronic hypoxemic RF, GHASSAN on CKD
desaturation
Acute on chronic hypoxic RF, sp intubation, volume overload
acute  on chronic hypoxic respiratory failure
acute on chronic respiratory failure and volume overload
desaturation
generalized weakness, lethargy, acute on chronic hypoxic respiratory failure sp intubation
desaturation

## 2019-10-21 NOTE — PROGRESS NOTE ADULT - SUBJECTIVE AND OBJECTIVE BOX
Hospital Day:  8d    Subjective:    Patient is a 87y old  Female who presents with a chief complaint of desaturation (20 Oct 2019 18:25)      Past Medical Hx:   Hyperglycemia  Dysphagia  Respiratory distress  Parkinson disease  Hypothyroid  Rib fractures  Congestive heart failure  Hypertension    Past Sx:  No significant past surgical history    Allergies:  No Known Allergies    Current Meds:   Standng Meds:  amLODIPine   Tablet 10 milliGRAM(s) Oral daily  BACItracin   Ointment 1 Application(s) Topical two times a day  carbidopa/levodopa  10/100 1 Tablet(s) Oral daily  carbidopa/levodopa  25/100 1 Tablet(s) Oral at bedtime  chlorhexidine 0.12% Liquid 15 milliLiter(s) Oral Mucosa every 12 hours  clotrimazole 1% Topical Cream - Peds 1 Application(s) Topical two times a day  furosemide   Injectable 40 milliGRAM(s) IV Push daily  insulin glargine Injectable (LANTUS) 6 Unit(s) SubCutaneous at bedtime  levothyroxine 75 MICROGram(s) Oral daily  pantoprazole  Injectable 40 milliGRAM(s) IV Push every 12 hours  senna 2 Tablet(s) Oral at bedtime    PRN Meds:  morphine  - Injectable 2 milliGRAM(s) IV Push every 4 hours PRN respiratory distress and pain    HOME MEDICATIONS:  albuterol 90 mcg/inh inhalation aerosol: 2 puff(s) inhaled 4 times a day, As Needed for dyspnea  amLODIPine 10 mg oral tablet: 1 tab(s) orally once a day  aspirin 81 mg oral tablet, chewable: 1 tab(s) orally once a day  budesonide-formoterol 160 mcg-4.5 mcg/inh inhalation aerosol: 2 puff(s) inhaled 2 times a day  carbidopa-levodopa 10 mg-100 mg oral tablet: 1 tab(s) orally once a day  carbidopa-levodopa 25 mg-100 mg oral tablet: 1 tab(s) orally once a day (at bedtime)  carvedilol 25 mg oral tablet: 1 tab(s) orally every 12 hours  cloNIDine 0.2 mg oral tablet: 1 tab(s) orally every 8 hours  clotrimazole 1% topical cream: Apply topically to affected areas 2 times a day, apply to circular eryrhmatous blotches on arms/legs and any where else they are noticed X 10 days: likely a Fungal Rash  docusate sodium 10 mg/mL oral liquid: 10 milliliter(s) orally 3 times a day  DULoxetine 60 mg oral delayed release capsule: 1 cap(s) orally once a day  enoxaparin: 30 unit(s) subcutaneously once a day unill fully amnulatory then can discontinue Rx  hydroCHLOROthiazide 25 mg oral tablet: 1 tab(s) orally once a day (at bedtime)  insulin glargine: 6 unit(s)  once a day (at bedtime)  isosorbide mononitrate 30 mg oral tablet, extended release: 1 tab(s) orally once a day (in the morning)  levothyroxine 75 mcg (0.075 mg) oral tablet: 1 tab(s) orally once a day  losartan 100 mg oral tablet: 1 tab(s) orally once a day  Multiple Vitamins oral tablet, chewable: 1 tab(s) orally once a day  nystatin 100,000 units/g topical powder: 1 application topically 2 times a day  pantoprazole 40 mg oral delayed release tablet: 1 tab(s) orally once a day (before a meal)  potassium chloride 20 mEq oral powder for reconstitution: 1 packet(s) orally once a day  senna oral tablet: 2 tab(s) orally once a day (at bedtime)      Vital Signs:   T(F): 96.7 (10-21-19 @ 07:47), Max: 96.7 (10-21-19 @ 07:47)  HR: 76 (10-21-19 @ 07:47) (75 - 89)  BP: 153/70 (10-21-19 @ 07:47) (141/74 - 153/70)  RR: 20 (10-21-19 @ 07:47) (20 - 20)  SpO2: --      10-20-19 @ 07:01  -  10-21-19 @ 07:00  --------------------------------------------------------  IN: 1360 mL / OUT: 502 mL / NET: 858 mL        Physical Exam:   GENERAL: NAD  HEENT: NCAT  CHEST/LUNG: CTAB  HEART: Regular rate and rhythm; s1 s2 appreciated, No murmurs, rubs, or gallops  ABDOMEN: Soft, Nontender, Nondistended; Bowel sounds present  EXTREMITIES: No LE edema b/l  NERVOUS SYSTEM:  Alert & Oriented X3        Labs:     Serum Pro-Brain Natriuretic Peptide: 88564 pg/mL (10-13-19 @ 06:13)  Serum Pro-Brain Natriuretic Peptide: 49224 pg/mL (10-12-19 @ 21:55)    Radiology:   None Today    Assessment and Plan:   This is an 87 year old female with PMHx of Atrial fibrillation not on anticoagulation, Hypertension, CKD stage III, Parkinson's Dementia, Hypothyroidism and DLD who was brought in from [facility] for increased and lethargy and SOB    #Acute Hypoxic Respiratory Failure (resolved)  - Intubated from 10/13-10/16 Hospital Day:  8d    Subjective:    Patient is a 87y old  Female who presents with a chief complaint of desaturation (20 Oct 2019 18:25)    No overnight events. Seen and examined at bedside. Reported no acute complaints. Pleasant and conversive; pending for transfer back to SNF for LTC vs Hospice.     Past Medical Hx:   Hyperglycemia  Dysphagia  Respiratory distress  Parkinson disease  Hypothyroid  Rib fractures  Congestive heart failure  Hypertension    Past Sx:  No significant past surgical history    Allergies:  No Known Allergies    Current Meds:   Standng Meds:  amLODIPine   Tablet 10 milliGRAM(s) Oral daily  BACItracin   Ointment 1 Application(s) Topical two times a day  carbidopa/levodopa  10/100 1 Tablet(s) Oral daily  carbidopa/levodopa  25/100 1 Tablet(s) Oral at bedtime  chlorhexidine 0.12% Liquid 15 milliLiter(s) Oral Mucosa every 12 hours  clotrimazole 1% Topical Cream - Peds 1 Application(s) Topical two times a day  furosemide   Injectable 40 milliGRAM(s) IV Push daily  insulin glargine Injectable (LANTUS) 6 Unit(s) SubCutaneous at bedtime  levothyroxine 75 MICROGram(s) Oral daily  pantoprazole  Injectable 40 milliGRAM(s) IV Push every 12 hours  senna 2 Tablet(s) Oral at bedtime    PRN Meds:  morphine  - Injectable 2 milliGRAM(s) IV Push every 4 hours PRN respiratory distress and pain    HOME MEDICATIONS:  albuterol 90 mcg/inh inhalation aerosol: 2 puff(s) inhaled 4 times a day, As Needed for dyspnea  amLODIPine 10 mg oral tablet: 1 tab(s) orally once a day  aspirin 81 mg oral tablet, chewable: 1 tab(s) orally once a day  budesonide-formoterol 160 mcg-4.5 mcg/inh inhalation aerosol: 2 puff(s) inhaled 2 times a day  carbidopa-levodopa 10 mg-100 mg oral tablet: 1 tab(s) orally once a day  carbidopa-levodopa 25 mg-100 mg oral tablet: 1 tab(s) orally once a day (at bedtime)  carvedilol 25 mg oral tablet: 1 tab(s) orally every 12 hours  cloNIDine 0.2 mg oral tablet: 1 tab(s) orally every 8 hours  clotrimazole 1% topical cream: Apply topically to affected areas 2 times a day, apply to circular eryrhmatous blotches on arms/legs and any where else they are noticed X 10 days: likely a Fungal Rash  docusate sodium 10 mg/mL oral liquid: 10 milliliter(s) orally 3 times a day  DULoxetine 60 mg oral delayed release capsule: 1 cap(s) orally once a day  enoxaparin: 30 unit(s) subcutaneously once a day unill fully amnulatory then can discontinue Rx  hydroCHLOROthiazide 25 mg oral tablet: 1 tab(s) orally once a day (at bedtime)  insulin glargine: 6 unit(s)  once a day (at bedtime)  isosorbide mononitrate 30 mg oral tablet, extended release: 1 tab(s) orally once a day (in the morning)  levothyroxine 75 mcg (0.075 mg) oral tablet: 1 tab(s) orally once a day  losartan 100 mg oral tablet: 1 tab(s) orally once a day  Multiple Vitamins oral tablet, chewable: 1 tab(s) orally once a day  nystatin 100,000 units/g topical powder: 1 application topically 2 times a day  pantoprazole 40 mg oral delayed release tablet: 1 tab(s) orally once a day (before a meal)  potassium chloride 20 mEq oral powder for reconstitution: 1 packet(s) orally once a day  senna oral tablet: 2 tab(s) orally once a day (at bedtime)      Vital Signs:   T(F): 96.7 (10-21-19 @ 07:47), Max: 96.7 (10-21-19 @ 07:47)  HR: 76 (10-21-19 @ 07:47) (75 - 89)  BP: 153/70 (10-21-19 @ 07:47) (141/74 - 153/70)  RR: 20 (10-21-19 @ 07:47) (20 - 20)  SpO2: --      10-20-19 @ 07:01  -  10-21-19 @ 07:00  --------------------------------------------------------  IN: 1360 mL / OUT: 502 mL / NET: 858 mL    Physical Exam:   GENERAL: NAD, lying in bed, appearing in no distress, responding to painful stimuli   HEENT: NCAT, PERRLA, EOMI   CHEST/LUNG: Breath sounds coarse bilaterally with no wheezing, rhonchi, rales  HEART: Regular rate and rhythm; s1 s2 appreciated, No murmurs, rubs, or gallops  ABDOMEN: Soft, Nontender, Nondistended; Bowel sounds present  EXTREMITIES: No LE edema b/l, Peripheral pulses 2+, No cyanosis, no clubbing  NERVOUS SYSTEM: Responding to painful stimuli     Labs:     Serum Pro-Brain Natriuretic Peptide: 37521 pg/mL (10-13-19 @ 06:13)  Serum Pro-Brain Natriuretic Peptide: 88854 pg/mL (10-12-19 @ 21:55)    Radiology:   None Today    Assessment and Plan:   This is an 87 year old female with PMHx of Atrial fibrillation not on anticoagulation, Hypertension, CKD stage III, Parkinson's Dementia, Hypothyroidism and DLD who was brought in from [facility] for increased and lethargy and SOB    #Failure to thrive secondary to Parkinson's, mobility dysfunction, frequent falls  - Noted bedbound status  - Family requesting no increase in level of care  - No BIPAP, HD, pressors, Intubation  - DNR/DNI  - Can return to SNF and transition to hospice care over there.   - Sinemet: 10/100 daily and 25/100 at bedtime    #H/o HTN, ASHD, CAD, A-fib, CHF, DLD, Hypothyroidism, OA, DDD, Osteoporosis  - Continue on Amlodipine, Furosemide, Levothyroxine    #DM  - Lantus 6 units at bedtime     #Acute Hypoxic Respiratory Failure (resolved)  - Intubated from 10/13-10/16  - Stable on NC    Activity: As tolerated  Diet: NPO with Tube Feeds  DVT ppx: SCD  GI ppx: Protonix  Code Status: DNR/DNI no escalation of care; transition to hospice  DISPO: Pending discharge for hospice.

## 2019-10-30 DIAGNOSIS — R06.02 SHORTNESS OF BREATH: ICD-10-CM

## 2019-10-30 DIAGNOSIS — N18.9 CHRONIC KIDNEY DISEASE, UNSPECIFIED: ICD-10-CM

## 2019-10-30 DIAGNOSIS — Z74.01 BED CONFINEMENT STATUS: ICD-10-CM

## 2019-10-30 DIAGNOSIS — I48.91 UNSPECIFIED ATRIAL FIBRILLATION: ICD-10-CM

## 2019-10-30 DIAGNOSIS — R62.7 ADULT FAILURE TO THRIVE: ICD-10-CM

## 2019-10-30 DIAGNOSIS — E03.9 HYPOTHYROIDISM, UNSPECIFIED: ICD-10-CM

## 2019-10-30 DIAGNOSIS — J96.01 ACUTE RESPIRATORY FAILURE WITH HYPOXIA: ICD-10-CM

## 2019-10-30 DIAGNOSIS — J69.0 PNEUMONITIS DUE TO INHALATION OF FOOD AND VOMIT: ICD-10-CM

## 2019-10-30 DIAGNOSIS — E87.5 HYPERKALEMIA: ICD-10-CM

## 2019-10-30 DIAGNOSIS — Z51.5 ENCOUNTER FOR PALLIATIVE CARE: ICD-10-CM

## 2019-10-30 DIAGNOSIS — Z66 DO NOT RESUSCITATE: ICD-10-CM

## 2019-10-30 DIAGNOSIS — J98.11 ATELECTASIS: ICD-10-CM

## 2019-10-30 DIAGNOSIS — E87.1 HYPO-OSMOLALITY AND HYPONATREMIA: ICD-10-CM

## 2019-10-30 DIAGNOSIS — G20 PARKINSON'S DISEASE: ICD-10-CM

## 2019-10-30 DIAGNOSIS — N17.9 ACUTE KIDNEY FAILURE, UNSPECIFIED: ICD-10-CM

## 2019-10-30 DIAGNOSIS — Z79.82 LONG TERM (CURRENT) USE OF ASPIRIN: ICD-10-CM

## 2019-10-30 DIAGNOSIS — E87.0 HYPEROSMOLALITY AND HYPERNATREMIA: ICD-10-CM

## 2019-10-30 DIAGNOSIS — I46.9 CARDIAC ARREST, CAUSE UNSPECIFIED: ICD-10-CM

## 2019-10-30 DIAGNOSIS — I13.0 HYPERTENSIVE HEART AND CHRONIC KIDNEY DISEASE WITH HEART FAILURE AND STAGE 1 THROUGH STAGE 4 CHRONIC KIDNEY DISEASE, OR UNSPECIFIED CHRONIC KIDNEY DISEASE: ICD-10-CM

## 2021-03-29 NOTE — PRE-OP CHECKLIST - ISOLATION PRECAUTIONS
Problem: Ineffective Coping  Goal: Participates in unit activities  Description: Interventions:  - Provide therapeutic environment   - Provide required programming   - Redirect inappropriate behaviors   Outcome: Not Progressing   Patient continues to remain in bed for most of the day; he is unwilling to join groups and refuses some meals as well 
none
none

## 2022-03-19 NOTE — PROGRESS NOTE ADULT - SUBJECTIVE AND OBJECTIVE BOX
86 yo f h/o Afib CHF HTN CKD Parkinson’s hypothyroid hyperlipidemia presents with cough and SOB x 3 days.   pt c/o worsening SOB, even at rest. also had intermitted chest pain/dyspnea with breathing, pain 5/10, now resolved   Denies fever, chills, CP N/V/D numbness/tingling   PAST MEDICAL & SURGICAL HISTORY:  Parkinson disease  Hypothyroid  Rib fractures  Congestive heart failure  Hypertension  No significant past surgical history    MEDICATIONS  (STANDING):  ALBUTerol/ipratropium for Nebulization 3 milliLiter(s) Nebulizer every 6 hours  amiodarone    Tablet 200 milliGRAM(s) Oral daily  aspirin  chewable 81 milliGRAM(s) Oral daily  atorvastatin 10 milliGRAM(s) Oral at bedtime  carbidopa/levodopa  10/100 1 Tablet(s) Oral at bedtime  carbidopa/levodopa  25/100 1 Tablet(s) Oral daily  carvedilol Oral Tab/Cap - Peds 25 milliGRAM(s) Oral two times a day  chlorhexidine 4% Liquid 1 Application(s) Topical <User Schedule>  DULoxetine 60 milliGRAM(s) Oral daily  famotidine    Tablet 20 milliGRAM(s) Oral daily  furosemide   Injectable 40 milliGRAM(s) IV Push daily  heparin  Injectable 5000 Unit(s) SubCutaneous every 12 hours  isosorbide   mononitrate ER Tablet (IMDUR) 30 milliGRAM(s) Oral daily  levothyroxine 75 MICROGram(s) Oral daily  pregabalin 100 milliGRAM(s) Oral two times a day    MEDICATIONS  (PRN):  temazepam 30 milliGRAM(s) Oral at bedtime PRN Insomnia    Vital Signs Last 24 Hrs  T(C): 35.8 (10 Jul 2019 04:56), Max: 37.6 (09 Jul 2019 20:29)  T(F): 96.4 (10 Jul 2019 04:56), Max: 99.7 (09 Jul 2019 20:29)  HR: 52 (10 Jul 2019 06:27) (52 - 68)  BP: 172/74 (10 Jul 2019 06:27) (172/74 - 197/86)  BP(mean): --  RR: 16 (10 Jul 2019 04:56) (16 - 20)  SpO2: 96% (09 Jul 2019 22:15) (96% - 96%)    	Gen: lying in bed, NAD   	HEENT: PERRL No nasal discharge. Moist mucus membranes.  	Neck: Supple, non tender, full range of motion.  	CV: RRR no murmurs, rubs, or gallops. +S1S2.   	Pulm: speaking in full sentences, b/l rhochi and crackles   	Abd: soft NT ND +BS.   	Ext: Warm and well perfused x4, moving all extremities, b/l pedal edema   	Psy: Cooperative, appropriate.   Skin: Warm, dry, no rash    CARDIAC MARKERS ( 09 Jul 2019 21:18 )  x     / <0.01 ng/mL / x     / x     / x                              12.9   6.88  )-----------( 175      ( 09 Jul 2019 21:18 )             40.4   07-09    139  |  99  |  33<H>  ----------------------------<  128<H>  5.1<H>   |  29  |  1.1    Ca    9.1      09 Jul 2019 21:18  Mg     2.2     07-09    TPro  7.2  /  Alb  4.3  /  TBili  0.8  /  DBili  x   /  AST  29  /  ALT  10  /  AlkPhos  133<H>  07-09 no

## 2023-04-14 NOTE — ED ADULT TRIAGE NOTE - NS ED NURSE AMBULANCES
Splint Application    Date/Time: 4/14/2023 2:02 PM  Performed by: Jyoti Carrillo CMA  Authorized by: Andrey Crowe DO     Consent:     Consent obtained:  Verbal    Consent given by:  Patient  Universal protocol:     Patient identity confirmed:  Verbally with patient  Procedure details:     Location:  Finger    Finger location:  R thumb    Upper extremity splint type: Velcro thumb splint.    Attestation: Splint applied and adjusted personally by me    Post-procedure details:     Procedure completion:  Tolerated       [facility]

## 2023-08-05 NOTE — PROGRESS NOTE ADULT - PROVIDER SPECIALTY LIST ADULT
Hospitalist
In the discharge instructions there is a phone number for poison control.  At obtaining time you have questions please give them a call.    If you have any new events with injections, please call POISON control.    Please ensure that all medications are locked up and out of reach of children    If your child looks worse, has persistent vomiting, a lot abdominal pain, please return the emergency department  
Internal Medicine

## 2023-09-28 NOTE — PATIENT PROFILE ADULT. - [facility]  INTERNAL MEDICINE OFFICE VISIT    CHIEF COMPLAINT:    Chief Complaint   Patient presents with   • Pre-Op Exam     Stent removal/ Reji Ramirez/ 10-       SUBJECTIVE:  Daisha Mendez is a 81 year old female who presented requesting evaluation for preop clearance for above procedure.  Patient is here with her caregiver she is doing very well is in no acute distress.  She seems quite happy and is at her baseline of memory impairment.  She was seen by Cardiology today they are scheduling a stress test for tomorrow to finally clear her apparently on her pacemaker cardiology noted over the last 4 months there was 8-10 minutes of atrial fib.  Other than that there are no acute issues      OBJECTIVE:  PROBLEM LIST:   Patient Active Problem List   Diagnosis   • Hypercholesterolemia   • Depression   • Alzheimer's dementia without behavioral disturbance (CMD)   • Benign essential HTN   • Hypothyroidism due to acquired atrophy of thyroid   • Medtronic dual chamber pacemaker (8/24/19)   • Sinus pause   • Stage 3a chronic kidney disease (CMD)   • Anemia   • Paroxysmal atrial fibrillation (CMD)       PAST HISTORIES:   ALLERGIES:   Allergen Reactions   • Bee SWELLING   • Ticagrelor Other (See Comments)     SOB   • Dust Other (See Comments)     Seasonal allergies       Current Outpatient Medications   Medication Sig Dispense Refill   • cephalexin (Keflex) 500 MG capsule Take 1 capsule by mouth in the morning and 1 capsule in the evening. Do all this for 7 days. 14 capsule 0   • escitalopram (LEXAPRO) 10 MG tablet TAKE ONE TABLET BY MOUTH ONCE DAILY 90 tablet 1   • levothyroxine 88 MCG tablet TAKE ONE TABLET BY MOUTH ONCE DAILY 90 tablet 1   • losartan (COZAAR) 100 MG tablet TAKE ONE TABLET BY MOUTH ONCE DAILY 90 tablet 1   • pantoprazole (PROTONIX) 40 MG tablet TAKE ONE TABLET BY MOUTH ONCE DAILY 90 tablet 1   • clopidogrel (PLAVIX) 75 MG tablet TAKE ONE (1) TABLET BY MOUTH DAILY. FOLLOW UP  NEEDED 90 tablet 0   • metoPROLOL succinate (Toprol XL) 25 MG 24 hr tablet Take 1 tablet by mouth daily. 90 tablet 1   • carvedilol (COREG) 3.125 MG tablet Take 1 tablet by mouth in the morning and 1 tablet in the evening. Take with meals. 180 tablet 1   • atorvastatin (LIPITOR) 80 MG tablet TAKE ONE TABLET BY MOUTH ONCE DAILY 90 tablet 3   • donepezil (ARICEPT) 10 MG tablet TAKE ONE TABLET BY MOUTH NIGHTLY 90 tablet 3   • cholecalciferol (cholecalciferol) 25 mcg (1,000 units) tablet Take 1 tablet by mouth daily. 90 tablet 1   • Multiple Vitamins-Minerals (VITAMIN - THERAPEUTIC MULTIVITAMINS W/MINERALS) Tab Take 1 tablet by mouth daily.     • Omega-3 Fatty Acids (FISH OIL) 1000 MG capsule Take 1 g by mouth daily.        No current facility-administered medications for this visit.     Past Medical History:   Diagnosis Date   • Anemia 2021   • Anxiety    • Coronary artery disease    • Depression    • Essential (primary) hypertension    • Hypercholesterolemia    • Hypothyroidism    • Kidney stone    • Myocardial infarction (CMD)    • Sinusitis, chronic     seasonal   • Stage 3a chronic kidney disease (CMD) 2022   • Uncomplicated senile dementia (CMD)      Past Surgical History:   Procedure Laterality Date   • Cardiac catheterization/possible ptca/possible stent  2018    1 Stent to LAD   • Colonoscopy diagnostic  10/18/2011    Colonoscopy, Dx   • Dexa bone density axial skeleton  2011   • Pacemaker     • Screening mammography  2011     Social History     Tobacco Use   • Smoking status: Former     Current packs/day: 0.00     Average packs/day: 1 pack/day for 10.0 years (10.0 ttl pk-yrs)     Types: Cigarettes     Start date: 1955     Quit date: 1965     Years since quittin.7   • Smokeless tobacco: Never   Substance Use Topics   • Alcohol use: No   • Drug use: No     Family History   Problem Relation Age of Onset   • Dementia/Alzheimers Mother    • Diabetes Father    •  Hyperlipidemia Father    • Heart disease Father    • Cancer Brother        REVIEW OF SYSTEMS:   Review of Systems   Constitutional: Negative for appetite change, chills, diaphoresis, fatigue and fever.   HENT: Negative for congestion, ear discharge, ear pain, facial swelling, hearing loss, rhinorrhea, sinus pain, sore throat, tinnitus and trouble swallowing.    Eyes: Negative for photophobia, pain, discharge and visual disturbance.   Respiratory: Negative for cough, choking, chest tightness, shortness of breath and wheezing.    Cardiovascular: Negative for chest pain, palpitations and leg swelling.   Gastrointestinal: Negative for abdominal distention, abdominal pain, blood in stool, constipation, diarrhea, nausea and vomiting.   Endocrine: Negative for cold intolerance, heat intolerance, polydipsia, polyphagia and polyuria.   Genitourinary: Negative for difficulty urinating, dysuria, flank pain, frequency, pelvic pain, urgency and vaginal discharge.   Musculoskeletal: Negative for arthralgias, back pain, gait problem, joint swelling, myalgias, neck pain and neck stiffness.   Skin: Negative for color change, pallor and rash.   Neurological: Negative for dizziness, tremors, seizures, syncope, weakness, light-headedness, numbness and headaches.   Hematological: Negative for adenopathy. Does not bruise/bleed easily.   Psychiatric/Behavioral: Positive for confusion (Baseline dementia). Negative for agitation, self-injury, sleep disturbance and suicidal ideas. The patient is not nervous/anxious.         PHYSICAL EXAM:    Vital Signs:    Vitals:    09/28/23 1221   BP: 116/76   Pulse: 88   Resp: 18   Weight: 65.3 kg (144 lb)   Height: 5' 4\" (1.626 m)     Physical Exam  Constitutional:       General: She is not in acute distress.     Appearance: Normal appearance. She is well-developed. She is not diaphoretic.   HENT:      Head: Normocephalic and atraumatic.      Right Ear: Tympanic membrane, ear canal and external ear  normal. There is no impacted cerumen.      Left Ear: Tympanic membrane, ear canal and external ear normal. There is no impacted cerumen.      Nose: Nose normal.      Mouth/Throat:      Mouth: Mucous membranes are moist.      Pharynx: Oropharynx is clear. No oropharyngeal exudate.   Eyes:      General: No scleral icterus.        Right eye: No discharge.         Left eye: No discharge.      Extraocular Movements: Extraocular movements intact.      Conjunctiva/sclera: Conjunctivae normal.      Pupils: Pupils are equal, round, and reactive to light.   Neck:      Thyroid: No thyromegaly.      Vascular: No JVD.      Trachea: No tracheal deviation.   Cardiovascular:      Rate and Rhythm: Normal rate and regular rhythm.      Pulses: Normal pulses.      Heart sounds: Normal heart sounds. No murmur heard.     No friction rub. No gallop.   Pulmonary:      Effort: Pulmonary effort is normal. No respiratory distress.      Breath sounds: Normal breath sounds. No wheezing or rales.   Chest:      Chest wall: No tenderness.   Abdominal:      General: Abdomen is flat. Bowel sounds are normal. There is no distension.      Palpations: Abdomen is soft. There is no mass.      Tenderness: There is no abdominal tenderness. There is no guarding or rebound.   Musculoskeletal:         General: No tenderness or deformity.      Cervical back: Normal range of motion and neck supple.   Lymphadenopathy:      Cervical: No cervical adenopathy.   Skin:     General: Skin is warm and dry.      Coloration: Skin is not pale.      Findings: No erythema or rash.   Neurological:      General: No focal deficit present.      Mental Status: She is alert and oriented to person, place, and time.      Cranial Nerves: No cranial nerve deficit.      Deep Tendon Reflexes: Reflexes are normal and symmetric.   Psychiatric:         Behavior: Behavior normal.         Thought Content: Thought content normal.      Comments: Baseline dementia          LAB  RESULTS:  Pertinent labs reviewed.    ASSESSMENT/PLAN:     1. Other specified pre-operative examination  Patient is medically clear from my perspective we still await her stress test and cardiology's final clearance    2. Kidney stone  I will medically clear her and Cardiology will clear her once her stress test is done    3. Late onset Alzheimer's disease without behavioral disturbance (CMD)  She is at her baseline continue Aricept 10 milligram    4. Stage 3a chronic kidney disease (CMD)  We are monitoring 5. Benign essential HTN  Continue losartan 100 milligrams, Toprol-XL and Coreg as per cardiology    6. Medtronic dual chamber pacemaker (8/24/19)  Cardiology notes reviewed this visit as noted above there was several minutes of atrial fib noted in the last 4 months    7. Coronary artery disease involving native coronary artery of native heart without angina pectoris  Stress test is scheduled apparently will be done tomorrow as per cardiology    8. Hypothyroidism due to acquired atrophy of thyroid  Continue levothyroxine 88 mcg    9. Paroxysmal atrial fibrillation (CMD)  Cardiology is managing    10. Gastroesophageal reflux disease, unspecified whether esophagitis present  Continue pantoprazole           Orders Placed This Encounter   • Microalbumin Urine Random          Return in about 3 months (around 12/28/2023) for For Medicare wellness visit and follow-up.    Instructions provided as documented in the after visit summary.    The patient indicated understanding of the diagnosis and agreed with the plan of care.       unsure

## 2024-04-02 NOTE — ED CDU PROVIDER INITIAL DAY NOTE - NS_OBSORDERDATE_ED_A_ED
11-Aug-2019 16:21 [Straight Catheterization] : insertion of a straight catheter [Urinary Frequency] : urinary frequency [Patient] : the patient [Time Patient Exited Room ___] : patient exited room: [unfilled] [Intraurethral 2% Lidocaine Gel ___ (cc)] : Local Anesthesia: [unfilled] cc of 2% Lidocaine Gel was administered intraurethrally  [___ Fr Straight Tip] : a [unfilled] in [place] straight tip catheter [Clear] : clear [None] : there were no complications with the catheter insertion [No Complications] : no complications [Tolerated Well] : the patient tolerated the procedure well [Post procedure instructions and information given] : Post procedure instructions and information were given and reviewed with patient. [1] : 1 [FreeTextEntry1] : cathed to obtain pvr and uncontam specimen

## 2025-01-14 NOTE — PHYSICAL THERAPY INITIAL EVALUATION ADULT - PLANNED THERAPY INTERVENTIONS, PT EVAL
Per Dr. Hunt: Patient should be on Toprol XL 25 daily, refer to electrophysiology, Dr. Mohsin Khan.     Spoke with patient regarding Dr. Hunt's recommendations. Patient agreeable with plan. Patient given phone number to call and schedule EP consult. Patient advised to contact office with new or worsening symptoms. Patient verbalized understanding and had no further questions or concerns. New prescription of metoprolol d/t dose change was sent to patient's preferred pharmacy.  
transfer training/balance training/bed mobility training/gait training/strengthening
transfer training/strengthening/balance training/gait training/bed mobility training

## 2025-05-14 NOTE — PATIENT PROFILE ADULT - NSPROMEDSPATCH_GEN_A_NUR
none Length Of Therapy: 6 months Add High Risk Medication Management Associated Diagnosis?: No Detail Level: Zone

## 2025-05-28 NOTE — ED ADULT NURSE NOTE - FALL HARM RISK
Pt arrives EMS with c/o generalized weakness and fatigue. Pt states that [facility]  Pt reports that MD that she wants to \"destroy herself\" and [facility].   
fall